# Patient Record
Sex: FEMALE | Race: WHITE | NOT HISPANIC OR LATINO | Employment: OTHER | URBAN - METROPOLITAN AREA
[De-identification: names, ages, dates, MRNs, and addresses within clinical notes are randomized per-mention and may not be internally consistent; named-entity substitution may affect disease eponyms.]

---

## 2017-01-01 ASSESSMENT — ENCOUNTER SYMPTOMS
STIFFNESS: 1
LOSS OF CONSCIOUSNESS: 0
DYSPNEA ON EXERTION: 1
DIZZINESS: 0
HEMOPTYSIS: 0
TREMORS: 0
POLYPHAGIA: 0
FEVER: 0
POSTURAL DYSPNEA: 0
INCREASED ENERGY: 1
POLYDIPSIA: 0
SNORES LOUDLY: 0
ALTERED TEMPERATURE REGULATION: 0
SPUTUM PRODUCTION: 1
DISTURBANCES IN COORDINATION: 0
WEIGHT LOSS: 0
MUSCLE CRAMPS: 0
TINGLING: 0
NUMBNESS: 0
CHILLS: 0
WHEEZING: 1
COUGH: 1
BACK PAIN: 1
JOINT SWELLING: 0
MUSCLE WEAKNESS: 1
ARTHRALGIAS: 1
SPEECH CHANGE: 0
DECREASED APPETITE: 1
SEIZURES: 0
HALLUCINATIONS: 0
COUGH DISTURBING SLEEP: 1
SHORTNESS OF BREATH: 1
PARALYSIS: 0
WEAKNESS: 0
MEMORY LOSS: 0
RESPIRATORY PAIN: 0
HEADACHES: 1
NIGHT SWEATS: 0
FATIGUE: 1
MYALGIAS: 1
WEIGHT GAIN: 0
NECK PAIN: 1

## 2017-01-03 DIAGNOSIS — M25.512 LEFT SHOULDER PAIN, UNSPECIFIED CHRONICITY: Primary | ICD-10-CM

## 2017-01-09 ENCOUNTER — OFFICE VISIT (OUTPATIENT)
Dept: ORTHOPEDICS | Facility: CLINIC | Age: 67
End: 2017-01-09

## 2017-01-09 VITALS — HEIGHT: 65 IN | BODY MASS INDEX: 21.02 KG/M2 | WEIGHT: 126.2 LBS

## 2017-01-09 DIAGNOSIS — M75.122 COMPLETE ROTATOR CUFF TEAR OF LEFT SHOULDER: ICD-10-CM

## 2017-01-09 DIAGNOSIS — M25.512 LEFT SHOULDER PAIN, UNSPECIFIED CHRONICITY: ICD-10-CM

## 2017-01-09 DIAGNOSIS — M25.512 LEFT SHOULDER PAIN, UNSPECIFIED CHRONICITY: Primary | ICD-10-CM

## 2017-01-09 LAB
BASOPHILS # BLD AUTO: 0.1 10E9/L (ref 0–0.2)
BASOPHILS NFR BLD AUTO: 0.6 %
CRP SERPL-MCNC: 3.7 MG/L (ref 0–8)
DIFFERENTIAL METHOD BLD: ABNORMAL
EOSINOPHIL # BLD AUTO: 0.1 10E9/L (ref 0–0.7)
EOSINOPHIL NFR BLD AUTO: 0.9 %
ERYTHROCYTE [DISTWIDTH] IN BLOOD BY AUTOMATED COUNT: 12.9 % (ref 10–15)
ERYTHROCYTE [SEDIMENTATION RATE] IN BLOOD BY WESTERGREN METHOD: 15 MM/H (ref 0–30)
HCT VFR BLD AUTO: 40.5 % (ref 35–47)
HGB BLD-MCNC: 13.8 G/DL (ref 11.7–15.7)
IMM GRANULOCYTES # BLD: 0 10E9/L (ref 0–0.4)
IMM GRANULOCYTES NFR BLD: 0.3 %
LYMPHOCYTES # BLD AUTO: 3.6 10E9/L (ref 0.8–5.3)
LYMPHOCYTES NFR BLD AUTO: 28.8 %
MCH RBC QN AUTO: 30.9 PG (ref 26.5–33)
MCHC RBC AUTO-ENTMCNC: 34.1 G/DL (ref 31.5–36.5)
MCV RBC AUTO: 91 FL (ref 78–100)
MONOCYTES # BLD AUTO: 0.7 10E9/L (ref 0–1.3)
MONOCYTES NFR BLD AUTO: 5.5 %
NEUTROPHILS # BLD AUTO: 8 10E9/L (ref 1.6–8.3)
NEUTROPHILS NFR BLD AUTO: 63.9 %
NRBC # BLD AUTO: 0 10*3/UL
NRBC BLD AUTO-RTO: 0 /100
PLATELET # BLD AUTO: 456 10E9/L (ref 150–450)
RBC # BLD AUTO: 4.47 10E12/L (ref 3.8–5.2)
WBC # BLD AUTO: 12.4 10E9/L (ref 4–11)

## 2017-01-09 NOTE — NURSING NOTE
Teaching Flowsheet   Relevant Diagnosis: Shoulder pain  Teaching Topic: Pre-op for left arthroscopic biopsies for culture - surgery coordinator will call patient to schedule     Person(s) involved in teaching:   Patient     Motivation Level:  Asks Questions: Yes  Eager to Learn: Yes  Cooperative: Yes  Receptive (willing/able to accept information): Yes  Any cultural factors/Gnosticism beliefs that may influence understanding or compliance? No     Patient demonstrates understanding of the following:  Reason for the appointment, diagnosis and treatment plan: Yes  Knowledge of proper use of medications and conditions for which they are ordered (with special attention to potential side effects or drug interactions): Yes  Which situations necessitate calling provider and whom to contact: Yes    Needs to work on smoking cessation.  Hold daily Amoxicillin x 2 weeks before surgery  Will contact PMD concerning pain medication management after surgery     Proper use and care of sling prn (medical equip, care aids, etc.): Yes  Nutritional needs and diet plan: Yes  Pain management techniques: Yes  Wound Care: Yes  How and/when to access community resources: Yes     Instructional Materials Used/Given: Pre-op packet, surgical soap     Time spent with patient: 12.
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
## 2017-01-11 ENCOUNTER — TELEPHONE (OUTPATIENT)
Dept: FAMILY MEDICINE | Facility: CLINIC | Age: 67
End: 2017-01-11

## 2017-01-11 NOTE — TELEPHONE ENCOUNTER
Reason for Call:  Other - Requesting Pre - Op Physical    Detailed comments: Patient called and stated she is having a shoulder surgery on 1/23/17 and she has to have pre op before then. She would like to know if there is any way Dr. Burger could fit her in. She said she is available Mondays all day and Wednesdays and Fridays before 2 pm.    Phone Number Patient can be reached at: Home number on file [phone] (home)    Best Time: Anytime    Can we leave a detailed message on this number? YES    Call taken on 1/11/2017 at 3:08 PM by Cass Andrade

## 2017-01-18 ENCOUNTER — OFFICE VISIT (OUTPATIENT)
Dept: FAMILY MEDICINE | Facility: CLINIC | Age: 67
End: 2017-01-18
Payer: COMMERCIAL

## 2017-01-18 VITALS
HEART RATE: 90 BPM | OXYGEN SATURATION: 98 % | WEIGHT: 126 LBS | BODY MASS INDEX: 20.99 KG/M2 | HEIGHT: 65 IN | SYSTOLIC BLOOD PRESSURE: 132 MMHG | TEMPERATURE: 98.5 F | DIASTOLIC BLOOD PRESSURE: 83 MMHG

## 2017-01-18 DIAGNOSIS — G89.29 CHRONIC LEFT SHOULDER PAIN: ICD-10-CM

## 2017-01-18 DIAGNOSIS — F17.200 TOBACCO USE DISORDER: ICD-10-CM

## 2017-01-18 DIAGNOSIS — M75.122 COMPLETE ROTATOR CUFF TEAR OF LEFT SHOULDER: ICD-10-CM

## 2017-01-18 DIAGNOSIS — Z01.818 PREOP GENERAL PHYSICAL EXAM: Primary | ICD-10-CM

## 2017-01-18 DIAGNOSIS — M25.512 CHRONIC LEFT SHOULDER PAIN: ICD-10-CM

## 2017-01-18 PROCEDURE — 99214 OFFICE O/P EST MOD 30 MIN: CPT | Performed by: FAMILY MEDICINE
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
## 2017-01-18 NOTE — MR AVS SNAPSHOT
After Visit Summary   1/18/2017    Beatriz Francis    MRN: [id number]           Patient Information     Date Of Birth          1950        Visit Information        Provider Department      1/18/2017 10:40 AM Azalia Burger MD [facility]        Today's Diagnoses     Preop general physical exam    -  1     Chronic left shoulder pain         Complete rotator cuff tear of left shoulder         Tobacco use disorder           Care Instructions      Before Your Surgery      Call your surgeon if there is any change in your health. This includes signs of a cold or flu (such as a sore throat, runny nose, cough, rash or fever).    Do not smoke, drink alcohol or take over the counter medicine (unless your surgeon or primary care doctor tells you to) for the 24 hours before and after surgery.    If you take prescribed drugs: Follow your doctor s orders about which medicines to take and which to stop until after surgery.    Eating and drinking prior to surgery: follow the instructions from your surgeon    Take a shower or bath the night before surgery. Use the soap your surgeon gave you to gently clean your skin. If you do not have soap from your surgeon, use your regular soap. Do not shave or scrub the surgery site.  Wear clean pajamas and have clean sheets on your bed.   Before Your Surgery    Call your surgeon if there is any change in your health. This includes signs of a cold or flu (such as a sore throat, runny nose, cough, rash or fever).  Do not smoke, drink alcohol or take over the counter medicine (unless your surgeon or primary care doctor tells you to) for the 24 hours before and after surgery.  If you take prescribed drugs: Follow your doctor s orders about which medicines to take and which to stop until after surgery.  Eating and drinking prior to surgery: follow the instructions from your surgeon  Take a shower or bath the night before surgery. Use the soap your  surgeon gave you to gently clean your skin. If you do not have soap from your surgeon, use your regular soap. Do not shave or scrub the surgery site.  Wear clean pajamas and have clean sheets on your bed.         Follow-ups after your visit        Your next 10 appointments already scheduled     Jan 23, 2017   Procedure with Ankit Morton MD   [facility] ([facility])    [address] [place] [address]  5th [facility] [id number] [phone]           Located in the Clinics and Surgery Center at [address] [place] [address].   parking is very convenient and highly recommended.  is a $6 flat rate fee; no tips accepted.  Both  and self parkers should enter the main arrival plaza from [facility]; parking attendants will direct you based on your parking preference.            Jan 23, 2017  3:30 PM   Test/Procedure with  Generic    Services Department ([facility])    [address] [place] [id number]               Feb 01, 2017 10:20 AM   SHORT with Azalia Burger MD   [facility] ([facility])    [address] [place] [id number] [phone]           Your Arrival times is X, We need you to be here at this time to get checked in and have the assistant get you ready for the provider, which can take about 15 minutes. Your appointment time with your provider is at  X. Thank you.            Feb 13, 2017 12:50 PM   (Arrive by 12:35 PM)   RETURN SHOULDER with Ankit Morton MD   [facility] ([facility])    [address] [place] [address]  4th [facility] [id number] [phone]              Who to contact     If you have questions or need follow up information about today's clinic visit or your schedule please contact [facility]  "[place] directly at [phone].  Normal or non-critical lab and imaging results will be communicated to you by MyChart, letter or phone within 4 business days after the clinic has received the results. If you do not hear from us within 7 days, please contact the clinic through [employer] or phone. If you have a critical or abnormal lab result, we will notify you by phone as soon as possible.  Submit refill requests through "[employer] or call your pharmacy and they will forward the refill request to us. Please allow 3 business days for your refill to be completed.          Additional Information About Your Visit        [employer] Information     "[employer] gives you secure access to your electronic health record. If you see a primary care provider, you can also send messages to your care team and make appointments. If you have questions, please call your primary care clinic.  If you do not have a primary care provider, please call [phone] and they will assist you.        Care EveryWhere ID     This is your Care EveryWhere ID. This could be used by other organizations to access your [facility] medical records  [id number]        Your Vitals Were     Pulse Temperature Height BMI (Body Mass Index) Pulse Oximetry Breastfeeding?    90 98.5  F (36.9  C) (Oral) 5' 5\" (1.651 m) 20.97 kg/m2 98% No       Blood Pressure from Last 3 Encounters:   01/18/17 132/83   11/15/16 119/79   10/05/16 139/92    Weight from Last 3 Encounters:   01/18/17 126 lb (57.153 kg)   01/09/17 126 lb 3.2 oz (57.244 kg)   11/15/16 124 lb (56.246 kg)              Today, you had the following     No orders found for display       Primary Care Provider Office Phone # Fax #    Azalia Burger [phone]       [facility] [address] [facility] [address]        Thank you!     Thank you for choosing [facility]  for your care. Our goal is always to provide you with excellent care. Hearing back " from our patients is one way we can continue to improve our services. Please take a few minutes to complete the written survey that you may receive in the mail after your visit with us. Thank you!             Your Updated Medication List - Protect others around you: Learn how to safely use, store and throw away your medicines at www.disposemymeds.org.          This list is accurate as of: 1/18/17 11:46 AM.  Always use your most recent med list.                   Brand Name Dispense Instructions for use    * albuterol (2.5 MG/3ML) 0.083% neb solution     3 Box    Take 1 vial (2.5 mg) by nebulization every 6 hours as needed for shortness of breath / dyspnea or wheezing       * albuterol 108 (90 BASE) MCG/ACT Inhaler    VENTOLIN HFA    6 Inhaler    Inhale 2 puffs into the lungs every 6 hours Please dispense a 3 month supply.       amoxicillin 500 MG capsule    AMOXIL    90 capsule    Take 1 capsule (500 mg) by mouth daily       fluticasone 220 MCG/ACT Inhaler    FLOVENT HFA    3 Inhaler    Inhale 2 puffs into the lungs 2 times daily       HYDROcodone-acetaminophen 5-325 MG per tablet   Start taking on:  2/4/2017    NORCO    60 tablet    Take 1 tablet by mouth every 6 hours as needed for pain May fill on or after 2/4/17       ibuprofen 200 MG tablet   Generic drug:  ibuprofen      1 TABLET EVERY 4 TO 6 HOURS AS NEEDED       methocarbamol 500 MG tablet    ROBAXIN    90 tablet    Take 1-2 tablets (500-1,000 mg) by mouth 3 times daily as needed for muscle spasms       omeprazole 20 MG tablet     90 tablet    Take 1 tablet (20 mg) by mouth daily Take 30-60 minutes before a meal.       tiotropium 18 MCG capsule    SPIRIVA HANDIHALER    90 capsule    Inhale 1 capsule (18 mcg) into the lungs daily       zolpidem 5 MG tablet    AMBIEN    30 tablet    Take 1 tablet (5 mg) by mouth nightly as needed for sleep       * Notice:  This list has 2 medication(s) that are the same as other medications prescribed for you. Read the  directions carefully, and ask your doctor or other care provider to review them with you.

## 2017-01-18 NOTE — NURSING NOTE
"Chief Complaint   Patient presents with     Pre-Op Exam     DOS: 1/23/2017     Pre Visit Planning - Done       Initial /83 mmHg  Pulse 90  Temp(Src) 98.5  F (36.9  C) (Oral)  Ht 5' 5\" (1.651 m)  Wt 126 lb (57.153 kg)  BMI 20.97 kg/m2  SpO2 98%  Breastfeeding? No Estimated body mass index is 20.97 kg/(m^2) as calculated from the following:    Height as of this encounter: 5' 5\" (1.651 m).    Weight as of this encounter: 126 lb (57.153 kg).  BP completed using cuff size: regular  Shanti Harrington CMA ([facility])      "

## 2017-01-22 ENCOUNTER — ANESTHESIA EVENT (OUTPATIENT)
Dept: SURGERY | Facility: AMBULATORY SURGERY CENTER | Age: 67
End: 2017-01-22

## 2017-01-23 ENCOUNTER — ANESTHESIA (OUTPATIENT)
Dept: SURGERY | Facility: AMBULATORY SURGERY CENTER | Age: 67
End: 2017-01-23

## 2017-01-23 ENCOUNTER — HOSPITAL ENCOUNTER (OUTPATIENT)
Facility: AMBULATORY SURGERY CENTER | Age: 67
End: 2017-01-23
Attending: ORTHOPAEDIC SURGERY

## 2017-01-23 VITALS
RESPIRATION RATE: 14 BRPM | HEART RATE: 103 BPM | DIASTOLIC BLOOD PRESSURE: 76 MMHG | WEIGHT: 126 LBS | HEIGHT: 65 IN | BODY MASS INDEX: 20.99 KG/M2 | TEMPERATURE: 97.9 F | OXYGEN SATURATION: 97 % | SYSTOLIC BLOOD PRESSURE: 111 MMHG

## 2017-01-23 RX ORDER — FENTANYL CITRATE 50 UG/ML
25-50 INJECTION, SOLUTION INTRAMUSCULAR; INTRAVENOUS
Status: DISCONTINUED | OUTPATIENT
Start: 2017-01-23 | End: 2017-01-23 | Stop reason: HOSPADM

## 2017-01-23 RX ORDER — ACETAMINOPHEN 325 MG/1
975 TABLET ORAL ONCE
Status: COMPLETED | OUTPATIENT
Start: 2017-01-23 | End: 2017-01-23

## 2017-01-23 RX ORDER — OXYCODONE AND ACETAMINOPHEN 5; 325 MG/1; MG/1
1-2 TABLET ORAL EVERY 4 HOURS PRN
Qty: 30 TABLET | Refills: 0 | Status: SHIPPED | OUTPATIENT
Start: 2017-01-23 | End: 2017-02-13

## 2017-01-23 RX ORDER — BUPIVACAINE HYDROCHLORIDE AND EPINEPHRINE 5; 5 MG/ML; UG/ML
INJECTION, SOLUTION PERINEURAL PRN
Status: DISCONTINUED | OUTPATIENT
Start: 2017-01-23 | End: 2017-01-23

## 2017-01-23 RX ORDER — HYDROXYZINE PAMOATE 25 MG/1
25 CAPSULE ORAL 3 TIMES DAILY PRN
Qty: 30 CAPSULE | Refills: 0 | Status: SHIPPED | OUTPATIENT
Start: 2017-01-23 | End: 2017-05-10

## 2017-01-23 RX ORDER — NALOXONE HYDROCHLORIDE 0.4 MG/ML
.1-.4 INJECTION, SOLUTION INTRAMUSCULAR; INTRAVENOUS; SUBCUTANEOUS
Status: DISCONTINUED | OUTPATIENT
Start: 2017-01-23 | End: 2017-01-23 | Stop reason: HOSPADM

## 2017-01-23 RX ORDER — NALOXONE HYDROCHLORIDE 0.4 MG/ML
.1-.4 INJECTION, SOLUTION INTRAMUSCULAR; INTRAVENOUS; SUBCUTANEOUS
Status: DISCONTINUED | OUTPATIENT
Start: 2017-01-23 | End: 2017-01-24 | Stop reason: HOSPADM

## 2017-01-23 RX ORDER — SODIUM CHLORIDE, SODIUM LACTATE, POTASSIUM CHLORIDE, CALCIUM CHLORIDE 600; 310; 30; 20 MG/100ML; MG/100ML; MG/100ML; MG/100ML
INJECTION, SOLUTION INTRAVENOUS CONTINUOUS
Status: DISCONTINUED | OUTPATIENT
Start: 2017-01-23 | End: 2017-01-24 | Stop reason: HOSPADM

## 2017-01-23 RX ORDER — ONDANSETRON 4 MG/1
4 TABLET, ORALLY DISINTEGRATING ORAL EVERY 30 MIN PRN
Status: DISCONTINUED | OUTPATIENT
Start: 2017-01-23 | End: 2017-01-24 | Stop reason: HOSPADM

## 2017-01-23 RX ORDER — FENTANYL CITRATE 50 UG/ML
INJECTION, SOLUTION INTRAMUSCULAR; INTRAVENOUS PRN
Status: DISCONTINUED | OUTPATIENT
Start: 2017-01-23 | End: 2017-01-23

## 2017-01-23 RX ORDER — ONDANSETRON 2 MG/ML
4 INJECTION INTRAMUSCULAR; INTRAVENOUS EVERY 30 MIN PRN
Status: DISCONTINUED | OUTPATIENT
Start: 2017-01-23 | End: 2017-01-24 | Stop reason: HOSPADM

## 2017-01-23 RX ORDER — SODIUM CHLORIDE, SODIUM LACTATE, POTASSIUM CHLORIDE, CALCIUM CHLORIDE 600; 310; 30; 20 MG/100ML; MG/100ML; MG/100ML; MG/100ML
INJECTION, SOLUTION INTRAVENOUS CONTINUOUS
Status: DISCONTINUED | OUTPATIENT
Start: 2017-01-23 | End: 2017-01-23 | Stop reason: HOSPADM

## 2017-01-23 RX ORDER — OXYCODONE HYDROCHLORIDE 5 MG/1
5 TABLET ORAL
Status: DISCONTINUED | OUTPATIENT
Start: 2017-01-23 | End: 2017-01-24 | Stop reason: HOSPADM

## 2017-01-23 RX ORDER — FLUMAZENIL 0.1 MG/ML
0.2 INJECTION, SOLUTION INTRAVENOUS
Status: DISCONTINUED | OUTPATIENT
Start: 2017-01-23 | End: 2017-01-23 | Stop reason: HOSPADM

## 2017-01-23 RX ORDER — CEFAZOLIN SODIUM 1 G/3ML
1 INJECTION, POWDER, FOR SOLUTION INTRAMUSCULAR; INTRAVENOUS SEE ADMIN INSTRUCTIONS
Status: DISCONTINUED | OUTPATIENT
Start: 2017-01-23 | End: 2017-01-23 | Stop reason: HOSPADM

## 2017-01-23 RX ORDER — GABAPENTIN 300 MG/1
300 CAPSULE ORAL ONCE
Status: COMPLETED | OUTPATIENT
Start: 2017-01-23 | End: 2017-01-23

## 2017-01-23 RX ORDER — PROPOFOL 10 MG/ML
INJECTION, EMULSION INTRAVENOUS CONTINUOUS PRN
Status: DISCONTINUED | OUTPATIENT
Start: 2017-01-23 | End: 2017-01-23

## 2017-01-23 RX ADMIN — GABAPENTIN 300 MG: 300 CAPSULE ORAL at 14:49

## 2017-01-23 RX ADMIN — SODIUM CHLORIDE, SODIUM LACTATE, POTASSIUM CHLORIDE, CALCIUM CHLORIDE: 600; 310; 30; 20 INJECTION, SOLUTION INTRAVENOUS at 14:49

## 2017-01-23 RX ADMIN — FENTANYL CITRATE 50 MCG: 50 INJECTION, SOLUTION INTRAMUSCULAR; INTRAVENOUS at 15:43

## 2017-01-23 RX ADMIN — FENTANYL CITRATE 50 MCG: 50 INJECTION, SOLUTION INTRAMUSCULAR; INTRAVENOUS at 15:00

## 2017-01-23 RX ADMIN — PROPOFOL 100 MCG/KG/MIN: 10 INJECTION, EMULSION INTRAVENOUS at 15:27

## 2017-01-23 RX ADMIN — BUPIVACAINE HYDROCHLORIDE AND EPINEPHRINE 15 ML: 5; 5 INJECTION, SOLUTION PERINEURAL at 15:01

## 2017-01-23 RX ADMIN — FENTANYL CITRATE 50 MCG: 50 INJECTION, SOLUTION INTRAMUSCULAR; INTRAVENOUS at 15:31

## 2017-01-23 RX ADMIN — ACETAMINOPHEN 975 MG: 325 TABLET ORAL at 14:49

## 2017-01-23 ASSESSMENT — LIFESTYLE VARIABLES: TOBACCO_USE: 1

## 2017-01-23 ASSESSMENT — COPD QUESTIONNAIRES
COPD: 1
CAT_SEVERITY: MODERATE

## 2017-01-23 NOTE — ANESTHESIA PREPROCEDURE EVALUATION
Anesthesia Evaluation     . Pt has had prior anesthetic. Type: General    No history of anesthetic complications     ROS/MED HX    ENT/Pulmonary:     (+)tobacco use, moderate COPD, , . .    Neurologic:     (+)other neuro Insomnia    Cardiovascular:     (+) Dyslipidemia, ----. : . . . :. . Previous cardiac testing date:results:date: results:ECG reviewed date:6/30/2016 results:NSR= 79. No acute changes. date: results:          METS/Exercise Tolerance:     Hematologic:     (+) Other Hematologic Disorder-Lupus. Rx; Prednisone 20 mg/day      Musculoskeletal: Comment: Left Shoulder Rotator Cuff Tear, AC Arthrosis  (+) arthritis, , , -       GI/Hepatic:     (+) GERD Asymptomatic on medication,       Renal/Genitourinary:  - ROS Renal section negative       Endo:  - neg endo ROS       Psychiatric:     (+) psychiatric history depression      Infectious Disease:  - neg infectious disease ROS       Malignancy:      - no malignancy   Other: Comment: S/P Hysterectomy   (+) No chance of pregnancy C-spine cleared: N/A, H/O Chronic Pain,H/O chronic opiod use ,              Physical Exam      Airway   Mallampati: I  TM distance: >3 FB  Neck ROM: full    Dental   (+) caps    Cardiovascular   Rhythm and rate: regular and normal      Pulmonary (+) rhonchi                           Anesthesia Plan      History & Physical Review  History and physical reviewed and following examination; no interval change.    ASA Status:  3 .    NPO Status:  > 8 hours    Plan for General and Peripheral Nerve Block for post-op pain at surgeon's request (General with native airway) with Intravenous and Propofol induction. Maintenance will be TIVA.    PONV prophylaxis:  Ondansetron (or other 5HT-3)       Postoperative Care  Postoperative pain management:  Oral pain medications and Peripheral nerve block (Single Shot).      Consents  Anesthetic plan, risks, benefits and alternatives discussed with:  Patient or representative..                          .

## 2017-01-23 NOTE — OR NURSING
Left interscalene block with bupivacaine for post operative pain control.  Tolerated procedure well.  Versed 1 mg and Fentanyl 50 mcg given.

## 2017-01-23 NOTE — ANESTHESIA PROCEDURE NOTES
Peripheral Nerve Block Procedure Note    Staff:     Anesthesiologist:  FRANCESCO BUI  Location: Pre-op  Procedure Start/Stop TImes:      1/23/2017 2:50 PM     1/23/2017 3:00 PM    patient identified, IV checked, site marked, risks and benefits discussed, informed consent, monitors and equipment checked, pre-op evaluation, at physician/surgeon's request and post-op pain management      Correct Patient: Yes      Correct Position: Yes      Correct Site: Yes      Correct Procedure: Yes      Correct Laterality:  Yes    Site Marked:  Yes  Procedure details:     Procedure:  Interscalene    ASA:  2    Laterality:  Left    Position:  Supine    Sterile Prep: chloraprep, mask and sterile gloves      Local skin infiltration:  None    Needle:  Short bevel and insulated    Needle gauge:  21    Needle length (inches):  4    Ultrasound: Yes      Ultrasound used to identify targeted nerve, plexus, or vascular structure and placed a needle adjacent to it      Permanent Image entered into patiient's record      Abnormal pain on injection: No      Blood Aspirated: No      Paresthesias:  No    Bleeding at site: No      Bolus via:  Needle    Infusion Method:  Single Shot    Complications:  None  Assessment/Narrative:     Injection made incrementally with aspirations every (mL):  5     The risks, benefits, and alternatives of the procedure were explained to the patient including bleeding, infection, and nerve injury. The patient consents to proceed. All questions were answered.

## 2017-01-23 NOTE — ANESTHESIA CARE TRANSFER NOTE
Patient: Beatriz Francis    ARTHROSCOPY SHOULDER (Left Shoulder)  Additional InformationProcedure(s):  Left Arthroscopic Biopsies for Culture       (Choice Anesthesia)   - Wound Class: I-Clean    Diagnosis: Shoulder Pain  Diagnosis Additional Information: No value filed.    Anesthesia Type:   No value filed.     Note:  Airway :Room Air  Patient transferred to:Phase II  Comments: Arrive Phase II, Stable, Airway Intact  110/77, 92,16,97,98.3        Vitals: (Last set prior to Anesthesia Care Transfer)              Electronically Signed By: ALEJANDRO Crandall CRNA  January 23, 2017  4:03 PM

## 2017-01-23 NOTE — ANESTHESIA POSTPROCEDURE EVALUATION
Patient: Beatriz Fracnis    ARTHROSCOPY SHOULDER (Left Shoulder)  Additional InformationProcedure(s):  Left Arthroscopic Biopsies for Culture       (Choice Anesthesia)   - Wound Class: I-Clean    Diagnosis:Shoulder Pain  Diagnosis Additional Information: No value filed.    Anesthesia Type:  General, Peripheral Nerve Block    Note:  Anesthesia Post Evaluation    Patient location during evaluation: PACU  Patient participation: Able to fully participate in evaluation  Level of consciousness: awake and alert  Pain management: adequate  Airway patency: patent  Cardiovascular status: hemodynamically stable  Respiratory status: nonlabored ventilation, spontaneous ventilation and room air  Hydration status: euvolemic  PONV: none     Anesthetic complications: None          Last vitals:  Filed Vitals:    01/23/17 1500 01/23/17 1604 01/23/17 1609   BP: 125/84 110/77 111/76   Pulse:      Temp:  36.3  C (97.3  F) 36.6  C (97.9  F)   Resp: 14 16 14   SpO2: 99% 98% 97%       Electronically Signed By: Anup Friend MD  January 23, 2017  4:53 PM

## 2017-01-23 NOTE — IP AVS SNAPSHOT
[facility]    [address] [place] [id number]    Phone:  [phone]    Fax:  [phone]                                       After Visit Summary   1/23/2017    Beatriz Francis    MRN: [id number]           After Visit Summary Signature Page     I have received my discharge instructions, and my questions have been answered. I have discussed any challenges I see with this plan with the nurse or doctor.    ..........................................................................................................................................  Patient/Patient Representative Signature      ..........................................................................................................................................  Patient Representative Print Name and Relationship to Patient    ..................................................               ................................................  Date                                            Time    ..........................................................................................................................................  Reviewed by Signature/Title    ...................................................              ..............................................  Date                                                            Time

## 2017-01-23 NOTE — DISCHARGE INSTRUCTIONS
[facility]  Home Care Following Anesthesia  For 24 hours after surgery:  1. Get plenty of rest.  A responsible adult must stay with you for at least 24 hours after you leave the surgery center.  2. Do not drive or use heavy equipment.  If you have weakness or tingling, don't drive or use heavy equipment until this feeling goes away.   3. Do not drink alcohol.   4. Avoid strenuous or risky activities.  Ask for help when climbing stairs.  5. You may feel lightheaded.  IF so, sit for a few minutes before standing.  Have someone help you get up.   6. If you have nausea (feel sick to your stomach): Drink only clear liquids such as apple juice, ginger ale, broth or 7-Up.  Rest may also help.  Be sure to drink enough fluids.  Move to a regular diet as you feel able.   7. You may have a slight fever.  Call the doctor if your fever is over 100 F (37.7 C) (taken under the tongue) or lasts longer than 24 hours.  8. You may have a dry mouth, a sore throat, muscle aches or trouble sleeping. These should go away after 24 hours.  9. Do not make important or legal decisions.   If you are female and have had general anesthesia you may have received a medication that inhibits the effectiveness of oral contraceptives.  We suggest you use a backup method of contraception for the next 7 days if this applies to you.  Tips for taking pain medications  To get the best pain relief possible, remember these points:    Take pain medications as directed, before pain becomes severe.    Pain medication can upset your stomach: taking it with food may help.    Constipation is a common side effect of pain medication. Drink plenty of  fluids.    Eat foods high in fiber. Take a stool softener if recommended by your doctor or pharmacist.    Do not drink alcohol, drive or operate machinery while taking pain medications.    Ask about other ways to control pain, such as with heat, ice or relaxation.    Call a doctor  for any of the followin. Signs of infection (fever, growing tenderness at the surgery site, a large amount of drainage or bleeding, severe pain, foul-smelling drainage, redness, swelling).  2. It has been over 8 to 10 hours since surgery and you are still not able to urinate (pass water).  3. Headache for over 24 hours.      Your doctor is:  Dr. Catarino Morton, Orthopaedics: [phone]               Or dial [phone] and ask for the resident on call for:  Orthopaedics  For emergency care, call the:  [facility]:  [phone] (TTY for hearing impaired: [phone])                   You have been prescribed a narcotic pain reliever that contains Tylenol/acetaminophen.      If you feel your pain relief is insufficient, you may take Tylenol/acetaminophen in addition to your narcotic pain medication.       Be careful not to exceed 3,000 mg of Tylenol/acetaminophen in a 24 hour period.      If you are taking extra strength Tylenol/acetaminophen (500 mg), the maximum dose is 6 tablets in 24 hours.      If you are taking regular strength acetaminophen (325 mg), the maximum dose is 9 tablets in 24 hours.    Discharge Instructions: Arthroscopy of the Shoulder    Diet    You have no restrictions in your diet.    During the evening following surgery, drink plenty of fluids and eat a light supper.   Nausea    The anesthesia you received may produce some nausea.     If nauseated, stay in bed, keep your head down, and try drinking fluids such as 7-up, tea, or soup.  Discomfort    The amount of discomfort you feel is very unpredictable.    If you have pain that cannot be controlled with the prescription you may have been given, you should notify your doctor.     Some residual swelling and discomfort may occur for one or two weeks.    Applying an ice pack for 10 minutes at a time may help relieve pain and reduce swelling.     When applying the ice, be sure your shoulder dressing does not get wet. You  can place plastic over the shoulder prior to applying the ice pack.   Drainage    You may expect drainage from the incisions on your shoulder.    Change the outer dressing in 3 days.    You may change the Band-Aids if they get soiled or wet. If you have white steri-strips across the incisions, leave them in place. They will fall off on their own in 7-10 days.   Activity    You can move your shoulder as tolerated after surgery.     Wear sling if instructed by your doctor.    No active sports, rotating of the shoulder or heavy lifting are advised for one week after surgery.    You may not drive the day of surgery because of the effects of anesthesia.     You can go back to work or school provided no problems such as significant increase or pain/swelling arise.     You may shower the day after surgery. Do not rub the incisions and pat dry.  Call your doctor if:    You have a large amount of bleeding drainage or severe pain.

## 2017-01-23 NOTE — BRIEF OP NOTE
Pre-op Dx:    Shoulder Painful shoulder after surgery    Post-op Dx:   Same           Procedure: Shoulder Arthroscopic Biopsy for Culture    Surgeon:   Ankit Morton MD    Assistant: None    Anaesthesia:   ISB + Sedation    Findings:       Dictated    EBL:      < 25 cc    Specimens: Shoulder 1-5.    Complications:  No immediate.    Plan:                   ** Codman's 2x/day.    ** Discharge to home.    ** Follow up 21 days.    ** Sling may be discontinued when patient comfortable.    ** May shower at POD #4 after dressing removed.    ** NSAIDs are ok if needed for pain control.

## 2017-01-23 NOTE — IP AVS SNAPSHOT
MRN:[id number]                      After Visit Summary   1/23/2017    Beatriz Francis    MRN: [id number]           Thank you!     Thank you for choosing [facility] for your care. Our goal is always to provide you with excellent care. Hearing back from our patients is one way we can continue to improve our services. Please take a few minutes to complete the written survey that you may receive in the mail after you visit with us. Thank you!        Patient Information     Date Of Birth          1950        About your hospital stay     You were admitted on:  January 23, 2017 [facility]    You were discharged on:  January 23, 2017       Who to Call     For medical emergencies, please call 911.  For non-urgent questions about your medical care, please call your primary care provider or clinic, [phone]  For questions related to your surgery, please call your surgery clinic        Attending Provider     Provider    Ankit Morton MD       Primary Care Provider Office Phone # Fax #    Azalia Burger [phone]       [address] [address]        After Care Instructions     Discharge Instructions       Dr. Morton's Discharge Instructions: Shoulder Arthroscopy     Activities: You will be provided with a sling. Wear the sling for comfort only.  You may stop using the sling when you are comfortable doing so.Your surgeon will let you know if there are any additional recommendations. An ice pack may be used for pain relief, along with the prescription pain pills that were prescribed.    You will be taught Codman's (pendulum) exercises.  Please do these twice per day.    Do Not Take Anti-inflammatories/NSAIDs (Ibuprofen, Aleve, Motrin, etc.)    Wound Care: You may remove your dressings in 4 days and shower. You may wet the wounds in the shower, but do not take  baths. Redress the wounds with bandaids. Leave the steri strips (sticky tapes) in place.     Follow-up within 10-14 days. Call for an appointment. [phone] ([facility] or [facility]) or [phone] ([facility]).                  Your next 10 appointments already scheduled     Feb 13, 2017 12:50 PM   (Arrive by 12:35 PM)   RETURN SHOULDER with Ankit Morton MD   [facility] ([facility])    [address] [place] [address]  4th [facility] [address] [phone]              Further instructions from your care team       [facility]  Home Care Following Anesthesia  For 24 hours after surgery:  1. Get plenty of rest.  A responsible adult must stay with you for at least 24 hours after you leave the surgery center.  2. Do not drive or use heavy equipment.  If you have weakness or tingling, don't drive or use heavy equipment until this feeling goes away.   3. Do not drink alcohol.   4. Avoid strenuous or risky activities.  Ask for help when climbing stairs.  5. You may feel lightheaded.  IF so, sit for a few minutes before standing.  Have someone help you get up.   6. If you have nausea (feel sick to your stomach): Drink only clear liquids such as apple juice, ginger ale, broth or 7-Up.  Rest may also help.  Be sure to drink enough fluids.  Move to a regular diet as you feel able.   7. You may have a slight fever.  Call the doctor if your fever is over 100 F (37.7 C) (taken under the tongue) or lasts longer than 24 hours.  8. You may have a dry mouth, a sore throat, muscle aches or trouble sleeping. These should go away after 24 hours.  9. Do not make important or legal decisions.   If you are female and have had general anesthesia you may have received a medication that inhibits the effectiveness of oral contraceptives.  We suggest you use a backup method of contraception for the next 7 days if this applies to you.  Tips for  taking pain medications  To get the best pain relief possible, remember these points:    Take pain medications as directed, before pain becomes severe.    Pain medication can upset your stomach: taking it with food may help.    Constipation is a common side effect of pain medication. Drink plenty of  fluids.    Eat foods high in fiber. Take a stool softener if recommended by your doctor or pharmacist.    Do not drink alcohol, drive or operate machinery while taking pain medications.    Ask about other ways to control pain, such as with heat, ice or relaxation.    Call a doctor for any of the followin. Signs of infection (fever, growing tenderness at the surgery site, a large amount of drainage or bleeding, severe pain, foul-smelling drainage, redness, swelling).  2. It has been over 8 to 10 hours since surgery and you are still not able to urinate (pass water).  3. Headache for over 24 hours.      Your doctor is:  Dr. Catarino Morton, Orthopaedics: [phone]               Or dial [phone] and ask for the resident on call for:  Orthopaedics  For emergency care, call the:  [facility]:  [phone] (TTY for hearing impaired: [phone])                   You have been prescribed a narcotic pain reliever that contains Tylenol/acetaminophen.      If you feel your pain relief is insufficient, you may take Tylenol/acetaminophen in addition to your narcotic pain medication.       Be careful not to exceed 3,000 mg of Tylenol/acetaminophen in a 24 hour period.      If you are taking extra strength Tylenol/acetaminophen (500 mg), the maximum dose is 6 tablets in 24 hours.      If you are taking regular strength acetaminophen (325 mg), the maximum dose is 9 tablets in 24 hours.    Discharge Instructions: Arthroscopy of the Shoulder    Diet    You have no restrictions in your diet.    During the evening following surgery, drink plenty of fluids and eat a light supper.   Nausea    The anesthesia  "you received may produce some nausea.     If nauseated, stay in bed, keep your head down, and try drinking fluids such as 7-up, tea, or soup.  Discomfort    The amount of discomfort you feel is very unpredictable.    If you have pain that cannot be controlled with the prescription you may have been given, you should notify your doctor.     Some residual swelling and discomfort may occur for one or two weeks.    Applying an ice pack for 10 minutes at a time may help relieve pain and reduce swelling.     When applying the ice, be sure your shoulder dressing does not get wet. You can place plastic over the shoulder prior to applying the ice pack.   Drainage    You may expect drainage from the incisions on your shoulder.    Change the outer dressing in 3 days.    You may change the Band-Aids if they get soiled or wet. If you have white steri-strips across the incisions, leave them in place. They will fall off on their own in 7-10 days.   Activity    You can move your shoulder as tolerated after surgery.     Wear sling if instructed by your doctor.    No active sports, rotating of the shoulder or heavy lifting are advised for one week after surgery.    You may not drive the day of surgery because of the effects of anesthesia.     You can go back to work or school provided no problems such as significant increase or pain/swelling arise.     You may shower the day after surgery. Do not rub the incisions and pat dry.  Call your doctor if:    You have a large amount of bleeding drainage or severe pain.                       Pending Results     No orders found from 1/22/2017 to 1/24/2017.            Admission Information        Provider Department Dept Phone    1/23/2017 Ankit Morton MD  [facility] [phone]      Your Vitals Were     Blood Pressure Pulse Temperature    111/76 mmHg 103 97.9  F (36.6  C) (Temporal)    Respirations Height Weight    14 1.651 m (5' 5\") 57.153 kg (126 lb)    BMI (Body Mass Index) " Pulse Oximetry       20.97 kg/m2 97%       [employer] Information     [employer] gives you secure access to your electronic health record. If you see a primary care provider, you can also send messages to your care team and make appointments. If you have questions, please call your primary care clinic.  If you do not have a primary care provider, please call [phone] and they will assist you.      [employer] is an electronic gateway that provides easy, online access to your medical records. With [employer], you can request a clinic appointment, read your test results, renew a prescription or communicate with your care team.     To access your existing account, please contact your [facility] or call [phone] for assistance.        Care EveryWhere ID     This is your Care EveryWhere ID. This could be used by other organizations to access your [facility] medical records  [id number]           Review of your medicines      START taking        Dose / Directions    hydrOXYzine 25 MG capsule   Commonly known as:  VISTARIL        Dose:  25 mg   Take 1 capsule (25 mg) by mouth 3 times daily as needed for itching   Quantity:  30 capsule   Refills:  0       oxyCODONE-acetaminophen 5-325 MG per tablet   Commonly known as:  PERCOCET        Dose:  1-2 tablet   Take 1-2 tablets by mouth every 4 hours as needed for pain (moderate to severe)   Quantity:  30 tablet   Refills:  0         CONTINUE these medicines which have NOT CHANGED        Dose / Directions    * albuterol (2.5 MG/3ML) 0.083% neb solution   Used for:  COPD (chronic obstructive pulmonary disease) (H)        Dose:  1 vial   Take 1 vial (2.5 mg) by nebulization every 6 hours as needed for shortness of breath / dyspnea or wheezing   Quantity:  3 Box   Refills:  6       * albuterol 108 (90 BASE) MCG/ACT Inhaler   Commonly known as:  VENTOLIN HFA   Used for:  COPD (chronic obstructive pulmonary disease) (H)        Dose:  2 puff   Inhale 2 puffs  into the lungs every 6 hours Please dispense a 3 month supply.   Quantity:  6 Inhaler   Refills:  1       ALEVE PO        Dose:  1 tablet   Take 1 tablet by mouth daily as needed for moderate pain   Refills:  0       amoxicillin 500 MG capsule   Commonly known as:  AMOXIL   Used for:  Acne, unspecified acne type        Dose:  500 mg   Take 1 capsule (500 mg) by mouth daily   Quantity:  90 capsule   Refills:  3       fluticasone 220 MCG/ACT Inhaler   Commonly known as:  FLOVENT HFA   Used for:  COPD (chronic obstructive pulmonary disease) (H)        Dose:  2 puff   Inhale 2 puffs into the lungs 2 times daily   Quantity:  3 Inhaler   Refills:  3       HYDROcodone-acetaminophen 5-325 MG per tablet   Commonly known as:  NORCO   Used for:  Chronic neck pain, Chronic pain syndrome        Dose:  1 tablet   Start taking on:  2/4/2017   Take 1 tablet by mouth every 6 hours as needed for pain May fill on or after 2/4/17   Quantity:  60 tablet   Refills:  0       methocarbamol 500 MG tablet   Commonly known as:  ROBAXIN   Used for:  Chronic pain syndrome, Bilateral shoulder pain        Dose:  500-1000 mg   Take 1-2 tablets (500-1,000 mg) by mouth 3 times daily as needed for muscle spasms   Quantity:  90 tablet   Refills:  1       omeprazole 20 MG tablet   Used for:  Gastroesophageal reflux disease without esophagitis        Dose:  20 mg   Take 1 tablet (20 mg) by mouth daily Take 30-60 minutes before a meal.   Quantity:  90 tablet   Refills:  3       tiotropium 18 MCG capsule   Commonly known as:  SPIRIVA HANDIHALER   Used for:  Pulmonary emphysema, unspecified emphysema type (H)        Dose:  18 mcg   Inhale 1 capsule (18 mcg) into the lungs daily   Quantity:  90 capsule   Refills:  3       zolpidem 5 MG tablet   Commonly known as:  AMBIEN   Used for:  Insomnia, unspecified type        Dose:  5 mg   Take 1 tablet (5 mg) by mouth nightly as needed for sleep   Quantity:  30 tablet   Refills:  5       * Notice:  This list has 2  medication(s) that are the same as other medications prescribed for you. Read the directions carefully, and ask your doctor or other care provider to review them with you.         Where to get your medicines      Some of these will need a paper prescription and others can be bought over the counter. Ask your nurse if you have questions.     Bring a paper prescription for each of these medications    - hydrOXYzine 25 MG capsule  - oxyCODONE-acetaminophen 5-325 MG per tablet             Protect others around you: Learn how to safely use, store and throw away your medicines at www.disposemymeds.org.             Medication List: This is a list of all your medications and when to take them. Check marks below indicate your daily home schedule. Keep this list as a reference.      Medications           Morning Afternoon Evening Bedtime As Needed    * albuterol (2.5 MG/3ML) 0.083% neb solution   Take 1 vial (2.5 mg) by nebulization every 6 hours as needed for shortness of breath / dyspnea or wheezing                                * albuterol 108 (90 BASE) MCG/ACT Inhaler   Commonly known as:  VENTOLIN HFA   Inhale 2 puffs into the lungs every 6 hours Please dispense a 3 month supply.                                ALEVE PO   Take 1 tablet by mouth daily as needed for moderate pain                                amoxicillin 500 MG capsule   Commonly known as:  AMOXIL   Take 1 capsule (500 mg) by mouth daily                                fluticasone 220 MCG/ACT Inhaler   Commonly known as:  FLOVENT HFA   Inhale 2 puffs into the lungs 2 times daily                                HYDROcodone-acetaminophen 5-325 MG per tablet   Commonly known as:  NORCO   Take 1 tablet by mouth every 6 hours as needed for pain May fill on or after 2/4/17   Start taking on:  2/4/2017                                hydrOXYzine 25 MG capsule   Commonly known as:  VISTARIL   Take 1 capsule (25 mg) by mouth 3 times daily as needed for itching                                 methocarbamol 500 MG tablet   Commonly known as:  ROBAXIN   Take 1-2 tablets (500-1,000 mg) by mouth 3 times daily as needed for muscle spasms                                omeprazole 20 MG tablet   Take 1 tablet (20 mg) by mouth daily Take 30-60 minutes before a meal.                                oxyCODONE-acetaminophen 5-325 MG per tablet   Commonly known as:  PERCOCET   Take 1-2 tablets by mouth every 4 hours as needed for pain (moderate to severe)                                tiotropium 18 MCG capsule   Commonly known as:  SPIRIVA HANDIHALER   Inhale 1 capsule (18 mcg) into the lungs daily                                zolpidem 5 MG tablet   Commonly known as:  AMBIEN   Take 1 tablet (5 mg) by mouth nightly as needed for sleep                                * Notice:  This list has 2 medication(s) that are the same as other medications prescribed for you. Read the directions carefully, and ask your doctor or other care provider to review them with you.              More Information        Pendulum Exercise for Use with Shoulder Repair Surgeries  Stretching exercises for your shoulder, such as the pendulum exercise, can improve flexibility, increase range of motion, and reduce pain. Your doctor or physical therapist has recommended the pendulum exercise to help speed your recovery. Remember to breathe normally when you exercise and try to use smooth, fluid movements.    Doing the Pendulum Exercise    Follow any special instructions you were given. If you feel pain, stop the exercise. If the pain continues after stopping, call your doctor or physical therapist.    Start pendulum exercises with your affected arm as soon as directed by your doctor:    Lean over with your good arm supported on a table or chair.    Relax the arm on the painful side, letting it hang straight down.    Slowly begin to swing the relaxed arm by moving your body. Move it in a [place], then reverse the direction.  Next, move the arm backward and forward. Finally, move it side to side.    Let gravity gently sway your arm. Do not actively lift or move it with your shoulder muscles.    Do the exercise 3 times a day, for 5 to 10 minutes each time or as directed by your doctor. Change the direction of your movement after 1 minute of motion.  Home Care    Wear your sling as directed.    Use pain medication as directed by your doctor.  Follow-Up  Make a follow-up appointment as directed by our staff.     When to Seek Medical Attention  Call 911 right away if you have:    Chest pain.    Shortness of breath.  Otherwise, call your doctor immediately if you have:    Fever of 100.4 F  (38.0 C) or higher, or shaking chills.    Increasing shoulder pain.    Pain that is not relieved by medication.    Pain or swelling in the arm on the side of your surgery.    Numbness, tingling, or blue-gray color of your arm or fingers on the side of your surgery.    Increased swelling or redness around the incision.    Drainage or oozing around the incision.     1617-8137 The "[employer]. [address] [place] [address], [place] [address]. All rights reserved. This information is not intended as a substitute for professional medical care. Always follow your healthcare professional's instructions.

## 2017-01-24 NOTE — OP NOTE
"DATE OF PROCEDURE:  01/23/2017      PREOPERATIVE DIAGNOSES:   1.  Left shoulder pain after prior rotator cuff repair.      POSTOPERATIVE DIAGNOSES:   1.  Left shoulder pain after prior rotator cuff repair.   2.  Left shoulder failed rotator cuff repair.      SURGICAL PROCEDURES:   1.  Left shoulder arthroscopic biopsy for cultures.      SURGEON:  Ankit Morton MD      ASSISTANT:  None.      SPECIMENS:  Shoulder.   1.  Shoulder #1 (posterior superior glenoid).   2.  Shoulder #2 (anterosuperior glenoid).   3.  Shoulder #3 (anteroinferior glenoid).   4.  Shoulder #4 (posterior inferior glenoid).   5.  Shoulder #5 (\"subacromial space\").      INDICATIONS:  Ms. Francis is a very pleasant 66-year-old woman with history of pain and disability in her shoulder.  She had previous surgery and concern for infection.  She had failed her repair.  After discussion of risks and benefits of surgical versus nonsurgical management, she elected to proceed with surgical remediation.      DESCRIPTION OF PROCEDURE:  The patient was positively identified in the preanesthesia care area, her surgical site was initialed by me and her consent was reviewed with her.  She was then taken to the operating theater, she was placed in a well-padded beachchair position, prepped and draped in the usual sterile fashion for left upper extremity surgery.      Prior to surgical initiation, a \"timeout\" was held in accordance with hospital policy.  Verbal verification that delivery of prophylactic intravenous antibiotics had been held secondary to the plan of obtaining cultures was performed.      After establishment of a posterior viewing portal, an internal portal was made under direct visualization.  This is in the distal most aspect of her anterior incision.  She had a full thickness rotator cuff tear.  The anterior subscapularis was not intact.  The sutures were medialized along with the end of the tendon.  It was chronic in appearance.  The " articular surface of the humerus and glenoid had no evidence of full thickness cartilage loss.  There was a superior cuff tear that was retracted at least half way to the glenoid rim.  The biceps was gone.      I took specimens and the above-mentioned cultures.  Each of these was taken 2 biopsies from each site.  One was sent for anaerobic and labeled please keep 2 weeks to rule out P. acnes.  The other was sent for aerobic in appropriate media.      All instruments were removed.  Closure was with interrupted 3-0 Monocryl sutures.  Steri-Strips and a sterile nonadherent dressing were applied.  A sling was placed.      POSTOPERATIVE PLAN:   1.  The patient will be discharged home today on oral analgesics.  She should have active and passive range of motion as tolerated and she will learn Codman's.   2.  At the 3-week suha, I will see her back in clinic.  Cultures will be final at 2 weeks and we can discuss options at that point.  If the cultures turn positive prior to that, we will have her seek Infectious Disease consultation prior to seeing me back in clinic.         MARCIO GOODMAN MD             D: 2017 16:08   T: 2017 08:08   MT: [email or web address]      Name:     YESENIA FERGUSON   MRN:      [id number]        Account:        [id number]   :      1950           Procedure Date: 2017      Document: [id number]       cc: Freddie Espino MD

## 2017-01-28 LAB
BACTERIA SPEC CULT: NO GROWTH
MICRO REPORT STATUS: NORMAL
SPECIMEN SOURCE: NORMAL

## 2017-02-06 LAB
BACTERIA SPEC CULT: NORMAL
Lab: NORMAL
MICRO REPORT STATUS: NORMAL
SPECIMEN SOURCE: NORMAL

## 2017-02-07 ENCOUNTER — MYC MEDICAL ADVICE (OUTPATIENT)
Dept: FAMILY MEDICINE | Facility: CLINIC | Age: 67
End: 2017-02-07

## 2017-02-07 DIAGNOSIS — J43.9 PULMONARY EMPHYSEMA, UNSPECIFIED EMPHYSEMA TYPE (H): Primary | ICD-10-CM

## 2017-02-07 RX ORDER — TIOTROPIUM BROMIDE 18 UG/1
18 CAPSULE ORAL; RESPIRATORY (INHALATION) DAILY
Qty: 90 CAPSULE | Refills: 1 | Status: SHIPPED | OUTPATIENT
Start: 2017-02-07 | End: 2017-05-10

## 2017-02-07 NOTE — TELEPHONE ENCOUNTER
Prescription approved per [facility] Refill Protocol.  Mailed script.  Tena Mcneil RN

## 2017-02-14 ENCOUNTER — ANESTHESIA EVENT (OUTPATIENT)
Dept: SURGERY | Facility: CLINIC | Age: 67
DRG: 483 | End: 2017-02-14
Payer: COMMERCIAL

## 2017-02-15 ENCOUNTER — ANESTHESIA (OUTPATIENT)
Dept: SURGERY | Facility: CLINIC | Age: 67
DRG: 483 | End: 2017-02-15
Payer: COMMERCIAL

## 2017-02-15 ENCOUNTER — APPOINTMENT (OUTPATIENT)
Dept: GENERAL RADIOLOGY | Facility: CLINIC | Age: 67
DRG: 483 | End: 2017-02-15
Attending: ORTHOPAEDIC SURGERY
Payer: COMMERCIAL

## 2017-02-15 ENCOUNTER — HOSPITAL ENCOUNTER (INPATIENT)
Facility: CLINIC | Age: 67
LOS: 1 days | Discharge: HOME | DRG: 483 | End: 2017-02-16
Attending: ORTHOPAEDIC SURGERY | Admitting: ORTHOPAEDIC SURGERY
Payer: COMMERCIAL

## 2017-02-15 DIAGNOSIS — Z96.612 H/O TOTAL SHOULDER REPLACEMENT, LEFT: Primary | ICD-10-CM

## 2017-02-15 PROBLEM — Z96.619 H/O TOTAL SHOULDER REPLACEMENT: Status: ACTIVE | Noted: 2017-02-15

## 2017-02-15 LAB
ABO + RH BLD: NORMAL
ABO + RH BLD: NORMAL
BLD GP AB SCN SERPL QL: NORMAL
BLOOD BANK CMNT PATIENT-IMP: NORMAL
SPECIMEN EXP DATE BLD: NORMAL

## 2017-02-15 PROCEDURE — 27110028 ZZH OR GENERAL SUPPLY NON-STERILE: Performed by: ORTHOPAEDIC SURGERY

## 2017-02-15 PROCEDURE — 36000062 ZZH SURGERY LEVEL 4 1ST 30 MIN - UMMC: Performed by: ORTHOPAEDIC SURGERY

## 2017-02-15 PROCEDURE — 40000940 XR SHOULDER LT PORT G/E 2 VW: Mod: LT

## 2017-02-15 PROCEDURE — 40000170 ZZH STATISTIC PRE-PROCEDURE ASSESSMENT II: Performed by: ORTHOPAEDIC SURGERY

## 2017-02-15 PROCEDURE — 0RRK00Z REPLACEMENT OF LEFT SHOULDER JOINT WITH REVERSE BALL AND SOCKET SYNTHETIC SUBSTITUTE, OPEN APPROACH: ICD-10-PCS | Performed by: ORTHOPAEDIC SURGERY

## 2017-02-15 PROCEDURE — 36415 COLL VENOUS BLD VENIPUNCTURE: CPT | Performed by: ORTHOPAEDIC SURGERY

## 2017-02-15 PROCEDURE — 25000125 ZZHC RX 250: Performed by: ANESTHESIOLOGY

## 2017-02-15 PROCEDURE — 86901 BLOOD TYPING SEROLOGIC RH(D): CPT | Performed by: ORTHOPAEDIC SURGERY

## 2017-02-15 PROCEDURE — 25000125 ZZHC RX 250: Performed by: ORTHOPAEDIC SURGERY

## 2017-02-15 PROCEDURE — 25000125 ZZHC RX 250: Performed by: STUDENT IN AN ORGANIZED HEALTH CARE EDUCATION/TRAINING PROGRAM

## 2017-02-15 PROCEDURE — 27210794 ZZH OR GENERAL SUPPLY STERILE: Performed by: ORTHOPAEDIC SURGERY

## 2017-02-15 PROCEDURE — 36000064 ZZH SURGERY LEVEL 4 EA 15 ADDTL MIN - UMMC: Performed by: ORTHOPAEDIC SURGERY

## 2017-02-15 PROCEDURE — 37000008 ZZH ANESTHESIA TECHNICAL FEE, 1ST 30 MIN: Performed by: ORTHOPAEDIC SURGERY

## 2017-02-15 PROCEDURE — 27810169 ZZH OR IMPLANT GENERAL: Performed by: ORTHOPAEDIC SURGERY

## 2017-02-15 PROCEDURE — 25000128 H RX IP 250 OP 636: Performed by: ORTHOPAEDIC SURGERY

## 2017-02-15 PROCEDURE — 99207 ZZC MOONLIGHTING INDICATOR: CPT | Performed by: INTERNAL MEDICINE

## 2017-02-15 PROCEDURE — 86850 RBC ANTIBODY SCREEN: CPT | Performed by: ORTHOPAEDIC SURGERY

## 2017-02-15 PROCEDURE — 71000014 ZZH RECOVERY PHASE 1 LEVEL 2 FIRST HR: Performed by: ORTHOPAEDIC SURGERY

## 2017-02-15 PROCEDURE — 27211024 ZZHC OR SUPPLY OTHER OPNP: Performed by: ORTHOPAEDIC SURGERY

## 2017-02-15 PROCEDURE — C1776 JOINT DEVICE (IMPLANTABLE): HCPCS | Performed by: ORTHOPAEDIC SURGERY

## 2017-02-15 PROCEDURE — 25000132 ZZH RX MED GY IP 250 OP 250 PS 637: Performed by: ORTHOPAEDIC SURGERY

## 2017-02-15 PROCEDURE — 12000001 ZZH R&B MED SURG/OB UMMC

## 2017-02-15 PROCEDURE — 86900 BLOOD TYPING SEROLOGIC ABO: CPT | Performed by: ORTHOPAEDIC SURGERY

## 2017-02-15 PROCEDURE — 25000566 ZZH SEVOFLURANE, EA 15 MIN: Performed by: ORTHOPAEDIC SURGERY

## 2017-02-15 PROCEDURE — 25000128 H RX IP 250 OP 636: Performed by: STUDENT IN AN ORGANIZED HEALTH CARE EDUCATION/TRAINING PROGRAM

## 2017-02-15 PROCEDURE — 25800025 ZZH RX 258: Performed by: STUDENT IN AN ORGANIZED HEALTH CARE EDUCATION/TRAINING PROGRAM

## 2017-02-15 PROCEDURE — 25000128 H RX IP 250 OP 636: Performed by: ANESTHESIOLOGY

## 2017-02-15 PROCEDURE — 25800025 ZZH RX 258: Performed by: ORTHOPAEDIC SURGERY

## 2017-02-15 PROCEDURE — 37000009 ZZH ANESTHESIA TECHNICAL FEE, EACH ADDTL 15 MIN: Performed by: ORTHOPAEDIC SURGERY

## 2017-02-15 PROCEDURE — 25000132 ZZH RX MED GY IP 250 OP 250 PS 637: Performed by: STUDENT IN AN ORGANIZED HEALTH CARE EDUCATION/TRAINING PROGRAM

## 2017-02-15 DEVICE — BONE CEMENT PALACOS W/GENTAMICIN 00-1113-140-01: Type: IMPLANTABLE DEVICE | Site: SHOULDER | Status: FUNCTIONAL

## 2017-02-15 DEVICE — IMPLANTABLE DEVICE: Type: IMPLANTABLE DEVICE | Site: SHOULDER | Status: FUNCTIONAL

## 2017-02-15 RX ORDER — LIDOCAINE 40 MG/G
CREAM TOPICAL
Status: DISCONTINUED | OUTPATIENT
Start: 2017-02-15 | End: 2017-02-16 | Stop reason: HOSPADM

## 2017-02-15 RX ORDER — DEXAMETHASONE SODIUM PHOSPHATE 4 MG/ML
INJECTION, SOLUTION INTRA-ARTICULAR; INTRALESIONAL; INTRAMUSCULAR; INTRAVENOUS; SOFT TISSUE PRN
Status: DISCONTINUED | OUTPATIENT
Start: 2017-02-15 | End: 2017-02-15

## 2017-02-15 RX ORDER — IPRATROPIUM BROMIDE AND ALBUTEROL SULFATE 2.5; .5 MG/3ML; MG/3ML
3 SOLUTION RESPIRATORY (INHALATION) ONCE
Status: COMPLETED | OUTPATIENT
Start: 2017-02-15 | End: 2017-02-15

## 2017-02-15 RX ORDER — BUPIVACAINE HYDROCHLORIDE AND EPINEPHRINE 5; 5 MG/ML; UG/ML
INJECTION, SOLUTION PERINEURAL PRN
Status: DISCONTINUED | OUTPATIENT
Start: 2017-02-15 | End: 2017-02-15

## 2017-02-15 RX ORDER — FLUMAZENIL 0.1 MG/ML
0.2 INJECTION, SOLUTION INTRAVENOUS
Status: DISCONTINUED | OUTPATIENT
Start: 2017-02-15 | End: 2017-02-15 | Stop reason: HOSPADM

## 2017-02-15 RX ORDER — HYDROMORPHONE HYDROCHLORIDE 2 MG/1
2-4 TABLET ORAL
Status: DISCONTINUED | OUTPATIENT
Start: 2017-02-15 | End: 2017-02-16 | Stop reason: HOSPADM

## 2017-02-15 RX ORDER — ACETAMINOPHEN 325 MG/1
650 TABLET ORAL EVERY 4 HOURS PRN
Status: DISCONTINUED | OUTPATIENT
Start: 2017-02-18 | End: 2017-02-16 | Stop reason: HOSPADM

## 2017-02-15 RX ORDER — FENTANYL CITRATE 50 UG/ML
25-50 INJECTION, SOLUTION INTRAMUSCULAR; INTRAVENOUS
Status: DISCONTINUED | OUTPATIENT
Start: 2017-02-15 | End: 2017-02-15 | Stop reason: HOSPADM

## 2017-02-15 RX ORDER — CEFAZOLIN SODIUM 2 G/100ML
2 INJECTION, SOLUTION INTRAVENOUS
Status: COMPLETED | OUTPATIENT
Start: 2017-02-15 | End: 2017-02-15

## 2017-02-15 RX ORDER — NALOXONE HYDROCHLORIDE 0.4 MG/ML
.1-.4 INJECTION, SOLUTION INTRAMUSCULAR; INTRAVENOUS; SUBCUTANEOUS
Status: DISCONTINUED | OUTPATIENT
Start: 2017-02-15 | End: 2017-02-15 | Stop reason: HOSPADM

## 2017-02-15 RX ORDER — ONDANSETRON 2 MG/ML
4 INJECTION INTRAMUSCULAR; INTRAVENOUS EVERY 6 HOURS PRN
Status: DISCONTINUED | OUTPATIENT
Start: 2017-02-15 | End: 2017-02-16 | Stop reason: HOSPADM

## 2017-02-15 RX ORDER — ONDANSETRON 4 MG/1
4 TABLET, ORALLY DISINTEGRATING ORAL EVERY 6 HOURS PRN
Status: DISCONTINUED | OUTPATIENT
Start: 2017-02-15 | End: 2017-02-16 | Stop reason: HOSPADM

## 2017-02-15 RX ORDER — SODIUM CHLORIDE, SODIUM LACTATE, POTASSIUM CHLORIDE, CALCIUM CHLORIDE 600; 310; 30; 20 MG/100ML; MG/100ML; MG/100ML; MG/100ML
INJECTION, SOLUTION INTRAVENOUS CONTINUOUS PRN
Status: DISCONTINUED | OUTPATIENT
Start: 2017-02-15 | End: 2017-02-15

## 2017-02-15 RX ORDER — AMOXICILLIN 500 MG/1
500 CAPSULE ORAL DAILY
Status: DISCONTINUED | OUTPATIENT
Start: 2017-02-16 | End: 2017-02-16 | Stop reason: HOSPADM

## 2017-02-15 RX ORDER — ALBUTEROL SULFATE 0.83 MG/ML
1 SOLUTION RESPIRATORY (INHALATION) EVERY 6 HOURS PRN
Status: DISCONTINUED | OUTPATIENT
Start: 2017-02-15 | End: 2017-02-16 | Stop reason: HOSPADM

## 2017-02-15 RX ORDER — NAPROXEN 250 MG/1
250 TABLET ORAL DAILY PRN
Status: DISCONTINUED | OUTPATIENT
Start: 2017-02-15 | End: 2017-02-16 | Stop reason: HOSPADM

## 2017-02-15 RX ORDER — ONDANSETRON 2 MG/ML
INJECTION INTRAMUSCULAR; INTRAVENOUS PRN
Status: DISCONTINUED | OUTPATIENT
Start: 2017-02-15 | End: 2017-02-15

## 2017-02-15 RX ORDER — METHOCARBAMOL 500 MG/1
500-1000 TABLET, FILM COATED ORAL 3 TIMES DAILY PRN
Status: DISCONTINUED | OUTPATIENT
Start: 2017-02-15 | End: 2017-02-16 | Stop reason: HOSPADM

## 2017-02-15 RX ORDER — PROCHLORPERAZINE MALEATE 5 MG
5-10 TABLET ORAL EVERY 6 HOURS PRN
Status: DISCONTINUED | OUTPATIENT
Start: 2017-02-15 | End: 2017-02-16 | Stop reason: HOSPADM

## 2017-02-15 RX ORDER — CEFAZOLIN SODIUM 1 G/3ML
1 INJECTION, POWDER, FOR SOLUTION INTRAMUSCULAR; INTRAVENOUS EVERY 8 HOURS
Status: COMPLETED | OUTPATIENT
Start: 2017-02-15 | End: 2017-02-16

## 2017-02-15 RX ORDER — GLYCOPYRROLATE 0.2 MG/ML
INJECTION, SOLUTION INTRAMUSCULAR; INTRAVENOUS PRN
Status: DISCONTINUED | OUTPATIENT
Start: 2017-02-15 | End: 2017-02-15

## 2017-02-15 RX ORDER — HYDROXYZINE PAMOATE 25 MG/1
25 CAPSULE ORAL 3 TIMES DAILY PRN
Status: DISCONTINUED | OUTPATIENT
Start: 2017-02-15 | End: 2017-02-16 | Stop reason: HOSPADM

## 2017-02-15 RX ORDER — ALBUTEROL SULFATE 90 UG/1
2 AEROSOL, METERED RESPIRATORY (INHALATION) EVERY 6 HOURS
Status: DISCONTINUED | OUTPATIENT
Start: 2017-02-15 | End: 2017-02-16 | Stop reason: HOSPADM

## 2017-02-15 RX ORDER — KETOROLAC TROMETHAMINE 30 MG/ML
INJECTION, SOLUTION INTRAMUSCULAR; INTRAVENOUS PRN
Status: DISCONTINUED | OUTPATIENT
Start: 2017-02-15 | End: 2017-02-15

## 2017-02-15 RX ORDER — EPHEDRINE SULFATE 50 MG/ML
INJECTION, SOLUTION INTRAMUSCULAR; INTRAVENOUS; SUBCUTANEOUS PRN
Status: DISCONTINUED | OUTPATIENT
Start: 2017-02-15 | End: 2017-02-15

## 2017-02-15 RX ORDER — GABAPENTIN 300 MG/1
300 CAPSULE ORAL ONCE
Status: COMPLETED | OUTPATIENT
Start: 2017-02-15 | End: 2017-02-15

## 2017-02-15 RX ORDER — ACETAMINOPHEN 325 MG/1
975 TABLET ORAL EVERY 8 HOURS
Status: DISCONTINUED | OUTPATIENT
Start: 2017-02-15 | End: 2017-02-16 | Stop reason: HOSPADM

## 2017-02-15 RX ORDER — METOCLOPRAMIDE 10 MG/1
10 TABLET ORAL EVERY 6 HOURS PRN
Status: DISCONTINUED | OUTPATIENT
Start: 2017-02-15 | End: 2017-02-16 | Stop reason: HOSPADM

## 2017-02-15 RX ORDER — CEFAZOLIN SODIUM 1 G/3ML
1 INJECTION, POWDER, FOR SOLUTION INTRAMUSCULAR; INTRAVENOUS SEE ADMIN INSTRUCTIONS
Status: DISCONTINUED | OUTPATIENT
Start: 2017-02-15 | End: 2017-02-15 | Stop reason: HOSPADM

## 2017-02-15 RX ORDER — LIDOCAINE HYDROCHLORIDE 20 MG/ML
INJECTION, SOLUTION INFILTRATION; PERINEURAL PRN
Status: DISCONTINUED | OUTPATIENT
Start: 2017-02-15 | End: 2017-02-15

## 2017-02-15 RX ORDER — ACETAMINOPHEN 325 MG/1
975 TABLET ORAL ONCE
Status: COMPLETED | OUTPATIENT
Start: 2017-02-15 | End: 2017-02-15

## 2017-02-15 RX ORDER — PROPOFOL 10 MG/ML
INJECTION, EMULSION INTRAVENOUS PRN
Status: DISCONTINUED | OUTPATIENT
Start: 2017-02-15 | End: 2017-02-15

## 2017-02-15 RX ORDER — NALOXONE HYDROCHLORIDE 0.4 MG/ML
.1-.4 INJECTION, SOLUTION INTRAMUSCULAR; INTRAVENOUS; SUBCUTANEOUS
Status: DISCONTINUED | OUTPATIENT
Start: 2017-02-15 | End: 2017-02-16 | Stop reason: HOSPADM

## 2017-02-15 RX ORDER — FENTANYL CITRATE 50 UG/ML
INJECTION, SOLUTION INTRAMUSCULAR; INTRAVENOUS PRN
Status: DISCONTINUED | OUTPATIENT
Start: 2017-02-15 | End: 2017-02-15

## 2017-02-15 RX ORDER — METOCLOPRAMIDE HYDROCHLORIDE 5 MG/ML
10 INJECTION INTRAMUSCULAR; INTRAVENOUS EVERY 6 HOURS PRN
Status: DISCONTINUED | OUTPATIENT
Start: 2017-02-15 | End: 2017-02-16 | Stop reason: HOSPADM

## 2017-02-15 RX ORDER — NICOTINE 21 MG/24HR
1 PATCH, TRANSDERMAL 24 HOURS TRANSDERMAL DAILY
Status: DISCONTINUED | OUTPATIENT
Start: 2017-02-15 | End: 2017-02-16 | Stop reason: HOSPADM

## 2017-02-15 RX ORDER — NEOSTIGMINE METHYLSULFATE 1 MG/ML
VIAL (ML) INJECTION PRN
Status: DISCONTINUED | OUTPATIENT
Start: 2017-02-15 | End: 2017-02-15

## 2017-02-15 RX ADMIN — PHENYLEPHRINE HYDROCHLORIDE 100 MCG: 10 INJECTION, SOLUTION INTRAMUSCULAR; INTRAVENOUS; SUBCUTANEOUS at 13:05

## 2017-02-15 RX ADMIN — KETOROLAC TROMETHAMINE 30 MG: 30 INJECTION, SOLUTION INTRAMUSCULAR at 14:33

## 2017-02-15 RX ADMIN — NEOSTIGMINE METHYLSULFATE 3 MG: 1 INJECTION INTRAMUSCULAR; INTRAVENOUS; SUBCUTANEOUS at 14:34

## 2017-02-15 RX ADMIN — PHENYLEPHRINE HYDROCHLORIDE 100 MCG: 10 INJECTION, SOLUTION INTRAMUSCULAR; INTRAVENOUS; SUBCUTANEOUS at 13:11

## 2017-02-15 RX ADMIN — GLYCOPYRROLATE 0.4 MG: 0.2 INJECTION, SOLUTION INTRAMUSCULAR; INTRAVENOUS at 14:34

## 2017-02-15 RX ADMIN — MIDAZOLAM HYDROCHLORIDE 1 MG: 1 INJECTION, SOLUTION INTRAMUSCULAR; INTRAVENOUS at 11:51

## 2017-02-15 RX ADMIN — GABAPENTIN 300 MG: 300 CAPSULE ORAL at 11:42

## 2017-02-15 RX ADMIN — PHENYLEPHRINE HYDROCHLORIDE 100 MCG: 10 INJECTION, SOLUTION INTRAMUSCULAR; INTRAVENOUS; SUBCUTANEOUS at 13:25

## 2017-02-15 RX ADMIN — LIDOCAINE HYDROCHLORIDE 60 MG: 20 INJECTION, SOLUTION INFILTRATION; PERINEURAL at 12:24

## 2017-02-15 RX ADMIN — FENTANYL CITRATE 25 MCG: 50 INJECTION, SOLUTION INTRAMUSCULAR; INTRAVENOUS at 14:30

## 2017-02-15 RX ADMIN — CEFAZOLIN 1 G: 1 INJECTION, POWDER, FOR SOLUTION INTRAMUSCULAR; INTRAVENOUS at 21:48

## 2017-02-15 RX ADMIN — IPRATROPIUM BROMIDE AND ALBUTEROL SULFATE 3 ML: .5; 3 SOLUTION RESPIRATORY (INHALATION) at 11:31

## 2017-02-15 RX ADMIN — ONDANSETRON 4 MG: 2 INJECTION INTRAMUSCULAR; INTRAVENOUS at 14:30

## 2017-02-15 RX ADMIN — DEXAMETHASONE SODIUM PHOSPHATE 8 MG: 4 INJECTION, SOLUTION INTRAMUSCULAR; INTRAVENOUS at 13:45

## 2017-02-15 RX ADMIN — PHENYLEPHRINE HYDROCHLORIDE 150 MCG: 10 INJECTION, SOLUTION INTRAMUSCULAR; INTRAVENOUS; SUBCUTANEOUS at 12:50

## 2017-02-15 RX ADMIN — SODIUM CHLORIDE, POTASSIUM CHLORIDE, SODIUM LACTATE AND CALCIUM CHLORIDE: 600; 310; 30; 20 INJECTION, SOLUTION INTRAVENOUS at 11:40

## 2017-02-15 RX ADMIN — MIDAZOLAM HYDROCHLORIDE 2 MG: 1 INJECTION, SOLUTION INTRAMUSCULAR; INTRAVENOUS at 12:21

## 2017-02-15 RX ADMIN — FENTANYL CITRATE 75 MCG: 50 INJECTION, SOLUTION INTRAMUSCULAR; INTRAVENOUS at 12:23

## 2017-02-15 RX ADMIN — Medication 30 MG: at 12:25

## 2017-02-15 RX ADMIN — PROPOFOL 150 MG: 10 INJECTION, EMULSION INTRAVENOUS at 12:24

## 2017-02-15 RX ADMIN — FENTANYL CITRATE 75 MCG: 50 INJECTION, SOLUTION INTRAMUSCULAR; INTRAVENOUS at 12:19

## 2017-02-15 RX ADMIN — FENTANYL CITRATE 50 MCG: 50 INJECTION, SOLUTION INTRAMUSCULAR; INTRAVENOUS at 11:50

## 2017-02-15 RX ADMIN — Medication 5 MG: at 12:46

## 2017-02-15 RX ADMIN — CEFAZOLIN SODIUM 2 G: 2 INJECTION, SOLUTION INTRAVENOUS at 12:45

## 2017-02-15 RX ADMIN — DEXTROSE AND SODIUM CHLORIDE: 5; 450 INJECTION, SOLUTION INTRAVENOUS at 18:14

## 2017-02-15 RX ADMIN — PHENYLEPHRINE HYDROCHLORIDE 100 MCG: 10 INJECTION, SOLUTION INTRAMUSCULAR; INTRAVENOUS; SUBCUTANEOUS at 13:35

## 2017-02-15 RX ADMIN — PROPOFOL 10 MG: 10 INJECTION, EMULSION INTRAVENOUS at 14:55

## 2017-02-15 RX ADMIN — CEFAZOLIN 1 G: 1 INJECTION, POWDER, FOR SOLUTION INTRAMUSCULAR; INTRAVENOUS at 14:33

## 2017-02-15 RX ADMIN — Medication 5 MG: at 13:40

## 2017-02-15 RX ADMIN — PHENYLEPHRINE HYDROCHLORIDE 150 MCG: 10 INJECTION, SOLUTION INTRAMUSCULAR; INTRAVENOUS; SUBCUTANEOUS at 12:43

## 2017-02-15 RX ADMIN — PHENYLEPHRINE HYDROCHLORIDE 100 MCG: 10 INJECTION, SOLUTION INTRAMUSCULAR; INTRAVENOUS; SUBCUTANEOUS at 13:55

## 2017-02-15 RX ADMIN — ACETAMINOPHEN 975 MG: 325 TABLET, FILM COATED ORAL at 11:41

## 2017-02-15 RX ADMIN — Medication 0.2 MCG/KG/MIN: at 13:05

## 2017-02-15 RX ADMIN — ACETAMINOPHEN 975 MG: 325 TABLET, FILM COATED ORAL at 20:04

## 2017-02-15 RX ADMIN — BUPIVACAINE HYDROCHLORIDE AND EPINEPHRINE BITARTRATE 20 ML: 5; .005 INJECTION, SOLUTION PERINEURAL at 11:52

## 2017-02-15 ASSESSMENT — COPD QUESTIONNAIRES
COPD: 1
CAT_SEVERITY: MODERATE

## 2017-02-15 ASSESSMENT — LIFESTYLE VARIABLES: TOBACCO_USE: 1

## 2017-02-15 NOTE — OR NURSING
"Remains awake and alert.  Tolerates PO well.  Denies pain or nausea.  CMS unchanged with left hand warm and pink with less then 3 second cap refill.  States \"numbness and tingling still present in left arm and hand.  Sling elevating arm.  Minimal drainage in drain tubing.  Meets discharge criteria.  "

## 2017-02-15 NOTE — ANESTHESIA POSTPROCEDURE EVALUATION
Patient: Beatriz Francis    Procedure(s):  Left Reverse Total Shoulder Arthroplasty   - Wound Class: I-Clean    Diagnosis:Rotator Cuff Arthropathy  Diagnosis Additional Information: No value filed.    Anesthesia Type:  General, ETT    Note:  Anesthesia Post Evaluation    Patient location during evaluation: PACU  Patient participation: Able to fully participate in evaluation  Level of consciousness: awake and alert  Pain management: adequate  Airway patency: patent  Cardiovascular status: acceptable  Respiratory status: acceptable  Hydration status: acceptable  PONV: none     Anesthetic complications: None          Last vitals:  Vitals:    02/15/17 1504 02/15/17 1515 02/15/17 1530   BP: 97/72 94/66 96/67   Resp: 16 14 18   Temp: 36.5  C (97.7  F)     SpO2: 100% 96% 94%         Electronically Signed By: Jaquan Neri MD, MD  February 15, 2017  3:47 PM
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
## 2017-02-15 NOTE — OR NURSING
"Taking over care.  Pt awake and sitting up and drinking water.  Denies pain in surgical shoulder.  Able to feel fingers and thumb in left hand and able to wiggle fingers.  Fingers warm and pink.  States left hand \"feels numb and is tingling\".  Breath sounds mostly clear heard anterior chest.  "

## 2017-02-15 NOTE — IP AVS SNAPSHOT
MRN:[id number]                      After Visit Summary   2/15/2017    Beatriz Francis    MRN: [id number]           Thank you!     Thank you for choosing [facility] for your care. Our goal is always to provide you with excellent care. Hearing back from our patients is one way we can continue to improve our services. Please take a few minutes to complete the written survey that you may receive in the mail after you visit with us. Thank you!        Patient Information     Date Of Birth          1950        About your hospital stay     You were admitted on:  February 15, 2017 You last received care in the:   8A    You were discharged on:  February 16, 2017        Reason for your hospital stay       You had your shoulder replaced.                  Who to Call     For medical emergencies, please call [phone].  For non-urgent questions about your medical care, please call your primary care provider or clinic, [phone]  For questions related to your surgery, please call your surgery clinic        Attending Provider     Provider Ankit Lundberg MD Orthopedics       Primary Care Provider Office Phone # Fax #    Azalia Shirlene Burger [phone]       [place] [id number]         When to contact your care team       Call your physician for fevers greater than 101.5, chills, increased pain, redness, swelling or discharge at the surgical site. During regular business hours call Dr Fernandez's office and request to speak with his nurse or the triage nurses. If you see him at [facility] call [phone]. If you see him at [facility] call [phone]. After hours or on weekends call the hospital  at [phone] and ask to speak with the resident on call.                  After Care Instructions     Discharge Instructions       Follow the therapy program, no active or passive shoulder  "movement for 6 weeks, or until instructed to do so by Dr Morton. Continue activating wrist/elbow as instructed.            Wound care and dressings       Instructions to care for your wound at home:You may remove your dressings on post-operative day 3If  your wound is dry, you may leave it open to the air. Starting on post-operative day 3  you may begin showering over your wound without the dressing on  but no scrubbing and no soaking your wound in a bath or pool.                  Follow-up Appointments     Follow Up and recommended labs and tests       Follow up with Dr Morton at [facility]/ [address] [place] [address]  in two weeks. Call the office at [phone]  to schedule an appointment if you have not already done so.                  Your next 10 appointments already scheduled     Feb 27, 2017 11:20 AM CST   (Arrive by 11:05 AM)   RETURN SHOULDER with Ankit Morton MD   [facility]    [address] [place] [address] [phone]            Mar 27, 2017 11:20 AM CDT   (Arrive by 11:05 AM)   RETURN SHOULDER with Ankit Morton MD   [facility]    [address] [place] [address] [phone]              Pending Results     No orders found for last 3 day(s).            Statement of Approval     Ordered          02/16/17 1029  I have reviewed and agree with all the recommendations and orders detailed in this document.  EFFECTIVE NOW     Approved and electronically signed by:  Lauren Young APRN CNP             Admission Information     Date & Time Provider Department Dept. Phone    2/15/2017 Aknit Morton MD  8A [phone]      Your Vitals Were     Blood Pressure Pulse Temperature Respirations Height Weight    109/58 94 97.1  F (36.2  C) 16 1.651 m (5' 5\") 55.6 kg (122 lb 9.2 oz)    Pulse Oximetry BMI (Body Mass Index)          "       97% 20.4 kg/m2          [employer] Information     [employer] gives you secure access to your electronic health record. If you see a primary care provider, you can also send messages to your care team and make appointments. If you have questions, please call your primary care clinic.  If you do not have a primary care provider, please call [phone] and they will assist you.        Care EveryWhere ID     This is your Care EveryWhere ID. This could be used by other organizations to access your [facility] medical records  [id number]           Review of your medicines      START taking        Dose / Directions    acetaminophen 325 MG tablet   Commonly known as:  TYLENOL   Used for:  H/O total shoulder replacement, left        Dose:  650 mg   Start taking on:  2/18/2017   Take 2 tablets (650 mg) by mouth every 4 hours as needed for other (surgical pain)   Quantity:  100 tablet   Refills:  0       HYDROmorphone 2 MG tablet   Commonly known as:  DILAUDID   Used for:  H/O total shoulder replacement, left        Dose:  2-4 mg   Take 1-2 tablets (2-4 mg) by mouth every 3 hours as needed for moderate to severe pain   Quantity:  80 tablet   Refills:  0       senna-docusate 8.6-50 MG per tablet   Commonly known as:  SENOKOT-S;PERICOLACE   Used for:  H/O total shoulder replacement, left        Dose:  2 tablet   Take 2 tablets by mouth 2 times daily   Quantity:  50 tablet   Refills:  0         CONTINUE these medicines which have NOT CHANGED        Dose / Directions    * albuterol (2.5 MG/3ML) 0.083% neb solution   Used for:  COPD (chronic obstructive pulmonary disease) (H)        Dose:  1 vial   Take 1 vial (2.5 mg) by nebulization every 6 hours as needed for shortness of breath / dyspnea or wheezing   Quantity:  3 Box   Refills:  6       * albuterol 108 (90 BASE) MCG/ACT Inhaler   Commonly known as:  VENTOLIN HFA   Used for:  COPD (chronic obstructive pulmonary disease) (H)        Dose:  2 puff   Inhale 2 puffs into the lungs  every 6 hours Please dispense a 3 month supply.   Quantity:  6 Inhaler   Refills:  1       amoxicillin 500 MG capsule   Commonly known as:  AMOXIL   Used for:  Acne, unspecified acne type        Dose:  500 mg   Take 1 capsule (500 mg) by mouth daily   Quantity:  90 capsule   Refills:  3       fluticasone 220 MCG/ACT Inhaler   Commonly known as:  FLOVENT HFA   Used for:  COPD (chronic obstructive pulmonary disease) (H)        Dose:  2 puff   Inhale 2 puffs into the lungs 2 times daily   Quantity:  3 Inhaler   Refills:  3       hydrOXYzine 25 MG capsule   Commonly known as:  VISTARIL        Dose:  25 mg   Take 1 capsule (25 mg) by mouth 3 times daily as needed for itching   Quantity:  30 capsule   Refills:  0       methocarbamol 500 MG tablet   Commonly known as:  ROBAXIN   Used for:  Chronic pain syndrome, Bilateral shoulder pain        Dose:  500-1000 mg   Take 1-2 tablets (500-1,000 mg) by mouth 3 times daily as needed for muscle spasms   Quantity:  90 tablet   Refills:  1       omeprazole 20 MG tablet   Used for:  Gastroesophageal reflux disease without esophagitis        Dose:  20 mg   Take 1 tablet (20 mg) by mouth daily Take 30-60 minutes before a meal.   Quantity:  90 tablet   Refills:  3       tiotropium 18 MCG capsule   Commonly known as:  SPIRIVA HANDIHALER   Used for:  Pulmonary emphysema, unspecified emphysema type (H)        Dose:  18 mcg   Inhale 1 capsule (18 mcg) into the lungs daily   Quantity:  90 capsule   Refills:  1       zolpidem 5 MG tablet   Commonly known as:  AMBIEN   Used for:  Insomnia, unspecified type        Dose:  5 mg   Take 1 tablet (5 mg) by mouth nightly as needed for sleep   Quantity:  30 tablet   Refills:  5       * Notice:  This list has 2 medication(s) that are the same as other medications prescribed for you. Read the directions carefully, and ask your doctor or other care provider to review them with you.      STOP taking     ALEVE PO           HYDROcodone-acetaminophen 5-325  MG per tablet   Commonly known as:  NORCO                Where to get your medicines      These medications were sent to [facility], [place] - [address]  [address] [facility], [facility] [address]     Phone:  [phone]     acetaminophen 325 MG tablet    senna-docusate 8.6-50 MG per tablet         Some of these will need a paper prescription and others can be bought over the counter. Ask your nurse if you have questions.     Bring a paper prescription for each of these medications     HYDROmorphone 2 MG tablet                Protect others around you: Learn how to safely use, store and throw away your medicines at www.disposemymeds.org.             Medication List: This is a list of all your medications and when to take them. Check marks below indicate your daily home schedule. Keep this list as a reference.      Medications           Morning Afternoon Evening Bedtime As Needed    acetaminophen 325 MG tablet   Commonly known as:  TYLENOL   Take 2 tablets (650 mg) by mouth every 4 hours as needed for other (surgical pain)   Start taking on:  2/18/2017   Last time this was given:  975 mg on 2/16/2017  5:21 AM                                * albuterol (2.5 MG/3ML) 0.083% neb solution   Take 1 vial (2.5 mg) by nebulization every 6 hours as needed for shortness of breath / dyspnea or wheezing                                * albuterol 108 (90 BASE) MCG/ACT Inhaler   Commonly known as:  VENTOLIN HFA   Inhale 2 puffs into the lungs every 6 hours Please dispense a 3 month supply.                                amoxicillin 500 MG capsule   Commonly known as:  AMOXIL   Take 1 capsule (500 mg) by mouth daily   Last time this was given:  500 mg on 2/16/2017  8:09 AM                                fluticasone 220 MCG/ACT Inhaler   Commonly known as:  FLOVENT HFA   Inhale 2 puffs into the lungs 2 times daily                                HYDROmorphone 2 MG tablet   Commonly known as:  DILAUDID    Take 1-2 tablets (2-4 mg) by mouth every 3 hours as needed for moderate to severe pain   Last time this was given:  2 mg on 2/16/2017  7:40 AM                                hydrOXYzine 25 MG capsule   Commonly known as:  VISTARIL   Take 1 capsule (25 mg) by mouth 3 times daily as needed for itching                                methocarbamol 500 MG tablet   Commonly known as:  ROBAXIN   Take 1-2 tablets (500-1,000 mg) by mouth 3 times daily as needed for muscle spasms                                omeprazole 20 MG tablet   Take 1 tablet (20 mg) by mouth daily Take 30-60 minutes before a meal.                                senna-docusate 8.6-50 MG per tablet   Commonly known as:  SENOKOT-S;PERICOLACE   Take 2 tablets by mouth 2 times daily                                tiotropium 18 MCG capsule   Commonly known as:  SPIRIVA HANDIHALER   Inhale 1 capsule (18 mcg) into the lungs daily                                zolpidem 5 MG tablet   Commonly known as:  AMBIEN   Take 1 tablet (5 mg) by mouth nightly as needed for sleep                                * Notice:  This list has 2 medication(s) that are the same as other medications prescribed for you. Read the directions carefully, and ask your doctor or other care provider to review them with you.

## 2017-02-15 NOTE — ANESTHESIA CARE TRANSFER NOTE
Patient: Beatriz Francis    Procedure(s):  Left Reverse Total Shoulder Arthroplasty   - Wound Class: I-Clean    Diagnosis: Rotator Cuff Arthropathy  Diagnosis Additional Information: No value filed.    Anesthesia Type:   General, ETT     Note:  Airway :Face Mask  Patient transferred to:PACU  Comments: Prior to extubation, patient breathing spontaneously and respiratory rate and tidal volume were appropriate. The patient was following commands. The patient was warm and demonstrated adequate strength. Patient was suctioned and extubated without complication.   Transported to PACU   VSS upon arrival   Care transferred to PACU RN      Vitals: (Last set prior to Anesthesia Care Transfer)    CRNA VITALS  2/15/2017 1432 - 2/15/2017 1512      2/15/2017             NIBP: 97/72    Pulse: 107    NIBP Mean: 80    Resp Rate (set): 14                Electronically Signed By: Barry Herring MD  February 15, 2017  3:12 PM

## 2017-02-15 NOTE — IP AVS SNAPSHOT
[facility]    [address]    [place] [address]    Phone:  [phone]                                       After Visit Summary   2/15/2017    Beatriz Francis    MRN: [id number]           After Visit Summary Signature Page     I have received my discharge instructions, and my questions have been answered. I have discussed any challenges I see with this plan with the nurse or doctor.    ..........................................................................................................................................  Patient/Patient Representative Signature      ..........................................................................................................................................  Patient Representative Print Name and Relationship to Patient    ..................................................               ................................................  Date                                            Time    ..........................................................................................................................................  Reviewed by Signature/Title    ...................................................              ..............................................  Date                                                            Time

## 2017-02-15 NOTE — ANESTHESIA PROCEDURE NOTES
Peripheral Nerve Block Procedure Note    Staff:     Anesthesiologist:  REFUGIO IBRAHIM    Resident/CRNA:  BARB ESCOBAR    Block performed by resident/CRNA in the presence of a teaching physician    Location: Pre-op  Procedure Start/Stop TImes:      2/15/2017 11:45 AM     2/15/2017 11:55 AM    patient identified, IV checked, site marked, risks and benefits discussed, informed consent, monitors and equipment checked, pre-op evaluation, at physician/surgeon's request and post-op pain management      Correct Patient: Yes      Correct Position: Yes      Correct Site: Yes      Correct Procedure: Yes      Correct Laterality:  Yes    Site Marked:  Yes  Procedure details:     Procedure:  Interscalene    ASA:  3    Laterality:  Left    Position:  Sitting    Sterile Prep: Betadine      Local skin infiltration:  None    Needle:  Insulated    Needle gauge:  21    Needle length (inches):  3.1    Ultrasound: Yes      Ultrasound used to identify targeted nerve, plexus, or vascular structure and placed a needle adjacent to it      Permanent Image entered into patiient's record

## 2017-02-15 NOTE — BRIEF OP NOTE
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    PT/OT per dictated operative report.           Comments: Dictated by Praveen

## 2017-02-16 ENCOUNTER — APPOINTMENT (OUTPATIENT)
Dept: OCCUPATIONAL THERAPY | Facility: CLINIC | Age: 67
DRG: 483 | End: 2017-02-16
Attending: ORTHOPAEDIC SURGERY
Payer: COMMERCIAL

## 2017-02-16 VITALS
BODY MASS INDEX: 20.42 KG/M2 | TEMPERATURE: 97.1 F | OXYGEN SATURATION: 97 % | SYSTOLIC BLOOD PRESSURE: 109 MMHG | HEART RATE: 94 BPM | RESPIRATION RATE: 16 BRPM | DIASTOLIC BLOOD PRESSURE: 58 MMHG | HEIGHT: 65 IN | WEIGHT: 122.58 LBS

## 2017-02-16 LAB — HGB BLD-MCNC: 9.6 G/DL (ref 11.7–15.7)

## 2017-02-16 PROCEDURE — 25000132 ZZH RX MED GY IP 250 OP 250 PS 637: Performed by: ORTHOPAEDIC SURGERY

## 2017-02-16 PROCEDURE — 40000133 ZZH STATISTIC OT WARD VISIT: Performed by: OCCUPATIONAL THERAPIST

## 2017-02-16 PROCEDURE — 36415 COLL VENOUS BLD VENIPUNCTURE: CPT | Performed by: ORTHOPAEDIC SURGERY

## 2017-02-16 PROCEDURE — 85018 HEMOGLOBIN: CPT | Performed by: ORTHOPAEDIC SURGERY

## 2017-02-16 PROCEDURE — 97165 OT EVAL LOW COMPLEX 30 MIN: CPT | Mod: GO | Performed by: OCCUPATIONAL THERAPIST

## 2017-02-16 PROCEDURE — 25000132 ZZH RX MED GY IP 250 OP 250 PS 637: Performed by: INTERNAL MEDICINE

## 2017-02-16 PROCEDURE — 97535 SELF CARE MNGMENT TRAINING: CPT | Mod: GO | Performed by: OCCUPATIONAL THERAPIST

## 2017-02-16 PROCEDURE — 97110 THERAPEUTIC EXERCISES: CPT | Mod: GO | Performed by: OCCUPATIONAL THERAPIST

## 2017-02-16 PROCEDURE — 25000128 H RX IP 250 OP 636: Performed by: ORTHOPAEDIC SURGERY

## 2017-02-16 RX ORDER — ACETAMINOPHEN 325 MG/1
650 TABLET ORAL EVERY 4 HOURS PRN
Qty: 100 TABLET | Refills: 0 | Status: SHIPPED | OUTPATIENT
Start: 2017-02-18

## 2017-02-16 RX ORDER — AMOXICILLIN 250 MG
2 CAPSULE ORAL 2 TIMES DAILY
Status: DISCONTINUED | OUTPATIENT
Start: 2017-02-16 | End: 2017-02-16 | Stop reason: HOSPADM

## 2017-02-16 RX ORDER — AMOXICILLIN 250 MG
2 CAPSULE ORAL 2 TIMES DAILY
Qty: 50 TABLET | Refills: 0 | Status: SHIPPED | OUTPATIENT
Start: 2017-02-16

## 2017-02-16 RX ORDER — HYDROMORPHONE HYDROCHLORIDE 2 MG/1
2-4 TABLET ORAL
Qty: 80 TABLET | Refills: 0 | Status: SHIPPED | OUTPATIENT
Start: 2017-02-16 | End: 2017-02-27

## 2017-02-16 RX ADMIN — HYDROMORPHONE HYDROCHLORIDE 2 MG: 2 TABLET ORAL at 07:40

## 2017-02-16 RX ADMIN — ACETAMINOPHEN 975 MG: 325 TABLET, FILM COATED ORAL at 05:21

## 2017-02-16 RX ADMIN — CEFAZOLIN 1 G: 1 INJECTION, POWDER, FOR SOLUTION INTRAMUSCULAR; INTRAVENOUS at 05:21

## 2017-02-16 RX ADMIN — AMOXICILLIN 500 MG: 500 CAPSULE ORAL at 08:09

## 2017-02-16 RX ADMIN — OMEPRAZOLE 20 MG: 20 CAPSULE, DELAYED RELEASE ORAL at 08:09

## 2017-02-16 RX ADMIN — HYDROMORPHONE HYDROCHLORIDE 2 MG: 2 TABLET ORAL at 11:02

## 2017-02-16 NOTE — CONSULTS
INTERNAL MEDICINE CONSULTATION     REQUESTING PHYSICIAN: Ankit Morton MD    REASON FOR CONSULTATION: For recommendations for medical comorbidities and post operative hemodynamics     Assessment  Beatriz Francis is a 66 year old female admitted on 2/15/2017 for Left reverse total shoulder arthroplasty     1) s/p Left reverse TSA post op D# 0  Hemodynamics: stable   - continue on  IV fluids, until adequate PO.      Analgesia: adequate   DVT prophylaxis, Abx and wound care as per primary team.   Intensive spirometry to prevent atelectasis   - patient wants to continue her amoxicillin daily (uses for skin)     2) COPD- stable, Ct home meds (Spiriva, Flovent, Albuterol prn)     3) Smoking - will order nicotine patch (smokes approx 1 pack/ day)       Thank you for letting us get involved in care of Cyn. Please page with any questions.      Anthony Salmeron MD, [place] (Pager- [phone])   Internal Medicine/ Hospitalist    CHIEF COMPLAINT: 66 year old year old female admitted for a chief complaint of Rotator Cuff Arthropathy  H/O total shoulder replacement    HISTORY OF PRESENT ILLNESS:   66 year old year old female  With h/o COPD, Depression, smoking, GERD  admitted on 2/15/2017 for Left reverse total shoulder arthroplasty (for further details for indication of surgery and operative note, please refer to Ankit Morton MD note) . Was in a accident and had shoulder injury and than rotator cuff repair and due to persistent pain had the Sx today. Had 150cc  EBL , fluids given were 750 cc LR. No documented hypotension/ hypoxemia intra/post operative.      Currently: Denies any chest pain, shortness of breath or palpitations. Denies any nausea, vomiting or bowel symptoms., Denies any dysuria or frequency of urination  No previous h/o blood clots. CAD     PAST MEDICAL HISTORY:   Past Medical History   Diagnosis Date     Abnormal platelets (H) 4/15/2015     Chronic neck pain      on chronic pain meds     COPD  (chronic obstructive pulmonary disease) (H)      Depression, major      Eczema      Herpes simplex, oral      Insomnia      Lupus (H)      lupus tumidus, 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     Pulmonary nodule      long standing, likely scarring     Past Surgical History   Procedure Laterality Date     Rotator cuff repair rt/lt  1994,1995     right     Rotator cuff repair rt/lt  3/5/04     left     Hysterectomy, pap no longer indicated  1990     Rotator cuff repair rt/lt  9/25/2009     Right     Colonoscopy  2002     Eye surgery  2004-[facility]     Biopsy       Arthroscopy shldr rotator cuff repair, subacromial decomp, dist clavicle resection, bicep tenodesis Left 7/8/2016     Procedure: ARTHROSCOPY SHOULDER ROTATOR CUFF REPAIR, SUBACROMIAL DECOMPRESSION, DISTAL CLAVICLE RESECTION, OPEN BICEP TENODESIS REPAIR;  Surgeon: Freddie Espino MD;  Location: [facility] OR     C shoulder surg proc unlisted  7/8/2016     Arthroscopy shoulder Left 1/23/2017     Procedure: ARTHROSCOPY SHOULDER;  Surgeon: Ankit Morton MD;  Location: [facility] OR       Family History   Problem Relation Age of Onset     CANCER Father      lung     HEART DISEASE Father      Alcohol/Drug Father      Other Cancer Father      CANCER Paternal Grandmother      lung     Hypertension Mother      CEREBROVASCULAR DISEASE Mother      ,, ,     CANCER Maternal Grandfather      CANCER Paternal Grandfather      DIABETES Brother      Hypertension Brother      Hyperlipidemia Brother      DIABETES Brother      GASTROINTESTINAL DISEASE Brother      [place]     Other Cancer Brother      DIABETES Brother      Rheumatoid Arthritis Brother        Social History     Social History     Marital status:      Spouse name: N/A     Number of children: N/A     Years of education: N/A     Social History Main Topics     Smoking status: Current Some Day Smoker     Packs/day: 1.00     Years: 45.00     Types: Cigarettes     Smokeless tobacco: Former User     Quit date: 9/9/2014      Comment:  Just under a pack. 52  yr. hx.     Alcohol use No      Comment: 3-4x's/year.     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 No     Social History Narrative    .    Helping take care of grandkids while her son is trying for full custody.       ALLERGIES:   Allergies   Allergen Reactions     Nkda [No Known Drug Allergies]        HOME MEDICATIONS:     No current facility-administered medications on file prior to encounter.   Current Outpatient Prescriptions on File Prior to Encounter:  tiotropium (SPIRIVA HANDIHALER) 18 MCG capsule Inhale 1 capsule (18 mcg) into the lungs daily   hydrOXYzine (VISTARIL) 25 MG capsule Take 1 capsule (25 mg) by mouth 3 times daily as needed for itching   HYDROcodone-acetaminophen (NORCO) 5-325 MG per tablet Take 1 tablet by mouth every 6 hours as needed for pain May fill on or after 2/4/17   zolpidem (AMBIEN) 5 MG tablet Take 1 tablet (5 mg) by mouth nightly as needed for sleep   amoxicillin (AMOXIL) 500 MG capsule Take 1 capsule (500 mg) by mouth daily   omeprazole 20 MG tablet Take 1 tablet (20 mg) by mouth daily Take 30-60 minutes before a meal.   albuterol (VENTOLIN HFA) 108 (90 BASE) MCG/ACT inhaler Inhale 2 puffs into the lungs every 6 hours Please dispense a 3 month supply.   fluticasone (FLOVENT HFA) 220 MCG/ACT inhaler Inhale 2 puffs into the lungs 2 times daily   albuterol (2.5 MG/3ML) 0.083% nebulizer solution Take 1 vial (2.5 mg) by nebulization every 6 hours as needed for shortness of breath / dyspnea or wheezing   Naproxen Sodium (ALEVE PO) Take 1 tablet by mouth daily as  "needed for moderate pain   methocarbamol (ROBAXIN) 500 MG tablet Take 1-2 tablets (500-1,000 mg) by mouth 3 times daily as needed for muscle spasms       REVIEW OF SYSTEMS: A comprehensive 10-point review of systems was done and was found negative unless mentioned in the HPI.     PHYSICAL EXAMINATION:   Vitals: /79 (BP Location: Left arm)  Temp 97.7  F (36.5  C) (Oral)  Resp 16  Ht 1.651 m (5' 5\")  Wt 55.6 kg (122 lb 9.2 oz)  LMP   SpO2 97%  BMI 20.4 kg/m2  BMI= Body mass index is 20.4 kg/(m^2).  GENERAL: Pleasant  HEENT: PERRLA. Sclerae  anicteric. Oral mucosa moist.    NECK: Supple. No thyromegaly.   CARDIOVASCULAR: Normal S1 and S2. No murmurs, rubs or gallops.   RESPIRATORY: Normal vesicular breath sounds. No wheezing or crackles.   ABDOMEN: Soft, nontender. No guarding, rigidity or rebound.   MUSCULOSKELETAL: Left shoulder dsg and sling- further as per ortho   EXTREMITIES: no edema  NEUROLOGIC: Cranial nerves II-XII are grossly intact. Alert and oriented x3.   SKIN: No petechia, purpura or rash.     LABORATORY DATA:   Results for YESENIA FERGUSON (MRN [id number]) as of 2/15/2017 18:34   Ref. Range 1/9/2017 12:08   CRP Inflammation Latest Ref Range: 0.0 - 8.0 mg/L 3.7   WBC Latest Ref Range: 4.0 - 11.0 10e9/L 12.4 (H)   Hemoglobin Latest Ref Range: 11.7 - 15.7 g/dL 13.8   Hematocrit Latest Ref Range: 35.0 - 47.0 % 40.5   Platelet Count Latest Ref Range: 150 - 450 10e9/L 456 (H)   RBC Count Latest Ref Range: 3.8 - 5.2 10e12/L 4.47   MCV Latest Ref Range: 78 - 100 fl 91   MCH Latest Ref Range: 26.5 - 33.0 pg 30.9   MCHC Latest Ref Range: 31.5 - 36.5 g/dL 34.1   RDW Latest Ref Range: 10.0 - 15.0 % 12.9   Diff Method Unknown Automated Method   % Neutrophils Latest Units: % 63.9   % Lymphocytes Latest Units: % 28.8   % Monocytes Latest Units: % 5.5   % Eosinophils Latest Units: % 0.9   % Basophils Latest Units: % 0.6   % Immature Granulocytes Latest Units: % 0.3   Nucleated RBCs Latest Ref Range: 0 /100 0 "   Absolute Neutrophil Latest Ref Range: 1.6 - 8.3 10e9/L 8.0   Absolute Lymphocytes Latest Ref Range: 0.8 - 5.3 10e9/L 3.6   Absolute Monocytes Latest Ref Range: 0.0 - 1.3 10e9/L 0.7   Absolute Eosinophils Latest Ref Range: 0.0 - 0.7 10e9/L 0.1   Absolute Basophils Latest Ref Range: 0.0 - 0.2 10e9/L 0.1   Abs Immature Granulocytes Latest Ref Range: 0 - 0.4 10e9/L 0.0   Absolute Nucleated RBC Unknown 0.0   Sed Rate Latest Ref Range: 0 - 30 mm/h 15   Results for YESENIA FERGUSON (MRN [id number]) as of 2/15/2017 18:34   Ref. Range 6/30/2016 17:55   Sodium Latest Ref Range: 133 - 144 mmol/L 135   Potassium Latest Ref Range: 3.4 - 5.3 mmol/L 5.0   Chloride Latest Ref Range: 94 - 109 mmol/L 101   Carbon Dioxide Latest Ref Range: 20 - 32 mmol/L 28   Urea Nitrogen Latest Ref Range: 7 - 30 mg/dL 9   Creatinine Latest Ref Range: 0.52 - 1.04 mg/dL 0.61   GFR Estimate Latest Ref Range: >60 mL/min/1.7m2 >90...   GFR Estimate If Black Latest Ref Range: >60 mL/min/1.7m2 >90...   Calcium Latest Ref Range: 8.5 - 10.1 mg/dL 9.3   Anion Gap Latest Ref Range: 3 - 14 mmol/L 6

## 2017-02-16 NOTE — PROGRESS NOTES
02/16/17 0849   Quick Adds   Type of Visit Initial Occupational Therapy Evaluation   Living Environment   Lives With spouse  (works part-time)   Living Arrangements house   Number of Stairs to Enter Home 5   Number of Stairs Within Home 0   Living Environment Comment Lives in a rambler style home, will not need to access basement   Self-Care   Dominant Hand right   Usual Activity Tolerance good   Current Activity Tolerance fair   Regular Exercise no   Equipment Currently Used at Home none   Activity/Exercise/Self-Care Comment Does  for grandkids 3 days per week   Functional Level Prior   Ambulation 0-->independent   Transferring 0-->independent   Toileting 0-->independent   Bathing 0-->independent   Dressing 0-->independent   Eating 0-->independent   Communication 0-->understands/communicates without difficulty   Swallowing 0-->swallows foods/liquids without difficulty   Cognition 0 - no cognition issues reported   Fall history within last six months no   Prior Functional Level Comment Patient drives, shops, cleans, cooks.   General Information   Onset of Illness/Injury or Date of Surgery - Date 02/15/17   Referring Physician Praveen   Patient/Family Goals Statement Hopes to go home today. Be able to reach with LUE, use it.   Additional Occupational Profile Info/Pertinent History of Current Problem s/p L reverse TSA. Has not been able to use arm much over the last year.   Precautions/Limitations other (see comments)   Weight-Bearing Status - LUE nonweight-bearing   General Observations PRECAUTIONS: sling full-time for 6 weeks, no AROM or PROM of L shoulder, elbow/wrist/hand AROM OK.   Cognitive Status Examination   Orientation orientation to person, place and time   Level of Consciousness alert   Able to Follow Commands WNL/WFL   Personal Safety (Cognitive) WNL/WFL   Visual Perception   Visual Perception Wears glasses   Sensory Examination   Sensory Comments Mild in hand/thumb post-op   Pain Assessment    Patient Currently in Pain Yes, see Vital Sign flowsheet   Range of Motion (ROM)   ROM Comment Limited L shoulder with post-op restrictions; block effecting L elbow flexion, otherwise WNL   Strength   Strength Comments LUE unable with NWB precautions, otherwise WNL   Muscle Tone Assessment   Muscle Tone Quick Adds No deficits were identified   Coordination   Upper Extremity Coordination No deficits were identified   Mobility   Bed Mobility Comments (I) supine<>EOB   Transfer Skill: Sit to Stand   Level of [place]: Sit/Stand independent   Transfer Skill: Toilet Transfer   Level of [place]: Toilet independent   Balance   Balance Comments (I) ambulation without AD   Upper Body Dressing   Level of [place]: Dress Upper Body moderate assist (50% patients effort)   Physical Assist/Nonphysical Assist: Dress Upper Body verbal cues   Lower Body Dressing   Level of [place]: Dress Lower Body stand-by assist   Physical Assist/Nonphysical Assist: Dress Lower Body verbal cues   Toileting   Level of [place]: Toilet independent   Grooming   Level of [place]: Grooming independent   Eating/Self Feeding   Level of [place]: Eating independent   Instrumental Activities of Daily Living (IADL)   Previous Responsibilities meal prep;housekeeping;laundry;shopping;driving;;yardwork;medication management   Activities of Daily Living Analysis   Impairments Contributing to Impaired Activities of Daily Living pain;post surgical precautions;ROM decreased;strength decreased   General Therapy Interventions   Planned Therapy Interventions ADL retraining;risk factor education;progressive activity/exercise;home program guidelines;transfer training;ROM   Clinical Impression   Criteria for Skilled Therapeutic Interventions Met yes, treatment indicated   OT Diagnosis Impaired ADL and L shoulder motion after L reverse TSA   Influenced by the following impairments shoulder precautions, use of sling   Assessment  "of Occupational Performance 1-3 Performance Deficits   Identified Performance Deficits dressing, bathing, IADL   Clinical Decision Making (Complexity) Low complexity   Therapy Frequency other (see comments)   Predicted Duration of Therapy Intervention (days/wks) 1x eval/treatment    Anticipated Discharge Disposition Home with Assist   Risks and Benefits of Treatment have been explained. Yes   Patient, Family & other staff in agreement with plan of care Yes   [facility] TM \"6 Clicks\"   2016, Trustees of [facility], under license to [employer].  All rights reserved.   6 Clicks Short Forms Daily Activity Inpatient Short Form   [facility]  \"6 Clicks\" Daily Activity Inpatient Short Form   1. Putting on and taking off regular lower body clothing? 3 - A Little   2. Bathing (including washing, rinsing, drying)? 3 - A Little   3. Toileting, which includes using toilet, bedpan or urinal? 4 - None   4. Putting on and taking off regular upper body clothing? 2 - A Lot   5. Taking care of personal grooming such as brushing teeth? 4 - None   6. Eating meals? 4 - None   Daily Activity Raw Score (Score out of 24.Lower scores equate to lower levels of function) 20   Total Evaluation Time   Total Evaluation Time (Minutes) 10     "

## 2017-02-16 NOTE — PLAN OF CARE
Problem: Goal Outcome Summary  Goal: Goal Outcome Summary  Outcome: No Change  Patient A/Ox4. VSS. Denies CP, SOB, dizziness/LH. LSCTA. +fl/BS. Voiding through patent casiano catheter. CMS is still quite a bit numb, pt said block is slowly wearing off. Dressing to shoulder CDI, HV in place. Tolerating regular diet without NV, pt will order breakfast this AM. IS encouraged. Activity level has been sleeping in bed, pt ready to get up after casiano pulled this AM. IV infused fluids between abx, pt is SL this morning. Pain rated as well managed throughout shift, did not request any PRN pain medications, am monitoring carefully for pain needs, pt has denied need for pain control at this time.  Pt would like to d/c today if possible. Patient has demonstrated ability to call appropriately. Patient is resting with call light within reach. Will continue to monitor.

## 2017-02-16 NOTE — PLAN OF CARE
Problem: Goal Outcome Summary  Goal: Goal Outcome Summary  Outcome: Improving  Patient arrived on 8A room 838 about 05:30 pm, A/Ox4. Pt orinted to room and call light, VSS. Denies CP, SOB, dizziness/LH. LSCTA. +fl/BS, CMS intact. Dressing to shoulder CDI, ice applied. Tolerating regular diet without NV. IS encouraged, IV infusing, arm sling on, pt denies pain or discomfort, Patient has demonstrated ability to call appropriately. Patient is resting with call light within reach. Will continue to monitor.

## 2017-02-16 NOTE — DISCHARGE SUMMARY
[facility] Orthopaedic Surgery Discharge Summary    Beatriz Francis MRN# [id number]   Age: 66 year old YOB: 1950       Date of Admission:  2/15/2017  Date of Discharge::  2/16/2017   Admitting Physician:  Ankit Morton MD  Discharge To:  Home   Primary Care Physician:      Azalia Burger          Admission Diagnoses:    Left shoulder pain after prior rotator cuff repairs (rotator cuff tear arthropathy).          Discharge Diagnosis:    same         Procedures Performed:   Left reverse total shoulder arthroplasty.          Consultations:   INTERNAL MEDICINE HOSPITALIST ADULT IP CONSULT - [facility] [place]  OCCUPATIONAL THERAPY ADULT IP CONSULT  PHYSICAL THERAPY ADULT IP CONSULT          Brief History:    Ms. Beatriz Francis is a very pleasant 66-year-old woman with history of pain and disability in her shoulder. She had a trial of nonsurgical management including activity modification. After discussion of risks and benefits of surgical versus nonsurgical management, she elected to proceed with surgical remediation.            Hospital Course:   Patient did well post operatively. She was admitted overnight for pain control and general observations. Pt did well. Transposition to oral pain meds quickly. Bowel and bladder function returned to normal with pt passing flatus on POD #0. Hemoglobin was stable, with no evidence of bleeding. Pt met with OT who taught home exercise program of wrist and elbow ROM with no active or passive ROM to the shoulder for 6 weeks. Pt was able to discharge in stable condition on POD #1.         Medications Prior to Admission:     Prescriptions Prior to Admission   Medication Sig Dispense Refill Last Dose     tiotropium (SPIRIVA HANDIHALER) 18 MCG capsule Inhale 1 capsule (18 mcg) into the lungs daily 90 capsule 1 2/15/2017 at 0700     hydrOXYzine (VISTARIL) 25 MG capsule Take 1 capsule (25 mg) by mouth 3 times daily as needed for itching 30 capsule 0 Past  Month at Unknown time     zolpidem (AMBIEN) 5 MG tablet Take 1 tablet (5 mg) by mouth nightly as needed for sleep 30 tablet 5 2/14/2017 at 2130     amoxicillin (AMOXIL) 500 MG capsule Take 1 capsule (500 mg) by mouth daily 90 capsule 3 2/15/2017 at 0830     omeprazole 20 MG tablet Take 1 tablet (20 mg) by mouth daily Take 30-60 minutes before a meal. 90 tablet 3 2/15/2017 at 0700     albuterol (VENTOLIN HFA) 108 (90 BASE) MCG/ACT inhaler Inhale 2 puffs into the lungs every 6 hours Please dispense a 3 month supply. 6 Inhaler 1 2/15/2017 at 0900     fluticasone (FLOVENT HFA) 220 MCG/ACT inhaler Inhale 2 puffs into the lungs 2 times daily 3 Inhaler 3 Past Month     albuterol (2.5 MG/3ML) 0.083% nebulizer solution Take 1 vial (2.5 mg) by nebulization every 6 hours as needed for shortness of breath / dyspnea or wheezing 3 Box 6 Past Month at Unknown time     [DISCONTINUED] Naproxen Sodium (ALEVE PO) Take 1 tablet by mouth daily as needed for moderate pain   1/19/17     [DISCONTINUED] HYDROcodone-acetaminophen (NORCO) 5-325 MG per tablet Take 1 tablet by mouth every 6 hours as needed for pain May fill on or after 2/4/17 60 tablet 0 2/15/2017 at 0830     methocarbamol (ROBAXIN) 500 MG tablet Take 1-2 tablets (500-1,000 mg) by mouth 3 times daily as needed for muscle spasms 90 tablet 1 More than a month at Unknown time            Discharge Medications:        Review of your medicines      START taking       Dose / Directions    acetaminophen 325 MG tablet   Commonly known as:  TYLENOL   Used for:  H/O total shoulder replacement, left        Dose:  650 mg   Start taking on:  2/18/2017   Take 2 tablets (650 mg) by mouth every 4 hours as needed for other (surgical pain)   Quantity:  100 tablet   Refills:  0       HYDROmorphone 2 MG tablet   Commonly known as:  DILAUDID   Used for:  H/O total shoulder replacement, left        Dose:  2-4 mg   Take 1-2 tablets (2-4 mg) by mouth every 3 hours as needed for moderate to severe pain    Quantity:  80 tablet   Refills:  0       senna-docusate 8.6-50 MG per tablet   Commonly known as:  SENOKOT-S;PERICOLACE   Used for:  H/O total shoulder replacement, left        Dose:  2 tablet   Take 2 tablets by mouth 2 times daily   Quantity:  50 tablet   Refills:  0         CONTINUE these medicines which have NOT CHANGED       Dose / Directions    * albuterol (2.5 MG/3ML) 0.083% neb solution   Used for:  COPD (chronic obstructive pulmonary disease) (H)        Dose:  1 vial   Take 1 vial (2.5 mg) by nebulization every 6 hours as needed for shortness of breath / dyspnea or wheezing   Quantity:  3 Box   Refills:  6       * albuterol 108 (90 BASE) MCG/ACT Inhaler   Commonly known as:  VENTOLIN HFA   Used for:  COPD (chronic obstructive pulmonary disease) (H)        Dose:  2 puff   Inhale 2 puffs into the lungs every 6 hours Please dispense a 3 month supply.   Quantity:  6 Inhaler   Refills:  1       amoxicillin 500 MG capsule   Commonly known as:  AMOXIL   Used for:  Acne, unspecified acne type        Dose:  500 mg   Take 1 capsule (500 mg) by mouth daily   Quantity:  90 capsule   Refills:  3       fluticasone 220 MCG/ACT Inhaler   Commonly known as:  FLOVENT HFA   Used for:  COPD (chronic obstructive pulmonary disease) (H)        Dose:  2 puff   Inhale 2 puffs into the lungs 2 times daily   Quantity:  3 Inhaler   Refills:  3       hydrOXYzine 25 MG capsule   Commonly known as:  VISTARIL        Dose:  25 mg   Take 1 capsule (25 mg) by mouth 3 times daily as needed for itching   Quantity:  30 capsule   Refills:  0       methocarbamol 500 MG tablet   Commonly known as:  ROBAXIN   Used for:  Chronic pain syndrome, Bilateral shoulder pain        Dose:  500-1000 mg   Take 1-2 tablets (500-1,000 mg) by mouth 3 times daily as needed for muscle spasms   Quantity:  90 tablet   Refills:  1       omeprazole 20 MG tablet   Used for:  Gastroesophageal reflux disease without esophagitis        Dose:  20 mg   Take 1 tablet (20  mg) by mouth daily Take 30-60 minutes before a meal.   Quantity:  90 tablet   Refills:  3       tiotropium 18 MCG capsule   Commonly known as:  SPIRIVA HANDIHALER   Used for:  Pulmonary emphysema, unspecified emphysema type (H)        Dose:  18 mcg   Inhale 1 capsule (18 mcg) into the lungs daily   Quantity:  90 capsule   Refills:  1       zolpidem 5 MG tablet   Commonly known as:  AMBIEN   Used for:  Insomnia, unspecified type        Dose:  5 mg   Take 1 tablet (5 mg) by mouth nightly as needed for sleep   Quantity:  30 tablet   Refills:  5       * Notice:  This list has 2 medication(s) that are the same as other medications prescribed for you. Read the directions carefully, and ask your doctor or other care provider to review them with you.      STOP taking          ALEVE PO           HYDROcodone-acetaminophen 5-325 MG per tablet   Commonly known as:  NORCO                Where to get your medicines      These medications were sent to [facility] [place], [place] - [address]  [address] [facility], [facility] [address]     Phone:  [phone]      acetaminophen 325 MG tablet     senna-docusate 8.6-50 MG per tablet         Some of these will need a paper prescription and others can be bought over the counter. Ask your nurse if you have questions.     Bring a paper prescription for each of these medications      HYDROmorphone 2 MG tablet                     Pending Results at Discharge:   None         Discharge Instructions:     Discharge Procedure Orders  Reason for your hospital stay   Order Comments: You had your shoulder replaced.     Follow Up and recommended labs and tests   Order Comments: Follow up with Dr Morton at [facility]/ [address] [place] [address]  in two weeks. Call the office at [phone]  to schedule an appointment if you have not already done so.     When to contact your care team   Order Comments: Call your physician for fevers greater than 101.5, chills, increased pain, redness, swelling  or discharge at the surgical site. During regular business hours call Dr Fernandez's office and request to speak with his nurse or the triage nurses. If you see him at [facility] call [phone]. If you see him at [facility] call [phone]. After hours or on weekends call the hospital  at [phone] and ask to speak with the resident on call.     Discharge Instructions   Order Comments: Follow the therapy program, no active or passive shoulder movement for 6 weeks, or until instructed to do so by Dr Morton. Continue activating wrist/elbow as instructed.     Wound care and dressings   Order Comments: Instructions to care for your wound at home:You may remove your dressings on post-operative day 3If  your wound is dry, you may leave it open to the air. Starting on post-operative day 3  you may begin showering over your wound without the dressing on  but no scrubbing and no soaking your wound in a bath or pool.       Future Appointments  Date Time Provider Department [place]   2/16/2017 1:30 PM Brigid Ribeiro, EL[facility]   2/27/2017 11:20 AM Ankit Morton MD [facility]   3/27/2017 11:20 AM Ankit Morton MD [facility]       ALEJANDRO Seo, [facility]  Department of Orthopedic Surgery  [facility]  [phone]

## 2017-02-16 NOTE — PLAN OF CARE
Problem: Goal Outcome Summary  Goal: Goal Outcome Summary  Outcome: Adequate for Discharge Date Met:  02/16/17  Pt discharged home via WC accompanied with transport about 11:40 am, discharge medication, dressing supplies, and discharge instruction given, all belongings send home with pt and pt understand discharge plan.

## 2017-02-16 NOTE — PLAN OF CARE
Problem: Goal Outcome Summary  Goal: Goal Outcome Summary  PT orders received for PT evaluation and treatment: s/p TSA.  Per OT pt does not have any skilled PT needs, PT orders completed.

## 2017-02-16 NOTE — PLAN OF CARE
Problem: Goal Outcome Summary  Goal: Goal Outcome Summary  Outcome: Improving  Patient A/Ox4. VSS. Denies CP, SOB, dizziness/LH. LSCTA. +fl/BS. Voiding with out difficulty. CMS intact. Dressing to shoulder changed this morning by NP and it is  CDI, ice applied. Tolerating regular diet without NV. IS encouraged. IV dcd pt is going home this aftrnoon, Pain rated comfortabely managable throughout shift, managed with dilaudid po PRN, Patient has demonstrated ability to call appropriately. Patient is resting with call light within reach. Will continue to monitor.

## 2017-02-16 NOTE — OP NOTE
DATE OF PROCEDURE:  2/15/2017      PREOPERATIVE DIAGNOSES:  Left shoulder pain after prior rotator cuff repairs (rotator cuff tear arthropathy).      POSTOPERATIVE DIAGNOSES:  Left shoulder pain after prior rotator cuff repairs (rotator cuff tear arthropathy).      SURGICAL PROCEDURE:  Left reverse total shoulder arthroplasty.      SURGEON:  Ankit Morton MD      ASSISTANT:  Freddie Sawyer MD.  He is the fellow that worked with Galileo Diaz today on Galileo's first case.  The assistance of assistant of Dr. Sawyer was necessitated by the orthopedic complexity of the case, the need to position the arm in space, pass and retrieve sutures.  Additionally, there was no suitably qualified orthopedic surgery .  No other level of surgical assistant would have provided adequate support for the patient's surgical well-being.      ANESTHESIA:  Interscalene blockade plus general endotracheal anesthesia.      ESTIMATED BLOOD LOSS:  150 mL.      IMPLANTS:   1.  Krystyna trabecular metal reverse matte finished size 11 mm proximally cemented humeral stem.   2.  Krystyna trabecular metal reverse 40+0 polyethylene.   3.  Krystyna trabecular metal reverse size 40 mm glenosphere.   4.  Krystyna trabecular metal reverse standard baseplate with appropriate screws and locking caps.   5.  Palacos cement preloaded with antibiotics (humeral side only).      INDICATIONS:  Ms. Beatriz Francis is a very pleasant 66-year-old woman with history of pain and disability in her shoulder.  She had a trial of nonsurgical management including activity modification.  After discussion of risks and benefits of surgical versus nonsurgical management, she elected to proceed with surgical remediation.      DESCRIPTION OF PROCEDURE:  The patient was positively identified in the preanesthesia care area, her surgical site was initialed by me, and her consent was reviewed with her.  She was then taken to the operating theater.  She was placed in a  "well-padded beachchair position, prepped and draped in the usual sterile fashion for left upper extremity surgery.      Prior to surgical initiation, a \"timeout\" was held in accordance with hospital policy.  Verbal verification of delivery of prophylactic intravenous antibiotics was performed.      After establishment of an anterior 10 cm deltopectoral incision, I was able to identify the cephalic vein and allow it to track medially.  Tributaries to the deltoid were ligated and coagulated.  I was able to mobilize the subdeltoid space.  There was dense adherent scarring there.  I was able to create a nice space for the brown retractor.  I was able to dissect down.  There was a filamentous anterior subscapularis, but no evidence of good tendon.  The superior tendon was gone.  The inferior was gone.        As I dissected down, I was able to dislocate the humerus and intussuscept it through the deltopectoral approach.  I was then able to ream.  There were numerous suture anchors in the proximal humerus.  I reamed until a size 11 mm reamer had acceptable cortical chatter and osteotomized the head in 30 degrees of retroversion, and then did metadiaphyseal followed by the metaphyseal reamings.  I did this in 30 degrees of retroversion.      I copiously irrigated, placed the trial implant, and turned my attention to the glenoid.      I did a circumferential release of the glenoid.  I identified the axillary nerve numerous times during the course of the procedure using the \"tug test.\"  I did this including once just prior to deltopectoral closure verifying that the axillary nerve remained in continuity throughout.      Consequently, I was able to do a circumferential labrectomy and remove all the cartilage off the glenoid.  I placed the implant in the center point.  I drilled, reamed, used the center boss and compressed the bone with a half and half reamer.  I copiously irrigated, placed the definitive implant in position, " and placed the guide screws with the superior followed by the inferior.  I tensioned them sequentially and then placed the locking screws using the anterior rotation device.  Again, I copiously irrigated and placed a 40 mm definitive glenosphere in position.  Provisional reduction with the above-mentioned implant trial showed 1 mm longitudinal traction, no evidence of instability with internal and external rotation, and no evidence of instability with internal rotation, adduction, and anterior superiorly directed force.      Consequently, I removed the humeral trial.  I placed three #2 FiberWires through the cut neck of the humerus and through the implant sutures, proximally cemented and put the definitive implant in position.  I left it there until the cement hardened outside the body.  I placed the definitive polyethylene, found similar motion profile, and reapproximated the subscapularis remnant using modified Suraj-Bong stitches.      The biceps was included in this as it had already been tenodesed surgically.      A drain was placed proximally to the axillary nerve and the axillary nerve verified to be in continuity.      Closure was with Ethibond proximally and distally to suha the interval in case of need for later revision followed by 0 Vicryl, 2-0 Vicryl and 3-0 running subcuticular Monocryl.  Steri-Strips and sterile nonadherent dressing were applied.  A sling was placed.      POSTOPERATIVE PLAN:   1.  The patient will be admitted to the Orthopedic Service for intravenous followed by oral pain medications.   2.  She will have no active or passive range of motion for the first 6 weeks.   3.  At 6 weeks, she will come out of her sling and begin activities of daily living.   4.  At 3 months, she will be evaluated clinically and radiographically (Hyndman 4 views) to determine whether or not she needs the [facility] reverse total shoulder arthroplasty protocol.         MARCIO GOODMAN MD              D: 02/15/2017 14:44   T: 02/15/2017 17:52   MT: lg      Name:     YESENIA FERGUSON   MRN:      [id number]        Account:        [id number]   :      1950           Procedure Date: 02/15/2017      Document: [id number]

## 2017-02-16 NOTE — PLAN OF CARE
Problem: Goal Outcome Summary  Goal: Goal Outcome Summary  Outcome: Therapy, progress toward functional goals as expected  OT: Eval completed and treatment initiated. Patient able to verbalize understanding of elbow/wrist/hand AROM and of shoulder precautions with use of sling. Completed bed mobility, toileting, transfers, ambulation, stairs (I)ly. SBA with LE dressing and Mod A with UE dressing with sling, but will have assist at home. Patient hoping to leave by noon today if possible. No further therapy needed at this time; will follow up with outpatient therapy after 6 weeks.    Comments:   Occupational Therapy Discharge Summary     Reason for therapy discharge:    Discharged to home.  All goals and outcomes met, no further needs identified.     Progress towards therapy goal(s). See goals on Care Plan in [facility] electronic health record for goal details.  Goals met     Therapy recommendation(s):    Continue home exercise program.

## 2017-02-16 NOTE — PROGRESS NOTES
"Orthopaedic Surgery Progress Note     Dx Left shoulder pain after prior rotator cuff repairs (rotator cuff tear arthropathy).       Tx: Left reverse total shoulder arthroplasty    E: No acute events overnight.   S: Pain well controlled,      O:   /58  Pulse 94  Temp 97.1  F (36.2  C)  Resp 16  Ht 1.651 m (5' 5\")  Wt 55.6 kg (122 lb 9.2 oz)  LMP   SpO2 97%  BMI 20.4 kg/m2  Denies N/V/D. Hoping to d/c later today.    Drain:  70/0    Exam:  Gen: NAD, A/O x 3  Resp: Comfortable, non-labored breathing  Abd soft  LUE:  -Wound dressed, c/d/i  -Sens: SILT m/r/u/ax  -Motor: 5/5 , io, epl, fpl  -Vasc: 2+ pulses, wwp, brisk cap refill      Recent Labs  Lab 02/16/17  0546   HGB 9.6*       Impression: 66 year old female s/p on L reverse total shoulder arthroplasty on 2- doing well    Plan:  - Activity: Up as tolerated, sling at all times.  - Antibiotics: complete  - Diet: general  - DVT prophylaxis: mechanical only   - Wound Care:  Aquacel   - Drain: will d/c later today.  - Pain management: PNB/orals  - Occupational Therapy: She will have no active or passive range of motion for the first 6 weeks.    - Dispo: 1-2 days pending progress with therapy/pain control  - Follow up: see below  Future Appointments  Date Time Provider Department Center   2/16/2017 8:30 AM [facility]   2/16/2017 1:30 PM Brigid Ribeiro, OT [facility] [place]   2/27/2017 11:20 AM Ankit Morton MD [facility]   3/27/2017 11:20 AM Ankit Morton MD [facility]        Lauren Young, APRN, CNP  Department of Orthopedic Surgery  [facility]  [phone]    For any questions regarding this patient please page me at the above number prior to contacting the ortho resident on call.          "

## 2017-02-18 ENCOUNTER — TELEPHONE (OUTPATIENT)
Dept: ORTHOPEDICS | Facility: CLINIC | Age: 67
End: 2017-02-18

## 2017-02-18 DIAGNOSIS — Z96.619 HISTORY OF SHOULDER REPLACEMENT, UNSPECIFIED LATERALITY: Primary | ICD-10-CM

## 2017-02-18 RX ORDER — OXYCODONE AND ACETAMINOPHEN 5; 325 MG/1; MG/1
1-2 TABLET ORAL EVERY 4 HOURS PRN
Qty: 80 TABLET | Refills: 0 | Status: SHIPPED | OUTPATIENT
Start: 2017-02-18 | End: 2017-03-17

## 2017-02-20 ENCOUNTER — TELEPHONE (OUTPATIENT)
Dept: FAMILY MEDICINE | Facility: CLINIC | Age: 67
End: 2017-02-20

## 2017-02-20 NOTE — TELEPHONE ENCOUNTER
This patient was discharged from [place] on 02/16/17.    Discharge Diagnosis: Total Shoulder Replacement, left    Follow-up instructions: Follow up w/ Dr Morton in two weeks    A follow-up visit has been scheduled w/ Dr Morton on 02/27/17.    Number of ED/ER visits in the last 12 months:  1     Please follow-up with patient.    Nancy Brown

## 2017-02-21 NOTE — TELEPHONE ENCOUNTER
Spoke with Lolly & we do not intervene with ortho surgeries. She is scheduled next week as recommended in f/u instructions.  Tena Mcneil RN

## 2017-02-27 ENCOUNTER — OFFICE VISIT (OUTPATIENT)
Dept: ORTHOPEDICS | Facility: CLINIC | Age: 67
End: 2017-02-27

## 2017-02-27 VITALS — HEIGHT: 65 IN | BODY MASS INDEX: 20.26 KG/M2 | WEIGHT: 121.6 LBS

## 2017-02-27 DIAGNOSIS — M75.122 COMPLETE ROTATOR CUFF TEAR OF LEFT SHOULDER: Primary | ICD-10-CM

## 2017-02-27 RX ORDER — OXYCODONE AND ACETAMINOPHEN 5; 325 MG/1; MG/1
1-2 TABLET ORAL EVERY 4 HOURS PRN
Qty: 60 TABLET | Refills: 0 | Status: SHIPPED | OUTPATIENT
Start: 2017-02-27 | End: 2017-03-17

## 2017-02-27 NOTE — LETTER
2/27/2017       RE: Beatriz Francis  [address] [place], [place] [address]     Dear Colleague,    Thank you for referring your patient, Beatriz Francis, to the [facility] at [facility]. Please see a copy of my visit note below.    S:  Doing well. Using percocet     O:  Incision clean, dry, and intact  Sets Deltoid  Wiggles fingers  Warm and well-perfused hand with palpable pulse    A/P:  Doing well  Advance per op report  I refilled percocet #60    Again, thank you for allowing me to participate in the care of your patient.      Sincerely,    Ankit Morton MD

## 2017-02-27 NOTE — PROGRESS NOTES
S:  Doing well. Using percocet     O:  Incision clean, dry, and intact  Sets Deltoid  Wiggles fingers  Warm and well-perfused hand with palpable pulse    A/P:  Doing well  Advance per op report  I refilled percocet #60

## 2017-02-27 NOTE — MR AVS SNAPSHOT
After Visit Summary   2/27/2017    Beatriz Francis    MRN: [id number]           Patient Information     Date Of Birth          1950        Visit Information        Provider Department      2/27/2017 11:20 AM Ankit Morton MD [facility]        Today's Diagnoses     Complete rotator cuff tear of left shoulder    -  1       Follow-ups after your visit        Your next 10 appointments already scheduled     Mar 27, 2017 11:20 AM CDT   (Arrive by 11:05 AM)   RETURN SHOULDER with Ankit Morton MD   [facility] ([facility])    [address] [place] [address] [id number] [phone]              Who to contact     Please call your clinic at [phone] to:    Ask questions about your health    Make or cancel appointments    Discuss your medicines    Learn about your test results    Speak to your doctor   If you have compliments or concerns about an experience at your clinic, or if you wish to file a complaint, please contact [facility] Patient Relations at [phone] or email us at [email or web address]         Additional Information About Your Visit        MyChart Information     [employer] gives you secure access to your electronic health record. If you see a primary care provider, you can also send messages to your care team and make appointments. If you have questions, please call your primary care clinic.  If you do not have a primary care provider, please call [phone] and they will assist you.      [employer] is an electronic gateway that provides easy, online access to your medical records. With [employer], you can request a clinic appointment, read your test results, renew a prescription or communicate with your care team.     To access your existing account, please contact your [facility] or call [phone] for assistance.       "  Care EveryWhere ID     This is your Care EveryWhere ID. This could be used by other organizations to access your [facility] medical records  [id number]        Your Vitals Were     Height BMI (Body Mass Index)                1.651 m (5' 5\") 20.24 kg/m2           Blood Pressure from Last 3 Encounters:   02/16/17 109/58   01/23/17 111/76   01/18/17 132/83    Weight from Last 3 Encounters:   02/27/17 55.2 kg (121 lb 9.6 oz)   02/15/17 55.6 kg (122 lb 9.2 oz)   01/23/17 57.2 kg (126 lb)              Today, you had the following     No orders found for display         Today's Medication Changes          These changes are accurate as of: 2/27/17 11:31 AM.  If you have any questions, ask your nurse or doctor.               These medicines have changed or have updated prescriptions.        Dose/Directions    * oxyCODONE-acetaminophen 5-325 MG per tablet   Commonly known as:  PERCOCET   This may have changed:  Another medication with the same name was added. Make sure you understand how and when to take each.   Used for:  History of shoulder replacement, unspecified laterality        Dose:  1-2 tablet   Take 1-2 tablets by mouth every 4 hours as needed for pain maximum 12 tablet(s) per day   Quantity:  80 tablet   Refills:  0       * oxyCODONE-acetaminophen 5-325 MG per tablet   Commonly known as:  PERCOCET   This may have changed:  You were already taking a medication with the same name, and this prescription was added. Make sure you understand how and when to take each.   Used for:  Complete rotator cuff tear of left shoulder        Dose:  1-2 tablet   Take 1-2 tablets by mouth every 4 hours as needed for pain (every four to six hours)   Quantity:  60 tablet   Refills:  0       * Notice:  This list has 2 medication(s) that are the same as other medications prescribed for you. Read the directions carefully, and ask your doctor or other care provider to review them with you.      Stop taking these medicines if you haven't " already. Please contact your care team if you have questions.     HYDROmorphone 2 MG tablet   Commonly known as:  DILAUDID                Where to get your medicines      Some of these will need a paper prescription and others can be bought over the counter.  Ask your nurse if you have questions.     Bring a paper prescription for each of these medications     oxyCODONE-acetaminophen 5-325 MG per tablet                Primary Care Provider Office Phone # Fax #    Azalia Burger [phone]       [address] [address]        Thank you!     Thank you for choosing [facility]  for your care. Our goal is always to provide you with excellent care. Hearing back from our patients is one way we can continue to improve our services. Please take a few minutes to complete the written survey that you may receive in the mail after your visit with us. Thank you!             Your Updated Medication List - Protect others around you: Learn how to safely use, store and throw away your medicines at www.disposemymeds.org.          This list is accurate as of: 2/27/17 11:31 AM.  Always use your most recent med list.                   Brand Name Dispense Instructions for use    acetaminophen 325 MG tablet    TYLENOL    100 tablet    Take 2 tablets (650 mg) by mouth every 4 hours as needed for other (surgical pain)       * albuterol (2.5 MG/3ML) 0.083% neb solution     3 Box    Take 1 vial (2.5 mg) by nebulization every 6 hours as needed for shortness of breath / dyspnea or wheezing       * albuterol 108 (90 BASE) MCG/ACT Inhaler    VENTOLIN HFA    6 Inhaler    Inhale 2 puffs into the lungs every 6 hours Please dispense a 3 month supply.       amoxicillin 500 MG capsule    AMOXIL    90 capsule    Take 1 capsule (500 mg) by mouth daily       fluticasone 220 MCG/ACT Inhaler    FLOVENT HFA    3 Inhaler    Inhale 2 puffs into the lungs 2 times daily        hydrOXYzine 25 MG capsule    VISTARIL    30 capsule    Take 1 capsule (25 mg) by mouth 3 times daily as needed for itching       methocarbamol 500 MG tablet    ROBAXIN    90 tablet    Take 1-2 tablets (500-1,000 mg) by mouth 3 times daily as needed for muscle spasms       omeprazole 20 MG tablet     90 tablet    Take 1 tablet (20 mg) by mouth daily Take 30-60 minutes before a meal.       * oxyCODONE-acetaminophen 5-325 MG per tablet    PERCOCET    80 tablet    Take 1-2 tablets by mouth every 4 hours as needed for pain maximum 12 tablet(s) per day       * oxyCODONE-acetaminophen 5-325 MG per tablet    PERCOCET    60 tablet    Take 1-2 tablets by mouth every 4 hours as needed for pain (every four to six hours)       senna-docusate 8.6-50 MG per tablet    SENOKOT-S;PERICOLACE    50 tablet    Take 2 tablets by mouth 2 times daily       tiotropium 18 MCG capsule    SPIRIVA HANDIHALER    90 capsule    Inhale 1 capsule (18 mcg) into the lungs daily       zolpidem 5 MG tablet    AMBIEN    30 tablet    Take 1 tablet (5 mg) by mouth nightly as needed for sleep       * Notice:  This list has 4 medication(s) that are the same as other medications prescribed for you. Read the directions carefully, and ask your doctor or other care provider to review them with you.
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
## 2017-03-09 DIAGNOSIS — G89.4 CHRONIC PAIN SYNDROME: ICD-10-CM

## 2017-03-09 DIAGNOSIS — G89.29 CHRONIC NECK PAIN: ICD-10-CM

## 2017-03-09 DIAGNOSIS — M54.2 CHRONIC NECK PAIN: ICD-10-CM

## 2017-03-09 RX ORDER — HYDROCODONE BITARTRATE AND ACETAMINOPHEN 5; 325 MG/1; MG/1
1 TABLET ORAL EVERY 6 HOURS PRN
Qty: 60 TABLET | Refills: 0 | Status: CANCELLED | OUTPATIENT
Start: 2017-03-09

## 2017-03-09 NOTE — TELEPHONE ENCOUNTER
Norco  Last Written Prescription Date: 02/06/2017  Last Fill Quantity: 60,  # refills: 0  Last Office Visit with G, P or [facility] prescribing provider: 01/18/2017    Patient is discontinuing Percocet and wants her norco filled

## 2017-03-09 NOTE — TELEPHONE ENCOUNTER
She was given percocet a few weeks ago from another provider.  Please find out the plan for her pain.      Elisabeth Massey D.O.

## 2017-03-09 NOTE — TELEPHONE ENCOUNTER
Called Teddy- she was switched to percocet for her rotator cuff tear but she will be out in about a week & then will be ready to switch back to her usual norco. Patient is okay waiting until PCP returns on Monday. Pended.  Tena Mcneil RN

## 2017-03-15 NOTE — TELEPHONE ENCOUNTER
ran by pharmacy and placed in PCP box. Routed back to PCP as and FYI to look for it.  Cass Serrano,Clinic Rn  [facility]

## 2017-03-17 RX ORDER — HYDROCODONE BITARTRATE AND ACETAMINOPHEN 5; 325 MG/1; MG/1
1 TABLET ORAL EVERY 6 HOURS PRN
Qty: 60 TABLET | Refills: 0 | Status: SHIPPED | OUTPATIENT
Start: 2017-03-17 | End: 2017-03-31

## 2017-03-17 NOTE — TELEPHONE ENCOUNTER
Called and spoke with patient. She is out of oxycodone in 2 days. Okayed with MD. Karthik walked to pharmacy.    Trever Morfin RN

## 2017-03-17 NOTE — TELEPHONE ENCOUNTER
Beatriz called pharmacy today to check on status of the norco refill.  Patient would like a phone call with an update.    Thanks!    Gale Escudero PharmD, [facility]  Pharmacist Manager   [facility]  [phone]

## 2017-03-17 NOTE — TELEPHONE ENCOUNTER
Patient called to check status of prescription.  Please call patient back at number [phone].    Thank you!  Karla RUDOLPH  Patient Representative  [facility]

## 2017-03-17 NOTE — TELEPHONE ENCOUNTER
Signed, placed in completed forms folder.  Please confirm oxycodone is finished and take off med rec  Azalia Burger MD

## 2017-03-27 ENCOUNTER — TELEPHONE (OUTPATIENT)
Dept: FAMILY MEDICINE | Facility: CLINIC | Age: 67
End: 2017-03-27

## 2017-03-27 ENCOUNTER — OFFICE VISIT (OUTPATIENT)
Dept: ORTHOPEDICS | Facility: CLINIC | Age: 67
End: 2017-03-27

## 2017-03-27 VITALS — WEIGHT: 120 LBS | HEIGHT: 65 IN | BODY MASS INDEX: 19.99 KG/M2

## 2017-03-27 DIAGNOSIS — Z96.612 S/P REVERSE TOTAL SHOULDER ARTHROPLASTY, LEFT: Primary | ICD-10-CM

## 2017-03-27 NOTE — TELEPHONE ENCOUNTER
Reason for Call:  Other - Call Back: Medication Recommendation    Detailed comments: Sapna with Dr. Morton's office calling. She stated Dr. Morton recommends an increase in pain medication for the next few weeks while she heals from shoulder surgery. Please call back if any questions.    Phone Number Patient can be reached at: Other phone number:  [phone]    Best Time: Anytime    Can we leave a detailed message on this number? YES    Call taken on 3/27/2017 at 1:10 PM by Cass Andrade

## 2017-03-27 NOTE — TELEPHONE ENCOUNTER
"Reviewed today's office notes but they are not complete yet.   Called EMILY Alejo inquiring what Dr. Morton is recommending. Med list states she is on nocro 5/325 every 6 hours PRN currently.   Sapna calls back & states \"Dr. Morton states 1-2 more vicodin a day for a few more weeks doesn't seem unreasonable.\"   Will huddle.    Tena Mcneil RN   "

## 2017-03-27 NOTE — MR AVS SNAPSHOT
After Visit Summary   3/27/2017    Beatriz Francis    MRN: [id number]           Patient Information     Date Of Birth          1950        Visit Information        Provider Department      3/27/2017 11:20 AM Ankit Morton MD [facility]        Today's Diagnoses     S/p reverse total shoulder arthroplasty, left    -  1       Follow-ups after your visit        Your next 10 appointments already scheduled     May 10, 2017 10:45 AM CDT   (Arrive by 10:30 AM)   RETURN SHOULDER with Casandra Martinez MD   [facility] ([facility])    [address] [place] [id number] [phone]              Who to contact     Please call your clinic at [phone] to:    Ask questions about your health    Make or cancel appointments    Discuss your medicines    Learn about your test results    Speak to your doctor   If you have compliments or concerns about an experience at your clinic, or if you wish to file a complaint, please contact [facility] Patient Relations at [phone] or email us at [email or web address]         Additional Information About Your Visit        MyChart Information     [employer]t gives you secure access to your electronic health record. If you see a primary care provider, you can also send messages to your care team and make appointments. If you have questions, please call your primary care clinic.  If you do not have a primary care provider, please call [phone] and they will assist you.      [employer] ([employer])t is an electronic gateway that provides easy, online access to your medical records. With [employer], you can request a clinic appointment, read your test results, renew a prescription or communicate with your care team.     To access your existing account, please contact your [facility] or call [phone] for assistance.       "  Care EveryWhere ID     This is your Care EveryWhere ID. This could be used by other organizations to access your [facility] medical records  [id number]        Your Vitals Were     Height BMI (Body Mass Index)                1.651 m (5' 5\") 19.97 kg/m2           Blood Pressure from Last 3 Encounters:   02/16/17 109/58   01/23/17 111/76   01/18/17 132/83    Weight from Last 3 Encounters:   03/27/17 54.4 kg (120 lb)   02/27/17 55.2 kg (121 lb 9.6 oz)   02/15/17 55.6 kg (122 lb 9.2 oz)              Today, you had the following     No orders found for display       Primary Care Provider Office Phone # Fax #    Azalia Burger [phone]       [address] [address]        Thank you!     Thank you for choosing [facility]  for your care. Our goal is always to provide you with excellent care. Hearing back from our patients is one way we can continue to improve our services. Please take a few minutes to complete the written survey that you may receive in the mail after your visit with us. Thank you!             Your Updated Medication List - Protect others around you: Learn how to safely use, store and throw away your medicines at www.disposemymeds.org.          This list is accurate as of: 3/27/17 11:59 PM.  Always use your most recent med list.                   Brand Name Dispense Instructions for use    acetaminophen 325 MG tablet    TYLENOL    100 tablet    Take 2 tablets (650 mg) by mouth every 4 hours as needed for other (surgical pain)       * albuterol (2.5 MG/3ML) 0.083% neb solution     3 Box    Take 1 vial (2.5 mg) by nebulization every 6 hours as needed for shortness of breath / dyspnea or wheezing       * albuterol 108 (90 BASE) MCG/ACT Inhaler    VENTOLIN HFA    6 Inhaler    Inhale 2 puffs into the lungs every 6 hours Please dispense a 3 month supply.       amoxicillin 500 MG capsule    AMOXIL    90 capsule    Take 1 " capsule (500 mg) by mouth daily       fluticasone 220 MCG/ACT Inhaler    FLOVENT HFA    3 Inhaler    Inhale 2 puffs into the lungs 2 times daily       hydrOXYzine 25 MG capsule    VISTARIL    30 capsule    Take 1 capsule (25 mg) by mouth 3 times daily as needed for itching       methocarbamol 500 MG tablet    ROBAXIN    90 tablet    Take 1-2 tablets (500-1,000 mg) by mouth 3 times daily as needed for muscle spasms       omeprazole 20 MG tablet     90 tablet    Take 1 tablet (20 mg) by mouth daily Take 30-60 minutes before a meal.       senna-docusate 8.6-50 MG per tablet    SENOKOT-S;PERICOLACE    50 tablet    Take 2 tablets by mouth 2 times daily       tiotropium 18 MCG capsule    SPIRIVA HANDIHALER    90 capsule    Inhale 1 capsule (18 mcg) into the lungs daily       zolpidem 5 MG tablet    AMBIEN    30 tablet    Take 1 tablet (5 mg) by mouth nightly as needed for sleep       * Notice:  This list has 2 medication(s) that are the same as other medications prescribed for you. Read the directions carefully, and ask your doctor or other care provider to review them with you.

## 2017-03-27 NOTE — LETTER
3/27/2017       RE: Beatriz Francis  [address] [place], [place] [id number]     Dear Colleague,    Thank you for referring your patient, Beatriz Francis, to the [facility] at [facility]. Please see a copy of my visit note below.    S:  Doing well.    O:  Incision clean, dry, and intact  Sets Deltoid  Wiggles fingers  Warm and well-perfused hand with palpable pulse    A/P:  Doing well  Advance per op report  F/U in 6 weeks without XR then with me at 6 mos post-op with XR.    Again, thank you for allowing me to participate in the care of your patient.      Sincerely,    Ankit Morton MD

## 2017-03-29 NOTE — TELEPHONE ENCOUNTER
Please contact RN to try and help clarify.  Patient may be asking for me to do script instead of her surgeon, but I prefer post op pain be managed by surgeon.  Based on refill request from 3/9/17 my understanding was patient was finishing her pain script for post-op pain and wanted to go back on the hydrocodone that I have prescribed for many years for chronic neck and back pain.  If she is still asking for help for post-op pain, then a script for post-op pain should be given by surgeon.  I appreciate the information, but I won't provide post op pain prescription.  Azalia Burger MD

## 2017-03-29 NOTE — TELEPHONE ENCOUNTER
Called and left message on Sapna's secure voicemail with Dr. Burger's message.     Leighann Abarca RN

## 2017-03-31 ENCOUNTER — TELEPHONE (OUTPATIENT)
Dept: ORTHOPEDICS | Facility: CLINIC | Age: 67
End: 2017-03-31

## 2017-03-31 DIAGNOSIS — G89.4 CHRONIC PAIN SYNDROME: ICD-10-CM

## 2017-03-31 DIAGNOSIS — Z96.612 S/P SHOULDER REPLACEMENT, LEFT: Primary | ICD-10-CM

## 2017-03-31 DIAGNOSIS — G89.29 CHRONIC NECK PAIN: ICD-10-CM

## 2017-03-31 DIAGNOSIS — M54.2 CHRONIC NECK PAIN: ICD-10-CM

## 2017-03-31 RX ORDER — HYDROCODONE BITARTRATE AND ACETAMINOPHEN 5; 325 MG/1; MG/1
1 TABLET ORAL EVERY 6 HOURS PRN
Qty: 60 TABLET | Refills: 0 | Status: SHIPPED | OUTPATIENT
Start: 2017-03-31 | End: 2017-03-31

## 2017-03-31 RX ORDER — HYDROCODONE BITARTRATE AND ACETAMINOPHEN 5; 325 MG/1; MG/1
1 TABLET ORAL EVERY 6 HOURS PRN
Qty: 60 TABLET | Refills: 0 | Status: SHIPPED | OUTPATIENT
Start: 2017-03-31 | End: 2017-04-13

## 2017-03-31 NOTE — TELEPHONE ENCOUNTER
Patient is S/P left reverse total shoulder arthroplasty 02/15/2017.  She is taking 4 Norco daily for pain.  Her PMD at [facility] requests Dr Morton refill pain medications until patient has decreased to 2 Norco / day which is her baseline.  Dr Morton is agreeable with this.  Prescription mailed to patient today.  She is aware Dr Morton will refill narcotics up to 3 months post-op or until she is back to her baseline for the chronic narcotic.

## 2017-04-13 DIAGNOSIS — Z96.612 S/P SHOULDER REPLACEMENT, LEFT: ICD-10-CM

## 2017-04-13 RX ORDER — HYDROCODONE BITARTRATE AND ACETAMINOPHEN 5; 325 MG/1; MG/1
1 TABLET ORAL EVERY 6 HOURS PRN
Qty: 60 TABLET | Refills: 0 | Status: SHIPPED | OUTPATIENT
Start: 2017-04-13 | End: 2017-04-28

## 2017-04-24 ENCOUNTER — RADIANT APPOINTMENT (OUTPATIENT)
Dept: MAMMOGRAPHY | Facility: CLINIC | Age: 67
End: 2017-04-24
Attending: FAMILY MEDICINE
Payer: COMMERCIAL

## 2017-04-24 DIAGNOSIS — Z12.31 VISIT FOR SCREENING MAMMOGRAM: ICD-10-CM

## 2017-04-24 PROCEDURE — G0202 SCR MAMMO BI INCL CAD: HCPCS | Mod: TC

## 2017-04-28 ENCOUNTER — TELEPHONE (OUTPATIENT)
Dept: ORTHOPEDICS | Facility: CLINIC | Age: 67
End: 2017-04-28

## 2017-04-28 DIAGNOSIS — Z96.612 S/P SHOULDER REPLACEMENT, LEFT: ICD-10-CM

## 2017-04-28 RX ORDER — HYDROCODONE BITARTRATE AND ACETAMINOPHEN 5; 325 MG/1; MG/1
1 TABLET ORAL EVERY 8 HOURS PRN
Qty: 60 TABLET | Refills: 0 | Status: SHIPPED | OUTPATIENT
Start: 2017-04-28 | End: 2017-06-15

## 2017-04-28 NOTE — TELEPHONE ENCOUNTER
Patient requests a refill of pain medication.  She is S/P left reverse total shoulder arthroplasty 02/15/2017.  She reports she has been taking 4 Norco / day but is agreeable with tapering down to 3 tablets daily.  She is aware future refill requests need to be authorized by her primary MD.  Today's prescription will be mailed to her home.

## 2017-05-09 ENCOUNTER — TELEPHONE (OUTPATIENT)
Dept: FAMILY MEDICINE | Facility: CLINIC | Age: 67
End: 2017-05-09

## 2017-05-09 NOTE — TELEPHONE ENCOUNTER
"She has generalized achyness every day, tired, not feeling well for about two months. She doesn't know why & states nothing has changed except for her allergies- she has been sneezing a lot & has a stuffy nose. She has been taking claritin & pseudoephedrine.  \"I feel terrible.\"   She denies fever, shortness of breath, tick or rash & stress changes.  Scheduled 5/9.  Tena Mcneil RN   "

## 2017-05-09 NOTE — TELEPHONE ENCOUNTER
Reason for call:  Patient reporting a symptom    Symptom or request: pain, not feeling well, achy all over, low energy    Duration (how long have symptoms been present): x a few months    Have you been treated for this before? Yes    Additional comments: patient wondering if she can be seen soon.    Phone Number patient can be reached at:  Home number on file [phone] (home)    Best Time:  any    Can we leave a detailed message on this number:  YES    Call taken on 5/9/2017 at 11:13 AM by Mervat Beauchamp

## 2017-05-10 ENCOUNTER — OFFICE VISIT (OUTPATIENT)
Dept: FAMILY MEDICINE | Facility: CLINIC | Age: 67
End: 2017-05-10
Payer: COMMERCIAL

## 2017-05-10 ENCOUNTER — OFFICE VISIT (OUTPATIENT)
Dept: ORTHOPEDICS | Facility: CLINIC | Age: 67
End: 2017-05-10

## 2017-05-10 ENCOUNTER — TELEPHONE (OUTPATIENT)
Dept: FAMILY MEDICINE | Facility: CLINIC | Age: 67
End: 2017-05-10

## 2017-05-10 VITALS
SYSTOLIC BLOOD PRESSURE: 112 MMHG | TEMPERATURE: 98.5 F | DIASTOLIC BLOOD PRESSURE: 68 MMHG | HEIGHT: 65 IN | WEIGHT: 122 LBS | BODY MASS INDEX: 20.33 KG/M2 | OXYGEN SATURATION: 98 % | HEART RATE: 86 BPM

## 2017-05-10 DIAGNOSIS — Z11.59 NEED FOR HEPATITIS C SCREENING TEST: ICD-10-CM

## 2017-05-10 DIAGNOSIS — R53.83 OTHER FATIGUE: ICD-10-CM

## 2017-05-10 DIAGNOSIS — F17.200 TOBACCO USE DISORDER: ICD-10-CM

## 2017-05-10 DIAGNOSIS — E87.1 HYPONATREMIA: ICD-10-CM

## 2017-05-10 DIAGNOSIS — G89.4 CHRONIC PAIN SYNDROME: ICD-10-CM

## 2017-05-10 DIAGNOSIS — E78.5 HYPERLIPIDEMIA LDL GOAL <130: ICD-10-CM

## 2017-05-10 DIAGNOSIS — J43.9 PULMONARY EMPHYSEMA, UNSPECIFIED EMPHYSEMA TYPE (H): Primary | ICD-10-CM

## 2017-05-10 DIAGNOSIS — Z96.612 S/P SHOULDER REPLACEMENT, LEFT: Primary | ICD-10-CM

## 2017-05-10 DIAGNOSIS — J30.2 SEASONAL ALLERGIC RHINITIS, UNSPECIFIED ALLERGIC RHINITIS TRIGGER: ICD-10-CM

## 2017-05-10 DIAGNOSIS — R63.0 POOR APPETITE: ICD-10-CM

## 2017-05-10 DIAGNOSIS — G47.00 INSOMNIA, UNSPECIFIED TYPE: ICD-10-CM

## 2017-05-10 DIAGNOSIS — Z96.612 S/P SHOULDER REPLACEMENT, LEFT: ICD-10-CM

## 2017-05-10 LAB
ALBUMIN SERPL-MCNC: 4.2 G/DL (ref 3.4–5)
ALP SERPL-CCNC: 108 U/L (ref 40–150)
ALT SERPL W P-5'-P-CCNC: 18 U/L (ref 0–50)
ANION GAP SERPL CALCULATED.3IONS-SCNC: 9 MMOL/L (ref 3–14)
AST SERPL W P-5'-P-CCNC: 12 U/L (ref 0–45)
BASOPHILS # BLD AUTO: 0 10E9/L (ref 0–0.2)
BASOPHILS NFR BLD AUTO: 0.4 %
BILIRUB SERPL-MCNC: 0.3 MG/DL (ref 0.2–1.3)
BUN SERPL-MCNC: 11 MG/DL (ref 7–30)
CALCIUM SERPL-MCNC: 9.2 MG/DL (ref 8.5–10.1)
CHLORIDE SERPL-SCNC: 98 MMOL/L (ref 94–109)
CHOLEST SERPL-MCNC: 192 MG/DL
CO2 SERPL-SCNC: 24 MMOL/L (ref 20–32)
CREAT SERPL-MCNC: 0.59 MG/DL (ref 0.52–1.04)
DIFFERENTIAL METHOD BLD: NORMAL
EOSINOPHIL # BLD AUTO: 0.2 10E9/L (ref 0–0.7)
EOSINOPHIL NFR BLD AUTO: 1.9 %
ERYTHROCYTE [DISTWIDTH] IN BLOOD BY AUTOMATED COUNT: 13.6 % (ref 10–15)
GFR SERPL CREATININE-BSD FRML MDRD: ABNORMAL ML/MIN/1.7M2
GLUCOSE SERPL-MCNC: 84 MG/DL (ref 70–99)
HCT VFR BLD AUTO: 39.9 % (ref 35–47)
HDLC SERPL-MCNC: 47 MG/DL
HGB BLD-MCNC: 13.6 G/DL (ref 11.7–15.7)
LDLC SERPL CALC-MCNC: 120 MG/DL
LYMPHOCYTES # BLD AUTO: 2.7 10E9/L (ref 0.8–5.3)
LYMPHOCYTES NFR BLD AUTO: 26 %
MCH RBC QN AUTO: 29.6 PG (ref 26.5–33)
MCHC RBC AUTO-ENTMCNC: 34.1 G/DL (ref 31.5–36.5)
MCV RBC AUTO: 87 FL (ref 78–100)
MONOCYTES # BLD AUTO: 0.9 10E9/L (ref 0–1.3)
MONOCYTES NFR BLD AUTO: 9.2 %
NEUTROPHILS # BLD AUTO: 6.4 10E9/L (ref 1.6–8.3)
NEUTROPHILS NFR BLD AUTO: 62.5 %
NONHDLC SERPL-MCNC: 145 MG/DL
PLATELET # BLD AUTO: 447 10E9/L (ref 150–450)
POTASSIUM SERPL-SCNC: 4.4 MMOL/L (ref 3.4–5.3)
PROT SERPL-MCNC: 7.6 G/DL (ref 6.8–8.8)
RBC # BLD AUTO: 4.59 10E12/L (ref 3.8–5.2)
SODIUM SERPL-SCNC: 131 MMOL/L (ref 133–144)
TRIGL SERPL-MCNC: 125 MG/DL
TSH SERPL DL<=0.005 MIU/L-ACNC: 0.84 MU/L (ref 0.4–4)
WBC # BLD AUTO: 10.2 10E9/L (ref 4–11)

## 2017-05-10 PROCEDURE — 80061 LIPID PANEL: CPT | Performed by: FAMILY MEDICINE

## 2017-05-10 PROCEDURE — 80050 GENERAL HEALTH PANEL: CPT | Performed by: FAMILY MEDICINE

## 2017-05-10 PROCEDURE — 36415 COLL VENOUS BLD VENIPUNCTURE: CPT | Performed by: FAMILY MEDICINE

## 2017-05-10 PROCEDURE — 99214 OFFICE O/P EST MOD 30 MIN: CPT | Performed by: FAMILY MEDICINE

## 2017-05-10 PROCEDURE — 86803 HEPATITIS C AB TEST: CPT | Performed by: FAMILY MEDICINE

## 2017-05-10 RX ORDER — FLUTICASONE PROPIONATE 50 MCG
1-2 SPRAY, SUSPENSION (ML) NASAL DAILY
Qty: 1 BOTTLE | Refills: 11 | Status: SHIPPED | OUTPATIENT
Start: 2017-05-10 | End: 2017-08-28

## 2017-05-10 RX ORDER — HYDROCODONE BITARTRATE AND ACETAMINOPHEN 5; 325 MG/1; MG/1
1 TABLET ORAL EVERY 8 HOURS PRN
Qty: 60 TABLET | Refills: 0 | Status: CANCELLED | OUTPATIENT
Start: 2017-05-10

## 2017-05-10 RX ORDER — FLUTICASONE PROPIONATE 220 UG/1
2 AEROSOL, METERED RESPIRATORY (INHALATION) 2 TIMES DAILY
Qty: 3 INHALER | Refills: 3 | Status: SHIPPED | OUTPATIENT
Start: 2017-05-10 | End: 2017-05-10

## 2017-05-10 RX ORDER — ALBUTEROL SULFATE 0.83 MG/ML
1 SOLUTION RESPIRATORY (INHALATION) EVERY 6 HOURS PRN
Qty: 3 BOX | Refills: 6 | Status: SHIPPED | OUTPATIENT
Start: 2017-05-10 | End: 2021-04-15

## 2017-05-10 RX ORDER — TIOTROPIUM BROMIDE 18 UG/1
18 CAPSULE ORAL; RESPIRATORY (INHALATION) DAILY
Qty: 90 CAPSULE | Refills: 1 | Status: SHIPPED | OUTPATIENT
Start: 2017-05-10 | End: 2017-12-11

## 2017-05-10 RX ORDER — ZOLPIDEM TARTRATE 5 MG/1
5 TABLET ORAL
Qty: 30 TABLET | Refills: 5 | Status: SHIPPED | OUTPATIENT
Start: 2017-05-10 | End: 2017-08-28

## 2017-05-10 RX ORDER — ALBUTEROL SULFATE 90 UG/1
2 AEROSOL, METERED RESPIRATORY (INHALATION) EVERY 6 HOURS
Qty: 6 INHALER | Refills: 1 | Status: SHIPPED | OUTPATIENT
Start: 2017-05-10 | End: 2017-11-07

## 2017-05-10 NOTE — PROGRESS NOTES
Chief concern: Left reverse total shoulder arthoplasty on 2/15/2017 with Dr. Morton    Interval history:  67 yo female s/p reverse total shoulder arthoplasty 3 months ago. Patient states she has been doing well since last visit, feels like she is improving every day. She does have some pain over her anterior shoulder, specialy with external rotation. However, states does not feel limited by her surgery. Denies fevers, chills, numbness, or tingling down the arm.    Objective:  General: Pleasant female in no acute distress. Communicates and articulates with appropriate affect  Respiratory: breathing room air non labored  LUE: incision is well healed, no erythema or fluctuance. TTP over anterior shoulder, accentuated with external and internal rotation. FE active, passive 120, Abduction active 90, passive 130, ER 30, IR to S1. SILT over ax, rum, med, uln nerve distributions. Extremity is wwp.    Imaging: none obtained    Assessment  1. S/p reverse total shoulder arthroplasty 3 months ago, doing well    Plan:  Patient is doing very well after her surgery. She has minimal limitations with her activities of daily living. Not interested in physical therapy. We went over some exercises, table slide, to increase her ROM. As for her anterior shoulder pain, this is not uncommon at this stage, we suggested tissue massages to free adhesions.  She can also use antiinflammatories for pain control. Finally, we recommend strengthening exercises for her anterior shoulder, e.g. punching the ceiling.  Next follow up with Dr. Morton at 6 months follow up.    Her questions were answered and she was happy with this plan.    Seen and discussed with Dr. Martinez.    Anil Montgomery MD  Orthopaedic Surgery, PGY-1    I have personally examined this patient and have reviewed the clinical presentation and progress note with the resident.  I agree with the treatment plan as outlined.  The plan was formulated with the resident on the day of the  resident's note.

## 2017-05-10 NOTE — TELEPHONE ENCOUNTER
Per refill authorization protocol - therapeutic substitution.     Medication: Flovent 220 is not covered by insurance.  . Reviewed pharmacy records, and dicussed therapeutic option with patient.     Sent new prescription for Qvar 80.      Routing to the prescriber as an  FYI to inform of change.      Updated medication list in EPIC.    Gale Escudero PharmD, [facility]  Pharmacist Manager   [facility]  [phone]

## 2017-05-10 NOTE — PROGRESS NOTES
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
## 2017-05-10 NOTE — MR AVS SNAPSHOT
After Visit Summary   5/10/2017    Beatriz Francis    MRN: [id number]           Patient Information     Date Of Birth          1950        Visit Information        Provider Department      5/10/2017 10:45 AM Casandra Martinez MD [facility]        Today's Diagnoses     S/P shoulder replacement, left    -  1       Follow-ups after your visit        Your next 10 appointments already scheduled     Jun 21, 2017  9:40 AM CDT   SHORT with Azalia Burger MD   [facility] ([facility])    [address] [place] [id number] [phone]           Your Arrival times is X, We need you to be here at this time to get checked in and have the assistant get you ready for the provider, which can take about 15 minutes. Your appointment time with your provider is at  X. Thank you.            Aug 14, 2017 11:20 AM CDT   (Arrive by 11:05 AM)   RETURN SHOULDER with Ankit Morton MD   [facility] ([facility])    [address] [place] [id number] [phone]              Who to contact     Please call your clinic at [phone] to:    Ask questions about your health    Make or cancel appointments    Discuss your medicines    Learn about your test results    Speak to your doctor   If you have compliments or concerns about an experience at your clinic, or if you wish to file a complaint, please contact [facility] Patient Relations at [phone] or email us at [email or web address]         Additional Information About Your Visit        MyChart Information     QuicklyChatt gives you secure access to your electronic health record. If you see a primary care provider, you can also send messages to your care team and make appointments. If you have questions, please call your primary care clinic.  If you do not have a primary care  provider, please call [phone] and they will assist you.      PA Semi is an electronic gateway that provides easy, online access to your medical records. With PA Semi, you can request a clinic appointment, read your test results, renew a prescription or communicate with your care team.     To access your existing account, please contact your [facility] or call [phone] for assistance.        Care EveryWhere ID     This is your Care EveryWhere ID. This could be used by other organizations to access your [place] medical records  [id number]         Blood Pressure from Last 3 Encounters:   05/10/17 112/68   02/16/17 109/58   01/23/17 111/76    Weight from Last 3 Encounters:   05/10/17 55.3 kg (122 lb)   03/27/17 54.4 kg (120 lb)   02/27/17 55.2 kg (121 lb 9.6 oz)              Today, you had the following     No orders found for display         Today's Medication Changes          These changes are accurate as of: 5/10/17 11:59 PM.  If you have any questions, ask your nurse or doctor.               Start taking these medicines.        Dose/Directions    beclomethasone 80 MCG/ACT Inhaler   Commonly known as:  QVAR   Used for:  Pulmonary emphysema, unspecified emphysema type (H)   Started by:  Azalia Burger MD        Dose:  4 puff   Inhale 4 puffs into the lungs 2 times daily   Quantity:  6 Inhaler   Refills:  3       fluticasone 50 MCG/ACT spray   Commonly known as:  FLONASE   Used for:  Seasonal allergic rhinitis, unspecified allergic rhinitis trigger   Started by:  Azalia Burger MD        Dose:  1-2 spray   Spray 1-2 sprays into both nostrils daily   Quantity:  1 Bottle   Refills:  11         Stop taking these medicines if you haven't already. Please contact your care team if you have questions.     fluticasone 220 MCG/ACT Inhaler   Commonly known as:  FLOVENT HFA   Stopped by:  Azalia Burger MD                Where to get your medicines      These  medications were sent to [facility] - [place], [place] - [address] [place] [address].  [address] [place] [address]., [facility] [address]     Phone:  [phone]     albuterol (2.5 MG/3ML) 0.083% neb solution    albuterol 108 (90 BASE) MCG/ACT Inhaler    beclomethasone 80 MCG/ACT Inhaler    fluticasone 50 MCG/ACT spray    tiotropium 18 MCG capsule         Some of these will need a paper prescription and others can be bought over the counter.  Ask your nurse if you have questions.     Bring a paper prescription for each of these medications     zolpidem 5 MG tablet                Primary Care Provider Office Phone # Fax #    Azalia Burger [phone]       [place] [id number]        Thank you!     Thank you for choosing [facility]  for your care. Our goal is always to provide you with excellent care. Hearing back from our patients is one way we can continue to improve our services. Please take a few minutes to complete the written survey that you may receive in the mail after your visit with us. Thank you!             Your Updated Medication List - Protect others around you: Learn how to safely use, store and throw away your medicines at www.disposemymeds.org.          This list is accurate as of: 5/10/17 11:59 PM.  Always use your most recent med list.                   Brand Name Dispense Instructions for use    acetaminophen 325 MG tablet    TYLENOL    100 tablet    Take 2 tablets (650 mg) by mouth every 4 hours as needed for other (surgical pain)       * albuterol 108 (90 BASE) MCG/ACT Inhaler    VENTOLIN HFA    6 Inhaler    Inhale 2 puffs into the lungs every 6 hours Please dispense a 3 month supply.       * albuterol (2.5 MG/3ML) 0.083% neb solution     3 Box    Take 1 vial (2.5 mg) by nebulization every 6 hours as needed for shortness of breath / dyspnea or wheezing       amoxicillin 500 MG capsule    AMOXIL     90 capsule    Take 1 capsule (500 mg) by mouth daily       beclomethasone 80 MCG/ACT Inhaler    QVAR    6 Inhaler    Inhale 4 puffs into the lungs 2 times daily       fluticasone 50 MCG/ACT spray    FLONASE    1 Bottle    Spray 1-2 sprays into both nostrils daily       HYDROcodone-acetaminophen 5-325 MG per tablet    NORCO    60 tablet    Take 1 tablet by mouth every 8 hours as needed for moderate to severe pain       methocarbamol 500 MG tablet    ROBAXIN    90 tablet    Take 1-2 tablets (500-1,000 mg) by mouth 3 times daily as needed for muscle spasms       omeprazole 20 MG tablet     90 tablet    Take 1 tablet (20 mg) by mouth daily Take 30-60 minutes before a meal.       senna-docusate 8.6-50 MG per tablet    SENOKOT-S;PERICOLACE    50 tablet    Take 2 tablets by mouth 2 times daily       tiotropium 18 MCG capsule    SPIRIVA HANDIHALER    90 capsule    Inhale 1 capsule (18 mcg) into the lungs daily       zolpidem 5 MG tablet    AMBIEN    30 tablet    Take 1 tablet (5 mg) by mouth nightly as needed for sleep       * Notice:  This list has 2 medication(s) that are the same as other medications prescribed for you. Read the directions carefully, and ask your doctor or other care provider to review them with you.

## 2017-05-10 NOTE — NURSING NOTE
"Chief Complaint   Patient presents with     Chronic Disease Management     COPD follow-up     Allergies       Initial /68  Pulse 86  Temp 98.5  F (36.9  C) (Oral)  Ht 5' 5\" (1.651 m)  Wt 122 lb (55.3 kg)  SpO2 98%  BMI 20.3 kg/m2 Estimated body mass index is 20.3 kg/(m^2) as calculated from the following:    Height as of this encounter: 5' 5\" (1.651 m).    Weight as of this encounter: 122 lb (55.3 kg).  Medication Reconciliation: complete   Shanti Harrington CMA (AAMA)      "

## 2017-05-10 NOTE — MR AVS SNAPSHOT
After Visit Summary   5/10/2017    Beatriz Francis    MRN: [id number]           Patient Information     Date Of Birth          1950        Visit Information        Provider Department      5/10/2017 8:00 AM Azalia Burger MD [facility]        Today's Diagnoses     Pulmonary emphysema, unspecified emphysema type (H)    -  1    S/P shoulder replacement, left        Insomnia, unspecified type        Other fatigue        Poor appetite        Seasonal allergic rhinitis, unspecified allergic rhinitis trigger        Hyperlipidemia LDL goal <130        Need for hepatitis C screening test        Tobacco use disorder          Care Instructions    Flonase: 2 puffs each nostril, once a day, every day    I prefer you use Claritin or Allegra (generic is fine) rather than Benadryl.    I recommend a multivitamin and Vitamin D 1000 IU daily until your appetite improves.    [facility]   Discharged by : Ghislaine DIXON MA  Paper scripts provided to patient : Rosetta     If you have any questions regarding your visit please contact your care team:     [facility] Clinic Hours Telephone Number   SOY Zhang Dr., Dr., Dr.   7am-7pm Monday - Thursday   7am-5pm Fridays  [phone]   (Appointment scheduling available 24/7)   RN Line   [phone] option 2       For a Price Quote for your services, please call our Consumer Price Line at [phone].     What options do I have for visits at the clinic other than the traditional office visit?     To expand how we care for you, many of our providers are utilizing electronic visits (e-visits) and telephone visits, when medically appropriate, for interactions with their patients rather than a visit in the clinic. We also offer nurse visits for many medical concerns. Just like any other service, we will bill your insurance company for this type of visit based  on time spent on the phone with your provider. Not all insurance companies cover these visits. Please check with your medical insurance if this type of visit is covered. You will be responsible for any charges that are not paid by your insurance.   E-visits via Featherlight: generally incur a $35.00 fee.     Telephone visits:   Time spent on the phone: *charged based on time that is spent on the phone in increments of 10 minutes. Estimated cost:   5-10 mins $30.00   11-20 mins. $59.00   21-30 mins. $85.00     Use Featherlight (secure email communication and access to your chart) to send your primary care provider a message or make an appointment. Ask someone on your Team how to sign up for Featherlight.     As always, Thank you for trusting us with your health care needs!      [facility]:    Scheduling Appointments  [facility]  Call: [phone]    [facility]  Call: [phone]    [facility]  Call: [phone]      WHERE TO GO FOR CARE?    Clinic    Make an appointment if you:       Are sick (cold, cough, flu, sore throat, earache or in pain).       Have a small injury (sprain, small cut, burn or broken bone).       Need a physical exam, Pap smear, vaccine or prescription refill.       Have questions about your health or medicines.    To reach us:      Call [phone] ([phone]). Open 24 hours every day. (For counseling services, call [phone].)    Log into Featherlight at [email or web address]. (Visit [email or web address] to create an account.) Hospital emergency room    An emergency is a serious or life- threatening problem that must be treated right away.    Call 990 or get to the hospital if you have:      Very bad or sudden:            - Chest pain or pressure         - Bleeding         - Head or belly pain         - Dizziness or trouble seeing, walking or                          Speaking      Problems  breathing      Blood in your vomit or you are coughing up blood      A major injury (knocked out, loss of a finger or limb, rape, broken bone protruding from skin)    A mental health crisis. (Or call the Mental Health Crisis line at 1-834.346.6702 or Suicide Prevention Hotline at 1-802.278.5473.)    Open 24 hours every day. You don't need an appointment.     Urgent care    Visit urgent care for sickness or small injuries when the clinic is closed. You don't need an appointment. To check hours or find an urgent care near you, visit [email or web address]. Online care    Get online care from [employer] for more than 70 common problems, like colds, allergies and infections. Open 24 hours every day at: [email or web address]   Need help deciding?    For advice about where to be seen, you may call your clinic and ask to speak with a nurse. We're here for you 24 hours every day.         If you are deaf or hard of hearing, please let us know. We provide many free services including sign language interpreters, oral interpreters, TTYs, telephone amplifiers, note takers and written materials.                       Follow-ups after your visit        Your next 10 appointments already scheduled     May 10, 2017 10:45 AM CDT   (Arrive by 10:30 AM)   RETURN SHOULDER with Casandra Martinez MD   [facility] ([facility])    [address] [place] [address] [phone]              Who to contact     If you have questions or need follow up information about today's clinic visit or your schedule please contact [facility] directly at [phone].  Normal or non-critical lab and imaging results will be communicated to you by MyChart, letter or phone within 4 business days after the clinic has received the results. If you do not hear from us within 7 days, please contact the clinic through MyChart or phone. If you have a critical or abnormal  "lab result, we will notify you by phone as soon as possible.  Submit refill requests through MobAppCreator or call your pharmacy and they will forward the refill request to us. Please allow 3 business days for your refill to be completed.          Additional Information About Your Visit        Covermate Productshart Information     MobAppCreator gives you secure access to your electronic health record. If you see a primary care provider, you can also send messages to your care team and make appointments. If you have questions, please call your primary care clinic.  If you do not have a primary care provider, please call [phone] and they will assist you.        Care EveryWhere ID     This is your Care EveryWhere ID. This could be used by other organizations to access your [place] medical records  [id number]        Your Vitals Were     Pulse Temperature Height Pulse Oximetry BMI (Body Mass Index)       86 98.5  F (36.9  C) (Oral) 5' 5\" (1.651 m) 98% 20.3 kg/m2        Blood Pressure from Last 3 Encounters:   05/10/17 112/68   02/16/17 109/58   01/23/17 111/76    Weight from Last 3 Encounters:   05/10/17 122 lb (55.3 kg)   03/27/17 120 lb (54.4 kg)   02/27/17 121 lb 9.6 oz (55.2 kg)              We Performed the Following     CBC with platelets differential     Comprehensive metabolic panel (BMP + Alb, Alk Phos, ALT, AST, Total. Bili, TP)     Hepatitis C Screen Reflex to HCV RNA Quant and Genotype     Lipid panel reflex to direct LDL     TOBACCO CESSATION ORDER FOR      TSH with free T4 reflex          Today's Medication Changes          These changes are accurate as of: 5/10/17  8:48 AM.  If you have any questions, ask your nurse or doctor.               Start taking these medicines.        Dose/Directions    fluticasone 50 MCG/ACT spray   Commonly known as:  FLONASE   Used for:  Seasonal allergic rhinitis, unspecified allergic rhinitis trigger   Started by:  Azalia Burger MD        Dose:  1-2 spray   Spray 1-2 sprays into both " nostrils daily   Quantity:  1 Bottle   Refills:  11            Where to get your medicines      These medications were sent to [facility] - [place], [place] - [address] [place] [address].  [address] [facility] [address]     Phone:  [phone]     albuterol (2.5 MG/3ML) 0.083% neb solution    albuterol 108 (90 BASE) MCG/ACT Inhaler    fluticasone 220 MCG/ACT Inhaler    fluticasone 50 MCG/ACT spray    tiotropium 18 MCG capsule         Some of these will need a paper prescription and others can be bought over the counter.  Ask your nurse if you have questions.     Bring a paper prescription for each of these medications     zolpidem 5 MG tablet                Primary Care Provider Office Phone # Fax #    Azalia Burger [phone]       [facility] [address] [place] [id number]        Thank you!     Thank you for choosing [facility]  for your care. Our goal is always to provide you with excellent care. Hearing back from our patients is one way we can continue to improve our services. Please take a few minutes to complete the written survey that you may receive in the mail after your visit with us. Thank you!             Your Updated Medication List - Protect others around you: Learn how to safely use, store and throw away your medicines at www.disposemymeds.org.          This list is accurate as of: 5/10/17  8:48 AM.  Always use your most recent med list.                   Brand Name Dispense Instructions for use    acetaminophen 325 MG tablet    TYLENOL    100 tablet    Take 2 tablets (650 mg) by mouth every 4 hours as needed for other (surgical pain)       * albuterol 108 (90 BASE) MCG/ACT Inhaler    VENTOLIN HFA    6 Inhaler    Inhale 2 puffs into the lungs every 6 hours Please dispense a 3 month supply.       * albuterol (2.5 MG/3ML) 0.083% neb solution     3 Box    Take 1 vial (2.5 mg) by nebulization every 6 hours  as needed for shortness of breath / dyspnea or wheezing       amoxicillin 500 MG capsule    AMOXIL    90 capsule    Take 1 capsule (500 mg) by mouth daily       fluticasone 220 MCG/ACT Inhaler    FLOVENT HFA    3 Inhaler    Inhale 2 puffs into the lungs 2 times daily       fluticasone 50 MCG/ACT spray    FLONASE    1 Bottle    Spray 1-2 sprays into both nostrils daily       HYDROcodone-acetaminophen 5-325 MG per tablet    NORCO    60 tablet    Take 1 tablet by mouth every 8 hours as needed for moderate to severe pain       methocarbamol 500 MG tablet    ROBAXIN    90 tablet    Take 1-2 tablets (500-1,000 mg) by mouth 3 times daily as needed for muscle spasms       omeprazole 20 MG tablet     90 tablet    Take 1 tablet (20 mg) by mouth daily Take 30-60 minutes before a meal.       senna-docusate 8.6-50 MG per tablet    SENOKOT-S;PERICOLACE    50 tablet    Take 2 tablets by mouth 2 times daily       tiotropium 18 MCG capsule    SPIRIVA HANDIHALER    90 capsule    Inhale 1 capsule (18 mcg) into the lungs daily       zolpidem 5 MG tablet    AMBIEN    30 tablet    Take 1 tablet (5 mg) by mouth nightly as needed for sleep       * Notice:  This list has 2 medication(s) that are the same as other medications prescribed for you. Read the directions carefully, and ask your doctor or other care provider to review them with you.

## 2017-05-10 NOTE — PATIENT INSTRUCTIONS
Flonase: 2 puffs each nostril, once a day, every day    I prefer you use Claritin or Allegra (generic is fine) rather than Benadryl.    I recommend a multivitamin and Vitamin D 1000 IU daily until your appetite improves.    [facility]   Discharged by : Ghislaine DIXON MA  Paper scripts provided to patient : Rosetta     If you have any questions regarding your visit please contact your care team:     [facility] Clinic Hours Telephone Number   SOY Zhang Dr., Dr., Dr.   7am-7pm Monday - Thursday   7am-5pm Fridays  [phone]   (Appointment scheduling available 24/7)   RN Line   [phone] option 2       For a Price Quote for your services, please call our [employer] Price Line at [phone].     What options do I have for visits at the clinic other than the traditional office visit?     To expand how we care for you, many of our providers are utilizing electronic visits (e-visits) and telephone visits, when medically appropriate, for interactions with their patients rather than a visit in the clinic. We also offer nurse visits for many medical concerns. Just like any other service, we will bill your insurance company for this type of visit based on time spent on the phone with your provider. Not all insurance companies cover these visits. Please check with your medical insurance if this type of visit is covered. You will be responsible for any charges that are not paid by your insurance.   E-visits via Education Everytime: generally incur a $35.00 fee.     Telephone visits:   Time spent on the phone: *charged based on time that is spent on the phone in increments of 10 minutes. Estimated cost:   5-10 mins $30.00   11-20 mins. $59.00   21-30 mins. $85.00     Use Education Everytime (secure email communication and access to your chart) to send your primary care provider a message or make an appointment. Ask someone on your Team how to sign up for  Visier.     As always, Thank you for trusting us with your health care needs!      [facility]:    Scheduling Appointments  [facility]  Call: [phone]    [facility]  Call: [phone]    [facility]  Call: [phone]      WHERE TO GO FOR CARE?    Clinic    Make an appointment if you:       Are sick (cold, cough, flu, sore throat, earache or in pain).       Have a small injury (sprain, small cut, burn or broken bone).       Need a physical exam, Pap smear, vaccine or prescription refill.       Have questions about your health or medicines.    To reach us:      Call [phone] ([phone]). Open 24 hours every day. (For counseling services, call [phone].)    Log into Visier at [employer]opher. (Visit [email or web address] to create an account.) Hospital emergency room    An emergency is a serious or life- threatening problem that must be treated right away.    Call 482 or get to the hospital if you have:      Very bad or sudden:            - Chest pain or pressure         - Bleeding         - Head or belly pain         - Dizziness or trouble seeing, walking or                          Speaking      Problems breathing      Blood in your vomit or you are coughing up blood      A major injury (knocked out, loss of a finger or limb, rape, broken bone protruding from skin)    A mental health crisis. (Or call the Mental Health Crisis line at 1-756.187.7770 or Suicide Prevention Hotline at 1-286.571.8209.)    Open 24 hours every day. You don't need an appointment.     Urgent care    Visit urgent care for sickness or small injuries when the clinic is closed. You don't need an appointment. To check hours or find an urgent care near you, visit [email or web address]. Online care    Get online care from [facility] for more than 70 common problems, like colds, allergies and infections. Open 24 hours every day  at: [email or web address]   Need help deciding?    For advice about where to be seen, you may call your clinic and ask to speak with a nurse. We're here for you 24 hours every day.         If you are deaf or hard of hearing, please let us know. We provide many free services including sign language interpreters, oral interpreters, TTYs, telephone amplifiers, note takers and written materials.
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
## 2017-05-10 NOTE — LETTER
5/10/2017       RE: Beatriz Francis  [facility] [address] [place], [place] [id number]     Dear Colleague,    Thank you for referring your patient, Beatriz Francis, to the [facility] at [facility]. Please see a copy of my visit note below.    Chief concern: Left reverse total shoulder arthoplasty on 2/15/2017 with Dr. Morton    Interval history:  67 yo female s/p reverse total shoulder arthoplasty 3 months ago. Patient states she has been doing well since last visit, feels like she is improving every day. She does have some pain over her anterior shoulder, specialy with external rotation. However, states does not feel limited by her surgery. Denies fevers, chills, numbness, or tingling down the arm.    Objective:  General: Pleasant female in no acute distress. Communicates and articulates with appropriate affect  Respiratory: breathing room air non labored  LUE: incision is well healed, no erythema or fluctuance. TTP over anterior shoulder, accentuated with external and internal rotation. FE active, passive 120, Abduction active 90, passive 130, ER 30, IR to S1. SILT over ax, rum, med, uln nerve distributions. Extremity is wwp.    Imaging: none obtained    Assessment  1. S/p reverse total shoulder arthroplasty 3 months ago, doing well    Plan:  Patient is doing very well after her surgery. She has minimal limitations with her activities of daily living. Not interested in physical therapy. We went over some exercises, table slide, to increase her ROM. As for her anterior shoulder pain, this is not uncommon at this stage, we suggested tissue massages to free adhesions.  She can also use antiinflammatories for pain control. Finally, we recommend strengthening exercises for her anterior shoulder, e.g. punching the ceiling.  Next follow up with Dr. Morton at 6 months follow up.    Her questions were answered and she was happy with this plan.    Seen and discussed with   Juan.    Anil Montgomery MD  Orthopaedic Surgery, PGY-1    I have personally examined this patient and have reviewed the clinical presentation and progress note with the resident.  I agree with the treatment plan as outlined.  The plan was formulated with the resident on the day of the resident's note.     Again, thank you for allowing me to participate in the care of your patient.      Sincerely,  Casandra Martinez MD

## 2017-05-11 LAB — HCV AB SERPL QL IA: NORMAL

## 2017-05-17 DIAGNOSIS — Z96.612 S/P SHOULDER REPLACEMENT, LEFT: ICD-10-CM

## 2017-05-17 DIAGNOSIS — M54.2 CHRONIC NECK PAIN: ICD-10-CM

## 2017-05-17 DIAGNOSIS — G89.4 CHRONIC PAIN SYNDROME: ICD-10-CM

## 2017-05-17 DIAGNOSIS — G89.29 CHRONIC NECK PAIN: ICD-10-CM

## 2017-05-17 NOTE — TELEPHONE ENCOUNTER
Hydrocodone/APAP 5/325  Last Written Prescription Date: 04/13/2017, for fill on 05/02/2017  Last Fill Quantity: 60  # refills:0  Last Office Visit with [facility], [facility] or [facility] prescribing provider: 05/10/2017                                     Next 5 appointments (look out 90 days)     Jun 21, 2017  9:40 AM CDT   SHORT with Azalia Burger MD   [facility]    [address] [place] [id number] [phone]                  This has previously been prescribed by Dr. Martinez, but she has requested this be redirected to Beatriz's PCP.      Based on our protocol day 18 or 20 days supply picked up 5/2/17, next fill date would be 5/19/17 at earliest.    Thank you!  Sophia Moralez, PharmD BCACP  [facility]  Phone [phone]  Fax [phone]

## 2017-05-19 RX ORDER — HYDROCODONE BITARTRATE AND ACETAMINOPHEN 5; 325 MG/1; MG/1
1 TABLET ORAL EVERY 8 HOURS PRN
Qty: 60 TABLET | Refills: 0 | Status: CANCELLED | OUTPATIENT
Start: 2017-05-19

## 2017-05-19 NOTE — TELEPHONE ENCOUNTER
Pt calling for update. Pt is asking for medication to be refill. Pt will be out soon.                    Thank You,  Karson Uriarte, [facility]  On behalf of [facility]

## 2017-05-22 ENCOUNTER — TELEPHONE (OUTPATIENT)
Dept: FAMILY MEDICINE | Facility: CLINIC | Age: 67
End: 2017-05-22

## 2017-05-22 RX ORDER — HYDROCODONE BITARTRATE AND ACETAMINOPHEN 5; 325 MG/1; MG/1
1 TABLET ORAL EVERY 6 HOURS PRN
Qty: 60 TABLET | Refills: 0 | Status: SHIPPED | OUTPATIENT
Start: 2017-05-22 | End: 2017-06-21

## 2017-05-22 NOTE — TELEPHONE ENCOUNTER
Reason for Call:  Medication or medication refill:    Do you use a [facility]?  Name of the pharmacy and phone number for the current request:  [facility] [place]    Name of the medication requested: HYDROcodone-acetaminophen (NORCO) 5-325 MG per tablet    Other request: N/a     Can we leave a detailed message on this number? YES    Phone number patient can be reached at: Home number on file [phone] (home)    Best Time: Anytime     Call taken on 5/22/2017 at 1:23 PM by Anne-Marie Grant

## 2017-05-22 NOTE — TELEPHONE ENCOUNTER
Brought hardcopy to pharmacy per patient request.    Gale Escudero PharmD, [facility]  Pharmacist Manager   [facility]  [phone]

## 2017-05-22 NOTE — TELEPHONE ENCOUNTER
This was ordered by surgeon Dr. Morton for surgery done on 2/15.   Route to Dr. Morton's office.  Tena Mcneil RN

## 2017-06-15 ENCOUNTER — TELEPHONE (OUTPATIENT)
Dept: FAMILY MEDICINE | Facility: CLINIC | Age: 67
End: 2017-06-15

## 2017-06-15 DIAGNOSIS — Z96.612 S/P SHOULDER REPLACEMENT, LEFT: ICD-10-CM

## 2017-06-15 RX ORDER — HYDROCODONE BITARTRATE AND ACETAMINOPHEN 5; 325 MG/1; MG/1
1 TABLET ORAL EVERY 8 HOURS PRN
Qty: 60 TABLET | Refills: 0 | Status: SHIPPED | OUTPATIENT
Start: 2017-06-15 | End: 2017-08-28 | Stop reason: DRUGHIGH

## 2017-06-15 NOTE — TELEPHONE ENCOUNTER
Refill in completed folder  Filled in Dr Burger's absence. Patient needs follow up with Dr Burger before further refills.    Umesh Price MD

## 2017-06-15 NOTE — TELEPHONE ENCOUNTER
norco  Last Written Prescription Date: 05/23/2017  Last Fill Quantity: 60,  # refills: 30  Last Office Visit with [facility], [facility] or [facility] prescribing provider: 05/10/2017                                Next 5 appointments (look out 90 days)     Jun 21, 2017  9:40 AM CDT   SHORT with Azalia Burger MD   [facility]    [address] [place] [id number] [phone]

## 2017-06-21 ENCOUNTER — OFFICE VISIT (OUTPATIENT)
Dept: FAMILY MEDICINE | Facility: CLINIC | Age: 67
End: 2017-06-21
Payer: COMMERCIAL

## 2017-06-21 VITALS — HEIGHT: 65 IN | DIASTOLIC BLOOD PRESSURE: 68 MMHG | SYSTOLIC BLOOD PRESSURE: 114 MMHG

## 2017-06-21 DIAGNOSIS — J30.2 SEASONAL ALLERGIC RHINITIS, UNSPECIFIED CHRONICITY, UNSPECIFIED TRIGGER: ICD-10-CM

## 2017-06-21 DIAGNOSIS — M54.2 CHRONIC NECK PAIN: ICD-10-CM

## 2017-06-21 DIAGNOSIS — E87.1 HYPONATREMIA: ICD-10-CM

## 2017-06-21 DIAGNOSIS — J43.9 PULMONARY EMPHYSEMA, UNSPECIFIED EMPHYSEMA TYPE (H): ICD-10-CM

## 2017-06-21 DIAGNOSIS — G89.29 CHRONIC NECK PAIN: ICD-10-CM

## 2017-06-21 DIAGNOSIS — G89.4 CHRONIC PAIN SYNDROME: Primary | ICD-10-CM

## 2017-06-21 LAB — SODIUM SERPL-SCNC: 135 MMOL/L (ref 133–144)

## 2017-06-21 PROCEDURE — 99214 OFFICE O/P EST MOD 30 MIN: CPT | Performed by: FAMILY MEDICINE

## 2017-06-21 PROCEDURE — 36415 COLL VENOUS BLD VENIPUNCTURE: CPT | Performed by: FAMILY MEDICINE

## 2017-06-21 PROCEDURE — 84295 ASSAY OF SERUM SODIUM: CPT | Performed by: FAMILY MEDICINE

## 2017-06-21 RX ORDER — DULOXETIN HYDROCHLORIDE 30 MG/1
30 CAPSULE, DELAYED RELEASE ORAL 2 TIMES DAILY
Qty: 30 CAPSULE | Refills: 1 | Status: SHIPPED | OUTPATIENT
Start: 2017-06-21 | End: 2017-08-28 | Stop reason: DRUGHIGH

## 2017-06-21 NOTE — PATIENT INSTRUCTIONS
[facility]   Discharged by : Shanti MAO CMA (AAMA)    Paper scripts provided to patient : none      If you have any questions regarding your visit please contact your care team:     [facility] Clinic Hours Telephone Number   [facility] [address] Dr., Dr.   7am-7pm Monday - Thursday   7am-5pm Fridays  [phone]   (Appointment scheduling available 24/7)   RN Line   [phone] option 2       For a Price Quote for your services, please call our [place] Price Line at [phone].     What options do I have for visits at the clinic other than the traditional office visit?     To expand how we care for you, many of our providers are utilizing electronic visits (e-visits) and telephone visits, when medically appropriate, for interactions with their patients rather than a visit in the clinic. We also offer nurse visits for many medical concerns. Just like any other service, we will bill your insurance company for this type of visit based on time spent on the phone with your provider. Not all insurance companies cover these visits. Please check with your medical insurance if this type of visit is covered. You will be responsible for any charges that are not paid by your insurance.   E-visits via Datapipe: generally incur a $35.00 fee.     Telephone visits:   Time spent on the phone: *charged based on time that is spent on the phone in increments of 10 minutes. Estimated cost:   5-10 mins $30.00   11-20 mins. $59.00   21-30 mins. $85.00     Use [email or web address] (secure email communication and access to your chart) to send your primary care provider a message or make an appointment. Ask someone on your Team how to sign up for Datapipe.     As always, Thank you for trusting us with your health care needs!      [facility] [facility]:    Scheduling Appointments  [facility]  Call: [phone]    [facility]  Call: [phone]    [facility]  Call: [phone]      WHERE TO GO FOR CARE?    Clinic    Make an appointment if you:       Are sick (cold, cough, flu, sore throat, earache or in pain).       Have a small injury (sprain, small cut, burn or broken bone).       Need a physical exam, Pap smear, vaccine or prescription refill.       Have questions about your health or medicines.    To reach us:      Call [phone] ([phone]). Open 24 hours every day. (For counseling services, call [phone].)    Log into ZYB at Zuki. (Visit Colibr[employer]­ to create an account.) Hospital emergency room    An emergency is a serious or life- threatening problem that must be treated right away.    Call 485 or get to the hospital if you have:      Very bad or sudden:            - Chest pain or pressure         - Bleeding         - Head or belly pain         - Dizziness or trouble seeing, walking or                          Speaking      Problems breathing      Blood in your vomit or you are coughing up blood      A major injury (knocked out, loss of a finger or limb, rape, broken bone protruding from skin)    A mental health crisis. (Or call the Mental Health Crisis line at 1-562.918.7561 or Suicide Prevention Hotline at 1-505.531.3334.)    Open 24 hours every day. You don't need an appointment.     Urgent care    Visit urgent care for sickness or small injuries when the clinic is closed. You don't need an appointment. To check hours or find an urgent care near you, visit [email or web address]. Online care    Get online care from [employer] for more than 70 common problems, like colds, allergies and infections. Open 24 hours every day at:   [email or web address]   Need help deciding?    For advice about where to be seen, you may call your clinic and ask to speak with a nurse. We're here for you 24 hours every day.         If you are deaf or hard of hearing,  please let us know. We provide many free services including sign language interpreters, oral interpreters, TTYs, telephone amplifiers, note takers and written materials.
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
## 2017-06-21 NOTE — LETTER
[facility]    06/21/17    Patient: Beatriz Francis  YOB: 1950  Medical Record Number: [id number]                                                                  Controlled Substance Agreement  I understand that my care provider has prescribed controlled substances (narcotics, tranquilizers, and/or stimulants) to help manage my condition(s).  I am taking this medicine to help me function or work.  I know that this is strong medicine.  It could have serious side effects and even cause a dependency on the drug.  If I stop these medicines suddenly, I could have severe withdrawal symptoms.    The risks, benefits, and side effects of these medication(s) were explained to me.  I agree that:  1. I will take part in other treatments as advised by my provider.  This may be psychiatry or counseling, physical therapy, behavioral therapy, group treatment, or a referral to a pain clinic.  I will reduce or stop my medicine when my provider tells me to do so.   2. I will take my medicines as prescribed.  I will not change the dose or schedule unless my provider tells me to.  There will be no refills if I  run out early.   I may be contacted at any time without warning and asked to complete a drug test or pill count.   3. I will keep all my appointments at the clinic.  If I miss appointments or fail to follow instructions, my provider may stop my medicine.  4. I will not ask other providers to prescribe controlled substances. And I will not accept controlled substances from other people. If I need another prescribed controlled substance for a new reason, I will notify my provider within one business day.  5. If I enroll in the [place] Medical Marijuana program, I will tell my provider.  I will also sign an agreement to share my medical records with my provider.  6. I will use one pharmacy to fill all of my controlled substance prescriptions.  If my prescription is mailed to my pharmacy, it may  take 5 to 7 days for my medicine to be ready.  7. I understand that my provider, clinic care team, and pharmacy can track controlled substance prescriptions from other providers through a central database (prescription monitoring program).  8. I will bring in my list of medications (or my medicine bottles) each time I come to the clinic.  REV- 04/2016                                                                                                                                            Page 1 of 2      [facility]    06/21/17    Patient: Beatriz Francis  YOB: 1950  Medical Record Number: [id number]    9. Refills of controlled substances will be made only during office hours.  It is up to me to make sure that I do not run out of my medicines on weekends or holidays.    10. I am responsible for my prescriptions.  If the medicine is lost or stolen, it will not be replaced.   I also agree not to share these medicines with anyone.  11. I agree to not use ANY illegal or recreational drugs.  This includes marijuana, cocaine, bath salts or other drugs.  I agree not to use alcohol unless my provider says I may.  I agree to give urine samples whenever asked.  If I fail to give a urine sample, the provider may stop my medicine.     12. I will tell my nurse or provider right away if I become pregnant or have a new medical problem treated outside of [facility].  13. I understand that this medicine can affect my thinking and judgment.  It may be unsafe for me to drive, use machinery and do dangerous tasks.  I will not do any of these things until I know how the medicine affects me.  If my dose changes, I will wait to see how it affects me.  I will contact my provider if I have concerns about medicine side effects.  I understand that if I do not follow any of the conditions above, my prescriptions or treatment may be stopped.    I agree that my provider, clinic care team, and pharmacy may  work with any city, state or federal law enforcement agency that investigates the misuse, sale, or other diversion of my controlled medicine. I will allow my provider to discuss my care with or share a copy of this agreement with any other treating provider, pharmacy or emergency room where I receive care.  I agree to give up (waive) any right of privacy or confidentiality with respect to these authorizations.   I have read this agreement and have asked questions about anything I did not understand.   ___________________________________    ___________________________  Patient Signature                                                           Date and Time  ___________________________________     ____________________________  Witness                                                                            Date and Time  ___________________________________  Azalia Burger MD  REV-  04/2016                                                                                                                                                                 Page 2 of 2  Opioid Pain Medicines (also known as Narcotics)  What You Need to Know      What are opioids?   Opioids are pain medicines that must be prescribed by a doctor. Examples are:     morphine (MS Contin, Jennifer)    oxycodone (Oxycontin)    oxycodone and acetaminophen (Percocet)    hydrocodone and acetaminophen (Vicodin, Norco)     fentanyl patch (Duragesic)     hydromorphone (Dilaudid)     methadone     What do opioids do well?   Opioids are best for short-term pain after a surgery or injury. They also work well for cancer pain. Unlike other pain medicines, they do not cause liver or kidney failure or ulcers. They may help some people with long-lasting (chronic) pain.     What do opioids NOT do well?   Opioids never get rid of pain entirely, and they do not work well for most patients with chronic pain. Opioids do not reduce swelling, one of the causes of  pain. They also don t work well for nerve pain.     Side effects  Talk to your doctor before you start or decide to keep taking one of these medicines. Side effects include:    Lowers your breathing rate enough that it could cause death    Death due to taking more than the prescribed dose    Serious lifelong opioid use      Dependence is not the same as addiction. Addiction is when people keep using a substance that harms their body, their mind or their relations with others. If you have a history of drug or alcohol abuse, taking opioids can cause a relapse.  Over time, opioids don t work as well. Most people will need higher and higher doses. The higher the dose, the more serious the side effects. We don t know the long-term effects of opioids.   People who have used opioids for a long time have a lower quality of life, worse depression, higher levels of pain and more visits to doctors.  Overdose from prescription drugs is the second leading cause of death in the [place] The risk of overdose rises when opioids are taken with other drugs such as:    Medicines used for anxiety and panic attacks (such as lorazepam, alprazolam, clonazepam    Other sedatives    Alcohol    Illegal drugs such as heroin  Never share your opioids with others. Be sure to store opioids in a secure place, locked if possible.Young children can easily swallow them and overdose.     Are there other ways to manage pain?  Ways to help reduce pain:    Exercise every day.    Treat health problems that may be causing pain.    Treat mental health problems like depression and anxiety.     Worse depression symptoms; Less pleasure in things you usually enjoy    Feeling tired or sluggish    Slower thoughts or cloudy thinking    Being more sensitive to pain over time; Pain is harder to control.    Trouble sleeping or restless sleep    Changes in hormone levels (for example, less testosterone).     Changes in sex drive or ability to have sex    Long lasting  nausea and constipation    Trouble breathing while asleep; This is worse with lung problems like COPD or sleep apnea.    Unsafe driving    Getting sick more often    Itching    Feeling dizzy    Dry mouth    Sweating    Trouble emptying the bladder (peeing). This is worse if you have an enlarged prostate or get urinary tract infections (UTIs).    What else should I know about opioids?  When someone takes opioids for too long or too often, they become dependent. This means that if you stop or reduce the medicine too quickly, you will have withdrawal symptoms.          Practice good sleep habits.  Try to go to bed and get up at the same time every day.    Stop smoking.  Tobacco use can make pain worse.    Do things that you enjoy.    Find a way to work through pain without drugs.  Try deep breathing, meditation, visual imagery and aromatherapy.    Ask your doctor to help you create a plan to manage your pain.

## 2017-06-21 NOTE — NURSING NOTE
"Chief Complaint   Patient presents with     Chronic Disease Management       Initial /68 (BP Location: Right arm, Patient Position: Chair, Cuff Size: Adult Regular)  Pulse (P) 80  Temp (P) 98.4  F (36.9  C) (Oral)  Ht 5' 5\" (1.651 m)  Wt (P) 119 lb (54 kg)  SpO2 (P) 99%  BMI (P) 19.8 kg/m2 Estimated body mass index is 19.8 kg/(m^2) (pended) as calculated from the following:    Height as of this encounter: 5' 5\" (1.651 m).    Weight as of this encounter: (P) 119 lb (54 kg).  Medication Reconciliation: complete   Valarie Blank MA      "

## 2017-06-21 NOTE — MR AVS SNAPSHOT
After Visit Summary   6/21/2017    Beatriz Francis    MRN: [id number]           Patient Information     Date Of Birth          1950        Visit Information        Provider Department      6/21/2017 9:40 AM Azalia Burger MD [facility]        Today's Diagnoses     Chronic pain syndrome    -  1    Hyponatremia        Chronic neck pain        Pulmonary emphysema, unspecified emphysema type (H)        Seasonal allergic rhinitis, unspecified allergic rhinitis trigger          Care Instructions    [facility]   Discharged by : Shanti MAO CMA (AAMA)    Paper scripts provided to patient : none      If you have any questions regarding your visit please contact your care team:     [facility] Clinic Hours Telephone Number   [address]., Dr., Dr.   7am-7pm Monday - Thursday   7am-5pm Fridays  [phone]   (Appointment scheduling available 24/7)   RN Line   [phone] option 2       For a Price Quote for your services, please call our [employer] Price Line at [phone].     What options do I have for visits at the clinic other than the traditional office visit?     To expand how we care for you, many of our providers are utilizing electronic visits (e-visits) and telephone visits, when medically appropriate, for interactions with their patients rather than a visit in the clinic. We also offer nurse visits for many medical concerns. Just like any other service, we will bill your insurance company for this type of visit based on time spent on the phone with your provider. Not all insurance companies cover these visits. Please check with your medical insurance if this type of visit is covered. You will be responsible for any charges that are not paid by your insurance.   E-visits via FlatStack: generally incur a $35.00 fee.     Telephone visits:   Time spent on the phone: *charged  based on time that is spent on the phone in increments of 10 minutes. Estimated cost:   5-10 mins $30.00   11-20 mins. $59.00   21-30 mins. $85.00     Use Lagoa (secure email communication and access to your chart) to send your primary care provider a message or make an appointment. Ask someone on your Team how to sign up for Lagoa.     As always, Thank you for trusting us with your health care needs!      [facility]:    Scheduling Appointments  [facility]  Call: [phone]    ByproDanna barnett, [facility]  Call: [phone]    [facility]  Call: [phone]      WHERE TO GO FOR CARE?    Clinic    Make an appointment if you:       Are sick (cold, cough, flu, sore throat, earache or in pain).       Have a small injury (sprain, small cut, burn or broken bone).       Need a physical exam, Pap smear, vaccine or prescription refill.       Have questions about your health or medicines.    To reach us:      Call [phone] ([phone]). Open 24 hours every day. (For counseling services, call [phone].)    Log into Lagoa at [email or web address]. (Visit WEALTH at [email or web address] to create an account.) Hospital emergency room    An emergency is a serious or life- threatening problem that must be treated right away.    Call 660 or get to the hospital if you have:      Very bad or sudden:            - Chest pain or pressure         - Bleeding         - Head or belly pain         - Dizziness or trouble seeing, walking or                          Speaking      Problems breathing      Blood in your vomit or you are coughing up blood      A major injury (knocked out, loss of a finger or limb, rape, broken bone protruding from skin)    A mental health crisis. (Or call the Mental Health Crisis line at 1-613.302.9605 or Suicide Prevention Hotline at 1-216.521.4145.)    Open 24 hours every day. You don't need an appointment.     Urgent  care    Visit urgent care for sickness or small injuries when the clinic is closed. You don't need an appointment. To check hours or find an urgent care near you, visit [email or web address]. Online care    Get online care from [facility] for more than 70 common problems, like colds, allergies and infections. Open 24 hours every day at:   [email or web address]   Need help deciding?    For advice about where to be seen, you may call your clinic and ask to speak with a nurse. We're here for you 24 hours every day.         If you are deaf or hard of hearing, please let us know. We provide many free services including sign language interpreters, oral interpreters, TTYs, telephone amplifiers, note takers and written materials.                         Follow-ups after your visit        Your next 10 appointments already scheduled     Aug 14, 2017 11:20 AM CDT   (Arrive by 11:05 AM)   RETURN SHOULDER with Ankit Morton MD   [facility] ([facility])    [address] [place] [id number] [phone]              Who to contact     If you have questions or need follow up information about today's clinic visit or your schedule please contact [facility] directly at [phone].  Normal or non-critical lab and imaging results will be communicated to you by AvePointhart, letter or phone within 4 business days after the clinic has received the results. If you do not hear from us within 7 days, please contact the clinic through AvePointhart or phone. If you have a critical or abnormal lab result, we will notify you by phone as soon as possible.  Submit refill requests through ENDOTRONIX or call your pharmacy and they will forward the refill request to us. Please allow 3 business days for your refill to be completed.          Additional Information About Your Visit        ENDOTRONIX Information     ENDOTRONIX gives you secure access to your electronic health record. If  "you see a primary care provider, you can also send messages to your care team and make appointments. If you have questions, please call your primary care clinic.  If you do not have a primary care provider, please call [phone] and they will assist you.        Care EveryWhere ID     This is your Care EveryWhere ID. This could be used by other organizations to access your [place] medical records  [id number]        Your Vitals Were     Height                   5' 5\" (1.651 m)            Blood Pressure from Last 3 Encounters:   06/21/17 114/68   05/10/17 112/68   02/16/17 109/58    Weight from Last 3 Encounters:   06/21/17 (P) 119 lb (54 kg)   05/10/17 122 lb (55.3 kg)   03/27/17 120 lb (54.4 kg)              We Performed the Following     Sodium          Today's Medication Changes          These changes are accurate as of: 6/21/17 10:39 AM.  If you have any questions, ask your nurse or doctor.               Start taking these medicines.        Dose/Directions    DULoxetine 30 MG EC capsule   Commonly known as:  CYMBALTA   Used for:  Chronic neck pain, Chronic pain syndrome   Started by:  Azalia Burger MD        Dose:  30 mg   Take 1 capsule (30 mg) by mouth 2 times daily   Quantity:  30 capsule   Refills:  1            Where to get your medicines      These medications were sent to [facility] [place] [address]     Phone:  [phone]     DULoxetine 30 MG EC capsule                Primary Care Provider Office Phone # Fax #    Azalia Burger [phone]       [place] [id number]        Equal Access to Services     KENDRICK CHAVEZ : Aravind Crook, wajuanitoda lujj, qaybta kaalmada bubba soler. So [facility] [phone].    ATENCIÓN: Si habla español, tiene a moon disposición servicios gratuitos de " asistencia lingüística. Rhea al [phone].    We comply with applicable federal civil rights laws and [place] laws. We do not discriminate on the basis of race, color, national origin, age, disability sex, sexual orientation or gender identity.            Thank you!     Thank you for choosing [facility]  for your care. Our goal is always to provide you with excellent care. Hearing back from our patients is one way we can continue to improve our services. Please take a few minutes to complete the written survey that you may receive in the mail after your visit with us. Thank you!             Your Updated Medication List - Protect others around you: Learn how to safely use, store and throw away your medicines at www.disposemymeds.org.          This list is accurate as of: 6/21/17 10:39 AM.  Always use your most recent med list.                   Brand Name Dispense Instructions for use Diagnosis    acetaminophen 325 MG tablet    TYLENOL    100 tablet    Take 2 tablets (650 mg) by mouth every 4 hours as needed for other (surgical pain)    H/O total shoulder replacement, left       * albuterol 108 (90 BASE) MCG/ACT Inhaler    VENTOLIN HFA    6 Inhaler    Inhale 2 puffs into the lungs every 6 hours Please dispense a 3 month supply.    Pulmonary emphysema, unspecified emphysema type (H)       * albuterol (2.5 MG/3ML) 0.083% neb solution     3 Box    Take 1 vial (2.5 mg) by nebulization every 6 hours as needed for shortness of breath / dyspnea or wheezing    Pulmonary emphysema, unspecified emphysema type (H)       amoxicillin 500 MG capsule    AMOXIL    90 capsule    Take 1 capsule (500 mg) by mouth daily    Acne, unspecified acne type       beclomethasone 80 MCG/ACT Inhaler    QVAR    6 Inhaler    Inhale 4 puffs into the lungs 2 times daily    Pulmonary emphysema, unspecified emphysema type (H)       DULoxetine 30 MG EC capsule    CYMBALTA    30 capsule    Take 1 capsule (30 mg) by mouth 2 times  daily    Chronic neck pain, Chronic pain syndrome       fluticasone 50 MCG/ACT spray    FLONASE    1 Bottle    Spray 1-2 sprays into both nostrils daily    Seasonal allergic rhinitis, unspecified allergic rhinitis trigger       * HYDROcodone-acetaminophen 5-325 MG per tablet    NORCO    60 tablet    Take 1 tablet by mouth every 6 hours as needed for pain May fill on or after 5/22/17    Chronic neck pain, Chronic pain syndrome       * HYDROcodone-acetaminophen 5-325 MG per tablet    NORCO    60 tablet    Take 1 tablet by mouth every 8 hours as needed for moderate to severe pain    S/P shoulder replacement, left       methocarbamol 500 MG tablet    ROBAXIN    90 tablet    Take 1-2 tablets (500-1,000 mg) by mouth 3 times daily as needed for muscle spasms    Chronic pain syndrome, Bilateral shoulder pain       omeprazole 20 MG tablet     90 tablet    Take 1 tablet (20 mg) by mouth daily Take 30-60 minutes before a meal.    Gastroesophageal reflux disease without esophagitis       senna-docusate 8.6-50 MG per tablet    SENOKOT-S;PERICOLACE    50 tablet    Take 2 tablets by mouth 2 times daily    H/O total shoulder replacement, left       tiotropium 18 MCG capsule    SPIRIVA HANDIHALER    90 capsule    Inhale 1 capsule (18 mcg) into the lungs daily    Pulmonary emphysema, unspecified emphysema type (H)       zolpidem 5 MG tablet    AMBIEN    30 tablet    Take 1 tablet (5 mg) by mouth nightly as needed for sleep    Insomnia, unspecified type       * Notice:  This list has 4 medication(s) that are the same as other medications prescribed for you. Read the directions carefully, and ask your doctor or other care provider to review them with you.

## 2017-06-26 ENCOUNTER — TELEPHONE (OUTPATIENT)
Dept: FAMILY MEDICINE | Facility: CLINIC | Age: 67
End: 2017-06-26

## 2017-06-26 RX ORDER — HYDROCODONE BITARTRATE AND ACETAMINOPHEN 5; 325 MG/1; MG/1
1 TABLET ORAL EVERY 6 HOURS PRN
Qty: 120 TABLET | Refills: 0 | Status: SHIPPED | OUTPATIENT
Start: 2017-06-26 | End: 2017-07-28

## 2017-07-28 ENCOUNTER — TELEPHONE (OUTPATIENT)
Dept: FAMILY MEDICINE | Facility: CLINIC | Age: 67
End: 2017-07-28

## 2017-07-28 DIAGNOSIS — G89.4 CHRONIC PAIN SYNDROME: ICD-10-CM

## 2017-07-28 DIAGNOSIS — M54.2 CHRONIC NECK PAIN: ICD-10-CM

## 2017-07-28 DIAGNOSIS — G89.29 CHRONIC NECK PAIN: ICD-10-CM

## 2017-07-28 NOTE — TELEPHONE ENCOUNTER
Norco 5-325mg        Last Written Prescription Date: 06/26/17  Last Fill Quantity: 120,  # refills: 0   Last Office Visit with [facility], [facility] or [facility] prescribing provider: 06/21/17                                         Next 5 appointments (look out 90 days)     Sep 08, 2017 10:40 AM CDT   SHORT with Azalia Burger MD   [facility]    [address] [place] [address] [phone]                  Jax Bryant Pharmacy Technician  [facility]

## 2017-07-31 RX ORDER — HYDROCODONE BITARTRATE AND ACETAMINOPHEN 5; 325 MG/1; MG/1
1 TABLET ORAL EVERY 6 HOURS PRN
Qty: 120 TABLET | Refills: 0 | Status: SHIPPED | OUTPATIENT
Start: 2017-07-31 | End: 2017-08-28

## 2017-07-31 NOTE — TELEPHONE ENCOUNTER
Recent follow up visit was a late cancel.  I will need to know if she tried the duloxetine and if it has done anything to improve pain.  Please do  also.  Azalia Burger MD

## 2017-07-31 NOTE — TELEPHONE ENCOUNTER
She has been taking Cymbalta for about the last 20 days & has had nausea about 15 of those days. She thought she had the stomach flu so that is why she cancelled her appt. She has a few days left of both medications. She is scheduled 9/8.   printed. Roger rayo.  Tena Mcneil RN

## 2017-08-01 NOTE — TELEPHONE ENCOUNTER
1.  Decrease the cymbalta from twice a day to once daily to see if nausea improves. If nausea persists, stop the cymbalta.    2.  Signed, placed in completed forms folder.  Azalia Burger MD

## 2017-08-03 ENCOUNTER — TELEPHONE (OUTPATIENT)
Dept: FAMILY MEDICINE | Facility: CLINIC | Age: 67
End: 2017-08-03

## 2017-08-03 NOTE — TELEPHONE ENCOUNTER
Reason for Call:  Other appointment    Detailed comments: Patient calling to see if she can get in any earlier to see Dr. Burger.  Currently patient has an appointment in early September.  She would like to get in August, if possible.  Please call patient back to discuss.     Phone Number Patient can be reached at: Cell number on file:    No relevant phone numbers on file.       Best Time: anytime    Can we leave a detailed message on this number? YES    Call taken on 8/3/2017 at 8:58 AM by Shona Alan

## 2017-08-08 DIAGNOSIS — L70.9 ACNE, UNSPECIFIED ACNE TYPE: ICD-10-CM

## 2017-08-08 NOTE — TELEPHONE ENCOUNTER
Medication Detail      Disp Refills Start End MANISHA   amoxicillin (AMOXIL) 500 MG capsule 90 capsule 3 7/17/2016  No   Sig: Take 1 capsule (500 mg) by mouth daily   Class: E-Prescribe   Route: Oral   Order: [id number]        Last Office Visit with [facility], [facility] or [facility] prescribing provider: 6/21/2017                                         Next 5 appointments (look out 90 days)     Aug 28, 2017 10:20 AM CDT   SHORT with Azalia Burger MD   [facility]    [address] [place] [id number] [phone]

## 2017-08-10 DIAGNOSIS — Z96.612 H/O TOTAL SHOULDER REPLACEMENT, LEFT: Primary | ICD-10-CM

## 2017-08-10 RX ORDER — AMOXICILLIN 500 MG/1
CAPSULE ORAL
Qty: 30 CAPSULE | Refills: 0 | Status: SHIPPED | OUTPATIENT
Start: 2017-08-10 | End: 2017-08-28

## 2017-08-15 NOTE — TELEPHONE ENCOUNTER
Patient states cutting back to once daily has helped & now she has it about 2x/week. She drinks water, relaxes & then it improves after an hour. She denies any foods attributing to her nausea. She wants to stick with her current plan & will speak with PCP at her 8/28 appt. [employer] dropped [facility] so she won't be able to see her PCP for 3 months until she can change her insurance after her birthday in [birth year].   Tena Mcneil RN

## 2017-08-21 ENCOUNTER — OFFICE VISIT (OUTPATIENT)
Dept: ORTHOPEDICS | Facility: CLINIC | Age: 67
End: 2017-08-21

## 2017-08-21 VITALS — HEIGHT: 65 IN | BODY MASS INDEX: 19.83 KG/M2 | WEIGHT: 119 LBS

## 2017-08-21 DIAGNOSIS — M25.512 PAIN IN JOINT OF LEFT SHOULDER: Primary | ICD-10-CM
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
## 2017-08-21 NOTE — MR AVS SNAPSHOT
After Visit Summary   8/21/2017    Beatriz Francis    MRN: [id number]           Patient Information     Date Of Birth          1950        Visit Information        Provider Department      8/21/2017 12:50 PM Ankit Morton MD [facility]        Today's Diagnoses     Pain in joint of left shoulder    -  1       Follow-ups after your visit        Your next 10 appointments already scheduled     Aug 28, 2017 10:20 AM CDT   SHORT with Azalia Burger MD   [facility]    [address] [place] [address] [phone]           Your Arrival times is X, We need you to be here at this time to get checked in and have the assistant get you ready for the provider, which can take about 15 minutes. Your appointment time with your provider is at  X. Thank you.              Who to contact     Please call your clinic at [phone] to:    Ask questions about your health    Make or cancel appointments    Discuss your medicines    Learn about your test results    Speak to your doctor   If you have compliments or concerns about an experience at your clinic, or if you wish to file a complaint, please contact [facility] Patient Relations at [phone] or email us at [email or web address]         Additional Information About Your Visit        MyChart Information     GordianTect gives you secure access to your electronic health record. If you see a primary care provider, you can also send messages to your care team and make appointments. If you have questions, please call your primary care clinic.  If you do not have a primary care provider, please call [phone] and they will assist you.      GroupZoom is an electronic gateway that provides easy, online access to your medical records. With GroupZoom, you can request a clinic appointment, read your test results, renew a prescription or  "communicate with your care team.     To access your existing account, please contact your [facility] or call [phone] for assistance.        Care EveryWhere ID     This is your Care EveryWhere ID. This could be used by other organizations to access your [place] medical records  [id number]        Your Vitals Were     Height BMI (Body Mass Index)                1.651 m (5' 5\") 19.8 kg/m2           Blood Pressure from Last 3 Encounters:   06/21/17 114/68   05/10/17 112/68   02/16/17 109/58    Weight from Last 3 Encounters:   08/21/17 54 kg (119 lb)   06/21/17 (P) 54 kg (119 lb)   05/10/17 55.3 kg (122 lb)              Today, you had the following     No orders found for display       Primary Care Provider Office Phone # Fax #    Azalia Burger [phone]       [address] [facility] [address]        Equal Access to Services     EZ[facility] : Hadii aad ku hadasho Soomaali, waaxda luqadaha, qaybta kaalmada adeegyada, waxay idiin haynidhin lauri kharachris coelho . So [facility] [phone].    ATENCIÓN: Si habla español, tiene a moon disposición servicios gratuitos de asistencia lingüística. Llame al [phone].    We comply with applicable federal civil rights laws and [place] laws. We do not discriminate on the basis of race, color, national origin, age, disability sex, sexual orientation or gender identity.            Thank you!     Thank you for choosing [facility]  for your care. Our goal is always to provide you with excellent care. Hearing back from our patients is one way we can continue to improve our services. Please take a few minutes to complete the written survey that you may receive in the mail after your visit with us. Thank you!             Your Updated Medication List - Protect others around you: Learn how to safely use, store and throw away your medicines at www.disposemymeds.org.          This list is accurate as of: " 8/21/17 11:59 PM.  Always use your most recent med list.                   Brand Name Dispense Instructions for use Diagnosis    acetaminophen 325 MG tablet    TYLENOL    100 tablet    Take 2 tablets (650 mg) by mouth every 4 hours as needed for other (surgical pain)    H/O total shoulder replacement, left       * albuterol 108 (90 BASE) MCG/ACT Inhaler    VENTOLIN HFA    6 Inhaler    Inhale 2 puffs into the lungs every 6 hours Please dispense a 3 month supply.    Pulmonary emphysema, unspecified emphysema type (H)       * albuterol (2.5 MG/3ML) 0.083% neb solution     3 Box    Take 1 vial (2.5 mg) by nebulization every 6 hours as needed for shortness of breath / dyspnea or wheezing    Pulmonary emphysema, unspecified emphysema type (H)       amoxicillin 500 MG capsule    AMOXIL    30 capsule    TAKE ONE CAPSULE BY MOUTH EVERY DAY    Acne, unspecified acne type       beclomethasone 80 MCG/ACT Inhaler    QVAR    6 Inhaler    Inhale 4 puffs into the lungs 2 times daily    Pulmonary emphysema, unspecified emphysema type (H)       DULoxetine 30 MG EC capsule    CYMBALTA    30 capsule    Take 1 capsule (30 mg) by mouth 2 times daily    Chronic neck pain, Chronic pain syndrome       fluticasone 50 MCG/ACT spray    FLONASE    1 Bottle    Spray 1-2 sprays into both nostrils daily    Seasonal allergic rhinitis, unspecified allergic rhinitis trigger       * HYDROcodone-acetaminophen 5-325 MG per tablet    NORCO    60 tablet    Take 1 tablet by mouth every 8 hours as needed for moderate to severe pain    S/P shoulder replacement, left       * HYDROcodone-acetaminophen 5-325 MG per tablet    NORCO    120 tablet    Take 1 tablet by mouth every 6 hours as needed for pain Max 4 tablets/day.  May fill on or after 7/31/17    Chronic neck pain, Chronic pain syndrome       methocarbamol 500 MG tablet    ROBAXIN    90 tablet    Take 1-2 tablets (500-1,000 mg) by mouth 3 times daily as needed for muscle spasms    Chronic pain syndrome,  Bilateral shoulder pain       omeprazole 20 MG tablet     90 tablet    Take 1 tablet (20 mg) by mouth daily Take 30-60 minutes before a meal.    Gastroesophageal reflux disease without esophagitis       senna-docusate 8.6-50 MG per tablet    SENOKOT-S;PERICOLACE    50 tablet    Take 2 tablets by mouth 2 times daily    H/O total shoulder replacement, left       tiotropium 18 MCG capsule    SPIRIVA HANDIHALER    90 capsule    Inhale 1 capsule (18 mcg) into the lungs daily    Pulmonary emphysema, unspecified emphysema type (H)       zolpidem 5 MG tablet    AMBIEN    30 tablet    Take 1 tablet (5 mg) by mouth nightly as needed for sleep    Insomnia, unspecified type       * Notice:  This list has 4 medication(s) that are the same as other medications prescribed for you. Read the directions carefully, and ask your doctor or other care provider to review them with you.

## 2017-08-21 NOTE — LETTER
8/21/2017       RE: Beatriz Francis  [address] [place], [place] [address]     Dear Colleague,    Thank you for referring your patient, Beatriz Francis, to the [facility] at [facility]. Please see a copy of my visit note below.    This is a postop followup.        DATE OF PROCEDURE:  02/15/2017, left reverse total shoulder arthroplasty.      INTERVAL HISTORY:  Beatriz is a 66-year-old right-hand dominant female who has done very well since her intervention as above.  She has minimal pain.  She does take pain medication but for unrelated pain generator.  She healed the incision well.  She has been working on increasing her strength and range of motion with progressive success.  She has not had any significant difficulty with sleeping related to her shoulder.  She states that she is far better off now than she was a year ago.      PHYSICAL EXAMINATION:  Examination of the left upper extremity reveals sensation intact median, ulnar, radial and axillary distributions.  She is able to fire the deltoid.  She can forward flex to 120  can abduct to 90 degrees.  Externally rotate to 30 degrees, internally rotate to her belt, all active.  Radial pulses palpable and is warm and well perfused.  There is a well-healed and minimal scar formation.        IMAGING:  Three views of the left shoulder were reviewed from today demonstrate a well-seated and positioned humeral stem and glenosphere.  The screws show no evidence of loosening or subsidence.  No implant breakage is located.  No other associated fractures or malady overtly recognized.       IMPRESSION:  66-year-old female 6 months status post left reverse shoulder arthroplasty.      PLAN:  We discussed her lifetime 8 pound lifting restriction.  She can otherwise continue working on range of motion and strengthening as tolerated if the following restrictions.  She should call the clinic office for dental prophylaxis  as needed.  Return to clinic at the 1 year anniversary of surgery.      Seen and examined by Dr. Morton who is in agreement with the plan as above.       I saw the patient with the resident.  I agree with the resident note and plan of care.      Ankit Morton MD

## 2017-08-21 NOTE — PROGRESS NOTES
This is a postop followup.        DATE OF PROCEDURE:  02/15/2017, left reverse total shoulder arthroplasty.      INTERVAL HISTORY:  Beatriz is a 66-year-old right-hand dominant female who has done very well since her intervention as above.  She has minimal pain.  She does take pain medication but for unrelated pain generator.  She healed the incision well.  She has been working on increasing her strength and range of motion with progressive success.  She has not had any significant difficulty with sleeping related to her shoulder.  She states that she is far better off now than she was a year ago.      PHYSICAL EXAMINATION:  Examination of the left upper extremity reveals sensation intact median, ulnar, radial and axillary distributions.  She is able to fire the deltoid.  She can forward flex to 120  can abduct to 90 degrees.  Externally rotate to 30 degrees, internally rotate to her belt, all active.  Radial pulses palpable and is warm and well perfused.  There is a well-healed and minimal scar formation.        IMAGING:  Three views of the left shoulder were reviewed from today demonstrate a well-seated and positioned humeral stem and glenosphere.  The screws show no evidence of loosening or subsidence.  No implant breakage is located.  No other associated fractures or malady overtly recognized.       IMPRESSION:  66-year-old female 6 months status post left reverse shoulder arthroplasty.      PLAN:  We discussed her lifetime 8 pound lifting restriction.  She can otherwise continue working on range of motion and strengthening as tolerated if the following restrictions.  She should call the clinic office for dental prophylaxis as needed.  Return to clinic at the 1 year anniversary of surgery.      Seen and examined by Dr. Morton who is in agreement with the plan as above.       I saw the patient with the resident.  I agree with the resident note and plan of care.      Ankit Morton MD

## 2017-08-28 ENCOUNTER — OFFICE VISIT (OUTPATIENT)
Dept: FAMILY MEDICINE | Facility: CLINIC | Age: 67
End: 2017-08-28
Payer: COMMERCIAL

## 2017-08-28 VITALS
HEART RATE: 84 BPM | BODY MASS INDEX: 20.35 KG/M2 | WEIGHT: 122.13 LBS | TEMPERATURE: 98.8 F | HEIGHT: 65 IN | DIASTOLIC BLOOD PRESSURE: 76 MMHG | SYSTOLIC BLOOD PRESSURE: 122 MMHG

## 2017-08-28 DIAGNOSIS — J43.9 PULMONARY EMPHYSEMA, UNSPECIFIED EMPHYSEMA TYPE (H): ICD-10-CM

## 2017-08-28 DIAGNOSIS — G47.00 INSOMNIA, UNSPECIFIED TYPE: ICD-10-CM

## 2017-08-28 DIAGNOSIS — L70.9 ACNE, UNSPECIFIED ACNE TYPE: ICD-10-CM

## 2017-08-28 DIAGNOSIS — M54.2 CHRONIC NECK PAIN: ICD-10-CM

## 2017-08-28 DIAGNOSIS — G89.4 CHRONIC PAIN SYNDROME: Primary | ICD-10-CM

## 2017-08-28 DIAGNOSIS — J30.2 SEASONAL ALLERGIC RHINITIS, UNSPECIFIED CHRONICITY, UNSPECIFIED TRIGGER: ICD-10-CM

## 2017-08-28 DIAGNOSIS — G89.29 CHRONIC NECK PAIN: ICD-10-CM

## 2017-08-28 PROCEDURE — 99000 SPECIMEN HANDLING OFFICE-LAB: CPT | Performed by: FAMILY MEDICINE

## 2017-08-28 PROCEDURE — 80307 DRUG TEST PRSMV CHEM ANLYZR: CPT | Mod: 90 | Performed by: FAMILY MEDICINE

## 2017-08-28 PROCEDURE — 99214 OFFICE O/P EST MOD 30 MIN: CPT | Performed by: FAMILY MEDICINE

## 2017-08-28 RX ORDER — PREDNISONE 20 MG/1
TABLET ORAL
Qty: 21 TABLET | Refills: 2 | Status: SHIPPED | OUTPATIENT
Start: 2017-08-28 | End: 2017-12-12

## 2017-08-28 RX ORDER — DOXEPIN 6 MG/1
6 TABLET, FILM COATED ORAL
Qty: 30 TABLET | Refills: 3 | Status: SHIPPED | OUTPATIENT
Start: 2017-08-28 | End: 2017-09-05

## 2017-08-28 RX ORDER — HYDROCODONE BITARTRATE AND ACETAMINOPHEN 5; 325 MG/1; MG/1
1 TABLET ORAL EVERY 6 HOURS PRN
Qty: 120 TABLET | Refills: 0 | Status: SHIPPED | OUTPATIENT
Start: 2017-08-30 | End: 2017-11-08

## 2017-08-28 RX ORDER — FLUTICASONE PROPIONATE 50 MCG
1-2 SPRAY, SUSPENSION (ML) NASAL DAILY
Qty: 3 BOTTLE | Refills: 3 | Status: SHIPPED | OUTPATIENT
Start: 2017-08-28 | End: 2019-03-04

## 2017-08-28 RX ORDER — DULOXETIN HYDROCHLORIDE 30 MG/1
30 CAPSULE, DELAYED RELEASE ORAL DAILY
Qty: 90 CAPSULE | Refills: 3 | Status: SHIPPED | OUTPATIENT
Start: 2017-08-28 | End: 2018-02-05

## 2017-08-28 RX ORDER — HYDROCODONE BITARTRATE AND ACETAMINOPHEN 5; 325 MG/1; MG/1
1 TABLET ORAL EVERY 6 HOURS PRN
Qty: 120 TABLET | Refills: 0 | Status: SHIPPED | OUTPATIENT
Start: 2017-09-29 | End: 2017-12-11

## 2017-08-28 RX ORDER — AMOXICILLIN 500 MG/1
500 CAPSULE ORAL DAILY
Qty: 30 CAPSULE | Refills: 0 | Status: CANCELLED | OUTPATIENT
Start: 2017-08-28

## 2017-08-28 RX ORDER — HYDROCODONE BITARTRATE AND ACETAMINOPHEN 5; 325 MG/1; MG/1
1 TABLET ORAL EVERY 6 HOURS PRN
Qty: 120 TABLET | Refills: 0 | Status: SHIPPED | OUTPATIENT
Start: 2017-10-29 | End: 2017-12-11

## 2017-08-28 RX ORDER — ADAPALENE 45 G/G
GEL TOPICAL AT BEDTIME
Qty: 45 G | Refills: 11 | Status: SHIPPED | OUTPATIENT
Start: 2017-08-28 | End: 2023-01-01

## 2017-08-28 RX ORDER — DOXYCYCLINE 100 MG/1
100 CAPSULE ORAL 2 TIMES DAILY
Qty: 14 CAPSULE | Refills: 0 | Status: SHIPPED | OUTPATIENT
Start: 2017-08-28 | End: 2017-12-11

## 2017-08-28 RX ORDER — ZOLPIDEM TARTRATE 5 MG/1
5 TABLET ORAL
Qty: 30 TABLET | Refills: 5 | Status: SHIPPED | OUTPATIENT
Start: 2017-08-28 | End: 2017-12-26

## 2017-08-28 ASSESSMENT — PAIN SCALES - GENERAL: PAINLEVEL: MODERATE PAIN (4)

## 2017-08-28 NOTE — MR AVS SNAPSHOT
After Visit Summary   8/28/2017    Beatriz Francis    MRN: [id number]           Patient Information     Date Of Birth          1950        Visit Information        Provider Department      8/28/2017 10:20 AM Azalia Burger MD [facility]        Today's Diagnoses     Pulmonary emphysema, unspecified emphysema type (H)    -  1    Chronic pain syndrome        Acne, unspecified acne type        Chronic neck pain        Insomnia, unspecified type        Seasonal allergic rhinitis, unspecified chronicity, unspecified trigger          Care Instructions    1. COPD- flares. Okay to use prednisone and the anti biotic doxycycline  2. For skin - Stop amoxicillin. Try tropical adapalene   3. For sleep- Try doxepin. Is till recommend Ambien 5 MG max as needed. It Is my goal to get you off Ambien     [facility]   Discharged by : Valarie Blank MA    Paper scripts provided to patient : yes     If you have any questions regarding your visit please contact your care team:     [facility] Clinic Hours Telephone Number   Dr. Eva Mesa   7am-7pm Monday - Thursday   7am-5pm Fridays  [phone]   (Appointment scheduling available 24/7)   RN Line   [phone] option 2       For a Price Quote for your services, please call our Consumer Price Line at [phone].     What options do I have for visits at the clinic other than the traditional office visit?     To expand how we care for you, many of our providers are utilizing electronic visits (e-visits) and telephone visits, when medically appropriate, for interactions with their patients rather than a visit in the clinic. We also offer nurse visits for many medical concerns. Just like any other service, we will bill your insurance company for this type of visit based on time spent on the phone with your provider. Not all insurance companies cover these  visits. Please check with your medical insurance if this type of visit is covered. You will be responsible for any charges that are not paid by your insurance.   E-visits via Coin: generally incur a $35.00 fee.     Telephone visits:   Time spent on the phone: *charged based on time that is spent on the phone in increments of 10 minutes. Estimated cost:   5-10 mins $30.00   11-20 mins. $59.00   21-30 mins. $85.00     Use Coin (secure email communication and access to your chart) to send your primary care provider a message or make an appointment. Ask someone on your Team how to sign up for Coin.     As always, Thank you for trusting us with your health care needs!      [facility]:    Scheduling Appointments  [facility]  Call: [phone]    [facility]  Call: [phone]    [facility]  Call: [phone]      WHERE TO GO FOR CARE?    Clinic    Make an appointment if you:       Are sick (cold, cough, flu, sore throat, earache or in pain).       Have a small injury (sprain, small cut, burn or broken bone).       Need a physical exam, Pap smear, vaccine or prescription refill.       Have questions about your health or medicines.    To reach us:      Call [phone] ([phone]). Open 24 hours every day. (For counseling services, call [phone].)    Log into Coin at [email or web address]. (Visit [email or web address] to create an account.) Hospital emergency room    An emergency is a serious or life- threatening problem that must be treated right away.    Call [phone] or get to the hospital if you have:      Very bad or sudden:            - Chest pain or pressure         - Bleeding         - Head or belly pain         - Dizziness or trouble seeing, walking or                          Speaking      Problems breathing      Blood in your vomit or you are coughing up blood      A major injury (knocked out, loss  of a finger or limb, rape, broken bone protruding from skin)    A mental health crisis. (Or call the Mental Health Crisis line at 1-819.979.4437 or Suicide Prevention Hotline at 1-656.573.6464.)    Open 24 hours every day. You don't need an appointment.     Urgent care    Visit urgent care for sickness or small injuries when the clinic is closed. You don't need an appointment. To check hours or find an urgent care near you, visit [email or web address]. Online care    Get online care from [facility] for more than 70 common problems, like colds, allergies and infections. Open 24 hours every day at:   [email or web address]   Need help deciding?    For advice about where to be seen, you may call your clinic and ask to speak with a nurse. We're here for you 24 hours every day.         If you are deaf or hard of hearing, please let us know. We provide many free services including sign language interpreters, oral interpreters, TTYs, telephone amplifiers, note takers and written materials.                         Follow-ups after your visit        Who to contact     If you have questions or need follow up information about today's clinic visit or your schedule please contact [facility] directly at [phone].  Normal or non-critical lab and imaging results will be communicated to you by NaHerehart, letter or phone within 4 business days after the clinic has received the results. If you do not hear from us within 7 days, please contact the clinic through NaHerehart or phone. If you have a critical or abnormal lab result, we will notify you by phone as soon as possible.  Submit refill requests through Sabre or call your pharmacy and they will forward the refill request to us. Please allow 3 business days for your refill to be completed.          Additional Information About Your Visit        Sabre Information     Sabre gives you secure access to your electronic health record. If you see a primary care provider, you can  "also send messages to your care team and make appointments. If you have questions, please call your primary care clinic.  If you do not have a primary care provider, please call [phone] and they will assist you.        Care EveryWhere ID     This is your Care EveryWhere ID. This could be used by other organizations to access your [facility] medical records  [id number]        Your Vitals Were     Pulse Temperature Height BMI (Body Mass Index)          84 98.8  F (37.1  C) (Oral) 5' 5\" (1.651 m) 20.32 kg/m2         Blood Pressure from Last 3 Encounters:   08/28/17 122/76   06/21/17 114/68   05/10/17 112/68    Weight from Last 3 Encounters:   08/28/17 122 lb 2 oz (55.4 kg)   08/21/17 119 lb (54 kg)   06/21/17 (P) 119 lb (54 kg)              We Performed the Following     Drug  Screen Comprehensive , Urine with Reported Meds (MedTox) (Pain Care Package)          Today's Medication Changes          These changes are accurate as of: 8/28/17 11:05 AM.  If you have any questions, ask your nurse or doctor.               Start taking these medicines.        Dose/Directions    adapalene 0.1 % gel   Commonly known as:  DIFFERIN   Used for:  Acne, unspecified acne type   Started by:  Azalia Burger MD        Apply topically At Bedtime   Quantity:  45 g   Refills:  11       doxepin 6 MG tablet   Commonly known as:  SILENOR   Used for:  Insomnia, unspecified type   Started by:  Azalia Burger MD        Dose:  6 mg   Take 1 tablet (6 mg) by mouth nightly as needed for sleep   Quantity:  30 tablet   Refills:  3       doxycycline 100 MG capsule   Commonly known as:  VIBRAMYCIN   Used for:  Pulmonary emphysema, unspecified emphysema type (H)   Started by:  Azalia Burger MD        Dose:  100 mg   Take 1 capsule (100 mg) by mouth 2 times daily   Quantity:  14 capsule   Refills:  0       predniSONE 20 MG tablet   Commonly known as:  DELTASONE   Used for:  Pulmonary emphysema, unspecified emphysema type (H) "   Started by:  Azalia Burger MD        Take 3 tabs (60 mg) orally daily for 2 days, 2 tabs (40 mg) orally daily for 5 days, 1 tab (20 mg) orally daily for 3 days, then 1/2 tab (10 mg) orally for 3 days   Quantity:  21 tablet   Refills:  2         These medicines have changed or have updated prescriptions.        Dose/Directions    DULoxetine 30 MG EC capsule   Commonly known as:  CYMBALTA   This may have changed:  when to take this   Used for:  Chronic neck pain, Chronic pain syndrome   Changed by:  Azalia Burger MD        Dose:  30 mg   Take 1 capsule (30 mg) by mouth daily   Quantity:  90 capsule   Refills:  3       * HYDROcodone-acetaminophen 5-325 MG per tablet   Commonly known as:  NORCO   This may have changed:  additional instructions   Used for:  Chronic neck pain, Chronic pain syndrome   Changed by:  Azalia Burger MD        Dose:  1 tablet   Start taking on:  8/30/2017   Take 1 tablet by mouth every 6 hours as needed for pain Max 4 tablets/day.  May fill on or after 8/30/17   Quantity:  120 tablet   Refills:  0       * HYDROcodone-acetaminophen 5-325 MG per tablet   Commonly known as:  NORCO   This may have changed:    - when to take this  - reasons to take this  - additional instructions   Used for:  Chronic neck pain, Chronic pain syndrome   Changed by:  Azalia Burger MD        Dose:  1 tablet   Start taking on:  9/29/2017   Take 1 tablet by mouth every 6 hours as needed for pain Max 4 tablets/day.  May fill on or after 9/29/17   Quantity:  120 tablet   Refills:  0       * HYDROcodone-acetaminophen 5-325 MG per tablet   Commonly known as:  NORCO   This may have changed:  You were already taking a medication with the same name, and this prescription was added. Make sure you understand how and when to take each.   Used for:  Chronic neck pain, Chronic pain syndrome   Changed by:  Azalia Burger MD        Dose:  1 tablet   Start taking on:  10/29/2017   Take 1 tablet by  mouth every 6 hours as needed for pain Max 4 tablets/day.  May fill on or after 10/29/17   Quantity:  120 tablet   Refills:  0       * Notice:  This list has 3 medication(s) that are the same as other medications prescribed for you. Read the directions carefully, and ask your doctor or other care provider to review them with you.         Where to get your medicines      Some of these will need a paper prescription and others can be bought over the counter.  Ask your nurse if you have questions.     Bring a paper prescription for each of these medications     adapalene 0.1 % gel    doxepin 6 MG tablet    doxycycline 100 MG capsule    DULoxetine 30 MG EC capsule    fluticasone 50 MCG/ACT spray    HYDROcodone-acetaminophen 5-325 MG per tablet    HYDROcodone-acetaminophen 5-325 MG per tablet    HYDROcodone-acetaminophen 5-325 MG per tablet    predniSONE 20 MG tablet    zolpidem 5 MG tablet                Primary Care Provider Office Phone # Fax #    Azalia Burger [phone] [address] [facility] [address]        Equal Access to Services     EZIO CHAVEZ : Hadii aad ku hadasho Soomaali, waaxda luqadaha, qaybta kaalmada adeegyada, waxay idiin haynidhin lauri coelho . So [facility] [phone].    ATENCIÓN: Si habla español, tiene a moon disposición servicios gratuitos de asistencia lingüística. [facility] [phone].    We comply with applicable federal civil rights laws and [place] laws. We do not discriminate on the basis of race, color, national origin, age, disability sex, sexual orientation or gender identity.            Thank you!     Thank you for choosing [facility]  for your care. Our goal is always to provide you with excellent care. Hearing back from our patients is one way we can continue to improve our services. Please take a few minutes to complete the written survey that you may receive in the mail after your visit with us. Thank you!              Your Updated Medication List - Protect others around you: Learn how to safely use, store and throw away your medicines at www.disposemymeds.org.          This list is accurate as of: 8/28/17 11:05 AM.  Always use your most recent med list.                   Brand Name Dispense Instructions for use Diagnosis    acetaminophen 325 MG tablet    TYLENOL    100 tablet    Take 2 tablets (650 mg) by mouth every 4 hours as needed for other (surgical pain)    H/O total shoulder replacement, left       adapalene 0.1 % gel    DIFFERIN    45 g    Apply topically At Bedtime    Acne, unspecified acne type       * albuterol 108 (90 BASE) MCG/ACT Inhaler    VENTOLIN HFA    6 Inhaler    Inhale 2 puffs into the lungs every 6 hours Please dispense a 3 month supply.    Pulmonary emphysema, unspecified emphysema type (H)       * albuterol (2.5 MG/3ML) 0.083% neb solution     3 Box    Take 1 vial (2.5 mg) by nebulization every 6 hours as needed for shortness of breath / dyspnea or wheezing    Pulmonary emphysema, unspecified emphysema type (H)       beclomethasone 80 MCG/ACT Inhaler    QVAR    6 Inhaler    Inhale 4 puffs into the lungs 2 times daily    Pulmonary emphysema, unspecified emphysema type (H)       doxepin 6 MG tablet    SILENOR    30 tablet    Take 1 tablet (6 mg) by mouth nightly as needed for sleep    Insomnia, unspecified type       doxycycline 100 MG capsule    VIBRAMYCIN    14 capsule    Take 1 capsule (100 mg) by mouth 2 times daily    Pulmonary emphysema, unspecified emphysema type (H)       DULoxetine 30 MG EC capsule    CYMBALTA    90 capsule    Take 1 capsule (30 mg) by mouth daily    Chronic neck pain, Chronic pain syndrome       fluticasone 50 MCG/ACT spray    FLONASE    3 Bottle    Spray 1-2 sprays into both nostrils daily    Seasonal allergic rhinitis, unspecified chronicity, unspecified trigger       * HYDROcodone-acetaminophen 5-325 MG per tablet   Start taking on:  8/30/2017    NORCO    120 tablet    Take 1  tablet by mouth every 6 hours as needed for pain Max 4 tablets/day.  May fill on or after 8/30/17    Chronic neck pain, Chronic pain syndrome       * HYDROcodone-acetaminophen 5-325 MG per tablet   Start taking on:  9/29/2017    NORCO    120 tablet    Take 1 tablet by mouth every 6 hours as needed for pain Max 4 tablets/day.  May fill on or after 9/29/17    Chronic neck pain, Chronic pain syndrome       * HYDROcodone-acetaminophen 5-325 MG per tablet   Start taking on:  10/29/2017    NORCO    120 tablet    Take 1 tablet by mouth every 6 hours as needed for pain Max 4 tablets/day.  May fill on or after 10/29/17    Chronic neck pain, Chronic pain syndrome       methocarbamol 500 MG tablet    ROBAXIN    90 tablet    Take 1-2 tablets (500-1,000 mg) by mouth 3 times daily as needed for muscle spasms    Chronic pain syndrome, Bilateral shoulder pain       omeprazole 20 MG tablet     90 tablet    Take 1 tablet (20 mg) by mouth daily Take 30-60 minutes before a meal.    Gastroesophageal reflux disease without esophagitis       predniSONE 20 MG tablet    DELTASONE    21 tablet    Take 3 tabs (60 mg) orally daily for 2 days, 2 tabs (40 mg) orally daily for 5 days, 1 tab (20 mg) orally daily for 3 days, then 1/2 tab (10 mg) orally for 3 days    Pulmonary emphysema, unspecified emphysema type (H)       senna-docusate 8.6-50 MG per tablet    SENOKOT-S;PERICOLACE    50 tablet    Take 2 tablets by mouth 2 times daily    H/O total shoulder replacement, left       tiotropium 18 MCG capsule    SPIRIVA HANDIHALER    90 capsule    Inhale 1 capsule (18 mcg) into the lungs daily    Pulmonary emphysema, unspecified emphysema type (H)       zolpidem 5 MG tablet    AMBIEN    30 tablet    Take 1 tablet (5 mg) by mouth nightly as needed for sleep    Insomnia, unspecified type       * Notice:  This list has 5 medication(s) that are the same as other medications prescribed for you. Read the directions carefully, and ask your doctor or other  care provider to review them with you.
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
## 2017-08-28 NOTE — NURSING NOTE
Handed patient Rx's at discharge. She will file all at home until needed.   Made patient a follow-up appointment for December.     Valarie Blank MA

## 2017-08-28 NOTE — PATIENT INSTRUCTIONS
1. COPD- flares. Okay to use prednisone and the anti biotic doxycycline  2. For skin - Stop amoxicillin. Try tropical adapalene   3. For sleep- Try doxepin. Is till recommend Ambien 5 MG max as needed. It Is my goal to get you off Ambien     [place] [facility]   Discharged by : Valarie Blank MA    Paper scripts provided to patient : yes     If you have any questions regarding your visit please contact your care team:     [facility] Clinic Hours Telephone Number   Dr. Eva Mesa   7am-7pm Monday - Thursday   7am-5pm Fridays  [phone]   (Appointment scheduling available 24/7)   RN Line   [phone] option 2       For a Price Quote for your services, please call our Consumer Price Line at [phone].     What options do I have for visits at the clinic other than the traditional office visit?     To expand how we care for you, many of our providers are utilizing electronic visits (e-visits) and telephone visits, when medically appropriate, for interactions with their patients rather than a visit in the clinic. We also offer nurse visits for many medical concerns. Just like any other service, we will bill your insurance company for this type of visit based on time spent on the phone with your provider. Not all insurance companies cover these visits. Please check with your medical insurance if this type of visit is covered. You will be responsible for any charges that are not paid by your insurance.   E-visits via [employer]: generally incur a $35.00 fee.     Telephone visits:   Time spent on the phone: *charged based on time that is spent on the phone in increments of 10 minutes. Estimated cost:   5-10 mins $30.00   11-20 mins. $59.00   21-30 mins. $85.00     Use [employer] (secure email communication and access to your chart) to send your primary care provider a message or make an appointment. Ask someone on your Team how to sign up for  [facility].     As always, Thank you for trusting us with your health care needs!      [facility]:    Scheduling Appointments  [facility]  Call: [phone]    [facility]  Call: [phone]    [facility]  Call: [phone]      WHERE TO GO FOR CARE?    Clinic    Make an appointment if you:       Are sick (cold, cough, flu, sore throat, earache or in pain).       Have a small injury (sprain, small cut, burn or broken bone).       Need a physical exam, Pap smear, vaccine or prescription refill.       Have questions about your health or medicines.    To reach us:      Call [phone] ([phone]). Open 24 hours every day. (For counseling services, call [phone].)    Log into [facility] at [employer]. (Visit [email or web address] to create an account.) Hospital emergency room    An emergency is a serious or life- threatening problem that must be treated right away.    Call [phone] or get to the hospital if you have:      Very bad or sudden:            - Chest pain or pressure         - Bleeding         - Head or belly pain         - Dizziness or trouble seeing, walking or                          Speaking      Problems breathing      Blood in your vomit or you are coughing up blood      A major injury (knocked out, loss of a finger or limb, rape, broken bone protruding from skin)    A mental health crisis. (Or call the Mental Health Crisis line at 1-842.193.6620 or Suicide Prevention Hotline at 1-583.689.5203.)    Open 24 hours every day. You don't need an appointment.     Urgent care    Visit urgent care for sickness or small injuries when the clinic is closed. You don't need an appointment. To check hours or find an urgent care near you, visit [email or web address]. Online care    Get online care from [facility] for more than 70 common problems, like colds, allergies and infections. Open 24 hours every day at:    [email or web address]   Need help deciding?    For advice about where to be seen, you may call your clinic and ask to speak with a nurse. We're here for you 24 hours every day.         If you are deaf or hard of hearing, please let us know. We provide many free services including sign language interpreters, oral interpreters, TTYs, telephone amplifiers, note takers and written materials.

## 2017-08-30 ENCOUNTER — TELEPHONE (OUTPATIENT)
Dept: FAMILY MEDICINE | Facility: CLINIC | Age: 67
End: 2017-08-30

## 2017-08-30 ENCOUNTER — MYC MEDICAL ADVICE (OUTPATIENT)
Dept: FAMILY MEDICINE | Facility: CLINIC | Age: 67
End: 2017-08-30

## 2017-08-30 NOTE — TELEPHONE ENCOUNTER
Per pharmacy, the patient's insurance requires a prior authorization for one or more of the patient's medications.  Please initiate prior authorization or call/fax pharmacy to change patient's medication.    Medication: adapalene (DIFFERIN) 0.1 % gel  Strength: 0.1% gel  Sig: Apply topically At Bedtime    [facility]acy: [id number]  Phone: [phone]  Fax: [phone]    Prescription Insurance: unk?  Phone: unk?  ID: [id number]    Additional Comments: ePA Form placed in Dr. Burger's MA Folder

## 2017-08-30 NOTE — TELEPHONE ENCOUNTER
PA submitted through [employer]. Will wait for response or if there are additional questions to be answered.   KEY: [id number]    Your demographic data has been sent to [facility] successfully. They will respond shortly with your clinical questions and you will be notified by email when available.  You can also check for an update later by opening this request from your dashboard.  Please do not fax or call [facility] to resubmit this request. If you need assistance, please chat with [employer] or call us at [phone].    Ghislaine Jimenez MA

## 2017-08-30 NOTE — TELEPHONE ENCOUNTER
Per pharmacy, the patient's insurance requires a prior authorization for one or more of the patient's medications.  Please initiate prior authorization or call/fax pharmacy to change patient's medication.    Medication: doxepin (SILENOR) 6 MG tablet  Strength: 6 mg  Sig: Take 1 tablet by mouth nightly as needed for sleep    [facility]acy: [facility]  Phone: [phone]  Fax: [phone]    Prescription Insurance: unk?  Phone: [phone]  ID: [id number]    Additional Comments: The patients plan does not cover the prescribed drug without a prior authorization. Please contact the plan to initiate a PA or call/fax the pharmacy to change medication.

## 2017-08-30 NOTE — TELEPHONE ENCOUNTER
Please send patient a my chart message to let her know insurance won't cover the gel and she can purchase the same thing over the counter.  Azalia Burger MD

## 2017-08-31 NOTE — TELEPHONE ENCOUNTER
Checked CoverMyMeds - more questions needed to be answered.   Your information has been submitted to [employer]. To check for an updated outcome later, reopen this PA request from your dashboard.  If [facility] has not responded to your request within 24 hours, contact [facility] at [phone]. If you think there may be a problem with your PA request, use our live chat feature at the bottom right  Please follow-up later.  Shanti Harrington CMA ([facility])

## 2017-09-04 ENCOUNTER — MYC MEDICAL ADVICE (OUTPATIENT)
Dept: FAMILY MEDICINE | Facility: CLINIC | Age: 67
End: 2017-09-04

## 2017-09-04 DIAGNOSIS — G47.00 INSOMNIA, UNSPECIFIED TYPE: ICD-10-CM

## 2017-09-05 ENCOUNTER — TELEPHONE (OUTPATIENT)
Dept: FAMILY MEDICINE | Facility: CLINIC | Age: 67
End: 2017-09-05

## 2017-09-05 LAB — PAIN DRUG SCR UR W RPTD MEDS: NORMAL

## 2017-09-05 RX ORDER — DOXEPIN 6 MG/1
6 TABLET, FILM COATED ORAL
Qty: 30 TABLET | Refills: 3 | Status: SHIPPED | OUTPATIENT
Start: 2017-09-05 | End: 2017-09-11

## 2017-09-05 NOTE — TELEPHONE ENCOUNTER
Per pharmacy, the patient's insurance requires a prior authorization for one or more of the patient's medications.  Please initiate prior authorization or call/fax pharmacy to change patient's medication.    Medication: silenor  Strength: 6 mg  Sig: take one tablet by mouth nightly as needed for sleep    [facility]acy: [facility]  Phone: [phone]  Fax: [phone]    Prescription Insurance: [facility]  Phone: [phone]  ID: [id number]    Additional Comments: Plan does not cover this medication.

## 2017-09-06 ENCOUNTER — MYC MEDICAL ADVICE (OUTPATIENT)
Dept: FAMILY MEDICINE | Facility: CLINIC | Age: 67
End: 2017-09-06

## 2017-09-06 NOTE — TELEPHONE ENCOUNTER
Let patient know the new sleep medication doxepin was not covered by her insurance.  Okay to take off med list unless she is going to see how much it is out of pocket.  Azalia Burger MD

## 2017-11-07 ENCOUNTER — MYC MEDICAL ADVICE (OUTPATIENT)
Dept: FAMILY MEDICINE | Facility: CLINIC | Age: 67
End: 2017-11-07

## 2017-11-07 DIAGNOSIS — M54.2 CHRONIC NECK PAIN: ICD-10-CM

## 2017-11-07 DIAGNOSIS — G89.4 CHRONIC PAIN SYNDROME: ICD-10-CM

## 2017-11-07 DIAGNOSIS — J43.9 PULMONARY EMPHYSEMA, UNSPECIFIED EMPHYSEMA TYPE (H): ICD-10-CM

## 2017-11-07 DIAGNOSIS — B00.1 RECURRENT COLD SORES: ICD-10-CM

## 2017-11-07 DIAGNOSIS — G89.29 CHRONIC NECK PAIN: ICD-10-CM

## 2017-11-07 RX ORDER — HYDROCODONE BITARTRATE AND ACETAMINOPHEN 5; 325 MG/1; MG/1
1 TABLET ORAL EVERY 6 HOURS PRN
Qty: 120 TABLET | Refills: 0 | Status: CANCELLED | OUTPATIENT
Start: 2017-11-07

## 2017-11-07 RX ORDER — ALBUTEROL SULFATE 90 UG/1
2 AEROSOL, METERED RESPIRATORY (INHALATION) EVERY 6 HOURS
Qty: 6 INHALER | Refills: 0 | Status: SHIPPED | OUTPATIENT
Start: 2017-11-07 | End: 2017-11-07

## 2017-11-07 RX ORDER — ALBUTEROL SULFATE 90 UG/1
2 AEROSOL, METERED RESPIRATORY (INHALATION) EVERY 6 HOURS
Qty: 6 INHALER | Refills: 0 | Status: SHIPPED | OUTPATIENT
Start: 2017-11-07 | End: 2018-02-05

## 2017-11-07 NOTE — TELEPHONE ENCOUNTER
Route for norco to get her to appt- last script was 10/29 & patient is scheduled 12/11 & acylovir, pended. Sent MyChart.   Tena Mcneil RN

## 2017-11-08 RX ORDER — ACYCLOVIR 50 MG/G
OINTMENT TOPICAL
Qty: 15 G | Refills: 3 | Status: SHIPPED | OUTPATIENT
Start: 2017-11-08 | End: 2018-09-10

## 2017-11-08 RX ORDER — HYDROCODONE BITARTRATE AND ACETAMINOPHEN 5; 325 MG/1; MG/1
1 TABLET ORAL EVERY 6 HOURS PRN
Qty: 120 TABLET | Refills: 0 | Status: SHIPPED | OUTPATIENT
Start: 2017-11-28 | End: 2018-02-05

## 2017-11-09 NOTE — TELEPHONE ENCOUNTER
Called patient and she wants me to put script for Norco at  to .  Patient stated she will  tomorrow.    Walked script to  and logged into book.    Nancy Brown

## 2017-12-11 ENCOUNTER — OFFICE VISIT (OUTPATIENT)
Dept: FAMILY MEDICINE | Facility: CLINIC | Age: 67
End: 2017-12-11
Payer: COMMERCIAL

## 2017-12-11 VITALS
WEIGHT: 122 LBS | SYSTOLIC BLOOD PRESSURE: 118 MMHG | DIASTOLIC BLOOD PRESSURE: 68 MMHG | BODY MASS INDEX: 20.33 KG/M2 | TEMPERATURE: 98.2 F | HEIGHT: 65 IN | HEART RATE: 72 BPM

## 2017-12-11 DIAGNOSIS — G89.29 CHRONIC NECK PAIN: ICD-10-CM

## 2017-12-11 DIAGNOSIS — G47.00 INSOMNIA, UNSPECIFIED TYPE: ICD-10-CM

## 2017-12-11 DIAGNOSIS — M54.2 CHRONIC NECK PAIN: ICD-10-CM

## 2017-12-11 DIAGNOSIS — J43.9 PULMONARY EMPHYSEMA, UNSPECIFIED EMPHYSEMA TYPE (H): Primary | ICD-10-CM

## 2017-12-11 DIAGNOSIS — Z72.0 TOBACCO ABUSE: ICD-10-CM

## 2017-12-11 DIAGNOSIS — K21.9 GASTROESOPHAGEAL REFLUX DISEASE WITHOUT ESOPHAGITIS: ICD-10-CM

## 2017-12-11 DIAGNOSIS — Z23 NEED FOR PROPHYLACTIC VACCINATION AND INOCULATION AGAINST INFLUENZA: ICD-10-CM

## 2017-12-11 DIAGNOSIS — R63.0 POOR APPETITE: ICD-10-CM

## 2017-12-11 DIAGNOSIS — G89.4 CHRONIC PAIN SYNDROME: ICD-10-CM

## 2017-12-11 PROCEDURE — 90686 IIV4 VACC NO PRSV 0.5 ML IM: CPT | Performed by: FAMILY MEDICINE

## 2017-12-11 PROCEDURE — G0008 ADMIN INFLUENZA VIRUS VAC: HCPCS | Performed by: FAMILY MEDICINE

## 2017-12-11 PROCEDURE — 99214 OFFICE O/P EST MOD 30 MIN: CPT | Mod: 25 | Performed by: FAMILY MEDICINE

## 2017-12-11 RX ORDER — DOXYCYCLINE 100 MG/1
100 CAPSULE ORAL 2 TIMES DAILY
Qty: 14 CAPSULE | Refills: 0 | Status: SHIPPED | OUTPATIENT
Start: 2017-12-11 | End: 2018-09-10

## 2017-12-11 RX ORDER — NICOTINE POLACRILEX 4 MG/1
20 GUM, CHEWING ORAL DAILY
Qty: 90 TABLET | Refills: 3 | Status: SHIPPED | OUTPATIENT
Start: 2017-12-11 | End: 2018-12-05

## 2017-12-11 RX ORDER — FLUTICASONE PROPIONATE 220 UG/1
2 AEROSOL, METERED RESPIRATORY (INHALATION) 2 TIMES DAILY
Qty: 3 INHALER | Refills: 3 | Status: SHIPPED | OUTPATIENT
Start: 2017-12-11 | End: 2018-02-05

## 2017-12-11 RX ORDER — PREDNISONE 20 MG/1
TABLET ORAL
Qty: 21 TABLET | Refills: 2 | Status: CANCELLED | OUTPATIENT
Start: 2017-12-11

## 2017-12-11 RX ORDER — HYDROCODONE BITARTRATE AND ACETAMINOPHEN 5; 325 MG/1; MG/1
1 TABLET ORAL EVERY 6 HOURS PRN
Qty: 120 TABLET | Refills: 0 | Status: SHIPPED | OUTPATIENT
Start: 2018-01-27 | End: 2018-02-05

## 2017-12-11 RX ORDER — TIOTROPIUM BROMIDE 18 UG/1
18 CAPSULE ORAL; RESPIRATORY (INHALATION) DAILY
Qty: 90 CAPSULE | Refills: 3 | Status: SHIPPED | OUTPATIENT
Start: 2017-12-11 | End: 2018-02-05

## 2017-12-11 RX ORDER — HYDROCODONE BITARTRATE AND ACETAMINOPHEN 5; 325 MG/1; MG/1
1 TABLET ORAL EVERY 6 HOURS PRN
Qty: 120 TABLET | Refills: 0 | Status: SHIPPED | OUTPATIENT
Start: 2018-02-26 | End: 2018-06-21

## 2017-12-11 RX ORDER — HYDROCODONE BITARTRATE AND ACETAMINOPHEN 5; 325 MG/1; MG/1
1 TABLET ORAL EVERY 6 HOURS PRN
Qty: 120 TABLET | Refills: 0 | Status: SHIPPED | OUTPATIENT
Start: 2017-12-28 | End: 2018-02-05

## 2017-12-11 NOTE — MR AVS SNAPSHOT
After Visit Summary   12/11/2017    Beatriz Francis    MRN: [id number]           Patient Information     Date Of Birth          1950        Visit Information        Provider Department      12/11/2017 10:20 AM Azalia Burger MD [facility]        Today's Diagnoses     Pulmonary emphysema, unspecified emphysema type (H)    -  1    Chronic neck pain        Chronic pain syndrome        Gastroesophageal reflux disease without esophagitis        Tobacco abuse        Insomnia, unspecified type        Poor appetite        Need for prophylactic vaccination and inoculation against influenza          Care Instructions    -- We will see if Insurance now covers Flovent for COPD    -- If you continue to have poor appetite, nausea, weight loss or any new pain, we will consider further imaging or blood work.     -- Continue Omeprazole for now. In the future we may switch to Ranitidine which has less long term complications.     [facility]   Discharged by : Shanti MAO CMA ([facility])    Paper scripts provided to patient : Norco 5-325 mg (3)     If you have any questions regarding your visit please contact your care team:     Team Gold                Clinic Hours Telephone Number     Dr. Eva Oliva 7am-7pm  Monday - Thursday   7am-5pm  Fridays  [phone]   (Appointment scheduling available 24/7)     RN Line  [phone] option 2     Urgent Care - [place] - 11am-9pm Monday-Friday Saturday-Sunday- 9am-5pm     [place] -   5pm-9pm Monday-Friday Saturday-Sunday- 9am-5pm    [phone] - [place]    [phone] - [place]       For a Price Quote for your services, please call our Consumer Price Line at [phone].     What options do I have for visits at the clinic other than the traditional office visit?     To expand how we care for you, many  of our providers are utilizing electronic visits (e-visits) and telephone visits, when medically appropriate, for interactions with their patients rather than a visit in the clinic. We also offer nurse visits for many medical concerns. Just like any other service, we will bill your insurance company for this type of visit based on time spent on the phone with your provider. Not all insurance companies cover these visits. Please check with your medical insurance if this type of visit is covered. You will be responsible for any charges that are not paid by your insurance.   E-visits via 1calendarhart: generally incur a $35.00 fee.     Telephone visits:   Time spent on the phone: *charged based on time that is spent on the phone in increments of 10 minutes. Estimated cost:   5-10 mins $30.00   11-20 mins. $59.00   21-30 mins. $85.00     Use PEVESA (secure email communication and access to your chart) to send your primary care provider a message or make an appointment. Ask someone on your Team how to sign up for PEVESA.     As always, Thank you for trusting us with your health care needs!      [facility]:    Scheduling Appointments  [facility]  Call: [phone]    [facility]  Call: [phone]    [facility]  Call: [phone]    For Gastroenterology referrals   [facility] and [facility], 4th Floor   [address] [place] [id number]   Appointments: [phone]    WHERE TO GO FOR CARE?  Clinic    Make an appointment if you:       Are sick (cold, cough, flu, sore throat, earache or in pain).       Have a small injury (sprain, small cut, burn or broken bone).       Need a physical exam, Pap smear, vaccine or prescription refill.       Have questions about your health or medicines.    To reach us:      Call [phone] ([phone]). Open 24 hours every day. (For counseling services,  call [phone].)    Log into [employer] at [email or web address]. (Visit [email or web address] to create an account.) Hospital emergency room    An emergency is a serious or life- threatening problem that must be treated right away.    Call 911 or get to the hospital if you have:      Very bad or sudden:            - Chest pain or pressure         - Bleeding         - Head or belly pain         - Dizziness or trouble seeing, walking or                          Speaking      Problems breathing      Blood in your vomit or you are coughing up blood      A major injury (knocked out, loss of a finger or limb, rape, broken bone protruding from skin)    A mental health crisis. (Or call the Mental Health Crisis line at 1-809.224.1415 or Suicide Prevention Hotline at 1-486.273.2459.)    Open 24 hours every day. You don't need an appointment.     Urgent care    Visit urgent care for sickness or small injuries when the clinic is closed. You don't need an appointment. To check hours or find an urgent care near you, visit [email or web address]. Online care    Get online care from [employer] for more than 70 common problems, like colds, allergies and infections. Open 24 hours every day at:   [email or web address]   Need help deciding?    For advice about where to be seen, you may call your clinic and ask to speak with a nurse. We're here for you 24 hours every day.         If you are deaf or hard of hearing, please let us know. We provide many free services including sign language interpreters, oral interpreters, TTYs, telephone amplifiers, note takers and written materials.                   Follow-ups after your visit        Who to contact     If you have questions or need follow up information about today's clinic visit or your schedule please contact [facility] directly at [phone].  Normal or non-critical lab and imaging results will be communicated to you by MyChart, letter or phone within 4 business days after the  "clinic has received the results. If you do not hear from us within 7 days, please contact the clinic through [employer] or phone. If you have a critical or abnormal lab result, we will notify you by phone as soon as possible.  Submit refill requests through [employer] or call your pharmacy and they will forward the refill request to us. Please allow 3 business days for your refill to be completed.          Additional Information About Your Visit        [employer] Information     [employer] gives you secure access to your electronic health record. If you see a primary care provider, you can also send messages to your care team and make appointments. If you have questions, please call your primary care clinic.  If you do not have a primary care provider, please call [phone] and they will assist you.        Care EveryWhere ID     This is your Care EveryWhere ID. This could be used by other organizations to access your [place] medical records  [id number]        Your Vitals Were     Pulse Temperature Height BMI (Body Mass Index)          72 98.2  F (36.8  C) (Oral) 5' 5\" (1.651 m) 20.3 kg/m2         Blood Pressure from Last 3 Encounters:   12/11/17 118/68   08/28/17 122/76   06/21/17 114/68    Weight from Last 3 Encounters:   12/11/17 122 lb (55.3 kg)   08/28/17 122 lb 2 oz (55.4 kg)   08/21/17 119 lb (54 kg)              We Performed the Following     FLU VAC, SPLIT VIRUS IM > 3 YO (QUADRIVALENT) [98639]     Vaccine Administration, Initial [65936]          Today's Medication Changes          These changes are accurate as of: 12/11/17 11:10 AM.  If you have any questions, ask your nurse or doctor.               Start taking these medicines.        Dose/Directions    fluticasone 220 MCG/ACT Inhaler   Commonly known as:  FLOVENT HFA   Used for:  Pulmonary emphysema, unspecified emphysema type (H)   Started by:  Azalia Burger MD        Dose:  2 puff   Inhale 2 puffs into the lungs 2 times daily   Quantity:  3 Inhaler "   Refills:  3         These medicines have changed or have updated prescriptions.        Dose/Directions    * HYDROcodone-acetaminophen 5-325 MG per tablet   Commonly known as:  NORCO   This may have changed:    - Another medication with the same name was added. Make sure you understand how and when to take each.  - Another medication with the same name was changed. Make sure you understand how and when to take each.   Used for:  Chronic neck pain, Chronic pain syndrome   Changed by:  Azalia Burger MD        Dose:  1 tablet   Take 1 tablet by mouth every 6 hours as needed for pain Max 4 tablets/day.  May fill on or after 11/28/17   Quantity:  120 tablet   Refills:  0       * HYDROcodone-acetaminophen 5-325 MG per tablet   Commonly known as:  NORCO   This may have changed:  You were already taking a medication with the same name, and this prescription was added. Make sure you understand how and when to take each.   Used for:  Chronic neck pain, Chronic pain syndrome   Changed by:  Azalia Burger MD        Dose:  1 tablet   Start taking on:  12/28/2017   Take 1 tablet by mouth every 6 hours as needed for pain Max 4 tablets/day.  May fill on or after 12/28/17   Quantity:  120 tablet   Refills:  0       * HYDROcodone-acetaminophen 5-325 MG per tablet   Commonly known as:  NORCO   This may have changed:  additional instructions   Used for:  Chronic neck pain, Chronic pain syndrome   Changed by:  Azalia Burger MD        Dose:  1 tablet   Start taking on:  1/27/2018   Take 1 tablet by mouth every 6 hours as needed for pain Max 4 tablets/day.  May fill on or after 1/27/18   Quantity:  120 tablet   Refills:  0       * HYDROcodone-acetaminophen 5-325 MG per tablet   Commonly known as:  NORCO   This may have changed:  additional instructions   Used for:  Chronic neck pain, Chronic pain syndrome   Changed by:  Azalia Burger MD        Dose:  1 tablet   Start taking on:  2/26/2018   Take 1 tablet by  mouth every 6 hours as needed for pain Max 4 tablets/day.  May fill on or after 2/26/18   Quantity:  120 tablet   Refills:  0       * Notice:  This list has 4 medication(s) that are the same as other medications prescribed for you. Read the directions carefully, and ask your doctor or other care provider to review them with you.         Where to get your medicines      These medications were sent to [facility] - [place], [place] - [address] [place] [address].  [address] [place] [address]., [place] [id number]     Phone:  [phone]     doxycycline 100 MG capsule    fluticasone 220 MCG/ACT Inhaler    omeprazole 20 MG tablet    tiotropium 18 MCG capsule         Some of these will need a paper prescription and others can be bought over the counter.  Ask your nurse if you have questions.     Bring a paper prescription for each of these medications     HYDROcodone-acetaminophen 5-325 MG per tablet    HYDROcodone-acetaminophen 5-325 MG per tablet    HYDROcodone-acetaminophen 5-325 MG per tablet                Primary Care Provider Office Phone # Fax #    Azalia Burger [phone]       [address] [place], [place] [id number]        Equal Access to Services     EZIO CHAVEZ : Hadii aad ku hadasho Sopaulette, waaxda luqadaha, qaybta kaalmada adelili, bubba coelho . So [facility] [phone].    ATENCIÓN: Si habla español, tiene a moon disposición servicios gratuitos de asistencia lingüística. [facility] [phone].    We comply with applicable federal civil rights laws and [place] laws. We do not discriminate on the basis of race, color, national origin, age, disability, sex, sexual orientation, or gender identity.            Thank you!     Thank you for choosing [facility]  for your care. Our goal is always to provide you with excellent care. Hearing back from our patients is one way we can continue to improve our services. Please take a few  minutes to complete the written survey that you may receive in the mail after your visit with us. Thank you!             Your Updated Medication List - Protect others around you: Learn how to safely use, store and throw away your medicines at www.disposemymeds.org.          This list is accurate as of: 12/11/17 11:10 AM.  Always use your most recent med list.                   Brand Name Dispense Instructions for use Diagnosis    acetaminophen 325 MG tablet    TYLENOL    100 tablet    Take 2 tablets (650 mg) by mouth every 4 hours as needed for other (surgical pain)    H/O total shoulder replacement, left       acyclovir 5 % ointment    ZOVIRAX    15 g    Apply topically 6 times daily    Recurrent cold sores       adapalene 0.1 % gel    DIFFERIN    45 g    Apply topically At Bedtime    Acne, unspecified acne type       * albuterol (2.5 MG/3ML) 0.083% neb solution     3 Box    Take 1 vial (2.5 mg) by nebulization every 6 hours as needed for shortness of breath / dyspnea or wheezing    Pulmonary emphysema, unspecified emphysema type (H)       * albuterol 108 (90 BASE) MCG/ACT Inhaler    VENTOLIN HFA    6 Inhaler    Inhale 2 puffs into the lungs every 6 hours Please dispense a 3 month supply.    Pulmonary emphysema, unspecified emphysema type (H)       beclomethasone 80 MCG/ACT Inhaler    QVAR    6 Inhaler    Inhale 4 puffs into the lungs 2 times daily    Pulmonary emphysema, unspecified emphysema type (H)       doxycycline 100 MG capsule    VIBRAMYCIN    14 capsule    Take 1 capsule (100 mg) by mouth 2 times daily    Pulmonary emphysema, unspecified emphysema type (H)       DULoxetine 30 MG EC capsule    CYMBALTA    90 capsule    Take 1 capsule (30 mg) by mouth daily    Chronic neck pain, Chronic pain syndrome       fluticasone 220 MCG/ACT Inhaler    FLOVENT HFA    3 Inhaler    Inhale 2 puffs into the lungs 2 times daily    Pulmonary emphysema, unspecified emphysema type (H)       fluticasone 50 MCG/ACT spray     FLONASE    3 Bottle    Spray 1-2 sprays into both nostrils daily    Seasonal allergic rhinitis, unspecified chronicity, unspecified trigger       * HYDROcodone-acetaminophen 5-325 MG per tablet    NORCO    120 tablet    Take 1 tablet by mouth every 6 hours as needed for pain Max 4 tablets/day.  May fill on or after 11/28/17    Chronic neck pain, Chronic pain syndrome       * HYDROcodone-acetaminophen 5-325 MG per tablet   Start taking on:  12/28/2017    NORCO    120 tablet    Take 1 tablet by mouth every 6 hours as needed for pain Max 4 tablets/day.  May fill on or after 12/28/17    Chronic neck pain, Chronic pain syndrome       * HYDROcodone-acetaminophen 5-325 MG per tablet   Start taking on:  1/27/2018    NORCO    120 tablet    Take 1 tablet by mouth every 6 hours as needed for pain Max 4 tablets/day.  May fill on or after 1/27/18    Chronic neck pain, Chronic pain syndrome       * HYDROcodone-acetaminophen 5-325 MG per tablet   Start taking on:  2/26/2018    NORCO    120 tablet    Take 1 tablet by mouth every 6 hours as needed for pain Max 4 tablets/day.  May fill on or after 2/26/18    Chronic neck pain, Chronic pain syndrome       methocarbamol 500 MG tablet    ROBAXIN    90 tablet    Take 1-2 tablets (500-1,000 mg) by mouth 3 times daily as needed for muscle spasms    Chronic pain syndrome, Bilateral shoulder pain       omeprazole 20 MG tablet     90 tablet    Take 1 tablet (20 mg) by mouth daily Take 30-60 minutes before a meal.    Gastroesophageal reflux disease without esophagitis       predniSONE 20 MG tablet    DELTASONE    21 tablet    Take 3 tabs (60 mg) orally daily for 2 days, 2 tabs (40 mg) orally daily for 5 days, 1 tab (20 mg) orally daily for 3 days, then 1/2 tab (10 mg) orally for 3 days    Pulmonary emphysema, unspecified emphysema type (H)       senna-docusate 8.6-50 MG per tablet    SENOKOT-S;PERICOLACE    50 tablet    Take 2 tablets by mouth 2 times daily    H/O total shoulder replacement,  left       tiotropium 18 MCG capsule    SPIRIVA HANDIHALER    90 capsule    Inhale 1 capsule (18 mcg) into the lungs daily    Pulmonary emphysema, unspecified emphysema type (H)       zolpidem 5 MG tablet    AMBIEN    30 tablet    Take 1 tablet (5 mg) by mouth nightly as needed for sleep    Insomnia, unspecified type       * Notice:  This list has 6 medication(s) that are the same as other medications prescribed for you. Read the directions carefully, and ask your doctor or other care provider to review them with you.
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
## 2017-12-11 NOTE — NURSING NOTE
"Chief Complaint   Patient presents with     Pain     Refill Request     pended     Flu Shot     done       Initial /68  Pulse 72  Temp 98.2  F (36.8  C) (Oral)  Ht 5' 5\" (1.651 m)  Wt 122 lb (55.3 kg)  BMI 20.3 kg/m2 Estimated body mass index is 20.3 kg/(m^2) as calculated from the following:    Height as of this encounter: 5' 5\" (1.651 m).    Weight as of this encounter: 122 lb (55.3 kg).  Medication Reconciliation: complete   Valarie Blank MA      "

## 2017-12-11 NOTE — PATIENT INSTRUCTIONS
-- We will see if Insurance now covers Flovent for COPD    -- If you continue to have poor appetite, nausea, weight loss or any new pain, we will consider further imaging or blood work.     -- Continue Omeprazole for now. In the future we may switch to Ranitidine which has less long term complications.     [facility]   Discharged by : Shanti MAO CMA ([facility])    Paper scripts provided to patient : Norco 5-325 mg (3)     If you have any questions regarding your visit please contact your care team:     Team Gold                Clinic Hours Telephone Number     Dr. Eva Oliav 7am-7pm  Monday - Thursday   7am-5pm  Fridays  [phone]   (Appointment scheduling available 24/7)     RN Line  [phone] option 2     Urgent Care - [place] and [place] - 11am-9pm Monday-Friday Saturday-Sunday- 9am-5pm     [place] -   5pm-9pm Monday-Friday Saturday-Sunday- 9am-5pm    [phone] - [place]    [phone] - [place]       For a Price Quote for your services, please call our Consumer Price Line at [phone].     What options do I have for visits at the clinic other than the traditional office visit?     To expand how we care for you, many of our providers are utilizing electronic visits (e-visits) and telephone visits, when medically appropriate, for interactions with their patients rather than a visit in the clinic. We also offer nurse visits for many medical concerns. Just like any other service, we will bill your insurance company for this type of visit based on time spent on the phone with your provider. Not all insurance companies cover these visits. Please check with your medical insurance if this type of visit is covered. You will be responsible for any charges that are not paid by your insurance.   E-visits via [employer]: generally incur a $35.00 fee.     Telephone visits:   Time  spent on the phone: *charged based on time that is spent on the phone in increments of 10 minutes. Estimated cost:   5-10 mins $30.00   11-20 mins. $59.00   21-30 mins. $85.00     Use [employer] (secure email communication and access to your chart) to send your primary care provider a message or make an appointment. Ask someone on your Team how to sign up for [employer].     As always, Thank you for trusting us with your health care needs!      [facility]:    Scheduling Appointments  [facility]  Call: [phone]    Danna Murphy, [facility]  Call: [phone]    [facility]  Call: [phone]    For Gastroenterology referrals   [facility]   Clinics and Surgery Center, 4th Floor   [address] [place] [address]   Appointments: [phone]    WHERE TO GO FOR CARE?  Clinic    Make an appointment if you:       Are sick (cold, cough, flu, sore throat, earache or in pain).       Have a small injury (sprain, small cut, burn or broken bone).       Need a physical exam, Pap smear, vaccine or prescription refill.       Have questions about your health or medicines.    To reach us:      Call [phone] ([phone]). Open 24 hours every day. (For counseling services, call [phone].)    Log into [employer] at [email or web address]. (Visit [email or web address] to create an account.) Hospital emergency room    An emergency is a serious or life- threatening problem that must be treated right away.    Call 053 or get to the hospital if you have:      Very bad or sudden:            - Chest pain or pressure         - Bleeding         - Head or belly pain         - Dizziness or trouble seeing, walking or                          Speaking      Problems breathing      Blood in your vomit or you are coughing up blood      A major injury (knocked out, loss of a finger or limb, rape, broken bone protruding from skin)    A mental  health crisis. (Or call the Mental Health Crisis line at 1-336.546.3906 or Suicide Prevention Hotline at 1-324.280.3703.)    Open 24 hours every day. You don't need an appointment.     Urgent care    Visit urgent care for sickness or small injuries when the clinic is closed. You don't need an appointment. To check hours or find an urgent care near you, visit [email or web address]. Online care    Get online care from [facility] for more than 70 common problems, like colds, allergies and infections. Open 24 hours every day at:   [email or web address]   Need help deciding?    For advice about where to be seen, you may call your clinic and ask to speak with a nurse. We're here for you 24 hours every day.         If you are deaf or hard of hearing, please let us know. We provide many free services including sign language interpreters, oral interpreters, TTYs, telephone amplifiers, note takers and written materials.

## 2017-12-12 ENCOUNTER — TELEPHONE (OUTPATIENT)
Dept: FAMILY MEDICINE | Facility: CLINIC | Age: 67
End: 2017-12-12

## 2017-12-12 DIAGNOSIS — J43.9 PULMONARY EMPHYSEMA, UNSPECIFIED EMPHYSEMA TYPE (H): ICD-10-CM

## 2017-12-12 RX ORDER — PREDNISONE 20 MG/1
TABLET ORAL
Qty: 21 TABLET | Refills: 1 | Status: SHIPPED | OUTPATIENT
Start: 2017-12-12 | End: 2019-03-04

## 2017-12-12 NOTE — TELEPHONE ENCOUNTER
Beatriz was inquiring about if you planned to prescribe prednisone, since you generally always use when sick.  She does not need right now, but would like to replace the supply she keeps on hand, and used about 3 weeks ago.    Sophia Moralez, PharmD [facility]  Phone [phone]  Fax [phone]

## 2017-12-12 NOTE — TELEPHONE ENCOUNTER
Called patient- her [employer] [facility]. She only used it once since the end of August & would like the remaining refills sent to our pharmacy. Faxed.  Tena Mcneil RN

## 2017-12-26 DIAGNOSIS — G47.00 INSOMNIA, UNSPECIFIED TYPE: ICD-10-CM

## 2017-12-26 NOTE — TELEPHONE ENCOUNTER
Zolpidem 5mg  Last Written Prescription Date: 05/10/2017  Last Fill Quantity:30,  # refills: 5  Last Office Visit with [facility], [facility] or [facility] prescribing provider:12/11/2017                                        Next 5 appointments (look out 90 days)     Feb 05, 2018  2:00 PM CST   SHORT with Azalia Burger MD   [facility]    [address] [place] [id number] [phone]                  Sophia Kirt, PharmD BCACP  [facility] [place]  Phone [phone]  Fax [phone]

## 2017-12-27 ENCOUNTER — MYC MEDICAL ADVICE (OUTPATIENT)
Dept: FAMILY MEDICINE | Facility: CLINIC | Age: 67
End: 2017-12-27

## 2017-12-27 RX ORDER — ZOLPIDEM TARTRATE 5 MG/1
5 TABLET ORAL
Qty: 30 TABLET | Refills: 0 | Status: SHIPPED | OUTPATIENT
Start: 2018-01-26 | End: 2018-02-05

## 2017-12-27 NOTE — TELEPHONE ENCOUNTER
There should be one month left on this RX as it was sent in August with 5 refills.  This should therefore not need a refill until end of January.  Rx forward dated, but will need to be signed by someone on site

## 2017-12-28 ENCOUNTER — TELEPHONE (OUTPATIENT)
Dept: FAMILY MEDICINE | Facility: CLINIC | Age: 67
End: 2017-12-28

## 2017-12-28 ENCOUNTER — MYC MEDICAL ADVICE (OUTPATIENT)
Dept: FAMILY MEDICINE | Facility: CLINIC | Age: 67
End: 2017-12-28

## 2017-12-28 NOTE — TELEPHONE ENCOUNTER
See TC from pharmacy    Sophia Moralez, PharmD BCACP  [facility]  Phone [phone]  Fax [phone]

## 2017-12-28 NOTE — TELEPHONE ENCOUNTER
Patient was given a Rx for Ambien to be filled 1/26/18. Patient takes 2- 5 mg so is out early.    Routed to provider to advise.  Tita Tesfaye RN  [facility]

## 2017-12-28 NOTE — TELEPHONE ENCOUNTER
Patient states unclear why written prescription generated 12/27/2017 was post dated to 1/26/2018.    Per discussion with Dr. Mesa and review of , last fill was 11/25/2017 @ [facility].  Verbal OK given to fill Zolpidem today by Dr. Mesa.    After discussion with Beatriz, she has been filling at [facility] due to insurance, and had not make  pharmacy aware.  Filled hard copy from [facility] today, and called [facility] after Beatriz requested we transfer the prescription.  They had 2 fills still valid on file there, prescription transferred and profiled for future use, therefore voiding it at [facility].    Sophia Moralez, PharmD BCACP  [facility]  Phone [phone]  Fax [phone]

## 2018-02-05 ENCOUNTER — OFFICE VISIT (OUTPATIENT)
Dept: FAMILY MEDICINE | Facility: CLINIC | Age: 68
End: 2018-02-05
Payer: COMMERCIAL

## 2018-02-05 VITALS
WEIGHT: 120.4 LBS | HEART RATE: 95 BPM | SYSTOLIC BLOOD PRESSURE: 116 MMHG | BODY MASS INDEX: 20.06 KG/M2 | HEIGHT: 65 IN | OXYGEN SATURATION: 96 % | DIASTOLIC BLOOD PRESSURE: 68 MMHG | TEMPERATURE: 98.3 F

## 2018-02-05 DIAGNOSIS — J43.9 PULMONARY EMPHYSEMA, UNSPECIFIED EMPHYSEMA TYPE (H): ICD-10-CM

## 2018-02-05 DIAGNOSIS — G89.4 CHRONIC PAIN SYNDROME: Primary | ICD-10-CM

## 2018-02-05 DIAGNOSIS — G89.29 CHRONIC NECK PAIN: ICD-10-CM

## 2018-02-05 DIAGNOSIS — M54.2 CHRONIC NECK PAIN: ICD-10-CM

## 2018-02-05 DIAGNOSIS — G47.00 INSOMNIA, UNSPECIFIED TYPE: ICD-10-CM

## 2018-02-05 PROCEDURE — 99214 OFFICE O/P EST MOD 30 MIN: CPT | Performed by: FAMILY MEDICINE

## 2018-02-05 RX ORDER — TIOTROPIUM BROMIDE 18 UG/1
18 CAPSULE ORAL; RESPIRATORY (INHALATION) DAILY
Qty: 90 CAPSULE | Refills: 3 | Status: SHIPPED | OUTPATIENT
Start: 2018-02-05 | End: 2018-04-09

## 2018-02-05 RX ORDER — ALBUTEROL SULFATE 90 UG/1
2 AEROSOL, METERED RESPIRATORY (INHALATION) EVERY 6 HOURS
Qty: 6 INHALER | Refills: 3 | Status: SHIPPED | OUTPATIENT
Start: 2018-02-05 | End: 2019-03-04

## 2018-02-05 RX ORDER — FLUTICASONE PROPIONATE 220 UG/1
2 AEROSOL, METERED RESPIRATORY (INHALATION) 2 TIMES DAILY
Qty: 3 INHALER | Refills: 3 | Status: SHIPPED | OUTPATIENT
Start: 2018-02-05 | End: 2022-07-25

## 2018-02-05 ASSESSMENT — ANXIETY QUESTIONNAIRES
7. FEELING AFRAID AS IF SOMETHING AWFUL MIGHT HAPPEN: NOT AT ALL
2. NOT BEING ABLE TO STOP OR CONTROL WORRYING: NOT AT ALL
IF YOU CHECKED OFF ANY PROBLEMS ON THIS QUESTIONNAIRE, HOW DIFFICULT HAVE THESE PROBLEMS MADE IT FOR YOU TO DO YOUR WORK, TAKE CARE OF THINGS AT HOME, OR GET ALONG WITH OTHER PEOPLE: NOT DIFFICULT AT ALL
5. BEING SO RESTLESS THAT IT IS HARD TO SIT STILL: NOT AT ALL
1. FEELING NERVOUS, ANXIOUS, OR ON EDGE: NOT AT ALL
6. BECOMING EASILY ANNOYED OR IRRITABLE: SEVERAL DAYS
GAD7 TOTAL SCORE: 1
3. WORRYING TOO MUCH ABOUT DIFFERENT THINGS: NOT AT ALL

## 2018-02-05 ASSESSMENT — PATIENT HEALTH QUESTIONNAIRE - PHQ9: 5. POOR APPETITE OR OVEREATING: NOT AT ALL
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
## 2018-02-05 NOTE — MR AVS SNAPSHOT
After Visit Summary   2/5/2018    Beatriz Francis    MRN: [id number]           Patient Information     Date Of Birth          1950        Visit Information        Provider Department      2/5/2018 2:00 PM Azalia Burger MD [facility]        Today's Diagnoses     Chronic pain syndrome    -  1    Insomnia, unspecified type        Pulmonary emphysema, unspecified emphysema type (H)        Chronic neck pain          Care Instructions    [facility]   Discharged by : Valarie Blank MA    Paper scripts provided to patient : yes     If you have any questions regarding your visit please contact your care team:     Team Gold                Clinic Hours Telephone Number     Dr. Eva Oliva 7am-7pm  Monday - Thursday   7am-5pm  Fridays  [phone]   (Appointment scheduling available 24/7)     RN Line  [phone] option 2     Urgent Care - [place] and [place] - 11am-9pm Monday-Friday Saturday-Sunday- 9am-5pm     [place] -   5pm-9pm Monday-Friday Saturday-Sunday- 9am-5pm    [phone] - [place]    [phone] - [place]       For a Price Quote for your services, please call our Consumer Price Line at [phone].     What options do I have for visits at the clinic other than the traditional office visit?     To expand how we care for you, many of our providers are utilizing electronic visits (e-visits) and telephone visits, when medically appropriate, for interactions with their patients rather than a visit in the clinic. We also offer nurse visits for many medical concerns. Just like any other service, we will bill your insurance company for this type of visit based on time spent on the phone with your provider. Not all insurance companies cover these visits. Please check with your medical insurance if this type of visit is covered. You  will be responsible for any charges that are not paid by your insurance.   E-visits via EndoventionharAffinity Solutions: generally incur a $35.00 fee.     Telephone visits:   Time spent on the phone: *charged based on time that is spent on the phone in increments of 10 minutes. Estimated cost:   5-10 mins $30.00   11-20 mins. $59.00   21-30 mins. $85.00     Use Endoventionhart (secure email communication and access to your chart) to send your primary care provider a message or make an appointment. Ask someone on your Team how to sign up for Singulext.     As always, Thank you for trusting us with your health care needs!      [facility]:    Scheduling Appointments  [place] [facility]  Call: [phone]    [place]  Call: [phone]    [facility]  Call: [phone]    For Gastroenterology referrals   [facility]enterology   Clinics and Surgery Center, 4th Floor   [address] [place] [address]   Appointments: [phone]    WHERE TO GO FOR CARE?  Clinic    Make an appointment if you:       Are sick (cold, cough, flu, sore throat, earache or in pain).       Have a small injury (sprain, small cut, burn or broken bone).       Need a physical exam, Pap smear, vaccine or prescription refill.       Have questions about your health or medicines.    To reach us:      Call [phone] ([phone]). Open 24 hours every day. (For counseling services, call [phone].)    Log into [employer] at [email or web address]. (Visit [email or web address] to create an account.) Hospital emergency room    An emergency is a serious or life- threatening problem that must be treated right away.    Call 101 or get to the hospital if you have:      Very bad or sudden:            - Chest pain or pressure         - Bleeding         - Head or belly pain         - Dizziness or trouble seeing, walking or                          Speaking      Problems  breathing      Blood in your vomit or you are coughing up blood      A major injury (knocked out, loss of a finger or limb, rape, broken bone protruding from skin)    A mental health crisis. (Or call the Mental Health Crisis line at 1-526.409.8327 or Suicide Prevention Hotline at 1-920.515.9727.)    Open 24 hours every day. You don't need an appointment.     Urgent care    Visit urgent care for sickness or small injuries when the clinic is closed. You don't need an appointment. To check hours or find an urgent care near you, visit [email or web address]. Online care    Get online care from [email or web address] for more than 70 common problems, like colds, allergies and infections. Open 24 hours every day at:   [email or web address]   Need help deciding?    For advice about where to be seen, you may call your clinic and ask to speak with a nurse. We're here for you 24 hours every day.         If you are deaf or hard of hearing, please let us know. We provide many free services including sign language interpreters, oral interpreters, TTYs, telephone amplifiers, note takers and written materials.                   Follow-ups after your visit        Who to contact     If you have questions or need follow up information about today's clinic visit or your schedule please contact [facility] directly at [phone].  Normal or non-critical lab and imaging results will be communicated to you by Pulmocidehart, letter or phone within 4 business days after the clinic has received the results. If you do not hear from us within 7 days, please contact the clinic through Pulmocidehart or phone. If you have a critical or abnormal lab result, we will notify you by phone as soon as possible.  Submit refill requests through [email or web address] or call your pharmacy and they will forward the refill request to us. Please allow 3 business days for your refill to be completed.          Additional Information About Your Visit        [email or web address] Information     [email or web address] gives  "you secure access to your electronic health record. If you see a primary care provider, you can also send messages to your care team and make appointments. If you have questions, please call your primary care clinic.  If you do not have a primary care provider, please call [phone] and they will assist you.        Care EveryWhere ID     This is your Care EveryWhere ID. This could be used by other organizations to access your [facility] medical records  [id number]        Your Vitals Were     Pulse Temperature Height Pulse Oximetry BMI (Body Mass Index)       95 98.3  F (36.8  C) (Oral) 5' 5\" (1.651 m) 96% 20.04 kg/m2        Blood Pressure from Last 3 Encounters:   02/05/18 116/68   12/11/17 118/68   08/28/17 122/76    Weight from Last 3 Encounters:   02/05/18 120 lb 6.4 oz (54.6 kg)   12/11/17 122 lb (55.3 kg)   08/28/17 122 lb 2 oz (55.4 kg)              Today, you had the following     No orders found for display         Where to get your medicines      Some of these will need a paper prescription and others can be bought over the counter.  Ask your nurse if you have questions.     Bring a paper prescription for each of these medications     albuterol 108 (90 BASE) MCG/ACT Inhaler    fluticasone 220 MCG/ACT Inhaler    tiotropium 18 MCG capsule          Primary Care Provider Office Phone # Fax #    Azalia Burger [phone]       [employer] [address]        Equal Access to Services     [facility] KATHY : Hadii aad ku hadasho Soomaali, waaxda luqadaha, qaybta kaalmada adeegyada, bubba avitia. So [facility] [phone].    ATENCIÓN: Si habla kassandra, tiene a moon disposición servicios gratuitos de asistencia lingüística. Llame al [phone].    We comply with applicable federal civil rights laws and [place] laws. We do not discriminate on the basis of race, color, national origin, age, disability, sex, sexual orientation, or gender identity.       "      Thank you!     Thank you for choosing [facility]  for your care. Our goal is always to provide you with excellent care. Hearing back from our patients is one way we can continue to improve our services. Please take a few minutes to complete the written survey that you may receive in the mail after your visit with us. Thank you!             Your Updated Medication List - Protect others around you: Learn how to safely use, store and throw away your medicines at www.disposemymeds.org.          This list is accurate as of 2/5/18  2:46 PM.  Always use your most recent med list.                   Brand Name Dispense Instructions for use Diagnosis    acetaminophen 325 MG tablet    TYLENOL    100 tablet    Take 2 tablets (650 mg) by mouth every 4 hours as needed for other (surgical pain)    H/O total shoulder replacement, left       acyclovir 5 % ointment    ZOVIRAX    15 g    Apply topically 6 times daily    Recurrent cold sores       adapalene 0.1 % gel    DIFFERIN    45 g    Apply topically At Bedtime    Acne, unspecified acne type       * albuterol (2.5 MG/3ML) 0.083% neb solution     3 Box    Take 1 vial (2.5 mg) by nebulization every 6 hours as needed for shortness of breath / dyspnea or wheezing    Pulmonary emphysema, unspecified emphysema type (H)       * albuterol 108 (90 BASE) MCG/ACT Inhaler    VENTOLIN HFA    6 Inhaler    Inhale 2 puffs into the lungs every 6 hours Please dispense a 3 month supply.    Pulmonary emphysema, unspecified emphysema type (H)       doxycycline 100 MG capsule    VIBRAMYCIN    14 capsule    Take 1 capsule (100 mg) by mouth 2 times daily    Pulmonary emphysema, unspecified emphysema type (H)       fluticasone 220 MCG/ACT Inhaler    FLOVENT HFA    3 Inhaler    Inhale 2 puffs into the lungs 2 times daily    Pulmonary emphysema, unspecified emphysema type (H)       fluticasone 50 MCG/ACT spray    FLONASE    3 Bottle    Spray 1-2 sprays into both nostrils daily     Seasonal allergic rhinitis, unspecified chronicity, unspecified trigger       * HYDROcodone-acetaminophen 5-325 MG per tablet    NORCO    120 tablet    Take 1 tablet by mouth every 6 hours as needed for pain Max 4 tablets/day.  May fill on or after 11/28/17    Chronic neck pain, Chronic pain syndrome       * HYDROcodone-acetaminophen 5-325 MG per tablet    NORCO    120 tablet    Take 1 tablet by mouth every 6 hours as needed for pain Max 4 tablets/day.  May fill on or after 12/28/17    Chronic neck pain, Chronic pain syndrome       * HYDROcodone-acetaminophen 5-325 MG per tablet    NORCO    120 tablet    Take 1 tablet by mouth every 6 hours as needed for pain Max 4 tablets/day.  May fill on or after 1/27/18    Chronic neck pain, Chronic pain syndrome       * HYDROcodone-acetaminophen 5-325 MG per tablet   Start taking on:  2/26/2018    NORCO    120 tablet    Take 1 tablet by mouth every 6 hours as needed for pain Max 4 tablets/day.  May fill on or after 2/26/18    Chronic neck pain, Chronic pain syndrome       methocarbamol 500 MG tablet    ROBAXIN    90 tablet    Take 1-2 tablets (500-1,000 mg) by mouth 3 times daily as needed for muscle spasms    Chronic pain syndrome, Bilateral shoulder pain       omeprazole 20 MG tablet     90 tablet    Take 1 tablet (20 mg) by mouth daily Take 30-60 minutes before a meal.    Gastroesophageal reflux disease without esophagitis       predniSONE 20 MG tablet    DELTASONE    21 tablet    Take 3 tabs (60 mg) orally daily for 2 days, 2 tabs (40 mg) orally daily for 5 days, 1 tab (20 mg) orally daily for 3 days, then 1/2 tab (10 mg) orally for 3 days    Pulmonary emphysema, unspecified emphysema type (H)       senna-docusate 8.6-50 MG per tablet    SENOKOT-S;PERICOLACE    50 tablet    Take 2 tablets by mouth 2 times daily    H/O total shoulder replacement, left       tiotropium 18 MCG capsule    SPIRIVA HANDIHALER    90 capsule    Inhale 1 capsule (18 mcg) into the lungs daily     Pulmonary emphysema, unspecified emphysema type (H)       zolpidem 5 MG tablet    AMBIEN    30 tablet    Take 1 tablet (5 mg) by mouth nightly as needed for sleep To be filled on or after 1/26/18    Insomnia, unspecified type       * Notice:  This list has 6 medication(s) that are the same as other medications prescribed for you. Read the directions carefully, and ask your doctor or other care provider to review them with you.

## 2018-02-05 NOTE — NURSING NOTE
"Chief Complaint   Patient presents with     Chronic Disease Management       Initial /68 (BP Location: Right arm, Patient Position: Sitting, Cuff Size: Adult Regular)  Pulse 95  Temp 98.3  F (36.8  C) (Oral)  Ht 5' 5\" (1.651 m)  Wt 120 lb 6.4 oz (54.6 kg)  SpO2 96%  BMI 20.04 kg/m2 Estimated body mass index is 20.04 kg/(m^2) as calculated from the following:    Height as of this encounter: 5' 5\" (1.651 m).    Weight as of this encounter: 120 lb 6.4 oz (54.6 kg).  Medication Reconciliation: complete    "

## 2018-02-05 NOTE — PATIENT INSTRUCTIONS
[facility]   Discharged by : Valarie Blank MA    Paper scripts provided to patient : yes     If you have any questions regarding your visit please contact your care team:     Team Gold                Clinic Hours Telephone Number     Dr. Eva Oliva 7am-7pm  Monday - Thursday   7am-5pm  Fridays  [phone]   (Appointment scheduling available 24/7)     RN Line  [phone] option 2     Urgent Care - [place] - 11am-9pm Monday-Friday Saturday-Sunday- 9am-5pm     [place] -   5pm-9pm Monday-Friday Saturday-Sunday- 9am-5pm    [phone] - [place]    [phone] - [place]       For a Price Quote for your services, please call our Consumer Price Line at [phone].     What options do I have for visits at the clinic other than the traditional office visit?     To expand how we care for you, many of our providers are utilizing electronic visits (e-visits) and telephone visits, when medically appropriate, for interactions with their patients rather than a visit in the clinic. We also offer nurse visits for many medical concerns. Just like any other service, we will bill your insurance company for this type of visit based on time spent on the phone with your provider. Not all insurance companies cover these visits. Please check with your medical insurance if this type of visit is covered. You will be responsible for any charges that are not paid by your insurance.   E-visits via FreshBooks: generally incur a $35.00 fee.     Telephone visits:   Time spent on the phone: *charged based on time that is spent on the phone in increments of 10 minutes. Estimated cost:   5-10 mins $30.00   11-20 mins. $59.00   21-30 mins. $85.00     Use FreshBooks (secure email communication and access to your chart) to send your primary care provider a message or make an appointment. Ask someone on  your Team how to sign up for [employer].     As always, Thank you for trusting us with your health care needs!      [facility]:    Scheduling Appointments  [place]  Call: [phone]    [place]  Call: [phone]    [facility]  Call: [phone]    For Gastroenterology referrals   [facility] Gastroenterology   Clinics and Surgery [place], 4th Floor   [address] [place] [address]   Appointments: [phone]    WHERE TO GO FOR CARE?  Clinic    Make an appointment if you:       Are sick (cold, cough, flu, sore throat, earache or in pain).       Have a small injury (sprain, small cut, burn or broken bone).       Need a physical exam, Pap smear, vaccine or prescription refill.       Have questions about your health or medicines.    To reach us:      Call [phone] ([phone]). Open 24 hours every day. (For counseling services, call [phone].)    Log into [employer] at [email or web address]. (Visit [email or web address] to create an account.) Hospital emergency room    An emergency is a serious or life- threatening problem that must be treated right away.    Call 489 or get to the hospital if you have:      Very bad or sudden:            - Chest pain or pressure         - Bleeding         - Head or belly pain         - Dizziness or trouble seeing, walking or                          Speaking      Problems breathing      Blood in your vomit or you are coughing up blood      A major injury (knocked out, loss of a finger or limb, rape, broken bone protruding from skin)    A mental health crisis. (Or call the Mental Health Crisis line at 1-659.794.8929 or Suicide Prevention Hotline at 1-469.772.4681.)    Open 24 hours every day. You don't need an appointment.     Urgent care    Visit urgent care for sickness or small injuries when the clinic is closed. You don't need an appointment. To check hours or find  an urgent care near you, visit [email or web address]. Online care    Get online care from OnCare for more than 70 common problems, like colds, allergies and infections. Open 24 hours every day at:   [email or web address]   Need help deciding?    For advice about where to be seen, you may call your clinic and ask to speak with a nurse. We're here for you 24 hours every day.         If you are deaf or hard of hearing, please let us know. We provide many free services including sign language interpreters, oral interpreters, TTYs, telephone amplifiers, note takers and written materials.

## 2018-02-06 RX ORDER — HYDROCODONE BITARTRATE AND ACETAMINOPHEN 5; 325 MG/1; MG/1
1 TABLET ORAL EVERY 6 HOURS PRN
Qty: 120 TABLET | Refills: 0 | Status: SHIPPED | OUTPATIENT
Start: 2018-04-27 | End: 2018-06-21

## 2018-02-06 RX ORDER — ZOLPIDEM TARTRATE 5 MG/1
5 TABLET ORAL
Qty: 30 TABLET | Refills: 3 | Status: SHIPPED | OUTPATIENT
Start: 2018-02-25 | End: 2018-06-21

## 2018-02-06 RX ORDER — HYDROCODONE BITARTRATE AND ACETAMINOPHEN 5; 325 MG/1; MG/1
1 TABLET ORAL EVERY 6 HOURS PRN
Qty: 120 TABLET | Refills: 0 | Status: SHIPPED | OUTPATIENT
Start: 2018-05-27 | End: 2018-06-21

## 2018-02-06 RX ORDER — HYDROCODONE BITARTRATE AND ACETAMINOPHEN 5; 325 MG/1; MG/1
1 TABLET ORAL EVERY 6 HOURS PRN
Qty: 120 TABLET | Refills: 0 | Status: SHIPPED | OUTPATIENT
Start: 2018-03-28 | End: 2018-06-21

## 2018-02-06 ASSESSMENT — ANXIETY QUESTIONNAIRES: GAD7 TOTAL SCORE: 1

## 2018-02-06 ASSESSMENT — PATIENT HEALTH QUESTIONNAIRE - PHQ9: SUM OF ALL RESPONSES TO PHQ QUESTIONS 1-9: 5

## 2018-02-07 ENCOUNTER — TELEPHONE (OUTPATIENT)
Dept: FAMILY MEDICINE | Facility: CLINIC | Age: 68
End: 2018-02-07

## 2018-02-07 NOTE — TELEPHONE ENCOUNTER
Walked scripts (ambien, hydrocodone 3 fill date 03/28/18, 4/27/18 and 5/27/18) to our pharmacy next door.    Nancy Brown

## 2018-02-28 ENCOUNTER — PRE VISIT (OUTPATIENT)
Dept: ORTHOPEDICS | Facility: CLINIC | Age: 68
End: 2018-02-28

## 2018-02-28 DIAGNOSIS — Z96.612 HISTORY OF LEFT SHOULDER REPLACEMENT: Primary | ICD-10-CM

## 2018-02-28 NOTE — TELEPHONE ENCOUNTER
Patient is s/p left reverse TSA on 2/15/2017.    Patient was last seen on 8/21/2017.    Patient is coming to clinic for annual surgical follow-up.      Per standing orders, left shoulder radiographs (Praveen views) have been ordered and scheduled.     Patient visit type and questionnaires have been reviewed and are correct for this appointment? Yes    Tanya Luna LPN

## 2018-04-09 ENCOUNTER — OFFICE VISIT (OUTPATIENT)
Dept: ORTHOPEDICS | Facility: CLINIC | Age: 68
End: 2018-04-09
Payer: COMMERCIAL

## 2018-04-09 ENCOUNTER — RADIANT APPOINTMENT (OUTPATIENT)
Dept: GENERAL RADIOLOGY | Facility: CLINIC | Age: 68
End: 2018-04-09
Attending: ORTHOPAEDIC SURGERY
Payer: COMMERCIAL

## 2018-04-09 ENCOUNTER — TELEPHONE (OUTPATIENT)
Dept: ORTHOPEDICS | Facility: CLINIC | Age: 68
End: 2018-04-09

## 2018-04-09 VITALS — HEIGHT: 65 IN | BODY MASS INDEX: 20.34 KG/M2 | WEIGHT: 122.1 LBS

## 2018-04-09 DIAGNOSIS — Z96.612 HISTORY OF LEFT SHOULDER REPLACEMENT: ICD-10-CM

## 2018-04-09 DIAGNOSIS — Z96.612 STATUS POST REPLACEMENT OF LEFT SHOULDER JOINT: Primary | ICD-10-CM

## 2018-04-09 RX ORDER — AMOXICILLIN 500 MG/1
500 TABLET, FILM COATED ORAL 3 TIMES DAILY
Qty: 20 TABLET | Refills: 0 | Status: SHIPPED | OUTPATIENT
Start: 2018-04-09 | End: 2019-09-05

## 2018-04-09 NOTE — TELEPHONE ENCOUNTER
Patient's pharmacy called to clarify amoxicillin prescription.  This was discussed with FARHAD Aguiar who stated that per the dictation the amoxicillin was prescribed for prophylaxis prior to dental and invasive procedures.  Pharmacy was provided with clarification.    Tanya Luna LPN

## 2018-04-09 NOTE — MR AVS SNAPSHOT
"              After Visit Summary   4/9/2018    Beatriz Francis    MRN: [id number]           Patient Information     Date Of Birth          1950        Visit Information        Provider Department      4/9/2018 2:30 PM Ankit Morton MD [facility]        Today's Diagnoses     Status post replacement of left shoulder joint    -  1       Follow-ups after your visit        Who to contact     Please call your clinic at [phone] to:    Ask questions about your health    Make or cancel appointments    Discuss your medicines    Learn about your test results    Speak to your doctor            Additional Information About Your Visit        MyChart Information     [employer] gives you secure access to your electronic health record. If you see a primary care provider, you can also send messages to your care team and make appointments. If you have questions, please call your primary care clinic.  If you do not have a primary care provider, please call [phone] and they will assist you.      [employer] is an electronic gateway that provides easy, online access to your medical records. With [employer], you can request a clinic appointment, read your test results, renew a prescription or communicate with your care team.     To access your existing account, please contact your [facility] or call [phone] for assistance.        Care EveryWhere ID     This is your Care EveryWhere ID. This could be used by other organizations to access your [place] medical records  [id number]        Your Vitals Were     Height BMI (Body Mass Index)                1.651 m (5' 5\") 20.32 kg/m2           Blood Pressure from Last 3 Encounters:   02/05/18 116/68   12/11/17 118/68   08/28/17 122/76    Weight from Last 3 Encounters:   04/09/18 55.4 kg (122 lb 1.6 oz)   02/05/18 54.6 kg (120 lb 6.4 oz)   12/11/17 55.3 kg (122 lb)              Today, you had the following     No orders " found for display         Today's Medication Changes          These changes are accurate as of 4/9/18 11:59 PM.  If you have any questions, ask your nurse or doctor.               Start taking these medicines.        Dose/Directions    amoxicillin 500 MG tablet   Commonly known as:  AMOXIL   Used for:  Status post replacement of left shoulder joint   Started by:  Ankit Morton MD        Dose:  500 mg   Take 1 tablet (500 mg) by mouth 3 times daily   Quantity:  20 tablet   Refills:  0            Where to get your medicines      These medications were sent to [facility] - [place], [place] - [address]  [address] [facility] [address]     Phone:  [phone]     amoxicillin 500 MG tablet                Primary Care Provider Office Phone # Fax #    Azalia Burger [phone]       [address] [place] [address]        Equal Access to Services     [facility]: Hadii tk waterman hadasho Sopaulette, waaxda luqadaha, qaybta kaalmada adeegyada, bubba coelho . So [facility] [phone].    ATENCIÓN: Si habla español, tiene a moon disposición servicios gratuitos de asistencia lingüística. Llame al [phone].    We comply with applicable federal civil rights laws and [place] laws. We do not discriminate on the basis of race, color, national origin, age, disability, sex, sexual orientation, or gender identity.            Thank you!     Thank you for choosing [facility]  for your care. Our goal is always to provide you with excellent care. Hearing back from our patients is one way we can continue to improve our services. Please take a few minutes to complete the written survey that you may receive in the mail after your visit with us. Thank you!             Your Updated Medication List - Protect others around you: Learn how to safely use, store and throw away your medicines at www.disposemymeds.org.           This list is accurate as of 4/9/18 11:59 PM.  Always use your most recent med list.                   Brand Name Dispense Instructions for use Diagnosis    acetaminophen 325 MG tablet    TYLENOL    100 tablet    Take 2 tablets (650 mg) by mouth every 4 hours as needed for other (surgical pain)    H/O total shoulder replacement, left       acyclovir 5 % ointment    ZOVIRAX    15 g    Apply topically 6 times daily    Recurrent cold sores       adapalene 0.1 % gel    DIFFERIN    45 g    Apply topically At Bedtime    Acne, unspecified acne type       * albuterol (2.5 MG/3ML) 0.083% neb solution     3 Box    Take 1 vial (2.5 mg) by nebulization every 6 hours as needed for shortness of breath / dyspnea or wheezing    Pulmonary emphysema, unspecified emphysema type (H)       * albuterol 108 (90 BASE) MCG/ACT Inhaler    VENTOLIN HFA    6 Inhaler    Inhale 2 puffs into the lungs every 6 hours Please dispense a 3 month supply.    Pulmonary emphysema, unspecified emphysema type (H)       amoxicillin 500 MG tablet    AMOXIL    20 tablet    Take 1 tablet (500 mg) by mouth 3 times daily    Status post replacement of left shoulder joint       doxycycline 100 MG capsule    VIBRAMYCIN    14 capsule    Take 1 capsule (100 mg) by mouth 2 times daily    Pulmonary emphysema, unspecified emphysema type (H)       fluticasone 220 MCG/ACT Inhaler    FLOVENT HFA    3 Inhaler    Inhale 2 puffs into the lungs 2 times daily    Pulmonary emphysema, unspecified emphysema type (H)       fluticasone 50 MCG/ACT spray    FLONASE    3 Bottle    Spray 1-2 sprays into both nostrils daily    Seasonal allergic rhinitis, unspecified chronicity, unspecified trigger       * HYDROcodone-acetaminophen 5-325 MG per tablet    NORCO    120 tablet    Take 1 tablet by mouth every 6 hours as needed for pain Max 4 tablets/day.  May fill on or after 2/26/18    Chronic neck pain, Chronic pain syndrome       * HYDROcodone-acetaminophen 5-325 MG per tablet    NORCO     120 tablet    Take 1 tablet by mouth every 6 hours as needed for pain Max 4 tablets/day.  May fill on or after 3/28/18    Chronic neck pain, Chronic pain syndrome       * HYDROcodone-acetaminophen 5-325 MG per tablet   Start taking on:  4/27/2018    NORCO    120 tablet    Take 1 tablet by mouth every 6 hours as needed for pain Max 4 tablets/day.  May fill on or after 4/27/18    Chronic neck pain, Chronic pain syndrome       * HYDROcodone-acetaminophen 5-325 MG per tablet   Start taking on:  5/27/2018    NORCO    120 tablet    Take 1 tablet by mouth every 6 hours as needed for pain Max 4 tablets/day.  May fill on or after 5/27/18    Chronic neck pain, Chronic pain syndrome       omeprazole 20 MG tablet     90 tablet    Take 1 tablet (20 mg) by mouth daily Take 30-60 minutes before a meal.    Gastroesophageal reflux disease without esophagitis       predniSONE 20 MG tablet    DELTASONE    21 tablet    Take 3 tabs (60 mg) orally daily for 2 days, 2 tabs (40 mg) orally daily for 5 days, 1 tab (20 mg) orally daily for 3 days, then 1/2 tab (10 mg) orally for 3 days    Pulmonary emphysema, unspecified emphysema type (H)       senna-docusate 8.6-50 MG per tablet    SENOKOT-S;PERICOLACE    50 tablet    Take 2 tablets by mouth 2 times daily    H/O total shoulder replacement, left       zolpidem 5 MG tablet    AMBIEN    30 tablet    Take 1 tablet (5 mg) by mouth nightly as needed for sleep To be filled on or after 2/25/18    Insomnia, unspecified type       * Notice:  This list has 6 medication(s) that are the same as other medications prescribed for you. Read the directions carefully, and ask your doctor or other care provider to review them with you.

## 2018-04-09 NOTE — LETTER
4/9/2018       RE: Beatriz Francis  [facility] [address] [place] [address]     Dear Colleague,    Thank you for referring your patient, Beatriz Francis, to the [facility] at [facility]. Please see a copy of my visit note below.    CHIEF COMPLAINT:  Left shoulder status post reverse total shoulder arthroplasty, date of surgery 02/15/2017.      HISTORY OF PRESENT ILLNESS:  Ms. Francis returns today for followup.  She has done very well.  She is now taking care of her mother and her grandchildren.  She notes that overall, she is happy with her shoulder.  Her pain is dramatically improved from where it was preoperatively, but her shoulder range of motion is still not back to what she was hoping it would be.      RADIOGRAPHS:  AP and lateral of the glenohumeral joint, AP and lateral of the scapula, indication shoulder surgery, comparison views in the EMR, show no evidence of atypical implant alignment, radiolucency, or hardware failure.  A reverse total shoulder arthroplasty is in good position.      PHYSICAL EXAMINATION:  On exam today, she is a well-developed woman in no apparent distress.  She has a well-healed surgical incision.  She has sensation intact in the axillary nerve distribution and a palpable radial pulse.  She has 140 degrees of active forward elevation and 140 degrees of lateral elevation, 80 degrees of external rotation at the side and internal rotation of the back to L-3.  She has 6 pounds of forward elevation strength and 10 pounds of external rotation strength as measured by a hand held dynamometer and an independent examiner.  This compares favorably to 6 and 15 respectively on the right side.      ASSESSMENT:  Left shoulder status post above procedure, doing well.      PLAN:  I had a nice talk with Ms. Francis today.  We talked at length about dental prophylaxis and also about the need for continued care with no lifting greater than 8  pounds.  She is going to return to a home exercise program.  She did not do physical therapy afterwards with a skilled physical therapist.      I would like to see her back at the next anniversary of her surgery with repeat radiographs or sooner should any additional problems arise.     Sincerely,    Ankit Morton MD    cc: Beatriz Francis
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
## 2018-06-02 ENCOUNTER — HEALTH MAINTENANCE LETTER (OUTPATIENT)
Age: 68
End: 2018-06-02

## 2018-06-21 ENCOUNTER — OFFICE VISIT (OUTPATIENT)
Dept: FAMILY MEDICINE | Facility: CLINIC | Age: 68
End: 2018-06-21
Payer: COMMERCIAL

## 2018-06-21 VITALS
TEMPERATURE: 98.7 F | SYSTOLIC BLOOD PRESSURE: 109 MMHG | WEIGHT: 115.2 LBS | BODY MASS INDEX: 19.17 KG/M2 | HEART RATE: 96 BPM | OXYGEN SATURATION: 96 % | DIASTOLIC BLOOD PRESSURE: 70 MMHG

## 2018-06-21 DIAGNOSIS — G89.29 CHRONIC NECK PAIN: ICD-10-CM

## 2018-06-21 DIAGNOSIS — G89.4 CHRONIC PAIN SYNDROME: ICD-10-CM

## 2018-06-21 DIAGNOSIS — G47.00 INSOMNIA, UNSPECIFIED TYPE: ICD-10-CM

## 2018-06-21 DIAGNOSIS — M54.2 CHRONIC NECK PAIN: ICD-10-CM

## 2018-06-21 PROCEDURE — 99214 OFFICE O/P EST MOD 30 MIN: CPT | Performed by: FAMILY MEDICINE

## 2018-06-21 RX ORDER — HYDROCODONE BITARTRATE AND ACETAMINOPHEN 5; 325 MG/1; MG/1
1 TABLET ORAL EVERY 6 HOURS PRN
Qty: 120 TABLET | Refills: 0 | Status: SHIPPED | OUTPATIENT
Start: 2018-08-16 | End: 2018-09-10

## 2018-06-21 RX ORDER — HYDROCODONE BITARTRATE AND ACETAMINOPHEN 5; 325 MG/1; MG/1
1 TABLET ORAL EVERY 6 HOURS PRN
Qty: 120 TABLET | Refills: 0 | Status: SHIPPED | OUTPATIENT
Start: 2018-06-21 | End: 2018-06-21

## 2018-06-21 RX ORDER — ZOLPIDEM TARTRATE 5 MG/1
5 TABLET ORAL
Qty: 30 TABLET | Refills: 2 | Status: SHIPPED | OUTPATIENT
Start: 2018-06-21 | End: 2018-09-10

## 2018-06-21 RX ORDER — HYDROCODONE BITARTRATE AND ACETAMINOPHEN 5; 325 MG/1; MG/1
1 TABLET ORAL EVERY 6 HOURS PRN
Qty: 120 TABLET | Refills: 0 | Status: SHIPPED | OUTPATIENT
Start: 2018-07-19 | End: 2018-06-21

## 2018-06-21 ASSESSMENT — PAIN SCALES - GENERAL: PAINLEVEL: MODERATE PAIN (4)

## 2018-06-21 NOTE — PATIENT INSTRUCTIONS
[facility]   Discharged by : karol  Paper scripts provided to patient : hydrocodone and ambien     If you have any questions regarding your visit please contact your care team:     Team Gold                Clinic Hours Telephone Number     Dr. Eva Mesa  Eloise Olivares, CNP 7am-7pm  Monday - Thursday   7am-5pm  Fridays  [phone]   (Appointment scheduling available 24/7)     RN Line  [phone] option 2     [facility] [place] [facility] 11am-9pm Monday-Friday Saturday-Sunday- 9am-5pm     [place] -   5pm-9pm Monday-Friday Saturday-Sunday- 9am-5pm    [phone] - [facility]    [phone] - [place]       For a Price Quote for your services, please call our [employer] Price Line at [phone].     What options do I have for visits at the clinic other than the traditional office visit?     To expand how we care for you, many of our providers are utilizing electronic visits (e-visits) and telephone visits, when medically appropriate, for interactions with their patients rather than a visit in the clinic. We also offer nurse visits for many medical concerns. Just like any other service, we will bill your insurance company for this type of visit based on time spent on the phone with your provider. Not all insurance companies cover these visits. Please check with your medical insurance if this type of visit is covered. You will be responsible for any charges that are not paid by your insurance.   E-visits via [employer]: generally incur a $35.00 fee.     Telephone visits:  Time spent on the phone: *charged based on time that is spent on the phone in increments of 10 minutes. Estimated cost:   5-10 mins $30.00   11-20 mins. $59.00   21-30 mins. $85.00       Use [employer] (secure email communication and access to your chart) to send your primary care provider a message or make an appointment. Ask someone on your  Team how to sign up for [employer].     As always, Thank you for trusting us with your health care needs!      [facility]:    Scheduling Appointments  [place] [place]  Call: [phone]    [facility]  Call: [phone]    [facility]  Call: [phone]    For Gastroenterology referrals   [facility]   Cl[facility] and [facility] Center, 4th Floor   [address] [place] [address]   Appointments: [phone]    WHERE TO GO FOR CARE?  Clinic    Make an appointment if you:       Are sick (cold, cough, flu, sore throat, earache or in pain).       Have a small injury (sprain, small cut, burn or broken bone).       Need a physical exam, Pap smear, vaccine or prescription refill.       Have questions about your health or medicines.    To reach us:      Call [phone] ([phone]). Open 24 hours every day. (For counseling services, call [phone].)    Log into [employer] at [email or web address]. (Visit [email or web address] to create an account.) Hospital emergency room    An emergency is a serious or life- threatening problem that must be treated right away.    Call 087 or get to the hospital if you have:      Very bad or sudden:            - Chest pain or pressure         - Bleeding         - Head or belly pain         - Dizziness or trouble seeing, walking or                          Speaking      Problems breathing      Blood in your vomit or you are coughing up blood      A major injury (knocked out, loss of a finger or limb, rape, broken bone protruding from skin)    A mental health crisis. (Or call the Mental Health Crisis line at 1-542.320.7264 or Suicide Prevention Hotline at 1-635.110.3129.)    Open 24 hours every day. You don't need an appointment.     Urgent care    Visit urgent care for sickness or small injuries when the clinic is closed. You don't need an appointment. To check hours or find an  urgent care near you, visit [email or web address]. Online care    Get online care from [facility] for more than 70 common problems, like colds, allergies and infections. Open 24 hours every day at:   [email or web address]   Need help deciding?    For advice about where to be seen, you may call your clinic and ask to speak with a nurse. We're here for you 24 hours every day.         If you are deaf or hard of hearing, please let us know. We provide many free services including sign language interpreters, oral interpreters, TTYs, telephone amplifiers, note takers and written materials.

## 2018-06-21 NOTE — MR AVS SNAPSHOT
After Visit Summary   6/21/2018    Beatriz Francis    MRN: [id number]           Patient Information     Date Of Birth          1950        Visit Information        Provider Department      6/21/2018 2:00 PM Freddie Mesa MD [facility]        Today's Diagnoses     Insomnia, unspecified type        Chronic neck pain        Chronic pain syndrome          Care Instructions    [facility]   Discharged by : karol  Paper scripts provided to patient : hydrocodone and ambien     If you have any questions regarding your visit please contact your care team:     Team Gold                Clinic Hours Telephone Number     Dr. Eva Olivares, CNP 7am-7pm  Monday - Thursday   7am-5pm  Fridays  [phone]   (Appointment scheduling available 24/7)     RN Line  [phone] option 2     Urgent Care - [place] and [facility] - 11am-9pm Monday-Friday Saturday-Sunday- 9am-5pm     [place] -   5pm-9pm Monday-Friday Saturday-Sunday- 9am-5pm    [phone] - [place]    [phone] - [place]       For a Price Quote for your services, please call our Consumer Price Line at [phone].     What options do I have for visits at the clinic other than the traditional office visit?     To expand how we care for you, many of our providers are utilizing electronic visits (e-visits) and telephone visits, when medically appropriate, for interactions with their patients rather than a visit in the clinic. We also offer nurse visits for many medical concerns. Just like any other service, we will bill your insurance company for this type of visit based on time spent on the phone with your provider. Not all insurance companies cover these visits. Please check with your medical insurance if this type of visit is covered. You will be responsible for any charges that are not paid by your  insurance.   E-visits via Thinknumhart: generally incur a $35.00 fee.     Telephone visits:  Time spent on the phone: *charged based on time that is spent on the phone in increments of 10 minutes. Estimated cost:   5-10 mins $30.00   11-20 mins. $59.00   21-30 mins. $85.00       Use [employer] (secure email communication and access to your chart) to send your primary care provider a message or make an appointment. Ask someone on your Team how to sign up for [employer].     As always, Thank you for trusting us with your health care needs!      [facility]:    Scheduling Appointments  [facility]  Call: [phone]    [facility]  Call: [phone]    [facility]  Call: [phone]    For Gastroenterology referrals   [facility]   Clinics and Surgery Center, 4th Floor   [address] [place] [address]   Appointments: [phone]    WHERE TO GO FOR CARE?  Clinic    Make an appointment if you:       Are sick (cold, cough, flu, sore throat, earache or in pain).       Have a small injury (sprain, small cut, burn or broken bone).       Need a physical exam, Pap smear, vaccine or prescription refill.       Have questions about your health or medicines.    To reach us:      Call [phone] ([phone]). Open 24 hours every day. (For counseling services, call [phone].)    Log into Atempo at [email or web address]. (Visit [email or web address] to create an account.) Hospital emergency room    An emergency is a serious or life- threatening problem that must be treated right away.    Call 433 or get to the hospital if you have:      Very bad or sudden:            - Chest pain or pressure         - Bleeding         - Head or belly pain         - Dizziness or trouble seeing, walking or                          Speaking      Problems breathing      Blood in your vomit or you are coughing up blood      A major  injury (knocked out, loss of a finger or limb, rape, broken bone protruding from skin)    A mental health crisis. (Or call the Mental Health Crisis line at 1-994.249.2297 or Suicide Prevention Hotline at 1-902.110.4194.)    Open 24 hours every day. You don't need an appointment.     Urgent care    Visit urgent care for sickness or small injuries when the clinic is closed. You don't need an appointment. To check hours or find an urgent care near you, visit [email or web address]. Online care    Get online care from [facility] for more than 70 common problems, like colds, allergies and infections. Open 24 hours every day at:   [email or web address]   Need help deciding?    For advice about where to be seen, you may call your clinic and ask to speak with a nurse. We're here for you 24 hours every day.         If you are deaf or hard of hearing, please let us know. We provide many free services including sign language interpreters, oral interpreters, TTYs, telephone amplifiers, note takers and written materials.                   Follow-ups after your visit        Follow-up notes from your care team     Return in about 3 months (around 9/21/2018) for Routine Visit.      Who to contact     If you have questions or need follow up information about today's clinic visit or your schedule please contact [facility] directly at [phone].  Normal or non-critical lab and imaging results will be communicated to you by MyChart, letter or phone within 4 business days after the clinic has received the results. If you do not hear from us within 7 days, please contact the clinic through [employer] or phone. If you have a critical or abnormal lab result, we will notify you by phone as soon as possible.  Submit refill requests through [employer] or call your pharmacy and they will forward the refill request to us. Please allow 3 business days for your refill to be completed.          Additional Information About Your Visit        [employer]  Information     [employer] gives you secure access to your electronic health record. If you see a primary care provider, you can also send messages to your care team and make appointments. If you have questions, please call your primary care clinic.  If you do not have a primary care provider, please call [phone] and they will assist you.        Care EveryWhere ID     This is your Care EveryWhere ID. This could be used by other organizations to access your [place] medical records  [id number]        Your Vitals Were     Pulse Temperature Pulse Oximetry Breastfeeding? BMI (Body Mass Index)       96 98.7  F (37.1  C) (Oral) 96% No 19.17 kg/m2        Blood Pressure from Last 3 Encounters:   06/21/18 109/70   02/05/18 116/68   12/11/17 118/68    Weight from Last 3 Encounters:   06/21/18 115 lb 3.2 oz (52.3 kg)   04/09/18 122 lb 1.6 oz (55.4 kg)   02/05/18 120 lb 6.4 oz (54.6 kg)              Today, you had the following     No orders found for display         Today's Medication Changes          These changes are accurate as of 6/21/18  2:55 PM.  If you have any questions, ask your nurse or doctor.               These medicines have changed or have updated prescriptions.        Dose/Directions    HYDROcodone-acetaminophen 5-325 MG per tablet   Commonly known as:  NORCO   This may have changed:    - additional instructions  - These instructions start on 8/16/2018. If you are unsure what to do until then, ask your doctor or other care provider.  - Another medication with the same name was removed. Continue taking this medication, and follow the directions you see here.   Used for:  Chronic neck pain, Chronic pain syndrome   Changed by:  Freddie Mesa MD        Dose:  1 tablet   Start taking on:  8/16/2018   Take 1 tablet by mouth every 6 hours as needed for pain Max 4 tablets/day.   Quantity:  120 tablet   Refills:  0       zolpidem 5 MG tablet   Commonly known as:  AMBIEN   This may have changed:   additional instructions   Used for:  Insomnia, unspecified type   Changed by:  Freddie Mesa MD        Dose:  5 mg   Take 1 tablet (5 mg) by mouth nightly as needed for sleep   Quantity:  30 tablet   Refills:  2            Where to get your medicines      Some of these will need a paper prescription and others can be bought over the counter.  Ask your nurse if you have questions.     Bring a paper prescription for each of these medications     HYDROcodone-acetaminophen 5-325 MG per tablet    zolpidem 5 MG tablet               Information about OPIOIDS     PRESCRIPTION OPIOIDS: WHAT YOU NEED TO KNOW   We gave you an opioid (narcotic) pain medicine. It is important to manage your pain, but opioids are not always the best choice. You should first try all the other options your care team gave you. Take this medicine for as short a time (and as few doses) as possible.     These medicines have risks:    DO NOT drive when on new or higher doses of pain medicine. These medicines can affect your alertness and reaction times, and you could be arrested for driving under the influence (DUI). If you need to use opioids long-term, talk to your care team about driving.    DO NOT operate heave machinery    DO NOT do any other dangerous activities while taking these medicines.     DO NOT drink any alcohol while taking these medicines.      If the opioid prescribed includes acetaminophen, DO NOT take with any other medicines that contain acetaminophen. Read all labels carefully. Look for the word  acetaminophen  or  Tylenol.  Ask your pharmacist if you have questions or are unsure.    You can get addicted to pain medicines, especially if you have a history of addiction (chemical, alcohol or substance dependence). Talk to your care team about ways to reduce this risk.    Store your pills in a secure place, locked if possible. We will not replace any lost or stolen medicine. If you don t finish your medicine, please throw  away (dispose) as directed by your pharmacist. The [place] Pollution Control Agency has more information about safe disposal: https://www.pca.[facility]mn.us/living-green/managing-unwanted-medications.     All opioids tend to cause constipation. Drink plenty of water and eat foods that have a lot of fiber, such as fruits, vegetables, prune juice, apple juice and high-fiber cereal. Take a laxative (Miralax, milk of magnesia, Colace, Senna) if you don t move your bowels at least every other day.          Primary Care Provider Office Phone # Fax #    Freddie Mesa [phone]       [address] [facility] [address]        Equal Access to Services     EZIO CHAVEZ : Aravind duarteo Ambreen, waaxda luqadaha, qaybta kaalmada ryder, bubba coelho . So [facility] [phone].    ATENCIÓN: Si habla español, tiene a moon disposición servicios gratuitos de asistencia lingüística. Llame al [phone].    We comply with applicable federal civil rights laws and [place] laws. We do not discriminate on the basis of race, color, national origin, age, disability, sex, sexual orientation, or gender identity.            Thank you!     Thank you for choosing [facility]  for your care. Our goal is always to provide you with excellent care. Hearing back from our patients is one way we can continue to improve our services. Please take a few minutes to complete the written survey that you may receive in the mail after your visit with us. Thank you!             Your Updated Medication List - Protect others around you: Learn how to safely use, store and throw away your medicines at www.disposemymeds.org.          This list is accurate as of 6/21/18  2:55 PM.  Always use your most recent med list.                   Brand Name Dispense Instructions for use Diagnosis    acetaminophen 325 MG tablet    TYLENOL    100 tablet    Take 2 tablets (650 mg) by  mouth every 4 hours as needed for other (surgical pain)    H/O total shoulder replacement, left       acyclovir 5 % ointment    ZOVIRAX    15 g    Apply topically 6 times daily    Recurrent cold sores       adapalene 0.1 % gel    DIFFERIN    45 g    Apply topically At Bedtime    Acne, unspecified acne type       * albuterol (2.5 MG/3ML) 0.083% neb solution     3 Box    Take 1 vial (2.5 mg) by nebulization every 6 hours as needed for shortness of breath / dyspnea or wheezing    Pulmonary emphysema, unspecified emphysema type (H)       * albuterol 108 (90 Base) MCG/ACT Inhaler    VENTOLIN HFA    6 Inhaler    Inhale 2 puffs into the lungs every 6 hours Please dispense a 3 month supply.    Pulmonary emphysema, unspecified emphysema type (H)       amoxicillin 500 MG tablet    AMOXIL    20 tablet    Take 1 tablet (500 mg) by mouth 3 times daily    Status post replacement of left shoulder joint       doxycycline 100 MG capsule    VIBRAMYCIN    14 capsule    Take 1 capsule (100 mg) by mouth 2 times daily    Pulmonary emphysema, unspecified emphysema type (H)       fluticasone 220 MCG/ACT Inhaler    FLOVENT HFA    3 Inhaler    Inhale 2 puffs into the lungs 2 times daily    Pulmonary emphysema, unspecified emphysema type (H)       fluticasone 50 MCG/ACT spray    FLONASE    3 Bottle    Spray 1-2 sprays into both nostrils daily    Seasonal allergic rhinitis, unspecified chronicity, unspecified trigger       HYDROcodone-acetaminophen 5-325 MG per tablet   Start taking on:  8/16/2018    NORCO    120 tablet    Take 1 tablet by mouth every 6 hours as needed for pain Max 4 tablets/day.    Chronic neck pain, Chronic pain syndrome       omeprazole 20 MG tablet     90 tablet    Take 1 tablet (20 mg) by mouth daily Take 30-60 minutes before a meal.    Gastroesophageal reflux disease without esophagitis       predniSONE 20 MG tablet    DELTASONE    21 tablet    Take 3 tabs (60 mg) orally daily for 2 days, 2 tabs (40 mg) orally daily for  5 days, 1 tab (20 mg) orally daily for 3 days, then 1/2 tab (10 mg) orally for 3 days    Pulmonary emphysema, unspecified emphysema type (H)       senna-docusate 8.6-50 MG per tablet    SENOKOT-S;PERICOLACE    50 tablet    Take 2 tablets by mouth 2 times daily    H/O total shoulder replacement, left       zolpidem 5 MG tablet    AMBIEN    30 tablet    Take 1 tablet (5 mg) by mouth nightly as needed for sleep    Insomnia, unspecified type       * Notice:  This list has 2 medication(s) that are the same as other medications prescribed for you. Read the directions carefully, and ask your doctor or other care provider to review them with you.
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
## 2018-08-29 DIAGNOSIS — G47.00 INSOMNIA, UNSPECIFIED TYPE: ICD-10-CM

## 2018-08-29 DIAGNOSIS — G89.4 CHRONIC PAIN SYNDROME: ICD-10-CM

## 2018-08-29 DIAGNOSIS — G89.29 CHRONIC NECK PAIN: ICD-10-CM

## 2018-08-29 DIAGNOSIS — M54.2 CHRONIC NECK PAIN: ICD-10-CM

## 2018-08-29 RX ORDER — ZOLPIDEM TARTRATE 5 MG/1
5 TABLET ORAL
Qty: 30 TABLET | Refills: 2 | Status: CANCELLED | OUTPATIENT
Start: 2018-08-29

## 2018-08-29 RX ORDER — HYDROCODONE BITARTRATE AND ACETAMINOPHEN 5; 325 MG/1; MG/1
1 TABLET ORAL EVERY 6 HOURS PRN
Qty: 120 TABLET | Refills: 0 | Status: CANCELLED | OUTPATIENT
Start: 2018-08-29

## 2018-08-29 NOTE — TELEPHONE ENCOUNTER
HYDROcodone-acetaminophen (NORCO) 5-325 MG per tablet      Last Written Prescription Date:  8/16/2018  Last Fill Quantity: 120 tablet,   # refills: 0  Last Office Visit: 6/21/2018 RAUL Garcia    Future Office visit:       Routing refill request to provider for review/approval because:  Drug not on the [facility], [facility] or [facility] refill protocol or controlled substance        zolpidem (AMBIEN) 5 MG tablet      Last Written Prescription Date:  6/21/2018  Last Fill Quantity: 30 tablet,   # refills: 2  Last Office Visit: 6/21/2018 chuy/ RAUL Mesa    Future Office visit:       Routing refill request to provider for review/approval because:  Drug not on the [facility], [facility] or [facility] refill protocol or controlled substance

## 2018-08-29 NOTE — TELEPHONE ENCOUNTER
Per last OV note, patient was to follow up in September. I called and scheduled appointment, ans she said that she just filled these scripts so she doesn't need them refilled before her appt. Refused.    Trever Morfin RN

## 2018-09-10 ENCOUNTER — OFFICE VISIT (OUTPATIENT)
Dept: FAMILY MEDICINE | Facility: CLINIC | Age: 68
End: 2018-09-10
Payer: COMMERCIAL

## 2018-09-10 VITALS
SYSTOLIC BLOOD PRESSURE: 118 MMHG | HEIGHT: 65 IN | BODY MASS INDEX: 19.69 KG/M2 | TEMPERATURE: 98.2 F | HEART RATE: 92 BPM | WEIGHT: 118.2 LBS | DIASTOLIC BLOOD PRESSURE: 72 MMHG

## 2018-09-10 DIAGNOSIS — M54.2 CHRONIC NECK PAIN: Primary | ICD-10-CM

## 2018-09-10 DIAGNOSIS — Z23 NEED FOR PROPHYLACTIC VACCINATION AGAINST STREPTOCOCCUS PNEUMONIAE (PNEUMOCOCCUS): ICD-10-CM

## 2018-09-10 DIAGNOSIS — F17.200 TOBACCO USE DISORDER: ICD-10-CM

## 2018-09-10 DIAGNOSIS — Z23 NEED FOR PROPHYLACTIC VACCINATION AND INOCULATION AGAINST INFLUENZA: ICD-10-CM

## 2018-09-10 DIAGNOSIS — G89.4 CHRONIC PAIN SYNDROME: ICD-10-CM

## 2018-09-10 DIAGNOSIS — G89.29 CHRONIC NECK PAIN: Primary | ICD-10-CM

## 2018-09-10 DIAGNOSIS — Z78.0 ASYMPTOMATIC POSTMENOPAUSAL STATUS: ICD-10-CM

## 2018-09-10 DIAGNOSIS — G47.00 INSOMNIA, UNSPECIFIED TYPE: ICD-10-CM

## 2018-09-10 PROCEDURE — G0008 ADMIN INFLUENZA VIRUS VAC: HCPCS | Performed by: FAMILY MEDICINE

## 2018-09-10 PROCEDURE — 90662 IIV NO PRSV INCREASED AG IM: CPT | Performed by: FAMILY MEDICINE

## 2018-09-10 PROCEDURE — 90670 PCV13 VACCINE IM: CPT | Performed by: FAMILY MEDICINE

## 2018-09-10 PROCEDURE — 80307 DRUG TEST PRSMV CHEM ANLYZR: CPT | Mod: 90 | Performed by: FAMILY MEDICINE

## 2018-09-10 PROCEDURE — 99214 OFFICE O/P EST MOD 30 MIN: CPT | Mod: 25 | Performed by: FAMILY MEDICINE

## 2018-09-10 PROCEDURE — 99000 SPECIMEN HANDLING OFFICE-LAB: CPT | Performed by: FAMILY MEDICINE

## 2018-09-10 PROCEDURE — G0009 ADMIN PNEUMOCOCCAL VACCINE: HCPCS | Performed by: FAMILY MEDICINE

## 2018-09-10 RX ORDER — TIOTROPIUM BROMIDE 18 UG/1
1 CAPSULE ORAL; RESPIRATORY (INHALATION) DAILY
Refills: 2 | COMMUNITY
Start: 2018-08-29 | End: 2018-12-05

## 2018-09-10 RX ORDER — HYDROCODONE BITARTRATE AND ACETAMINOPHEN 5; 325 MG/1; MG/1
1 TABLET ORAL EVERY 6 HOURS PRN
Qty: 120 TABLET | Refills: 0 | Status: SHIPPED | OUTPATIENT
Start: 2018-10-10 | End: 2018-09-10

## 2018-09-10 RX ORDER — HYDROCODONE BITARTRATE AND ACETAMINOPHEN 5; 325 MG/1; MG/1
1 TABLET ORAL EVERY 6 HOURS PRN
Qty: 120 TABLET | Refills: 0 | Status: SHIPPED | OUTPATIENT
Start: 2018-09-10 | End: 2018-09-10

## 2018-09-10 RX ORDER — ZOLPIDEM TARTRATE 5 MG/1
5 TABLET ORAL
Qty: 30 TABLET | Refills: 2 | Status: SHIPPED | OUTPATIENT
Start: 2018-09-10 | End: 2018-12-05

## 2018-09-10 RX ORDER — HYDROCODONE BITARTRATE AND ACETAMINOPHEN 5; 325 MG/1; MG/1
1 TABLET ORAL EVERY 6 HOURS PRN
Qty: 120 TABLET | Refills: 0 | Status: SHIPPED | OUTPATIENT
Start: 2018-11-09 | End: 2018-12-05

## 2018-09-10 ASSESSMENT — PAIN SCALES - GENERAL: PAINLEVEL: MILD PAIN (3)

## 2018-09-10 NOTE — PROGRESS NOTES
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
## 2018-09-10 NOTE — MR AVS SNAPSHOT
After Visit Summary   9/10/2018    Beatriz Francis    MRN: [id number]           Patient Information     Date Of Birth          1950        Visit Information        Provider Department      9/10/2018 10:20 AM Freddie Mesa MD [facility]        Today's Diagnoses     Chronic neck pain    -  1    Chronic pain syndrome        Asymptomatic postmenopausal status        Tobacco use disorder        Need for prophylactic vaccination against Streptococcus pneumoniae (pneumococcus)        Insomnia, unspecified type        Need for prophylactic vaccination and inoculation against influenza           Follow-ups after your visit        Follow-up notes from your care team     Return in about 3 months (around 12/10/2018) for Physical Exam.      Future tests that were ordered for you today     Open Future Orders        Priority Expected Expires Ordered    DEXA HIP/PELVIS/SPINE - Future Routine  9/10/2019 9/10/2018            Who to contact     If you have questions or need follow up information about today's clinic visit or your schedule please contact [facility] directly at [phone].  Normal or non-critical lab and imaging results will be communicated to you by [employer], letter or phone within 4 business days after the clinic has received the results. If you do not hear from us within 7 days, please contact the clinic through Voicendot or phone. If you have a critical or abnormal lab result, we will notify you by phone as soon as possible.  Submit refill requests through [employer] or call your pharmacy and they will forward the refill request to us. Please allow 3 business days for your refill to be completed.          Additional Information About Your Visit        MyChart Information     [employer] gives you secure access to your electronic health record. If you see a primary care provider, you can also send messages to your care team and make appointments. If you  "have questions, please call your primary care clinic.  If you do not have a primary care provider, please call [phone] and they will assist you.        Care EveryWhere ID     This is your Care EveryWhere ID. This could be used by other organizations to access your [facility] medical records  [id number]        Your Vitals Were     Pulse Temperature Height BMI (Body Mass Index)          92 98.2  F (36.8  C) (Oral) 5' 5\" (1.651 m) 19.67 kg/m2         Blood Pressure from Last 3 Encounters:   09/10/18 118/72   06/21/18 109/70   02/05/18 116/68    Weight from Last 3 Encounters:   09/10/18 118 lb 3.2 oz (53.6 kg)   06/21/18 115 lb 3.2 oz (52.3 kg)   04/09/18 122 lb 1.6 oz (55.4 kg)              We Performed the Following     ADMIN INFLUENZA (For MEDICARE Patients ONLY) []     ADMIN PNEUMOVAX VACCINE (For MEDICARE Patients ONLY) []     Drug  Screen Comprehensive, Urine w/o Reported Meds (Pain Care Package)     FLU VACCINE, INCREASED ANTIGEN, PRESV FREE, AGE 65+ [42580]     Pneumococcal vaccine 13 valent PCV13 IM (Prevnar) [86506]     TOBACCO CESSATION ORDER FOR           Today's Medication Changes          These changes are accurate as of 9/10/18 11:12 AM.  If you have any questions, ask your nurse or doctor.               Start taking these medicines.        Dose/Directions    HYDROcodone-acetaminophen 5-325 MG per tablet   Commonly known as:  NORCO   Used for:  Chronic neck pain, Chronic pain syndrome   Started by:  Freddie Mesa MD        Dose:  1 tablet   Start taking on:  11/9/2018   Take 1 tablet by mouth every 6 hours as needed for pain Max 4 tablets/day.   Quantity:  120 tablet   Refills:  0       varenicline 0.5 MG X 11 & 1 MG X 42 tablet   Commonly known as:  CHANTIX STARTING MONTH DORI   Used for:  Tobacco use disorder   Started by:  Freddie Mesa MD        Take 0.5 mg tab daily for 3 days, then 0.5 mg tab twice daily for 4 days, then 1 mg twice daily.   Quantity:  53 " tablet   Refills:  0            Where to get your medicines      These medications [place] [facility] [place] - [address]  [address] [facility] [address]     Phone:  [phone]     varenicline 0.5 MG X 11 & 1 MG X 42 tablet         Some of these will need a paper prescription and others can be bought over the counter.  Ask your nurse if you have questions.     Bring a paper prescription for each of these medications     HYDROcodone-acetaminophen 5-325 MG per tablet    zolpidem 5 MG tablet               Information about OPIOIDS     PRESCRIPTION OPIOIDS: WHAT YOU NEED TO KNOW   We gave you an opioid (narcotic) pain medicine. It is important to manage your pain, but opioids are not always the best choice. You should first try all the other options your care team gave you. Take this medicine for as short a time (and as few doses) as possible.    Some activities can increase your pain, such as bandage changes or therapy sessions. It may help to take your pain medicine 30 to 60 minutes before these activities. Reduce your stress by getting enough sleep, working on hobbies you enjoy and practicing relaxation or meditation. Talk to your care team about ways to manage your pain beyond prescription opioids.    These medicines have risks:    DO NOT drive when on new or higher doses of pain medicine. These medicines can affect your alertness and reaction times, and you could be arrested for driving under the influence (DUI). If you need to use opioids long-term, talk to your care team about driving.    DO NOT operate heavy machinery    DO NOT do any other dangerous activities while taking these medicines.    DO NOT drink any alcohol while taking these medicines.     If the opioid prescribed includes acetaminophen, DO NOT take with any other medicines that contain acetaminophen. Read all labels carefully. Look for the word  acetaminophen  or  Tylenol.  Ask your  pharmacist if you have questions or are unsure.    You can get addicted to pain medicines, especially if you have a history of addiction (chemical, alcohol or substance dependence). Talk to your care team about ways to reduce this risk.    All opioids tend to cause constipation. Drink plenty of water and eat foods that have a lot of fiber, such as fruits, vegetables, prune juice, apple juice and high-fiber cereal. Take a laxative (Miralax, milk of magnesia, Colace, Senna) if you don t move your bowels at least every other day. Other side effects include upset stomach, sleepiness, dizziness, throwing up, tolerance (needing more of the medicine to have the same effect), physical dependence and slowed breathing.    Store your pills in a secure place, locked if possible. We will not replace any lost or stolen medicine. If you don t finish your medicine, please throw away (dispose) as directed by your pharmacist. The [place] Pollution [employer] has more information about safe disposal: [email or web address]         Primary Care Provider Office Phone # Fax #    Freddie Mesa [phone]       [address] [facility] [address]        Equal Access to Services     EZIO CHAVEZ : Aravind duarteo Sopaulette, waaxda luqadaha, qaybta kaalmada adeegyada, bubba avitia. So [facility] [phone].    ATENCIÓN: Si habla español, tiene a moon disposición servicios gratuitos de asistencia lingüística. Llame al [phone].    We comply with applicable federal civil rights laws and [place] laws. We do not discriminate on the basis of race, color, national origin, age, disability, sex, sexual orientation, or gender identity.            Thank you!     Thank you for choosing [facility]  for your care. Our goal is always to provide you with excellent care. Hearing back from our patients is one way  we can continue to improve our services. Please take a few minutes to complete the written survey that you may receive in the mail after your visit with us. Thank you!             Your Updated Medication List - Protect others around you: Learn how to safely use, store and throw away your medicines at www.disposemymeds.org.          This list is accurate as of 9/10/18 11:12 AM.  Always use your most recent med list.                   Brand Name Dispense Instructions for use Diagnosis    acetaminophen 325 MG tablet    TYLENOL    100 tablet    Take 2 tablets (650 mg) by mouth every 4 hours as needed for other (surgical pain)    H/O total shoulder replacement, left       adapalene 0.1 % gel    DIFFERIN    45 g    Apply topically At Bedtime    Acne, unspecified acne type       * albuterol (2.5 MG/3ML) 0.083% neb solution     3 Box    Take 1 vial (2.5 mg) by nebulization every 6 hours as needed for shortness of breath / dyspnea or wheezing    Pulmonary emphysema, unspecified emphysema type (H)       * albuterol 108 (90 Base) MCG/ACT inhaler    VENTOLIN HFA    6 Inhaler    Inhale 2 puffs into the lungs every 6 hours Please dispense a 3 month supply.    Pulmonary emphysema, unspecified emphysema type (H)       amoxicillin 500 MG tablet    AMOXIL    20 tablet    Take 1 tablet (500 mg) by mouth 3 times daily    Status post replacement of left shoulder joint       fluticasone 220 MCG/ACT Inhaler    FLOVENT HFA    3 Inhaler    Inhale 2 puffs into the lungs 2 times daily    Pulmonary emphysema, unspecified emphysema type (H)       fluticasone 50 MCG/ACT spray    FLONASE    3 Bottle    Spray 1-2 sprays into both nostrils daily    Seasonal allergic rhinitis, unspecified chronicity, unspecified trigger       HYDROcodone-acetaminophen 5-325 MG per tablet   Start taking on:  11/9/2018    NORCO    120 tablet    Take 1 tablet by mouth every 6 hours as needed for pain Max 4 tablets/day.    Chronic neck pain, Chronic pain syndrome        omeprazole 20 MG tablet     90 tablet    Take 1 tablet (20 mg) by mouth daily Take 30-60 minutes before a meal.    Gastroesophageal reflux disease without esophagitis       predniSONE 20 MG tablet    DELTASONE    21 tablet    Take 3 tabs (60 mg) orally daily for 2 days, 2 tabs (40 mg) orally daily for 5 days, 1 tab (20 mg) orally daily for 3 days, then 1/2 tab (10 mg) orally for 3 days    Pulmonary emphysema, unspecified emphysema type (H)       senna-docusate 8.6-50 MG per tablet    SENOKOT-S;PERICOLACE    50 tablet    Take 2 tablets by mouth 2 times daily    H/O total shoulder replacement, left       SPIRIVA HANDIHALER 18 MCG capsule   Generic drug:  tiotropium      Take 1 capsule by mouth daily        varenicline 0.5 MG X 11 & 1 MG X 42 tablet    CHANTIX STARTING MONTH DORI    53 tablet    Take 0.5 mg tab daily for 3 days, then 0.5 mg tab twice daily for 4 days, then 1 mg twice daily.    Tobacco use disorder       zolpidem 5 MG tablet    AMBIEN    30 tablet    Take 1 tablet (5 mg) by mouth nightly as needed for sleep    Insomnia, unspecified type       * Notice:  This list has 2 medication(s) that are the same as other medications prescribed for you. Read the directions carefully, and ask your doctor or other care provider to review them with you.

## 2018-09-12 LAB — COMPREHEN DRUG ANALYSIS UR: NORMAL

## 2018-09-26 ENCOUNTER — RADIANT APPOINTMENT (OUTPATIENT)
Dept: BONE DENSITY | Facility: CLINIC | Age: 68
End: 2018-09-26
Attending: FAMILY MEDICINE
Payer: COMMERCIAL

## 2018-09-26 DIAGNOSIS — Z78.0 ASYMPTOMATIC POSTMENOPAUSAL STATUS: ICD-10-CM

## 2018-09-26 PROCEDURE — 77085 DXA BONE DENSITY AXL VRT FX: CPT | Performed by: INTERNAL MEDICINE

## 2018-09-28 ENCOUNTER — MYC MEDICAL ADVICE (OUTPATIENT)
Dept: FAMILY MEDICINE | Facility: CLINIC | Age: 68
End: 2018-09-28

## 2018-10-01 ENCOUNTER — MYC MEDICAL ADVICE (OUTPATIENT)
Dept: FAMILY MEDICINE | Facility: CLINIC | Age: 68
End: 2018-10-01

## 2018-10-01 DIAGNOSIS — F17.200 SMOKER: Primary | ICD-10-CM

## 2018-10-01 RX ORDER — VARENICLINE TARTRATE 1 MG/1
1 TABLET, FILM COATED ORAL 2 TIMES DAILY
Qty: 56 TABLET | Refills: 2 | Status: SHIPPED | OUTPATIENT
Start: 2018-10-01 | End: 2018-12-05

## 2018-10-23 ASSESSMENT — ACTIVITIES OF DAILY LIVING (ADL)
I_NEED_ASSISTANCE_FOR_THE_FOLLOWING_DAILY_ACTIVITIES:: NO ASSISTANCE IS NEEDED
CURRENT_FUNCTION: NO ASSISTANCE NEEDED

## 2018-10-23 ASSESSMENT — ENCOUNTER SYMPTOMS
DIARRHEA: 0
FEVER: 0
EYE PAIN: 0
SORE THROAT: 0
MYALGIAS: 1
CONSTIPATION: 0
BREAST MASS: 0
HEADACHES: 1
SHORTNESS OF BREATH: 1
FREQUENCY: 0
DYSURIA: 0
ARTHRALGIAS: 1
NERVOUS/ANXIOUS: 0
ABDOMINAL PAIN: 0
HEARTBURN: 1
PALPITATIONS: 0
HEMATURIA: 0
PARESTHESIAS: 0
HEMATOCHEZIA: 0
COUGH: 1
NAUSEA: 0
DIZZINESS: 0
WEAKNESS: 0
JOINT SWELLING: 1
CHILLS: 0

## 2018-10-24 ENCOUNTER — OFFICE VISIT (OUTPATIENT)
Dept: FAMILY MEDICINE | Facility: CLINIC | Age: 68
End: 2018-10-24
Payer: COMMERCIAL

## 2018-10-24 VITALS
SYSTOLIC BLOOD PRESSURE: 125 MMHG | HEIGHT: 66 IN | BODY MASS INDEX: 18.96 KG/M2 | OXYGEN SATURATION: 98 % | WEIGHT: 118 LBS | DIASTOLIC BLOOD PRESSURE: 82 MMHG | HEART RATE: 100 BPM | TEMPERATURE: 98.3 F

## 2018-10-24 DIAGNOSIS — Z13.220 LIPID SCREENING: ICD-10-CM

## 2018-10-24 DIAGNOSIS — M25.512 ACUTE PAIN OF LEFT SHOULDER: ICD-10-CM

## 2018-10-24 DIAGNOSIS — J43.9 PULMONARY EMPHYSEMA, UNSPECIFIED EMPHYSEMA TYPE (H): ICD-10-CM

## 2018-10-24 DIAGNOSIS — Z23 NEED FOR PROPHYLACTIC VACCINATION WITH TETANUS-DIPHTHERIA (TD): ICD-10-CM

## 2018-10-24 DIAGNOSIS — D69.1 ABNORMAL PLATELETS (H): ICD-10-CM

## 2018-10-24 DIAGNOSIS — G89.4 CHRONIC PAIN SYNDROME: ICD-10-CM

## 2018-10-24 DIAGNOSIS — Z00.00 ROUTINE HISTORY AND PHYSICAL EXAMINATION OF ADULT: Primary | ICD-10-CM

## 2018-10-24 DIAGNOSIS — E78.5 HYPERLIPIDEMIA LDL GOAL <130: ICD-10-CM

## 2018-10-24 LAB
ERYTHROCYTE [DISTWIDTH] IN BLOOD BY AUTOMATED COUNT: 13.1 % (ref 10–15)
HCT VFR BLD AUTO: 39.4 % (ref 35–47)
HGB BLD-MCNC: 13.5 G/DL (ref 11.7–15.7)
MCH RBC QN AUTO: 30.5 PG (ref 26.5–33)
MCHC RBC AUTO-ENTMCNC: 34.3 G/DL (ref 31.5–36.5)
MCV RBC AUTO: 89 FL (ref 78–100)
PLATELET # BLD AUTO: 432 10E9/L (ref 150–450)
RBC # BLD AUTO: 4.43 10E12/L (ref 3.8–5.2)
WBC # BLD AUTO: 9.6 10E9/L (ref 4–11)

## 2018-10-24 PROCEDURE — 85027 COMPLETE CBC AUTOMATED: CPT | Performed by: NURSE PRACTITIONER

## 2018-10-24 PROCEDURE — 90714 TD VACC NO PRESV 7 YRS+ IM: CPT | Performed by: NURSE PRACTITIONER

## 2018-10-24 PROCEDURE — 80061 LIPID PANEL: CPT | Performed by: NURSE PRACTITIONER

## 2018-10-24 PROCEDURE — 36415 COLL VENOUS BLD VENIPUNCTURE: CPT | Performed by: NURSE PRACTITIONER

## 2018-10-24 PROCEDURE — G0145 SCR C/V CYTO,THINLAYER,RESCR: HCPCS | Performed by: NURSE PRACTITIONER

## 2018-10-24 PROCEDURE — 90471 IMMUNIZATION ADMIN: CPT | Performed by: NURSE PRACTITIONER

## 2018-10-24 PROCEDURE — 87624 HPV HI-RISK TYP POOLED RSLT: CPT | Performed by: NURSE PRACTITIONER

## 2018-10-24 PROCEDURE — 99214 OFFICE O/P EST MOD 30 MIN: CPT | Mod: 25 | Performed by: NURSE PRACTITIONER

## 2018-10-24 PROCEDURE — G0402 INITIAL PREVENTIVE EXAM: HCPCS | Performed by: NURSE PRACTITIONER

## 2018-10-24 ASSESSMENT — ENCOUNTER SYMPTOMS
DIARRHEA: 0
SORE THROAT: 0
NAUSEA: 0
HEARTBURN: 1
FREQUENCY: 0
DYSURIA: 0
WEAKNESS: 0
ABDOMINAL PAIN: 0
CONSTIPATION: 0
HEMATOCHEZIA: 0
DIZZINESS: 0
COUGH: 1
HEADACHES: 1
HEMATURIA: 0
MYALGIAS: 1
PARESTHESIAS: 0
FEVER: 0
PALPITATIONS: 0
EYE PAIN: 0
BREAST MASS: 0
CHILLS: 0
JOINT SWELLING: 1
NERVOUS/ANXIOUS: 0

## 2018-10-24 ASSESSMENT — ACTIVITIES OF DAILY LIVING (ADL): CURRENT_FUNCTION: NO ASSISTANCE NEEDED

## 2018-10-24 NOTE — PATIENT INSTRUCTIONS
Preventive Health Recommendations    Female Ages 65 +    Yearly exam:     See your health care provider every year in order to  o Review health changes.   o Discuss preventive care.    o Review your medicines if your doctor has prescribed any.      You no longer need a yearly Pap test unless you've had an abnormal Pap test in the past 10 years. If you have vaginal symptoms, such as bleeding or discharge, be sure to talk with your provider about a Pap test.      Every 1 to 2 years, have a mammogram.  If you are over 69, talk with your health care provider about whether or not you want to continue having screening mammograms.      Every 10 years, have a colonoscopy. Or, have a yearly FIT test (stool test). These exams will check for colon cancer.       Have a cholesterol test every 5 years, or more often if your doctor advises it.       Have a diabetes test (fasting glucose) every three years. If you are at risk for diabetes, you should have this test more often.       At age 65, have a bone density scan (DEXA) to check for osteoporosis (brittle bone disease).    Shots:    Get a flu shot each year.    Get a tetanus shot every 10 years.    Talk to your doctor about your pneumonia vaccines. There are now two you should receive - Pneumovax (PPSV 23) and Prevnar (PCV 13).    Talk to your pharmacist about the shingles vaccine.    Talk to your doctor about the hepatitis B vaccine.    Nutrition:     Eat at least 5 servings of fruits and vegetables each day.      Eat whole-grain bread, whole-wheat pasta and brown rice instead of white grains and rice.      Get adequate Calcium and Vitamin D.     Lifestyle    Exercise at least 150 minutes a week (30 minutes a day, 5 days a week). This will help you control your weight and prevent disease.      Limit alcohol to one drink per day.      No smoking.       Wear sunscreen to prevent skin cancer.       See your dentist twice a year for an exam and cleaning.      See your eye doctor  every 1 to 2 years to screen for conditions such as glaucoma, macular degeneration and cataracts.    [facility]   Discharged by : Ghislaine DIXON MA    If you have any questions regarding your visit please contact your care team:     Team Gold                Clinic Hours Telephone Number     Dr. Eva Olivares, CNP  Hallie Lucien, CNP 7am-7pm  Monday - Thursday   7am-5pm  Fridays  [phone]   (Appointment scheduling available 24/7)     RN Line  [phone] option 2     [facility] [place] [facility] 11am-9pm Monday-Friday Saturday-Sunday- 9am-5pm     [place] -   5pm-9pm Monday-Friday Saturday-Sunday- 9am-5pm    [phone] - [place]    [phone] - [place]       For a Price Quote for your services, please call our Consumer Price Line at [phone].     What options do I have for visits at the clinic other than the traditional office visit?     To expand how we care for you, many of our providers are utilizing electronic visits (e-visits) and telephone visits, when medically appropriate, for interactions with their patients rather than a visit in the clinic. We also offer nurse visits for many medical concerns. Just like any other service, we will bill your insurance company for this type of visit based on time spent on the phone with your provider. Not all insurance companies cover these visits. Please check with your medical insurance if this type of visit is covered. You will be responsible for any charges that are not paid by your insurance.   E-visits via Edgar: generally incur a $35.00 fee.     Telephone visits:  Time spent on the phone: *charged based on time that is spent on the phone in increments of 10 minutes. Estimated cost:   5-10 mins $30.00   11-20 mins. $59.00   21-30 mins. $85.00       Use Edgar (secure email communication and access to your chart) to send your primary care  provider a message or make an appointment. Ask someone on your Team how to sign up for [employer]wick[employer].     As always, Thank you for trusting us with your health care needs!      [facility]:    Scheduling Appointments  [place] [facility]  Call: [phone]    Danna Murphy [facility]  Call: [phone]    [facility]  Call: [phone]    For Gastroenterology referrals   [facility]   Clinics and Surgery [place], 4th Floor   [address] [place] [address]   Appointments: [phone]    WHERE TO GO FOR CARE?  Clinic    Make an appointment if you:       Are sick (cold, cough, flu, sore throat, earache or in pain).       Have a small injury (sprain, small cut, burn or broken bone).       Need a physical exam, Pap smear, vaccine or prescription refill.       Have questions about your health or medicines.    To reach us:      Call [phone] ([phone]). Open 24 hours every day. (For counseling services, call [phone].)    Log into Qwickly at [email or web address]. (Visit [email or web address] to create an account.) Hospital emergency room    An emergency is a serious or life- threatening problem that must be treated right away.    Call [phone] or get to the hospital if you have:      Very bad or sudden:            - Chest pain or pressure         - Bleeding         - Head or belly pain         - Dizziness or trouble seeing, walking or                          Speaking      Problems breathing      Blood in your vomit or you are coughing up blood      A major injury (knocked out, loss of a finger or limb, rape, broken bone protruding from skin)    A mental health crisis. (Or call the Mental Health Crisis line at 1-852.570.2575 or Suicide Prevention Hotline at 1-844.807.3007.)    Open 24 hours every day. You don't need an appointment.     Urgent care    Visit urgent care for sickness or small injuries when the clinic is  closed. You don't need an appointment. To check hours or find an urgent care near you, visit [email or web address]. Online care    Get online care from [facility] for more than 70 common problems, like colds, allergies and infections. Open 24 hours every day at:   [email or web address]   Need help deciding?    For advice about where to be seen, you may call your clinic and ask to speak with a nurse. We're here for you 24 hours every day.         If you are deaf or hard of hearing, please let us know. We provide many free services including sign language interpreters, oral interpreters, TTYs, telephone amplifiers, note takers and written materials.

## 2018-10-24 NOTE — PROGRESS NOTES
SUBJECTIVE:   Beatriz Francis is a 67 year old female who presents for Preventive Visit.  Are you in the first 12 months of your Medicare coverage?  Yes,  Visual Acuity:  Right Eye: 20/25   Left Eye: 20/32  Both Eyes: 20/25    Physical   Annual:     Getting at least 3 servings of Calcium per day:  Yes    Bi-annual eye exam:  NO    Dental care twice a year:  Yes    Sleep apnea or symptoms of sleep apnea:  Daytime drowsiness    Diet:  Regular (no restrictions)    Frequency of exercise:  None    Taking medications regularly:  Yes    Medication side effects:  None    Additional concerns today:  YES    Ability to successfully perform activities of daily living: no assistance needed    Home Safety:  Throw rugs in the hallway    Hearing Impairment: no hearing concerns      Fall risk:  Fallen 2 or more times in the past year?: No  Any fall with injury in the past year?: No  click delete button to remove this line now  COGNITIVE SCREEN  1) Repeat 3 items (Leader, Season, Table)    2) Clock draw: NORMAL  3) 3 item recall: Recalls 2 objects   Results: NORMAL clock, 1-2 items recalled: COGNITIVE IMPAIRMENT LESS LIKELY    Mini-CogTM Copyright S Marcello. Licensed by the author for use in [facility]; reprinted with permission (clau@[facility]). All rights reserved.        Reviewed and updated as needed this visit by clinical staff  Tobacco  Allergies  Meds  Med Hx  Surg Hx  Fam Hx  Soc Hx      She is on chantix and she is down to half ppd. Smoking for 50 + years.    Chronic pain syndrome  Uncontrolled. Years. Upper body, arms, back, legs. Generalized.   Has tried Cymbalta, gabapentin, nausea with both medications. Wanted to know about meloxicam and if she can start this. Her younger sister recently started on this for her OA and it's been a lifesaver.   She's maintained on long term opioid therapy. 20 mg MEDD.       COPD Follow-Up    Symptoms are currently: stable    Current fatigue or dyspnea with ambulation:  "none    Shortness of breath: stable    Increased or change in Cough/Sputum: No    Fever(s): No    Baseline ambulation without stopping to rest:  . Able to walk up few flights of stairs without stopping to rest.    Any ER/UC or hospital admissions since your last visit? No     History   Smoking Status     Current Every Day Smoker     Packs/day: 1.00     Years: 45.00     Types: Cigarettes   Smokeless Tobacco     Never Used     Comment: Just under a pack. 50  yr. hx.     No results found for: FEV1, [id number]        Reviewed and updated as needed this visit by Provider        Social History   Substance Use Topics     Smoking status: Current Every Day Smoker     Packs/day: 1.00     Years: 45.00     Types: Cigarettes     Smokeless tobacco: Never Used      Comment: Just under a pack. 52  yr. hx.     Alcohol use No      Comment: 3-4x's/year.       Alcohol Use 10/23/2018   If you drink alcohol do you typically have greater than 3 drinks per day OR greater than 7 drinks per week? Not Applicable   No flowsheet data found.      Just moved mother into assisted living. She lost a few lbs due to stress. She has regained a few lbs and consciously trying to make herself eat.     Joint Pain    Onset: couple of weeks     Description:   Location: right ankle, left thumb  Character: Sharp    Intensity: moderate    Progression of Symptoms: same    Accompanying Signs & Symptoms:  Other symptoms: swelling in left thumb, and \"locks\" in the mornings     History:   Previous similar pain: no       Precipitating factors:   Trauma or overuse: no     Alleviating factors:  Improved by: Meloxicam      Therapies Tried and outcome: meds from her sister - Meloxicam         Today's PHQ-2 Score:   PHQ-2 ( 1999 Pfizer) 10/23/2018   Q1: Little interest or pleasure in doing things 0   Q2: Feeling down, depressed or hopeless 0   PHQ-2 Score 0   Q1: Little interest or pleasure in doing things Not at all   Q2: Feeling down, depressed or hopeless Not at all "   PHQ-2 Score 0       Do you feel safe in your environment - Yes    Do you have a Health Care Directive?: Yes: Advance Directive has been received and scanned.    Current providers sharing in care for this patient include:   Patient Care Team:  Freddie Mesa MD as PCP - General (Family Practice)  Freddie Espino MD as MD (Orthopedics)  Ankit Morton MD as MD (Orthopedics)    The following health maintenance items are reviewed in Epic and correct as of today:  Health Maintenance   Topic Date Due     HIV SCREEN (SYSTEM ASSIGNED)  [birth year]/28/1970     COPD ACTION PLAN Q1 YR  09/23/2014     LUNG CANCER SCREENING ANNUAL  03/02/2016     PAP Q3 YR  03/27/2018     LIPID MONITORING Q1 YEAR  05/10/2018     CBC Q1 YR  05/10/2018     TETANUS IMMUNIZATION (SYSTEM ASSIGNED)  10/31/2018     HIRAL QUESTIONNAIRE 1 YEAR  02/05/2019     PHQ-9 Q1YR  02/05/2019     MAMMO SCREEN Q2 YR (SYSTEM ASSIGNED)  04/24/2019     TOBACCO CESSATION COUNSELING Q1 YR  09/10/2019     FALL RISK ASSESSMENT  09/10/2019     PNEUMOCOCCAL (2 of 2 - PPSV23) 09/10/2019     URINE DRUG SCREEN Q1 YR  09/10/2019     ADVANCE DIRECTIVE PLANNING Q5 YRS  02/19/2020     COLON CANCER SCREEN (SYSTEM ASSIGNED)  11/09/2025     SPIROMETRY ONETIME  Completed     DEXA SCAN SCREENING (SYSTEM ASSIGNED)  Completed     INFLUENZA VACCINE  Completed     HEPATITIS C SCREENING  Completed     Labs reviewed in EPIC  BP Readings from Last 3 Encounters:   10/24/18 125/82   09/10/18 118/72   06/21/18 109/70    Wt Readings from Last 3 Encounters:   10/24/18 118 lb (53.5 kg)   09/10/18 118 lb 3.2 oz (53.6 kg)   06/21/18 115 lb 3.2 oz (52.3 kg)                    Mammogram Screening: Patient over age 50, mutual decision to screen reflected in health maintenance.    Review of Systems   Constitutional: Negative for chills and fever.   HENT: Negative for congestion, ear pain, hearing loss and sore throat.    Eyes: Negative for pain and visual disturbance.   Respiratory:  "Positive for cough. Shortness of breath: baseline from COPD.    Cardiovascular: Negative for chest pain, palpitations and peripheral edema.   Gastrointestinal: Positive for heartburn. Negative for abdominal pain, constipation, diarrhea, hematochezia and nausea.   Breasts:  Negative for tenderness, breast mass and discharge.   Genitourinary: Negative for dysuria, frequency, genital sores, hematuria, pelvic pain, urgency, vaginal bleeding and vaginal discharge.   Musculoskeletal: Positive for arthralgias (OA), joint swelling and myalgias.   Skin: Negative for rash.   Neurological: Positive for headaches. Negative for dizziness, weakness and paresthesias.   Psychiatric/Behavioral: Negative for mood changes. The patient is not nervous/anxious.      Constitutional, HEENT, cardiovascular, pulmonary, GI, , musculoskeletal, neuro, skin, endocrine and psych systems are negative, except as otherwise noted.    OBJECTIVE:   /82  Pulse 100  Temp 98.3  F (36.8  C) (Oral)  Ht 5' 5.5\" (1.664 m)  Wt 118 lb (53.5 kg)  SpO2 98%  Breastfeeding? No  BMI 19.34 kg/m2 Estimated body mass index is 19.34 kg/(m^2) as calculated from the following:    Height as of this encounter: 5' 5.5\" (1.664 m).    Weight as of this encounter: 118 lb (53.5 kg).  Physical Exam  GENERAL: healthy, alert and no distress  EYES: Eyes grossly normal to inspection, PERRL and conjunctivae and sclerae normal  HENT: ear canals and TM's normal, nose and mouth without ulcers or lesions  NECK: no adenopathy, no asymmetry, masses, or scars and thyroid normal to palpation  RESP: lungs clear to auscultation - no rales, rhonchi or wheezes  BREAST: normal without masses, tenderness or nipple discharge and no palpable axillary masses or adenopathy  CV: regular rate and rhythm, normal S1 S2, no S3 or S4, no murmur, click or rub, no peripheral edema and peripheral pulses strong  ABDOMEN: soft, nontender, no hepatosplenomegaly, no masses and bowel sounds " "normal  MS: no gross musculoskeletal defects noted, no edema  SKIN: no suspicious lesions or rashes  NEURO: Normal strength and tone, mentation intact and speech normal  PSYCH: mentation appears normal, affect normal/bright    Diagnostic Test Results:  No results found for this or any previous visit (from the past 24 hour(s)).    ASSESSMENT / PLAN:     Discussed chronic pain management with long term opioid therapy not being optimal choice. Meloxicam not ideal choice either for age >65 years and potential for GI risk and or cardiac. I think she could find benefit in trying Savella given her pain seems to be widespread and that she has tried and failed duloxetine and gabapentin. She will think about this. For now she will continue on Norco.     ICD-10-CM    1. Routine history and physical examination of adult Z00.00    2. Pulmonary emphysema, unspecified emphysema type (H) J43.9 COPD ACTION PLAN   3. Chronic pain syndrome G89.4    4. Need for prophylactic vaccination with tetanus-diphtheria (Td) Z23 TD (ADULT, 7+) PRESERVE FREE   5. Lipid screening Z13.220 Lipid panel reflex to direct LDL Fasting   6. Hyperlipidemia LDL goal <130 E78.5    7. Abnormal platelets (H) D69.1 CBC with platelets   8. Acute pain of left shoulder M25.512        End of Life Planning:  Patient currently has an advanced directive: No.  I have verified the patient's ablity to prepare an advanced directive/make health care decisions.  Literature was provided to assist patient in preparing an advanced directive.    COUNSELING:  Reviewed preventive health counseling, as reflected in patient instructions       Regular exercise       Healthy diet/nutrition       pain, medications     BP Readings from Last 1 Encounters:   10/24/18 125/82     Estimated body mass index is 19.34 kg/(m^2) as calculated from the following:    Height as of this encounter: 5' 5.5\" (1.664 m).    Weight as of this encounter: 118 lb (53.5 kg).    BP Screening:   Last 3 BP " Readings:    BP Readings from Last 3 Encounters:   10/24/18 125/82   09/10/18 118/72   06/21/18 109/70       The following was recommended to the patient:  Re-screen BP within a year and recommended lifestyle modifications  Weight management plan noted, stable and monitoring     reports that she has been smoking Cigarettes.  She has a 45.00 pack-year smoking history. She has never used smokeless tobacco.  Tobacco Cessation Action Plan: Information offered: Patient not interested at this time    Appropriate preventive services were discussed with this patient, including applicable screening as appropriate for cardiovascular disease, diabetes, osteopenia/osteoporosis, and glaucoma.  As appropriate for age/gender, discussed screening for colorectal cancer, prostate cancer, breast cancer, and cervical cancer. Checklist reviewing preventive services available has been given to the patient.    Reviewed patients plan of care and provided an AVS. The Basic Care Plan (routine screening as documented in Health Maintenance) for Beatriz meets the Care Plan requirement. This Care Plan has been established and reviewed with the Patient.    Counseling Resources:  ATP IV Guidelines  Pooled Cohorts Equation Calculator  Breast Cancer Risk Calculator  FRAX Risk Assessment  ICSI Preventive Guidelines  Dietary Guidelines for Americans, 2010  [employer]'s MyPlate  ASA Prophylaxis  Lung CA Screening    [facility]JANDRO [facility]  Answers for HPI/ROS submitted by the patient on 10/23/2018   PHQ-2 Score: 0

## 2018-10-24 NOTE — NURSING NOTE
Prior to injection verified patient identity using patient's name and date of birth.  Due to injection administration, patient instructed to remain in clinic for 15 minutes  afterwards, and to report any adverse reaction to me immediately.    Screening Questionnaire for Adult Immunization    Are you sick today?   No   Do you have allergies to medications, food, a vaccine component or latex?   No   Have you ever had a serious reaction after receiving a vaccination?   No   Do you have a long-term health problem with heart disease, lung disease, asthma, kidney disease, metabolic disease (e.g. diabetes), anemia, or other blood disorder?   No   Do you have cancer, leukemia, HIV/AIDS, or any other immune system problem?   No   In the past 3 months, have you taken medications that affect  your immune system, such as prednisone, other steroids, or anticancer drugs; drugs for the treatment of rheumatoid arthritis, Crohn s disease, or psoriasis; or have you had radiation treatments?   No   Have you had a seizure, or a brain or other nervous system problem?   No   During the past year, have you received a transfusion of blood or blood     products, or been given immune (gamma) globulin or antiviral drug?   No   For women: Are you pregnant or is there a chance you could become        pregnant during the next month?   No   Have you received any vaccinations in the past 4 weeks?   No     Immunization questionnaire answers were all negative.        Per orders of Shira Mcgraw CNP, injection of Td given by Ghislaine Jimenez. Patient instructed to remain in clinic for 15 minutes afterwards, and to report any adverse reaction to me immediately.       Screening performed by Ghislaine Jimenez on 10/24/2018 at 9:52 AM.

## 2018-10-24 NOTE — MR AVS SNAPSHOT
After Visit Summary   10/24/2018    Beatriz Francis    MRN: [id number]           Patient Information     Date Of Birth          1950        Visit Information        Provider Department      10/24/2018 9:00 AM Hallie Mcgraw APRN [facility]        Today's Diagnoses     Lipid screening    -  1    Screening for malignant neoplasm of cervix        Screening for HIV (human immunodeficiency virus)        Need for prophylactic vaccination with tetanus-diphtheria (Td)        Routine history and physical examination of adult        Pulmonary emphysema, unspecified emphysema type (H)        Chronic pain syndrome        Hyperlipidemia LDL goal <130        Abnormal platelets (H)        Acute pain of left shoulder          Care Instructions      Preventive Health Recommendations    Female Ages 65 +    Yearly exam:     See your health care provider every year in order to  o Review health changes.   o Discuss preventive care.    o Review your medicines if your doctor has prescribed any.      You no longer need a yearly Pap test unless you've had an abnormal Pap test in the past 10 years. If you have vaginal symptoms, such as bleeding or discharge, be sure to talk with your provider about a Pap test.      Every 1 to 2 years, have a mammogram.  If you are over 69, talk with your health care provider about whether or not you want to continue having screening mammograms.      Every 10 years, have a colonoscopy. Or, have a yearly FIT test (stool test). These exams will check for colon cancer.       Have a cholesterol test every 5 years, or more often if your doctor advises it.       Have a diabetes test (fasting glucose) every three years. If you are at risk for diabetes, you should have this test more often.       At age 65, have a bone density scan (DEXA) to check for osteoporosis (brittle bone disease).    Shots:    Get a flu shot each year.    Get a tetanus shot every 10  years.    Talk to your doctor about your pneumonia vaccines. There are now two you should receive - Pneumovax (PPSV 23) and Prevnar (PCV 13).    Talk to your pharmacist about the shingles vaccine.    Talk to your doctor about the hepatitis B vaccine.    Nutrition:     Eat at least 5 servings of fruits and vegetables each day.      Eat whole-grain bread, whole-wheat pasta and brown rice instead of white grains and rice.      Get adequate Calcium and Vitamin D.     Lifestyle    Exercise at least 150 minutes a week (30 minutes a day, 5 days a week). This will help you control your weight and prevent disease.      Limit alcohol to one drink per day.      No smoking.       Wear sunscreen to prevent skin cancer.       See your dentist twice a year for an exam and cleaning.      See your eye doctor every 1 to 2 years to screen for conditions such as glaucoma, macular degeneration and cataracts.    [facility]   Discharged by : Ghislaine DIXON MA    If you have any questions regarding your visit please contact your care team:     Team Gold                Clinic Hours Telephone Number     Dr. Eva Olivares, CASSIE Mcgraw, CNP 7am-7pm  Monday - Thursday   7am-5pm  Fridays  [phone]   (Appointment scheduling available 24/7)     RN Line  [phone] option 2     [facility] - 11am-9pm Monday-Friday Saturday-Sunday- 9am-5pm     [place] -   5pm-9pm Monday-Friday Saturday-Sunday- 9am-5pm    [phone] - [facility]    [phone] - [place]       For a Price Quote for your services, please call our Consumer Price Line at [phone].     What options do I have for visits at the clinic other than the traditional office visit?     To expand how we care for you, many of our providers are utilizing electronic visits (e-visits) and telephone visits, when medically appropriate, for interactions with  their patients rather than a visit in the clinic. We also offer nurse visits for many medical concerns. Just like any other service, we will bill your insurance company for this type of visit based on time spent on the phone with your provider. Not all insurance companies cover these visits. Please check with your medical insurance if this type of visit is covered. You will be responsible for any charges that are not paid by your insurance.   E-visits via [employer]: generally incur a $35.00 fee.     Telephone visits:  Time spent on the phone: *charged based on time that is spent on the phone in increments of 10 minutes. Estimated cost:   5-10 mins $30.00   11-20 mins. $59.00   21-30 mins. $85.00       Use [employer] (secure email communication and access to your chart) to send your primary care provider a message or make an appointment. Ask someone on your Team how to sign up for [employer].     As always, Thank you for trusting us with your health care needs!      [facility]:    Scheduling Appointments  [place]  Call: [phone]    [facility]  Call: [phone]    [facility]  Call: [phone]    For Gastroenterology referrals   [facility]   Clinics and Surgery Center, 4th Floor   [address] [place] [address]   Appointments: [phone]    WHERE TO GO FOR CARE?  Clinic    Make an appointment if you:       Are sick (cold, cough, flu, sore throat, earache or in pain).       Have a small injury (sprain, small cut, burn or broken bone).       Need a physical exam, Pap smear, vaccine or prescription refill.       Have questions about your health or medicines.    To reach us:      Call [phone] ([phone]). Open 24 hours every day. (For counseling services, call [phone].)    Log into [employer] at [email or web address]. (Visit [email or web address] to create an account.) [facility]  emergency room    An emergency is a serious or life- threatening problem that must be treated right away.    Call 911 or get to the hospital if you have:      Very bad or sudden:            - Chest pain or pressure         - Bleeding         - Head or belly pain         - Dizziness or trouble seeing, walking or                          Speaking      Problems breathing      Blood in your vomit or you are coughing up blood      A major injury (knocked out, loss of a finger or limb, rape, broken bone protruding from skin)    A mental health crisis. (Or call the Mental Health Crisis line at 1-207.119.2012 or Suicide Prevention Hotline at 1-614.810.6538.)    Open 24 hours every day. You don't need an appointment.     Urgent care    Visit urgent care for sickness or small injuries when the clinic is closed. You don't need an appointment. To check hours or find an urgent care near you, visit [email or web address]. Online care    Get online care from [facility] for more than 70 common problems, like colds, allergies and infections. Open 24 hours every day at:   [email or web address]   Need help deciding?    For advice about where to be seen, you may call your clinic and ask to speak with a nurse. We're here for you 24 hours every day.         If you are deaf or hard of hearing, please let us know. We provide many free services including sign language interpreters, oral interpreters, TTYs, telephone amplifiers, note takers and written materials.                   Follow-ups after your visit        Who to contact     If you have questions or need follow up information about today's clinic visit or your schedule please contact [facility] directly at [phone].  Normal or non-critical lab and imaging results will be communicated to you by MyChart, letter or phone within 4 business days after the clinic has received the results. If you do not hear from us within 7 days, please contact the clinic through MyChart or phone.  "If you have a critical or abnormal lab result, we will notify you by phone as soon as possible.  Submit refill requests through [employer] or call your pharmacy and they will forward the refill request to us. Please allow 3 business days for your refill to be completed.          Additional Information About Your Visit        [employer] Information     [employer] gives you secure access to your electronic health record. If you see a primary care provider, you can also send messages to your care team and make appointments. If you have questions, please call your primary care clinic.  If you do not have a primary care provider, please call [phone] and they will assist you.        Care EveryWhere ID     This is your Care EveryWhere ID. This could be used by other organizations to access your [facility] medical records  [id number]        Your Vitals Were     Pulse Temperature Height Pulse Oximetry Breastfeeding? BMI (Body Mass Index)    100 98.3  F (36.8  C) (Oral) 5' 5.5\" (1.664 m) 98% No 19.34 kg/m2       Blood Pressure from Last 3 Encounters:   10/24/18 125/82   09/10/18 118/72   06/21/18 109/70    Weight from Last 3 Encounters:   10/24/18 118 lb (53.5 kg)   09/10/18 118 lb 3.2 oz (53.6 kg)   06/21/18 115 lb 3.2 oz (52.3 kg)              We Performed the Following     CBC with platelets     COPD ACTION PLAN     HPV High Risk Types DNA Cervical     Lipid panel reflex to direct LDL Fasting     Pap imaged thin layer screen with HPV - recommended age 30 - 65 years (select HPV order below)     TD (ADULT, 7+) PRESERVE FREE        Primary Care Provider Office Phone # Fax #    Freddie Mesa [phone]       [address] [facility] [address]        Equal Access to Services     EZIO CHAVEZ : Aravind Crook, argentina aquino, bubba hernandez. So [facility] [phone].    ATENCIÓN: Si habla español, tiene a moon disposición " servicios gratuitos de asistencia lingüística. Rhea restrepo [phone].    We comply with applicable federal civil rights laws and [place] laws. We do not discriminate on the basis of race, color, national origin, age, disability, sex, sexual orientation, or gender identity.            Thank you!     Thank you for choosing [facility]  for your care. Our goal is always to provide you with excellent care. Hearing back from our patients is one way we can continue to improve our services. Please take a few minutes to complete the written survey that you may receive in the mail after your visit with us. Thank you!             Your Updated Medication List - Protect others around you: Learn how to safely use, store and throw away your medicines at www.disposemymeds.org.          This list is accurate as of 10/24/18  9:45 AM.  Always use your most recent med list.                   Brand Name Dispense Instructions for use Diagnosis    acetaminophen 325 MG tablet    TYLENOL    100 tablet    Take 2 tablets (650 mg) by mouth every 4 hours as needed for other (surgical pain)    H/O total shoulder replacement, left       adapalene 0.1 % gel    DIFFERIN    45 g    Apply topically At Bedtime    Acne, unspecified acne type       * albuterol (2.5 MG/3ML) 0.083% neb solution     3 Box    Take 1 vial (2.5 mg) by nebulization every 6 hours as needed for shortness of breath / dyspnea or wheezing    Pulmonary emphysema, unspecified emphysema type (H)       * albuterol 108 (90 Base) MCG/ACT inhaler    VENTOLIN HFA    6 Inhaler    Inhale 2 puffs into the lungs every 6 hours Please dispense a 3 month supply.    Pulmonary emphysema, unspecified emphysema type (H)       amoxicillin 500 MG tablet    AMOXIL    20 tablet    Take 1 tablet (500 mg) by mouth 3 times daily    Status post replacement of left shoulder joint       fluticasone 220 MCG/ACT Inhaler    FLOVENT HFA    3 Inhaler    Inhale 2 puffs into the lungs 2 times  daily    Pulmonary emphysema, unspecified emphysema type (H)       fluticasone 50 MCG/ACT spray    FLONASE    3 Bottle    Spray 1-2 sprays into both nostrils daily    Seasonal allergic rhinitis, unspecified chronicity, unspecified trigger       HYDROcodone-acetaminophen 5-325 MG per tablet   Start taking on:  11/9/2018    NORCO    120 tablet    Take 1 tablet by mouth every 6 hours as needed for pain Max 4 tablets/day.    Chronic neck pain, Chronic pain syndrome       omeprazole 20 MG tablet     90 tablet    Take 1 tablet (20 mg) by mouth daily Take 30-60 minutes before a meal.    Gastroesophageal reflux disease without esophagitis       predniSONE 20 MG tablet    DELTASONE    21 tablet    Take 3 tabs (60 mg) orally daily for 2 days, 2 tabs (40 mg) orally daily for 5 days, 1 tab (20 mg) orally daily for 3 days, then 1/2 tab (10 mg) orally for 3 days    Pulmonary emphysema, unspecified emphysema type (H)       senna-docusate 8.6-50 MG per tablet    SENOKOT-S;PERICOLACE    50 tablet    Take 2 tablets by mouth 2 times daily    H/O total shoulder replacement, left       SPIRIVA HANDIHALER 18 MCG capsule   Generic drug:  tiotropium      Take 1 capsule by mouth daily        * varenicline 0.5 MG X 11 & 1 MG X 42 tablet    CHANTIX STARTING MONTH DORI    53 tablet    Take 0.5 mg tab daily for 3 days, then 0.5 mg tab twice daily for 4 days, then 1 mg twice daily.    Tobacco use disorder       * varenicline 1 MG tablet    CHANTIX    56 tablet    Take 1 tablet (1 mg) by mouth 2 times daily    Smoker       zolpidem 5 MG tablet    AMBIEN    30 tablet    Take 1 tablet (5 mg) by mouth nightly as needed for sleep    Insomnia, unspecified type       * Notice:  This list has 4 medication(s) that are the same as other medications prescribed for you. Read the directions carefully, and ask your doctor or other care provider to review them with you.

## 2018-10-25 LAB
CHOLEST SERPL-MCNC: 191 MG/DL
HDLC SERPL-MCNC: 51 MG/DL
LDLC SERPL CALC-MCNC: 107 MG/DL
NONHDLC SERPL-MCNC: 140 MG/DL
TRIGL SERPL-MCNC: 166 MG/DL

## 2018-10-26 ASSESSMENT — ENCOUNTER SYMPTOMS: ARTHRALGIAS: 1

## 2018-10-29 LAB
COPATH REPORT: NORMAL
PAP: NORMAL

## 2018-10-30 LAB
FINAL DIAGNOSIS: NORMAL
HPV HR 12 DNA CVX QL NAA+PROBE: NEGATIVE
HPV16 DNA SPEC QL NAA+PROBE: NEGATIVE
HPV18 DNA SPEC QL NAA+PROBE: NEGATIVE
SPECIMEN DESCRIPTION: NORMAL
SPECIMEN SOURCE CVX/VAG CYTO: NORMAL

## 2018-11-07 ENCOUNTER — MYC MEDICAL ADVICE (OUTPATIENT)
Dept: FAMILY MEDICINE | Facility: CLINIC | Age: 68
End: 2018-11-07

## 2018-11-07 DIAGNOSIS — Z72.0 TOBACCO ABUSE: Primary | ICD-10-CM

## 2018-11-09 NOTE — TELEPHONE ENCOUNTER
It's a lung CT scan for lung cancer screen completed x1 year. See Health maintenance.If she needs help scheduling this please assist.     Thank you

## 2018-11-13 ENCOUNTER — RADIANT APPOINTMENT (OUTPATIENT)
Dept: CT IMAGING | Facility: CLINIC | Age: 68
End: 2018-11-13
Attending: NURSE PRACTITIONER
Payer: COMMERCIAL

## 2018-11-13 DIAGNOSIS — Z72.0 TOBACCO ABUSE: ICD-10-CM

## 2018-11-13 PROCEDURE — G0297 LDCT FOR LUNG CA SCREEN: HCPCS | Mod: TC

## 2018-11-14 ENCOUNTER — TELEPHONE (OUTPATIENT)
Dept: FAMILY MEDICINE | Facility: CLINIC | Age: 68
End: 2018-11-14

## 2018-11-14 DIAGNOSIS — R91.8 ABNORMAL CT LUNG SCREENING: ICD-10-CM

## 2018-11-14 NOTE — TELEPHONE ENCOUNTER
Beatriz notified of CT result and recommendation.  CT order placed  Reviewed information about lung nodule.    Valentino Sullivan RN  [place] [facility] RN  Lung Nodule and ED Lab Result RN  Epic pool (ED late result f/u RN): [id number]  FV INCIDENTAL RADIOLOGY F/U NURSES: P [id number]  # [phone]

## 2018-11-14 NOTE — TELEPHONE ENCOUNTER
1jia[facility]ie/[employer] Radiology Lung Cancer Screening CT result notification:     LDCT/Lung Cancer Screening CT Exam date: 11/13/18  Radiologist Impression AND Recommendations:   ACR Assessment Category:  Lung-RADS Category 3. Probably benign  finding(s)- short term follow up suggested 6 month low dose CT   Pertinent Findings:  Lungs:  3 mm nodule on the right lower lobe image 240 series 6. More centrally, there are at least three occluded bronchi, this may be related to mucous plugging, an endobronchial lesion would be difficult to exclude in this setting. No acute infiltrates. Trace peripheral fibrosis and/or atelectasis.        Ordering Provider: Hallie Mcgraw APRN CNP  Beatriz Francis did not receive the remaining radiology results from her provider.   CT Chest order placed to be completed within 6 months (Yes/No):  Yes, order pending at this time  RN communicated the lung nodule finding to the patient (Yes/No):  No, left message requesting call back  RN ordered Lung nodule program referral (Yes/NA): no  The patient had the following questions: NA  Correct letter sent as per Lung nodule protocol (Yes/No):  yes      If scheduling LDCT only, RN will contact Image Scheduling Team  Hours available (all sites below):  Mon-Fri 7A to 8P; Sat 7A to 3P.  No schedulers available on Sunday.    [facility] ([place] and [place]): [phone]    [place] region ([place], [place]): [phone]    South region ([facility]): [phone]    Left voicemail message requesting a call back to [phone] between 10 a.m. and 6:30 p.m. to discuss radiology finding.    [employer] [employer] RN  Lung Nodule and ED Lab Result F/u RN  Ph# [phone]

## 2018-12-05 ENCOUNTER — OFFICE VISIT (OUTPATIENT)
Dept: FAMILY MEDICINE | Facility: CLINIC | Age: 68
End: 2018-12-05
Payer: COMMERCIAL

## 2018-12-05 VITALS
TEMPERATURE: 98.7 F | HEART RATE: 96 BPM | SYSTOLIC BLOOD PRESSURE: 108 MMHG | BODY MASS INDEX: 19.48 KG/M2 | DIASTOLIC BLOOD PRESSURE: 74 MMHG | HEIGHT: 66 IN | WEIGHT: 121.2 LBS

## 2018-12-05 DIAGNOSIS — G47.00 INSOMNIA, UNSPECIFIED TYPE: ICD-10-CM

## 2018-12-05 DIAGNOSIS — F17.200 SMOKER: ICD-10-CM

## 2018-12-05 DIAGNOSIS — G89.4 CHRONIC PAIN SYNDROME: ICD-10-CM

## 2018-12-05 DIAGNOSIS — M54.2 CHRONIC NECK PAIN: ICD-10-CM

## 2018-12-05 DIAGNOSIS — J43.9 PULMONARY EMPHYSEMA, UNSPECIFIED EMPHYSEMA TYPE (H): Primary | ICD-10-CM

## 2018-12-05 DIAGNOSIS — G89.29 CHRONIC NECK PAIN: ICD-10-CM

## 2018-12-05 DIAGNOSIS — K21.9 GASTROESOPHAGEAL REFLUX DISEASE WITHOUT ESOPHAGITIS: ICD-10-CM

## 2018-12-05 PROCEDURE — 99214 OFFICE O/P EST MOD 30 MIN: CPT | Performed by: FAMILY MEDICINE

## 2018-12-05 RX ORDER — VARENICLINE TARTRATE 1 MG/1
1 TABLET, FILM COATED ORAL DAILY
Qty: 30 TABLET | Refills: 2 | Status: SHIPPED | OUTPATIENT
Start: 2018-12-05 | End: 2019-03-04

## 2018-12-05 RX ORDER — HYDROCODONE BITARTRATE AND ACETAMINOPHEN 5; 325 MG/1; MG/1
1 TABLET ORAL EVERY 6 HOURS PRN
Qty: 120 TABLET | Refills: 0 | Status: SHIPPED | OUTPATIENT
Start: 2019-01-02 | End: 2018-12-05

## 2018-12-05 RX ORDER — HYDROCODONE BITARTRATE AND ACETAMINOPHEN 5; 325 MG/1; MG/1
1 TABLET ORAL EVERY 6 HOURS PRN
Qty: 120 TABLET | Refills: 0 | Status: SHIPPED | OUTPATIENT
Start: 2018-12-05 | End: 2018-12-05

## 2018-12-05 RX ORDER — NICOTINE POLACRILEX 4 MG/1
20 GUM, CHEWING ORAL DAILY
Qty: 90 TABLET | Refills: 3 | Status: SHIPPED | OUTPATIENT
Start: 2018-12-05 | End: 2020-10-12

## 2018-12-05 RX ORDER — MELOXICAM 15 MG/1
15 TABLET ORAL DAILY PRN
Qty: 90 TABLET | Refills: 1 | Status: SHIPPED | OUTPATIENT
Start: 2018-12-05 | End: 2019-09-05

## 2018-12-05 RX ORDER — HYDROCODONE BITARTRATE AND ACETAMINOPHEN 5; 325 MG/1; MG/1
1 TABLET ORAL EVERY 6 HOURS PRN
Qty: 120 TABLET | Refills: 0 | Status: SHIPPED | OUTPATIENT
Start: 2019-01-31 | End: 2019-03-04

## 2018-12-05 RX ORDER — ZOLPIDEM TARTRATE 5 MG/1
5 TABLET ORAL
Qty: 30 TABLET | Refills: 2 | Status: SHIPPED | OUTPATIENT
Start: 2018-12-05 | End: 2019-03-04

## 2018-12-05 RX ORDER — TIOTROPIUM BROMIDE 18 UG/1
1 CAPSULE ORAL; RESPIRATORY (INHALATION) DAILY
Qty: 90 CAPSULE | Refills: 3 | Status: SHIPPED | OUTPATIENT
Start: 2018-12-05 | End: 2019-03-04

## 2018-12-05 ASSESSMENT — PAIN SCALES - GENERAL: PAINLEVEL: MODERATE PAIN (5)
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
## 2018-12-05 NOTE — MR AVS SNAPSHOT
After Visit Summary   12/5/2018    Beatriz Francis    MRN: [id number]           Patient Information     Date Of Birth          1950        Visit Information        Provider Department      12/5/2018 11:40 AM Freddie Mesa MD [facility]        Today's Diagnoses     Pulmonary emphysema, unspecified emphysema type (H)    -  1    Chronic pain syndrome        Insomnia, unspecified type        Gastroesophageal reflux disease without esophagitis        Chronic neck pain        Smoker           Follow-ups after your visit        Follow-up notes from your care team     Return in about 3 months (around 3/5/2019) for Routine Visit.      Who to contact     If you have questions or need follow up information about today's clinic visit or your schedule please contact [facility] directly at [phone].  Normal or non-critical lab and imaging results will be communicated to you by MyChart, letter or phone within 4 business days after the clinic has received the results. If you do not hear from us within 7 days, please contact the clinic through MyChart or phone. If you have a critical or abnormal lab result, we will notify you by phone as soon as possible.  Submit refill requests through [employer] or call your pharmacy and they will forward the refill request to us. Please allow 3 business days for your refill to be completed.          Additional Information About Your Visit        MyChart Information     [employer] gives you secure access to your electronic health record. If you see a primary care provider, you can also send messages to your care team and make appointments. If you have questions, please call your primary care clinic.  If you do not have a primary care provider, please call [phone] and they will assist you.        Care EveryWhere ID     This is your Care EveryWhere ID. This could be used by other organizations to access your [facility]  "records  [id number]        Your Vitals Were     Pulse Temperature Height BMI (Body Mass Index)          96 98.7  F (37.1  C) (Oral) 5' 5.5\" (1.664 m) 19.86 kg/m2         Blood Pressure from Last 3 Encounters:   12/05/18 108/74   10/24/18 125/82   09/10/18 118/72    Weight from Last 3 Encounters:   12/05/18 121 lb 3.2 oz (55 kg)   10/24/18 118 lb (53.5 kg)   09/10/18 118 lb 3.2 oz (53.6 kg)              Today, you had the following     No orders found for display         Today's Medication Changes          These changes are accurate as of 12/5/18 12:43 PM.  If you have any questions, ask your nurse or doctor.               Start taking these medicines.        Dose/Directions    HYDROcodone-acetaminophen 5-325 MG tablet   Commonly known as:  NORCO   Used for:  Chronic neck pain, Chronic pain syndrome   Started by:  Freddie Mesa MD        Dose:  1 tablet   Start taking on:  1/31/2019   Take 1 tablet by mouth every 6 hours as needed for pain Max 4 tablets/day.   Quantity:  120 tablet   Refills:  0       meloxicam 15 MG tablet   Commonly known as:  MOBIC   Used for:  Chronic neck pain   Started by:  Freddie Mesa MD        Dose:  15 mg   Take 1 tablet (15 mg) by mouth daily as needed for moderate pain   Quantity:  90 tablet   Refills:  1         These medicines have changed or have updated prescriptions.        Dose/Directions    SPIRIVA HANDIHALER 18 MCG inhaled capsule   This may have changed:    - how much to take  - how to take this   Used for:  Pulmonary emphysema, unspecified emphysema type (H)   Generic drug:  tiotropium   Changed by:  Freddie Mesa MD        Dose:  1 capsule   Inhale 1 capsule (18 mcg) into the lungs daily   Quantity:  90 capsule   Refills:  3       varenicline 1 MG tablet   Commonly known as:  CHANTIX   This may have changed:    - when to take this  - Another medication with the same name was removed. Continue taking this medication, and follow the " directions you see here.   Used for:  Smoker   Changed by:  Freddie Mesa MD        Dose:  1 mg   Take 1 tablet (1 mg) by mouth daily   Quantity:  30 tablet   Refills:  2            Where to get your medicines      These medications were sent [place] [facility] [place] - [address]  [address] [facility] [address]     Phone:  [phone]     meloxicam 15 MG tablet    omeprazole 20 MG tablet    SPIRIVA HANDIHALER 18 MCG inhaled capsule    varenicline 1 MG tablet         Some of these will need a paper prescription and others can be bought over the counter.  Ask your nurse if you have questions.     Bring a paper prescription for each of these medications     HYDROcodone-acetaminophen 5-325 MG tablet    zolpidem 5 MG tablet               Information about OPIOIDS     PRESCRIPTION OPIOIDS: WHAT YOU NEED TO KNOW   We gave you an opioid (narcotic) pain medicine. It is important to manage your pain, but opioids are not always the best choice. You should first try all the other options your care team gave you. Take this medicine for as short a time (and as few doses) as possible.    Some activities can increase your pain, such as bandage changes or therapy sessions. It may help to take your pain medicine 30 to 60 minutes before these activities. Reduce your stress by getting enough sleep, working on hobbies you enjoy and practicing relaxation or meditation. Talk to your care team about ways to manage your pain beyond prescription opioids.    These medicines have risks:    DO NOT drive when on new or higher doses of pain medicine. These medicines can affect your alertness and reaction times, and you could be arrested for driving under the influence (DUI). If you need to use opioids long-term, talk to your care team about driving.    DO NOT operate heavy machinery    DO NOT do any other dangerous activities while taking these medicines.    DO NOT drink any alcohol  while taking these medicines.     If the opioid prescribed includes acetaminophen, DO NOT take with any other medicines that contain acetaminophen. Read all labels carefully. Look for the word  acetaminophen  or  Tylenol.  Ask your pharmacist if you have questions or are unsure.    You can get addicted to pain medicines, especially if you have a history of addiction (chemical, alcohol or substance dependence). Talk to your care team about ways to reduce this risk.    All opioids tend to cause constipation. Drink plenty of water and eat foods that have a lot of fiber, such as fruits, vegetables, prune juice, apple juice and high-fiber cereal. Take a laxative (Miralax, milk of magnesia, Colace, Senna) if you don t move your bowels at least every other day. Other side effects include upset stomach, sleepiness, dizziness, throwing up, tolerance (needing more of the medicine to have the same effect), physical dependence and slowed breathing.    Store your pills in a secure place, locked if possible. We will not replace any lost or stolen medicine. If you don t finish your medicine, please throw away (dispose) as directed by your pharmacist. The [place] Pollution Control Agency has more information about safe disposal: [email or web address]-medications         Primary Care Provider Office Phone # Fax #    Freddie Mesa [phone]       [address] [place] [address]        Equal Access to Services     EZIO CHAVEZ : Hadgino valle Sopaulette, waaxda luqadaha, qaybta kaalmada bubba soler. So [facility] [phone].    ATENCIÓN: Si habla español, tiene a moon disposición servicios gratuitos de asistencia lingüística. Rhea restrepo [phone].    We comply with applicable federal civil rights laws and [place] laws. We do not discriminate on the basis of race, color, national origin, age, disability, sex,  sexual orientation, or gender identity.            Thank you!     Thank you for choosing [facility]  for your care. Our goal is always to provide you with excellent care. Hearing back from our patients is one way we can continue to improve our services. Please take a few minutes to complete the written survey that you may receive in the mail after your visit with us. Thank you!             Your Updated Medication List - Protect others around you: Learn how to safely use, store and throw away your medicines at www.disposemymeds.org.          This list is accurate as of 12/5/18 12:43 PM.  Always use your most recent med list.                   Brand Name Dispense Instructions for use Diagnosis    acetaminophen 325 MG tablet    TYLENOL    100 tablet    Take 2 tablets (650 mg) by mouth every 4 hours as needed for other (surgical pain)    H/O total shoulder replacement, left       adapalene 0.1 % external gel    DIFFERIN    45 g    Apply topically At Bedtime    Acne, unspecified acne type       * albuterol (2.5 MG/3ML) 0.083% neb solution    PROVENTIL    3 Box    Take 1 vial (2.5 mg) by nebulization every 6 hours as needed for shortness of breath / dyspnea or wheezing    Pulmonary emphysema, unspecified emphysema type (H)       * albuterol 108 (90 Base) MCG/ACT inhaler    VENTOLIN HFA    6 Inhaler    Inhale 2 puffs into the lungs every 6 hours Please dispense a 3 month supply.    Pulmonary emphysema, unspecified emphysema type (H)       amoxicillin 500 MG tablet    AMOXIL    20 tablet    Take 1 tablet (500 mg) by mouth 3 times daily    Status post replacement of left shoulder joint       fluticasone 220 MCG/ACT inhaler    FLOVENT HFA    3 Inhaler    Inhale 2 puffs into the lungs 2 times daily    Pulmonary emphysema, unspecified emphysema type (H)       fluticasone 50 MCG/ACT nasal spray    FLONASE    3 Bottle    Spray 1-2 sprays into both nostrils daily    Seasonal allergic rhinitis, unspecified  chronicity, unspecified trigger       HYDROcodone-acetaminophen 5-325 MG tablet   Start taking on:  1/31/2019    NORCO    120 tablet    Take 1 tablet by mouth every 6 hours as needed for pain Max 4 tablets/day.    Chronic neck pain, Chronic pain syndrome       meloxicam 15 MG tablet    MOBIC    90 tablet    Take 1 tablet (15 mg) by mouth daily as needed for moderate pain    Chronic neck pain       omeprazole 20 MG tablet     90 tablet    Take 1 tablet (20 mg) by mouth daily Take 30-60 minutes before a meal.    Gastroesophageal reflux disease without esophagitis       predniSONE 20 MG tablet    DELTASONE    21 tablet    Take 3 tabs (60 mg) orally daily for 2 days, 2 tabs (40 mg) orally daily for 5 days, 1 tab (20 mg) orally daily for 3 days, then 1/2 tab (10 mg) orally for 3 days    Pulmonary emphysema, unspecified emphysema type (H)       senna-docusate 8.6-50 MG tablet    SENOKOT-S/PERICOLACE    50 tablet    Take 2 tablets by mouth 2 times daily    H/O total shoulder replacement, left       SPIRIVA HANDIHALER 18 MCG inhaled capsule   Generic drug:  tiotropium     90 capsule    Inhale 1 capsule (18 mcg) into the lungs daily    Pulmonary emphysema, unspecified emphysema type (H)       varenicline 1 MG tablet    CHANTIX    30 tablet    Take 1 tablet (1 mg) by mouth daily    Smoker       zolpidem 5 MG tablet    AMBIEN    30 tablet    Take 1 tablet (5 mg) by mouth nightly as needed for sleep    Insomnia, unspecified type       * Notice:  This list has 2 medication(s) that are the same as other medications prescribed for you. Read the directions carefully, and ask your doctor or other care provider to review them with you.

## 2018-12-13 ENCOUNTER — TELEPHONE (OUTPATIENT)
Dept: FAMILY MEDICINE | Facility: CLINIC | Age: 68
End: 2018-12-13

## 2018-12-13 DIAGNOSIS — G47.00 INSOMNIA, UNSPECIFIED TYPE: ICD-10-CM

## 2018-12-13 RX ORDER — ZOLPIDEM TARTRATE 5 MG/1
5 TABLET ORAL
Qty: 30 TABLET | Refills: 2 | Status: CANCELLED | OUTPATIENT
Start: 2018-12-13

## 2018-12-13 NOTE — TELEPHONE ENCOUNTER
zolpidem (AMBIEN) 5 MG tablet      Class: Local Print  Last Written Prescription Date:  12/5/2018  Last Fill Quantity: 30 tablet,   # refills: 2  Last Office Visit: 12/5/2018  RAUL Garcia    Future Office visit:       Routing refill request to provider for review/approval because:  Drug not on the [facility], [facility] or  Health refill protocol or controlled substance

## 2018-12-14 NOTE — TELEPHONE ENCOUNTER
Spoke with patient, who reports she had done the automatic refill and forgot that pharmacy had the hard copies.  She spoke with them yesterday and had this filled, needs nothing further at this time.    Marta Candelario RN

## 2018-12-14 NOTE — TELEPHONE ENCOUNTER
"Patient was seen 12/5/18 and last Rx 30 tabs with 2 refills was \"local print\" that day but no documentation if was handed to patient.    Routed to team to contact pharmacy during business hours and/or call patient to verify where this Rx went.    Appears this might require PA?    Jasmin Meza, RN  [facility]      "

## 2019-01-25 NOTE — PATIENT INSTRUCTIONS
Before Your Surgery      Call your surgeon if there is any change in your health. This includes signs of a cold or flu (such as a sore throat, runny nose, cough, rash or fever).    Do not smoke, drink alcohol or take over the counter medicine (unless your surgeon or primary care doctor tells you to) for the 24 hours before and after surgery.    If you take prescribed drugs: Follow your doctor s orders about which medicines to take and which to stop until after surgery.    Eating and drinking prior to surgery: follow the instructions from your surgeon    Take a shower or bath the night before surgery. Use the soap your surgeon gave you to gently clean your skin. If you do not have soap from your surgeon, use your regular soap. Do not shave or scrub the surgery site.  Wear clean pajamas and have clean sheets on your bed.   Before Your Surgery    Call your surgeon if there is any change in your health. This includes signs of a cold or flu (such as a sore throat, runny nose, cough, rash or fever).  Do not smoke, drink alcohol or take over the counter medicine (unless your surgeon or primary care doctor tells you to) for the 24 hours before and after surgery.  If you take prescribed drugs: Follow your doctor s orders about which medicines to take and which to stop until after surgery.  Eating and drinking prior to surgery: follow the instructions from your surgeon  Take a shower or bath the night before surgery. Use the soap your surgeon gave you to gently clean your skin. If you do not have soap from your surgeon, use your regular soap. Do not shave or scrub the surgery site.  Wear clean pajamas and have clean sheets on your bed.   
Patient is 18 years or older (and not pregnant)

## 2019-02-08 ENCOUNTER — TRANSFERRED RECORDS (OUTPATIENT)
Dept: HEALTH INFORMATION MANAGEMENT | Facility: CLINIC | Age: 69
End: 2019-02-08

## 2019-02-11 ENCOUNTER — OFFICE VISIT (OUTPATIENT)
Dept: FAMILY MEDICINE | Facility: CLINIC | Age: 69
End: 2019-02-11
Payer: COMMERCIAL

## 2019-02-11 VITALS
OXYGEN SATURATION: 94 % | DIASTOLIC BLOOD PRESSURE: 79 MMHG | WEIGHT: 120 LBS | SYSTOLIC BLOOD PRESSURE: 111 MMHG | TEMPERATURE: 97.1 F | HEART RATE: 96 BPM | BODY MASS INDEX: 19.99 KG/M2 | HEIGHT: 65 IN

## 2019-02-11 DIAGNOSIS — S22.21XD CLOSED FRACTURE OF MANUBRIUM WITH ROUTINE HEALING, SUBSEQUENT ENCOUNTER: Primary | ICD-10-CM

## 2019-02-11 PROCEDURE — 99214 OFFICE O/P EST MOD 30 MIN: CPT | Performed by: FAMILY MEDICINE

## 2019-02-11 RX ORDER — OXYCODONE HYDROCHLORIDE 5 MG/1
5 CAPSULE ORAL EVERY 4 HOURS PRN
COMMUNITY
End: 2019-03-04

## 2019-02-11 RX ORDER — OXYCODONE HYDROCHLORIDE 5 MG/1
5 TABLET ORAL EVERY 6 HOURS PRN
Qty: 42 TABLET | Refills: 0 | Status: SHIPPED | OUTPATIENT
Start: 2019-02-11 | End: 2019-03-04

## 2019-02-11 ASSESSMENT — PAIN SCALES - GENERAL: PAINLEVEL: SEVERE PAIN (6)

## 2019-02-11 ASSESSMENT — MIFFLIN-ST. JEOR: SCORE: 1085.2
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
## 2019-03-04 ENCOUNTER — OFFICE VISIT (OUTPATIENT)
Dept: FAMILY MEDICINE | Facility: CLINIC | Age: 69
End: 2019-03-04
Payer: COMMERCIAL

## 2019-03-04 VITALS
OXYGEN SATURATION: 97 % | WEIGHT: 126 LBS | DIASTOLIC BLOOD PRESSURE: 86 MMHG | HEART RATE: 83 BPM | BODY MASS INDEX: 20.99 KG/M2 | TEMPERATURE: 98 F | SYSTOLIC BLOOD PRESSURE: 129 MMHG | HEIGHT: 65 IN

## 2019-03-04 DIAGNOSIS — M54.2 CHRONIC NECK PAIN: ICD-10-CM

## 2019-03-04 DIAGNOSIS — J30.1 SEASONAL ALLERGIC RHINITIS DUE TO POLLEN: ICD-10-CM

## 2019-03-04 DIAGNOSIS — F17.200 SMOKER: ICD-10-CM

## 2019-03-04 DIAGNOSIS — G47.00 INSOMNIA, UNSPECIFIED TYPE: ICD-10-CM

## 2019-03-04 DIAGNOSIS — G89.4 CHRONIC PAIN SYNDROME: ICD-10-CM

## 2019-03-04 DIAGNOSIS — J43.9 PULMONARY EMPHYSEMA, UNSPECIFIED EMPHYSEMA TYPE (H): ICD-10-CM

## 2019-03-04 DIAGNOSIS — G89.29 CHRONIC NECK PAIN: ICD-10-CM

## 2019-03-04 PROCEDURE — 99214 OFFICE O/P EST MOD 30 MIN: CPT | Performed by: FAMILY MEDICINE

## 2019-03-04 RX ORDER — HYDROCODONE BITARTRATE AND ACETAMINOPHEN 5; 325 MG/1; MG/1
1 TABLET ORAL EVERY 6 HOURS PRN
Qty: 120 TABLET | Refills: 0 | Status: SHIPPED | OUTPATIENT
Start: 2019-04-29 | End: 2019-06-03

## 2019-03-04 RX ORDER — ZOLPIDEM TARTRATE 5 MG/1
5 TABLET ORAL
Qty: 30 TABLET | Refills: 2 | Status: SHIPPED | OUTPATIENT
Start: 2019-03-04 | End: 2019-06-03

## 2019-03-04 RX ORDER — HYDROCODONE BITARTRATE AND ACETAMINOPHEN 5; 325 MG/1; MG/1
1 TABLET ORAL EVERY 6 HOURS PRN
Qty: 120 TABLET | Refills: 0 | Status: SHIPPED | OUTPATIENT
Start: 2019-03-04 | End: 2019-03-04

## 2019-03-04 RX ORDER — VARENICLINE TARTRATE 1 MG/1
1 TABLET, FILM COATED ORAL DAILY
Qty: 30 TABLET | Refills: 2 | Status: SHIPPED | OUTPATIENT
Start: 2019-03-04 | End: 2019-06-03

## 2019-03-04 RX ORDER — FLUTICASONE PROPIONATE 50 MCG
1-2 SPRAY, SUSPENSION (ML) NASAL DAILY
Qty: 18.2 ML | Refills: 11 | Status: SHIPPED | OUTPATIENT
Start: 2019-03-04 | End: 2020-01-13

## 2019-03-04 RX ORDER — TIOTROPIUM BROMIDE 18 UG/1
1 CAPSULE ORAL; RESPIRATORY (INHALATION) DAILY
Qty: 90 CAPSULE | Refills: 11 | Status: SHIPPED | OUTPATIENT
Start: 2019-03-04 | End: 2020-03-31

## 2019-03-04 RX ORDER — HYDROCODONE BITARTRATE AND ACETAMINOPHEN 5; 325 MG/1; MG/1
1 TABLET ORAL EVERY 6 HOURS PRN
Qty: 120 TABLET | Refills: 0 | Status: SHIPPED | OUTPATIENT
Start: 2019-04-01 | End: 2019-03-04

## 2019-03-04 RX ORDER — ALBUTEROL SULFATE 90 UG/1
2 AEROSOL, METERED RESPIRATORY (INHALATION) EVERY 6 HOURS
Qty: 6 INHALER | Refills: 3 | Status: SHIPPED | OUTPATIENT
Start: 2019-03-04 | End: 2020-08-26

## 2019-03-04 ASSESSMENT — MIFFLIN-ST. JEOR: SCORE: 1112.41

## 2019-03-04 NOTE — LETTER
[facility]  03/04/19    Patient: Beatriz Francis  YOB: 1950  Medical Record Number: [id number]                                                                  Opioid / Opioid Plus Controlled Substance Agreement    I understand that my care provider has prescribed an opioid (narcotic) controlled substance to help manage my condition(s). I am taking this medicine to help me function or work. I know this is strong medicine, and that it can cause serious side effects. Opioid medicine can be sedating, addicting and may cause a dependency on the drug. They can affect my ability to drive or think, and cause depression. They need to be taken exactly as prescribed. Combining opioids with certain medicines or chemicals (such as cocaine, sedatives and tranquilizers, sleeping pills, meth) can be dangerous or even fatal. Also, if I stop opioids suddenly, I may have severe withdrawal symptoms. Last, I understand that opioids do not work for all types of pain nor for all patients. If not helpful, I may be asked to stop them.        The risks, benefits, and side effects of these medicine(s) were explained to me. I agree that:    1. I will take part in other treatments as advised by my care team. This may be psychiatry or counseling, physical therapy, behavioral therapy, group treatment or a referral to a pain clinic. I will reduce or stop my medicine when my care team tells me to do so.  2. I will take my medicines as prescribed. I will not change the dose or schedule unless my care team tells me to. There will be no refills if I  run out early.   I may be contactedwithout warning and asked to complete a urine drug test or pill count at any time.   3. I will keep all my appointments, and understand this is part of the monitoring of opioids. My care team may require an office visit for EVERY opioid/controlled substance refill. If I miss appointments or don t follow instructions, my care team  may stop my medicine.  4. I will not ask other providers to prescribe controlled substances, and I will not accept controlled substances from other people. If I need another prescribed controlled substance for a new reason, I will tell my care team within 1 business day.  5. I will use one pharmacy to fill all of my controlled substance prescriptions, and it is up to me to make sure that I do not run out of my medicines on weekends or holidays. If my care team is willing to refill my opioid prescription without a visit, I must request refills only during office hours, refills may take up to 3 days to process, and it may take up to 5 to 7 days for my medicine to be mailed and ready at my pharmacy. Prescriptions will not be mailed anywhere except my pharmacy.        [id number]  Rev 12/18         Registration to scan to EHR                             Page 1 of 2               Controlled Substance Agreement Opioid        [facility]  03/04/19  Patient: Beatriz Francis  YOB: 1950  Medical Record Number: [id number]                                                                  6. I am responsible for my prescriptions. If the medicine/prescription is lost or stolen, it will not be replaced. I also agree not to share controlled substance medicines with anyone.  7. I agree to not use ANY illegal or recreational drugs. This includes marijuana, cocaine, bath salts or other drugs. I agree not to use alcohol unless my care team says I may.          I agree to give urine samples whenever asked. If I don t give a urine sample, the care team may stop my medicine.    8. If I enroll in the [place] Medical Marijuana program, I will tell my care team. I will also sign an agreement to share my medical records with my care team.   9. I will bring in my list of medicines (or my medicine bottles) each time I come to the clinic.   10. I will tell my care team right away if I become pregnant or have a  new medical problem treated outside of my regular clinic.  11. I understand that this medicine can affect my thinking and judgment. It may be unsafe for me to drive, use machinery and do dangerous tasks. I will not do any of these things until I know how the medicine affects me. If my dose changes, I will wait to see how it affects me. I will contact my care team if I have concerns about medicine side effects.    I understand that if I do not follow any of the conditions above, my prescriptions or treatment may be stopped.      I agree that my provider, clinic care team, and pharmacy may work with any city, state or federal law enforcement agency that investigates the misuse, sale, or other diversion of my controlled medicine. I will allow my provider to discuss my care with or share a copy of this agreement with any other treating provider, pharmacy or emergency room where I receive care. I agree to give up (waive) any right of privacy or confidentiality with respect to these consents.     I have read this agreement and have asked questions about anything I did not understand.      ________________________________________________________________________  Patient signature - Date/Time -  Beatriz Francis                                      ________________________________________________________________________  Witness signature                                                            ________________________________________________________________________  Provider signature - Freddie Mesa MD      [id number]  Rev 12/18         Registration to scan to EHR                         Page 2 of 2                   Controlled Substance Agreement Opioid           Page 1 of 2  Opioid Pain Medicines (also known as Narcotics)  What You Need to Know    What are opioids?   Opioids are pain medicines that must be prescribed by a doctor.  They are also known as narcotics.    Examples are:     morphine (MS Contin,  Jennifer)    oxycodone (Oxycontin)    oxycodone and acetaminophen (Percocet)    hydrocodone and acetaminophen (Vicodin, Norco)     fentanyl patch (Duragesic)     hydromorphone (Dilaudid)     methadone     What do opioids do well?   Opioids are best for short-term pain after a surgery or injury. They also work well for cancer pain. Unlike other pain medicines, they do not cause liver or kidney failure or ulcers. They may help some people with long-lasting (chronic) pain.     What do opioids NOT do well?   Opioids never get rid of pain entirely, and they do not work well for most patients with chronic pain. Opioids do not reduce swelling, one of the causes of pain. They also don t work well for nerve pain.                           For informational purposes only.  Not to replace the advice of your care provider.  Copyright 201 [facility]. All right reserved. [employer] [id number] 02/18.      Page 2 of 2    Risks and side effects   Talk to your doctor before you start or decide to keep taking one of these medicines. Side effects include:    Lowering your breathing rate enough to cause death    Overdose, including death, especially if taking higher than prescribed doses    Long-term opioid use    Worse depression symptoms; less pleasure in things you usually enjoy    Feeling tired or sluggish    Slower thoughts or cloudy thinking    Being more sensitive to pain over time; pain is harder to control    Trouble sleeping or restless sleep    Changes in hormone levels (for example, less testosterone)    Changes in sex drive or ability to have sex    Constipation    Unsafe driving    Itching and sweating    Feeling dizzy    Nausea, vomiting and dry mouth    What else should I know about opioids?  When someone takes opioids for too long or too often, they become dependent. This means that if you stop or reduce the medicine too quickly, you will have withdrawal symptoms.    Dependence is not the same as addiction.  Addiction is when people keep using a substance that harms their body, their mind or their relations with others. If you have a history of drug or alcohol abuse, taking opioids can cause a relapse.    Over time, opioids don t work as well. Most people will need higher and higher doses. The higher the dose, the more serious the side effects. We don t know the long-term effects of opioids.      Prescribed opioids aren't the best way to manage chronic pain    Other ways to manage pain include:      Ibuprofen or acetaminophen.  You should always try this first.      Treat health problems that may be causing pain.      acupuncture or massage, deep breathing, meditation, visual imagery, aromatherapy.      Use heat or ice at the pain site      Physical therapy and exercise      Stop smoking      See a counselor or therapist                                                  People who have used opioids for a long time may have a lower quality of life, worse depression, higher levels of pain and more visits to doctors.    Never share your opioids with others. Be sure to store opioids in a secure place, locked if possible.Young children can easily swallow them and overdose.     You can overdose on opioids.  Signs of overdose include decrease or loss of consciousness, slowed breathing, trouble waking and blue lips.  If someone is worried about overdose, they should call 911.    If you are at risk for overdose, you may get naloxone (Narcan, a medicine that reverses the effects of opioids.  If you overdose, a friend or family member can give you Narcan while waiting for the ambulance.  They need to know the signs of overdose and how to give Narcan.    While you're taking opioids:    Don't use alcohol or street drugs. Taking them together can cause death.    Don't take any of these medicines unless your doctor says its okay.  Taking these with opioids can cause death.    Benzodiazepines (such as lorazepam         or  diazepam)    Muscle relaxers (such as cyclobenzaprine)    sleeping pills    other opioids    Safe disposal of opioids  Find your area drug take-back program, your pharmacy mail-back program, buy a special disposal bag (such as Deterra) from your pharmacy or flush them down the toilet.  Use the guidelines at:  www.fda.gov/drugs/resourcesforyou
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
## 2019-04-29 ENCOUNTER — MYC MEDICAL ADVICE (OUTPATIENT)
Dept: FAMILY MEDICINE | Facility: CLINIC | Age: 69
End: 2019-04-29

## 2019-05-13 ENCOUNTER — ANCILLARY PROCEDURE (OUTPATIENT)
Dept: CT IMAGING | Facility: CLINIC | Age: 69
End: 2019-05-13
Attending: NURSE PRACTITIONER
Payer: COMMERCIAL

## 2019-05-13 DIAGNOSIS — R91.8 ABNORMAL CT LUNG SCREENING: ICD-10-CM

## 2019-05-13 PROCEDURE — 71250 CT THORAX DX C-: CPT | Mod: TC | Performed by: INTERNAL MEDICINE

## 2019-06-03 ENCOUNTER — OFFICE VISIT (OUTPATIENT)
Dept: FAMILY MEDICINE | Facility: CLINIC | Age: 69
End: 2019-06-03
Payer: COMMERCIAL

## 2019-06-03 VITALS
SYSTOLIC BLOOD PRESSURE: 125 MMHG | BODY MASS INDEX: 20.8 KG/M2 | DIASTOLIC BLOOD PRESSURE: 82 MMHG | OXYGEN SATURATION: 95 % | TEMPERATURE: 97.7 F | WEIGHT: 125 LBS | HEART RATE: 101 BPM

## 2019-06-03 DIAGNOSIS — M54.2 CHRONIC NECK PAIN: ICD-10-CM

## 2019-06-03 DIAGNOSIS — F17.200 SMOKER: ICD-10-CM

## 2019-06-03 DIAGNOSIS — G47.00 INSOMNIA, UNSPECIFIED TYPE: ICD-10-CM

## 2019-06-03 DIAGNOSIS — R22.31 MASS OF FINGER OF RIGHT HAND: ICD-10-CM

## 2019-06-03 DIAGNOSIS — G89.29 CHRONIC NECK PAIN: ICD-10-CM

## 2019-06-03 DIAGNOSIS — Z96.612 H/O TOTAL SHOULDER REPLACEMENT, LEFT: ICD-10-CM

## 2019-06-03 DIAGNOSIS — F11.90 CHRONIC, CONTINUOUS USE OF OPIOIDS: Primary | ICD-10-CM

## 2019-06-03 DIAGNOSIS — Z12.31 ENCOUNTER FOR SCREENING MAMMOGRAM FOR BREAST CANCER: ICD-10-CM

## 2019-06-03 PROCEDURE — 99214 OFFICE O/P EST MOD 30 MIN: CPT | Performed by: FAMILY MEDICINE

## 2019-06-03 RX ORDER — ZOLPIDEM TARTRATE 5 MG/1
5 TABLET ORAL
Qty: 30 TABLET | Refills: 2 | Status: SHIPPED | OUTPATIENT
Start: 2019-06-03 | End: 2019-07-30

## 2019-06-03 RX ORDER — HYDROCODONE BITARTRATE AND ACETAMINOPHEN 5; 325 MG/1; MG/1
1 TABLET ORAL EVERY 6 HOURS PRN
Qty: 120 TABLET | Refills: 0 | Status: SHIPPED | OUTPATIENT
Start: 2019-07-29 | End: 2019-09-05

## 2019-06-03 RX ORDER — HYDROCODONE BITARTRATE AND ACETAMINOPHEN 5; 325 MG/1; MG/1
1 TABLET ORAL EVERY 6 HOURS PRN
Qty: 120 TABLET | Refills: 0 | Status: SHIPPED | OUTPATIENT
Start: 2019-06-03 | End: 2019-07-25

## 2019-06-03 RX ORDER — VARENICLINE TARTRATE 1 MG/1
1 TABLET, FILM COATED ORAL 2 TIMES DAILY
Qty: 60 TABLET | Refills: 2 | Status: SHIPPED | OUTPATIENT
Start: 2019-06-03 | End: 2019-09-05

## 2019-06-03 RX ORDER — HYDROCODONE BITARTRATE AND ACETAMINOPHEN 5; 325 MG/1; MG/1
1 TABLET ORAL EVERY 6 HOURS PRN
Qty: 120 TABLET | Refills: 0 | Status: SHIPPED | OUTPATIENT
Start: 2019-07-01 | End: 2019-09-05

## 2019-06-03 ASSESSMENT — PAIN SCALES - GENERAL: PAINLEVEL: MODERATE PAIN (5)
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
## 2019-07-26 DIAGNOSIS — G47.00 INSOMNIA, UNSPECIFIED TYPE: ICD-10-CM

## 2019-07-26 NOTE — TELEPHONE ENCOUNTER
Requested Prescriptions   Pending Prescriptions Disp Refills     zolpidem (AMBIEN) 5 MG tablet 30 tablet 2     Sig: Take 1 tablet (5 mg) by mouth nightly as needed for sleep       There is no refill protocol information for this order         Last Written Prescription Date:  6-3-19  Last Fill Quantity: 30,   # refills: 2  Last Office Visit: 6-3-19  Future Office visit:    Next 5 appointments (look out 90 days)    Aug 13, 2019  1:20 PM CDT  Samm Ward with Freddie Mesa MD  [facility] [address] [place] [id number] [phone]           Routing refill request to provider for review/approval because:  Drug not on the [facility], [facility] or [facility] refill protocol or controlled substance

## 2019-07-30 RX ORDER — ZOLPIDEM TARTRATE 5 MG/1
5 TABLET ORAL
Qty: 30 TABLET | Refills: 0 | Status: SHIPPED | OUTPATIENT
Start: 2019-07-30 | End: 2019-08-26

## 2019-08-08 ENCOUNTER — TELEPHONE (OUTPATIENT)
Dept: FAMILY MEDICINE | Facility: CLINIC | Age: 69
End: 2019-08-08

## 2019-08-08 DIAGNOSIS — Z12.31 ENCOUNTER FOR SCREENING MAMMOGRAM FOR BREAST CANCER: Primary | ICD-10-CM

## 2019-08-08 NOTE — TELEPHONE ENCOUNTER
Panel Management Review      Patient has the following on her problem list:    COPD  Diagnosis date:    Is this diagnosis new within the last year?   NO   Was spirometry completed?      Composite cancer screening  Chart review shows that this patient is due/due soon for the following Mammogram  Summary:    Patient is due/failing the following:   MAMMOGRAM    Action needed:   Schedule mammogram    Type of outreach:    Sent letter.    Questions for provider review:    None                                                                                                                                    Amanda Wilder MA       Chart routed to Care Team .

## 2019-08-08 NOTE — LETTER
August 8, 2019    Beatriz Francis  [address] [place] [id number]    Dear Beatriz    We care about your health and have reviewed your health plan. We have reviewed your medical conditions, medication list, and lab results and are making recommendations based on this review, to better manage your health.    You are in particular need of attention regarding:  - Scheduling a Breast Cancer Screening (Mammography) [phone]      Here is a list of Health Maintenance topics that are due now or due soon:  Health Maintenance Due   Topic Date Due     ZOSTER IMMUNIZATION (2 of 3) 03/13/2015     HIRAL ASSESSMENT  02/05/2019     PHQ-9  02/05/2019     MAMMO SCREENING  04/24/2019       Please call us at [phone] (or use [facility]) to address the above recommendations. If we do not hear from you in the next couple of weeks we will be reaching out to you again.    Thank you for trusting [facility] and we appreciate the opportunity to serve you.  We look forward to supporting your healthcare needs in the future.    Healthy Regards,    Your Care Team

## 2019-08-26 DIAGNOSIS — G89.29 CHRONIC NECK PAIN: ICD-10-CM

## 2019-08-26 DIAGNOSIS — G47.00 INSOMNIA, UNSPECIFIED TYPE: ICD-10-CM

## 2019-08-26 DIAGNOSIS — F11.90 CHRONIC, CONTINUOUS USE OF OPIOIDS: ICD-10-CM

## 2019-08-26 DIAGNOSIS — M54.2 CHRONIC NECK PAIN: ICD-10-CM

## 2019-08-26 RX ORDER — HYDROCODONE BITARTRATE AND ACETAMINOPHEN 5; 325 MG/1; MG/1
1 TABLET ORAL EVERY 6 HOURS PRN
Qty: 120 TABLET | Refills: 0 | Status: SHIPPED | OUTPATIENT
Start: 2019-08-26 | End: 2019-09-05

## 2019-08-26 RX ORDER — ZOLPIDEM TARTRATE 5 MG/1
5 TABLET ORAL
Qty: 30 TABLET | Refills: 0 | Status: SHIPPED | OUTPATIENT
Start: 2019-08-26 | End: 2019-09-23

## 2019-08-26 NOTE — TELEPHONE ENCOUNTER
It appears patient is due for a recheck within the next month.  Rx written for hydrocodone per previous directions.  No refills after this date without being seen.

## 2019-08-26 NOTE — TELEPHONE ENCOUNTER
Requested Prescriptions   Pending Prescriptions Disp Refills     zolpidem (AMBIEN) 5 MG tablet 30 tablet 0     Sig: Take 1 tablet (5 mg) by mouth nightly as needed for sleep       There is no refill protocol information for this order         Last Written Prescription Date:  7-30-19  Last Fill Quantity: 30,   # refills: 0  Last Office Visit: 6-3-19  Future Office visit:    Next 5 appointments (look out 90 days)    Sep 05, 2019 11:00 AM CDT  Samm Ward with Freddie Mesa MD  [facility] ([facility]) [address] [place] [id number] [phone]           Routing refill request to provider for review/approval because:  Drug not on the [facility], [facility] or [facility] refill protocol or controlled substance

## 2019-08-26 NOTE — TELEPHONE ENCOUNTER
Requested Prescriptions   Pending Prescriptions Disp Refills     HYDROcodone-acetaminophen (NORCO) 5-325 MG tablet 120 tablet 0     Sig: Take 1 tablet by mouth every 6 hours as needed for pain Max 4 tablets/day.       There is no refill protocol information for this order         Last Written Prescription Date:  7-29-19  Last Fill Quantity: 120,   # refills: 0  Last Office Visit: 6-3-19  Future Office visit:    Next 5 appointments (look out 90 days)    Sep 05, 2019 11:00 AM CDT  Samm Ward with Freddie Mesa MD  [facility] [address] [place] [id number] [phone]           Routing refill request to provider for review/approval because:  Drug not on the [facility], [facility] or [facility] refill protocol or controlled substance

## 2019-08-26 NOTE — TELEPHONE ENCOUNTER
I have re filled the patient's Ambien.  They have not change the dose, and she is due for a follow-up next month for her chronic pain and insomnia medications.

## 2019-09-05 ENCOUNTER — OFFICE VISIT (OUTPATIENT)
Dept: FAMILY MEDICINE | Facility: CLINIC | Age: 69
End: 2019-09-05
Payer: COMMERCIAL

## 2019-09-05 VITALS
HEART RATE: 85 BPM | OXYGEN SATURATION: 97 % | WEIGHT: 122 LBS | BODY MASS INDEX: 20.3 KG/M2 | TEMPERATURE: 97.9 F | DIASTOLIC BLOOD PRESSURE: 73 MMHG | SYSTOLIC BLOOD PRESSURE: 112 MMHG

## 2019-09-05 DIAGNOSIS — G89.29 OTHER CHRONIC PAIN: ICD-10-CM

## 2019-09-05 DIAGNOSIS — G89.29 CHRONIC NECK PAIN: ICD-10-CM

## 2019-09-05 DIAGNOSIS — M54.2 CHRONIC NECK PAIN: ICD-10-CM

## 2019-09-05 DIAGNOSIS — F11.90 CHRONIC, CONTINUOUS USE OF OPIOIDS: Primary | ICD-10-CM

## 2019-09-05 PROCEDURE — 99213 OFFICE O/P EST LOW 20 MIN: CPT | Performed by: FAMILY MEDICINE

## 2019-09-05 PROCEDURE — 99000 SPECIMEN HANDLING OFFICE-LAB: CPT | Performed by: FAMILY MEDICINE

## 2019-09-05 PROCEDURE — 80307 DRUG TEST PRSMV CHEM ANLYZR: CPT | Mod: 90 | Performed by: FAMILY MEDICINE

## 2019-09-05 RX ORDER — HYDROCODONE BITARTRATE AND ACETAMINOPHEN 5; 325 MG/1; MG/1
1 TABLET ORAL EVERY 6 HOURS PRN
Qty: 120 TABLET | Refills: 0 | Status: SHIPPED | OUTPATIENT
Start: 2019-09-05 | End: 2019-10-21

## 2019-09-05 RX ORDER — MELOXICAM 15 MG/1
15 TABLET ORAL DAILY PRN
Qty: 90 TABLET | Refills: 1 | Status: SHIPPED | OUTPATIENT
Start: 2019-09-05 | End: 2020-11-15

## 2019-09-05 ASSESSMENT — ANXIETY QUESTIONNAIRES
1. FEELING NERVOUS, ANXIOUS, OR ON EDGE: NOT AT ALL
2. NOT BEING ABLE TO STOP OR CONTROL WORRYING: NOT AT ALL
6. BECOMING EASILY ANNOYED OR IRRITABLE: SEVERAL DAYS
5. BEING SO RESTLESS THAT IT IS HARD TO SIT STILL: NOT AT ALL
3. WORRYING TOO MUCH ABOUT DIFFERENT THINGS: SEVERAL DAYS
7. FEELING AFRAID AS IF SOMETHING AWFUL MIGHT HAPPEN: NOT AT ALL
IF YOU CHECKED OFF ANY PROBLEMS ON THIS QUESTIONNAIRE, HOW DIFFICULT HAVE THESE PROBLEMS MADE IT FOR YOU TO DO YOUR WORK, TAKE CARE OF THINGS AT HOME, OR GET ALONG WITH OTHER PEOPLE: NOT DIFFICULT AT ALL
GAD7 TOTAL SCORE: 3

## 2019-09-05 ASSESSMENT — PATIENT HEALTH QUESTIONNAIRE - PHQ9
5. POOR APPETITE OR OVEREATING: SEVERAL DAYS
SUM OF ALL RESPONSES TO PHQ QUESTIONS 1-9: 5

## 2019-09-05 ASSESSMENT — PAIN SCALES - GENERAL: PAINLEVEL: MODERATE PAIN (4)
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
## 2019-09-06 ASSESSMENT — ANXIETY QUESTIONNAIRES: GAD7 TOTAL SCORE: 3

## 2019-09-09 LAB — COMPREHEN DRUG ANALYSIS UR: NORMAL

## 2019-09-12 NOTE — TELEPHONE ENCOUNTER
9/12/19- Spoke with pt, scheduled mammogram at [place] next Friday @11:00.    Routing to Dr. Mesa to sign order. She is on HM for every 2 years, previous one was done 4/24/17.    Amanda Wilder MA

## 2019-09-23 DIAGNOSIS — G47.00 INSOMNIA, UNSPECIFIED TYPE: ICD-10-CM

## 2019-09-23 RX ORDER — ZOLPIDEM TARTRATE 5 MG/1
5 TABLET ORAL
Qty: 30 TABLET | Refills: 0 | Status: SHIPPED | OUTPATIENT
Start: 2019-09-23 | End: 2019-10-21

## 2019-09-23 RX ORDER — ZOLPIDEM TARTRATE 5 MG/1
5 TABLET ORAL
Qty: 30 TABLET | Refills: 0 | Status: CANCELLED | OUTPATIENT
Start: 2019-09-23

## 2019-09-23 NOTE — TELEPHONE ENCOUNTER
Requested Prescriptions   Pending Prescriptions Disp Refills     zolpidem (AMBIEN) 5 MG tablet 30 tablet 0     Sig: Take 1 tablet (5 mg) by mouth nightly as needed for sleep       There is no refill protocol information for this order         Last Written Prescription Date:  8-26-19  Last Fill Quantity: 30,   # refills: 0  Last Office Visit: 9-5-19  Future Office visit:       Routing refill request to provider for review/approval because:  Drug not on the [facility], P or [facility] refill protocol or controlled substance

## 2019-09-29 ENCOUNTER — HEALTH MAINTENANCE LETTER (OUTPATIENT)
Age: 69
End: 2019-09-29

## 2019-10-21 DIAGNOSIS — F11.90 CHRONIC, CONTINUOUS USE OF OPIOIDS: ICD-10-CM

## 2019-10-21 DIAGNOSIS — G89.29 CHRONIC NECK PAIN: ICD-10-CM

## 2019-10-21 DIAGNOSIS — M54.2 CHRONIC NECK PAIN: ICD-10-CM

## 2019-10-21 DIAGNOSIS — G47.00 INSOMNIA, UNSPECIFIED TYPE: ICD-10-CM

## 2019-10-21 NOTE — TELEPHONE ENCOUNTER
Requested Prescriptions   Pending Prescriptions Disp Refills     HYDROcodone-acetaminophen (NORCO) 5-325 MG tablet 120 tablet 0     Sig: Take 1 tablet by mouth every 6 hours as needed for pain Max 4 tablets/day.         Last Written Prescription Date:  9-5-19  Last Fill Quantity: 20,   # refills: 0  Last Office Visit: 9-5-19  Future Office visit:       Routing refill request to provider for review/approval because:  Drug not on the [facility], P or  Health refill protocol or controlled substance      There is no refill protocol information for this order        zolpidem (AMBIEN) 5 MG tablet 30 tablet 0     Sig: Take 1 tablet (5 mg) by mouth nightly as needed for sleep         Last Written Prescription Date:  9-23-19  Last Fill Quantity: 30,   # refills: 0  Last Office Visit:   Future Office visit:       Routing refill request to provider for review/approval because:  Drug not on the [facility], [facility] or  Health refill protocol or controlled substance      There is no refill protocol information for this order

## 2019-10-22 RX ORDER — HYDROCODONE BITARTRATE AND ACETAMINOPHEN 5; 325 MG/1; MG/1
1 TABLET ORAL EVERY 6 HOURS PRN
Qty: 120 TABLET | Refills: 0 | Status: SHIPPED | OUTPATIENT
Start: 2019-10-22 | End: 2019-11-20

## 2019-10-22 RX ORDER — ZOLPIDEM TARTRATE 5 MG/1
5 TABLET ORAL
Qty: 30 TABLET | Refills: 0 | Status: SHIPPED | OUTPATIENT
Start: 2019-10-22 | End: 2019-11-20

## 2019-11-20 DIAGNOSIS — F11.90 CHRONIC, CONTINUOUS USE OF OPIOIDS: ICD-10-CM

## 2019-11-20 DIAGNOSIS — M54.2 CHRONIC NECK PAIN: ICD-10-CM

## 2019-11-20 DIAGNOSIS — G47.00 INSOMNIA, UNSPECIFIED TYPE: ICD-10-CM

## 2019-11-20 DIAGNOSIS — G89.29 CHRONIC NECK PAIN: ICD-10-CM

## 2019-11-20 RX ORDER — HYDROCODONE BITARTRATE AND ACETAMINOPHEN 5; 325 MG/1; MG/1
1 TABLET ORAL EVERY 6 HOURS PRN
Qty: 120 TABLET | Refills: 0 | Status: SHIPPED | OUTPATIENT
Start: 2019-11-20 | End: 2019-12-19

## 2019-11-20 RX ORDER — ZOLPIDEM TARTRATE 5 MG/1
5 TABLET ORAL
Qty: 30 TABLET | Refills: 0 | Status: SHIPPED | OUTPATIENT
Start: 2019-11-20 | End: 2019-12-17

## 2019-11-20 NOTE — TELEPHONE ENCOUNTER
Requested Prescriptions   Pending Prescriptions Disp Refills     HYDROcodone-acetaminophen (NORCO) 5-325 MG tablet 120 tablet 0     Sig: Take 1 tablet by mouth every 6 hours as needed for pain Max 4 tablets/day.       There is no refill protocol information for this order         Last Written Prescription Date:  10/22/19  Last Fill Quantity: 120,   # refills: 0  Last Office Visit: 9/5/19  Future Office visit:       Routing refill request to provider for review/approval because:  Drug not on the [facility], P or [facility] refill protocol or controlled substance

## 2019-12-16 ENCOUNTER — TELEPHONE (OUTPATIENT)
Dept: FAMILY MEDICINE | Facility: CLINIC | Age: 69
End: 2019-12-16

## 2019-12-16 DIAGNOSIS — G47.00 INSOMNIA, UNSPECIFIED TYPE: ICD-10-CM

## 2019-12-16 NOTE — TELEPHONE ENCOUNTER
Requested Prescriptions   Pending Prescriptions Disp Refills     zolpidem (AMBIEN) 5 MG tablet 30 tablet 0     Sig: Take 1 tablet (5 mg) by mouth nightly as needed for sleep       There is no refill protocol information for this order         Last Written Prescription Date:  11-20-19  Last Fill Quantity: 30,   # refills: 0  Last Office Visit: 9-5-19  Future Office visit:    Next 5 appointments (look out 90 days)    Dec 30, 2019 11:40 AM CST  Samm Ward with Freddie Mesa MD  [facility] ([facility]) [address] [place] [id number] [phone]           Routing refill request to provider for review/approval because:  Drug not on the [facility], [facility] or [facility] refill protocol or controlled substance

## 2019-12-17 RX ORDER — ZOLPIDEM TARTRATE 5 MG/1
5 TABLET ORAL
Qty: 30 TABLET | Refills: 0 | Status: SHIPPED | OUTPATIENT
Start: 2019-12-17 | End: 2020-01-13

## 2019-12-17 NOTE — TELEPHONE ENCOUNTER
Routing refill request to provider for review/approval because:  Drug not on the [facility] refill protocol       Cassandra Crespo RN  [facility]

## 2019-12-19 DIAGNOSIS — M54.2 CHRONIC NECK PAIN: ICD-10-CM

## 2019-12-19 DIAGNOSIS — G89.29 CHRONIC NECK PAIN: ICD-10-CM

## 2019-12-19 DIAGNOSIS — F11.90 CHRONIC, CONTINUOUS USE OF OPIOIDS: ICD-10-CM

## 2019-12-19 RX ORDER — HYDROCODONE BITARTRATE AND ACETAMINOPHEN 5; 325 MG/1; MG/1
1 TABLET ORAL EVERY 6 HOURS PRN
Qty: 120 TABLET | Refills: 0 | Status: SHIPPED | OUTPATIENT
Start: 2019-12-19 | End: 2020-01-13

## 2019-12-19 NOTE — TELEPHONE ENCOUNTER
Routing refill request to provider for review/approval because:  Drug not on the [facility] refill protocol       Haroon Chanel  [facility]p Refill 4 minutes ago (10:08 AM)      Tried to send this on Monday, but system issues must have prevented it from getting to you. Please send ASAP as pt is due for it tomorrow.    Routing comment          Ade Cleary RN

## 2020-01-13 ENCOUNTER — OFFICE VISIT (OUTPATIENT)
Dept: FAMILY MEDICINE | Facility: CLINIC | Age: 70
End: 2020-01-13
Payer: COMMERCIAL

## 2020-01-13 VITALS
SYSTOLIC BLOOD PRESSURE: 123 MMHG | WEIGHT: 125 LBS | HEIGHT: 65 IN | DIASTOLIC BLOOD PRESSURE: 87 MMHG | OXYGEN SATURATION: 97 % | TEMPERATURE: 98.4 F | HEART RATE: 100 BPM | BODY MASS INDEX: 20.83 KG/M2

## 2020-01-13 DIAGNOSIS — G47.00 INSOMNIA, UNSPECIFIED TYPE: ICD-10-CM

## 2020-01-13 DIAGNOSIS — M54.2 CHRONIC NECK PAIN: ICD-10-CM

## 2020-01-13 DIAGNOSIS — F11.90 CHRONIC, CONTINUOUS USE OF OPIOIDS: ICD-10-CM

## 2020-01-13 DIAGNOSIS — Z23 NEED FOR PROPHYLACTIC VACCINATION AND INOCULATION AGAINST INFLUENZA: ICD-10-CM

## 2020-01-13 DIAGNOSIS — G89.29 CHRONIC NECK PAIN: ICD-10-CM

## 2020-01-13 DIAGNOSIS — J43.9 PULMONARY EMPHYSEMA, UNSPECIFIED EMPHYSEMA TYPE (H): ICD-10-CM

## 2020-01-13 DIAGNOSIS — J30.1 SEASONAL ALLERGIC RHINITIS DUE TO POLLEN: ICD-10-CM

## 2020-01-13 DIAGNOSIS — B00.1 RECURRENT COLD SORES: Primary | ICD-10-CM

## 2020-01-13 DIAGNOSIS — Z23 NEED FOR VACCINATION FOR STREP PNEUMONIAE: ICD-10-CM

## 2020-01-13 PROCEDURE — 90732 PPSV23 VACC 2 YRS+ SUBQ/IM: CPT | Performed by: FAMILY MEDICINE

## 2020-01-13 PROCEDURE — G0009 ADMIN PNEUMOCOCCAL VACCINE: HCPCS | Performed by: FAMILY MEDICINE

## 2020-01-13 PROCEDURE — 99214 OFFICE O/P EST MOD 30 MIN: CPT | Mod: 25 | Performed by: FAMILY MEDICINE

## 2020-01-13 PROCEDURE — 90662 IIV NO PRSV INCREASED AG IM: CPT | Performed by: FAMILY MEDICINE

## 2020-01-13 PROCEDURE — G0008 ADMIN INFLUENZA VIRUS VAC: HCPCS | Performed by: FAMILY MEDICINE

## 2020-01-13 RX ORDER — ZOLPIDEM TARTRATE 10 MG/1
5-10 TABLET ORAL
Qty: 30 TABLET | Refills: 0 | Status: SHIPPED | OUTPATIENT
Start: 2020-01-13 | End: 2020-02-12

## 2020-01-13 RX ORDER — FLUTICASONE PROPIONATE 50 MCG
1-2 SPRAY, SUSPENSION (ML) NASAL DAILY
Qty: 18.2 ML | Refills: 11 | Status: SHIPPED | OUTPATIENT
Start: 2020-01-13 | End: 2021-03-24

## 2020-01-13 RX ORDER — HYDROCODONE BITARTRATE AND ACETAMINOPHEN 5; 325 MG/1; MG/1
1 TABLET ORAL EVERY 6 HOURS PRN
Qty: 120 TABLET | Refills: 0 | Status: SHIPPED | OUTPATIENT
Start: 2020-01-13 | End: 2020-02-12

## 2020-01-13 RX ORDER — ACYCLOVIR 50 MG/G
OINTMENT TOPICAL
Qty: 15 G | Refills: 11 | Status: SHIPPED | OUTPATIENT
Start: 2020-01-13 | End: 2022-07-25

## 2020-01-13 ASSESSMENT — PAIN SCALES - GENERAL: PAINLEVEL: MODERATE PAIN (4)

## 2020-01-13 ASSESSMENT — MIFFLIN-ST. JEOR: SCORE: 1102.88

## 2020-01-13 NOTE — NURSING NOTE
Prior to immunization administration, verified patients identity using patient s name and date of birth. Please see Immunization Activity for additional information.     Screening Questionnaire for Adult Immunization    Are you sick today?   No   Do you have allergies to medications, food, a vaccine component or latex?   No   Have you ever had a serious reaction after receiving a vaccination?   No   Do you have a long-term health problem with heart, lung, kidney, or metabolic disease (e.g., diabetes), asthma, a blood disorder, no spleen, complement component deficiency, a cochlear implant, or a spinal fluid leak?  Are you on long-term aspirin therapy?   No   Do you have cancer, leukemia, HIV/AIDS, or any other immune system problem?   No   Do you have a parent, brother, or sister with an immune system problem?   No   In the past 3 months, have you taken medications that affect  your immune system, such as prednisone, other steroids, or anticancer drugs; drugs for the treatment of rheumatoid arthritis, Crohn s disease, or psoriasis; or have you had radiation treatments?   No   Have you had a seizure, or a brain or other nervous system problem?   No   During the past year, have you received a transfusion of blood or blood    products, or been given immune (gamma) globulin or antiviral drug?   No   For women: Are you pregnant or is there a chance you could become       pregnant during the next month?   No   Have you received any vaccinations in the past 4 weeks?   No     Immunization questionnaire answers were all negative.        Per orders of Dr. Mesa, injection of PCV23 and FLU given by Amanda Wilder. Patient instructed to remain in clinic for 15 minutes afterwards, and to report any adverse reaction to me immediately.    Vaccine information supplied.       Screening performed by Amanda Wilder on 1/13/2020 at 2:43 PM.
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
## 2020-01-14 ENCOUNTER — TELEPHONE (OUTPATIENT)
Dept: FAMILY MEDICINE | Facility: CLINIC | Age: 70
End: 2020-01-14

## 2020-01-14 NOTE — TELEPHONE ENCOUNTER
Prior Authorization Retail Medication Request    Medication/Dose: acyclovir  ICD code (if different than what is on RX):    Previously Tried and Failed:    Rationale:      Insurance Name:    Insurance ID:        Pharmacy Information (if different than what is on RX)  Name:    Phone:

## 2020-01-15 NOTE — TELEPHONE ENCOUNTER
Central Prior Authorization Team   Phone: [phone]    PA Initiation    Medication: acyclovir  Insurance Company: [employer] [place] - Phone [phone] Fax [phone]  Pharmacy Filling the Rx: [facility] [place], [place] - [address]  Filling Pharmacy Phone: [phone]  Filling Pharmacy Fax: [phone]  Start Date: 1/15/2020

## 2020-01-20 ENCOUNTER — TELEPHONE (OUTPATIENT)
Dept: FAMILY MEDICINE | Facility: CLINIC | Age: 70
End: 2020-01-20

## 2020-01-20 DIAGNOSIS — B00.1 RECURRENT COLD SORES: Primary | ICD-10-CM

## 2020-01-20 RX ORDER — ACYCLOVIR 400 MG/1
400 TABLET ORAL EVERY 8 HOURS
Qty: 15 TABLET | Refills: 11 | Status: SHIPPED | OUTPATIENT
Start: 2020-01-20 | End: 2021-08-09

## 2020-01-20 NOTE — TELEPHONE ENCOUNTER
Insurance requires her to try the Ayclovir tablets first before they will cover the ointment- - please send us a rx for the tablets. (cash price for the ointment $294.43)  Thanks!  Eva Ortiz CPhT  [facility]  [phone]

## 2020-01-20 NOTE — TELEPHONE ENCOUNTER
PRIOR AUTHORIZATION DENIED    Medication: acyclovir-DENIED    Denial Date: 1/16/2020    Denial Rational: PATIENT MUST TRY/FAIL FORMULARY ALTERNATIVES -  VALACYCLOVIR TAB      Appeal Information:  IF PATIENT IS UNABLE TO TRY/FAIL ALTERNATIVE(S) PLEASE SUPPLY PA TEAM WITH A LETTER OF MEDICAL NECESSITY WITH CLINICAL REASON.    PLEASE CALL NUMBER ON THE BACK OF INSURANCE CARD.

## 2020-01-20 NOTE — TELEPHONE ENCOUNTER
Relayed MD message to patient. Patient verbalizes understanding and states there are no further questions.      Tracie De Los Santos RN on 1/20/2020 at 3:43 PM

## 2020-01-20 NOTE — TELEPHONE ENCOUNTER
New script with refills sent to the pharmacy.    Please call patient to advise.    Freddie Mesa MD

## 2020-01-20 NOTE — TELEPHONE ENCOUNTER
Please call Beatriz and let her know the acyclovir ointment will not be covered by her insurance.  She can pay cash for it or try an oral medication.  Please call to find out what she prefers.  If she wants an oral medication, then I can send that to the pharmacy of her choice.    Thanks,  Freddie Mesa MD

## 2020-01-20 NOTE — TELEPHONE ENCOUNTER
Reason for Call:  Other returning call    Detailed comments: Patient calling back to OK the medication for Ayclovir tablets. Please advise.     Phone Number Patient can be reached at: Home number on file [phone] (home)    Best Time: Anytime    Can we leave a detailed message on this number? YES    Call taken on 1/20/2020 at 2:27 PM by Sapna Doran

## 2020-01-20 NOTE — TELEPHONE ENCOUNTER
1/20/20- Spoke with patient, she is going to see how much it will cost out of pocket for topical, if it is too much she will call back and request oral to our [place] pharmacy here.    Amanda Wilder MA

## 2020-01-21 ENCOUNTER — TELEPHONE (OUTPATIENT)
Dept: FAMILY MEDICINE | Facility: CLINIC | Age: 70
End: 2020-01-21

## 2020-01-21 NOTE — TELEPHONE ENCOUNTER
Panel Management Review      Patient has the following on her problem list: None      Composite cancer screening  Chart review shows that this patient is due/due soon for the following Mammogram  Summary:    Patient is due/failing the following:   MAMMOGRAM    Action needed:   Schedule mammogram.    Type of outreach:    Sent letter.    Questions for provider review:    None                                                                                                                                    Amanda Wilder MA       Chart routed to Care Team .

## 2020-01-21 NOTE — LETTER
January 21, 2020    Beatriz Francis  [address] [place] [id number]    Dear Beatriz    We care about your health and have reviewed your health plan. We have reviewed your medical conditions, medication list, and lab results and are making recommendations based on this review, to better manage your health.    You are in particular need of attention regarding:  - Scheduling a Breast Cancer Screening (Mammography) [phone]      Here is a list of Health Maintenance topics that are due now or due soon:  Health Maintenance Due   Topic Date Due     ZOSTER IMMUNIZATION (2 of 3) 03/13/2015     MAMMO SCREENING  04/24/2019     TOBACCO CESSATION COUNSELING  09/10/2019     MEDICARE ANNUAL WELLNESS VISIT  10/24/2019     LIPID  10/24/2019     FALL RISK ASSESSMENT  10/24/2019     CBC  10/24/2019     PHQ-2  01/01/2020     ADVANCE CARE PLANNING  02/19/2020       Please call us at [phone] (or use Art [employer]) to address the above recommendations. If we do not hear from you in the next couple of weeks we will be reaching out to you again.    Thank you for trusting [facility] and we appreciate the opportunity to serve you.  We look forward to supporting your healthcare needs in the future.    Healthy Regards,    Your Care Team

## 2020-01-29 DIAGNOSIS — K21.9 GASTROESOPHAGEAL REFLUX DISEASE WITHOUT ESOPHAGITIS: Primary | ICD-10-CM

## 2020-01-29 NOTE — TELEPHONE ENCOUNTER
"Requested Prescriptions   Pending Prescriptions Disp Refills     omeprazole (PRILOSEC) 20 MG DR capsule  0     Sig: Take 1 capsule (20 mg) by mouth daily         Last Written Prescription Date:  na  Last Fill Quantity: na,   # refills: na  Last Office Visit: na  Future Office visit:       Routing refill request to provider for review/approval because:  Medication is reported/historical      PPI Protocol Passed - 1/29/2020  8:50 AM        Passed - Not on Clopidogrel (unless Pantoprazole ordered)        Passed - No diagnosis of osteoporosis on record        Passed - Recent (12 mo) or future (30 days) visit within the authorizing provider's specialty     Patient has had an office visit with the authorizing provider or a provider within the authorizing providers department within the previous 12 mos or has a future within next 30 days. See \"Patient Info\" tab in inbasket, or \"Choose Columns\" in Meds & Orders section of the refill encounter.              Passed - Medication is active on med list        Passed - Patient is age 18 or older        Passed - No active pregnacy on record        Passed - No positive pregnancy test in past 12 months          "

## 2020-02-04 ASSESSMENT — ASTHMA QUESTIONNAIRES
ACT_TOTALSCORE: 13
QUESTION_4 LAST FOUR WEEKS HOW OFTEN HAVE YOU USED YOUR RESCUE INHALER OR NEBULIZER MEDICATION (SUCH AS ALBUTEROL): ONE OR TWO TIMES PER DAY
QUESTION_3 LAST FOUR WEEKS HOW OFTEN DID YOUR ASTHMA SYMPTOMS (WHEEZING, COUGHING, SHORTNESS OF BREATH, CHEST TIGHTNESS OR PAIN) WAKE YOU UP AT NIGHT OR EARLIER THAN USUAL IN THE MORNING: TWO OR THREE NIGHTS A WEEK
QUESTION_2 LAST FOUR WEEKS HOW OFTEN HAVE YOU HAD SHORTNESS OF BREATH: ONCE A DAY
QUESTION_5 LAST FOUR WEEKS HOW WOULD YOU RATE YOUR ASTHMA CONTROL: SOMEWHAT CONTROLLED
QUESTION_1 LAST FOUR WEEKS HOW MUCH OF THE TIME DID YOUR ASTHMA KEEP YOU FROM GETTING AS MUCH DONE AT WORK, SCHOOL OR AT HOME: A LITTLE OF THE TIME

## 2020-02-05 ASSESSMENT — ASTHMA QUESTIONNAIRES: ACT_TOTALSCORE: 13

## 2020-02-12 DIAGNOSIS — M54.2 CHRONIC NECK PAIN: ICD-10-CM

## 2020-02-12 DIAGNOSIS — F11.90 CHRONIC, CONTINUOUS USE OF OPIOIDS: ICD-10-CM

## 2020-02-12 DIAGNOSIS — G89.29 CHRONIC NECK PAIN: ICD-10-CM

## 2020-02-12 DIAGNOSIS — G47.00 INSOMNIA, UNSPECIFIED TYPE: ICD-10-CM

## 2020-02-12 RX ORDER — HYDROCODONE BITARTRATE AND ACETAMINOPHEN 5; 325 MG/1; MG/1
1 TABLET ORAL EVERY 6 HOURS PRN
Qty: 120 TABLET | Refills: 0 | Status: SHIPPED | OUTPATIENT
Start: 2020-02-12 | End: 2020-03-11

## 2020-02-12 RX ORDER — ZOLPIDEM TARTRATE 10 MG/1
5-10 TABLET ORAL
Qty: 30 TABLET | Refills: 5 | Status: SHIPPED | OUTPATIENT
Start: 2020-02-12 | End: 2020-04-06

## 2020-02-12 NOTE — TELEPHONE ENCOUNTER
Requested Prescriptions   Pending Prescriptions Disp Refills     HYDROcodone-acetaminophen (NORCO) 5-325 MG tablet 120 tablet 0     Sig: Take 1 tablet by mouth every 6 hours as needed for pain Max 4 tablets/day.       There is no refill protocol information for this order         Last Written Prescription Date:  1/13/2020  Last Fill Quantity: 120,   # refills: 0  Last Office Visit: 1/13/2020  Future Office visit:       Routing refill request to provider for review/approval because:  Drug not on the [facility], P or [facility] refill protocol or controlled substance

## 2020-02-12 NOTE — TELEPHONE ENCOUNTER
Requested Prescriptions   Pending Prescriptions Disp Refills     zolpidem (AMBIEN) 10 MG tablet 30 tablet 0     Sig: Take 0.5-1 tablets (5-10 mg) by mouth nightly as needed for sleep       There is no refill protocol information for this order         Last Written Prescription Date:  1/13/2020  Last Fill Quantity: 30,   # refills: 0  Last Office Visit: 1/13/2020  Future Office visit:       Routing refill request to provider for review/approval because:  Drug not on the [facility], P or [facility] refill protocol or controlled substance

## 2020-03-05 NOTE — TELEPHONE ENCOUNTER
Brief note.     Patient called from home. Stated that PO Dilaudid is causing her intolerable reflux. Strongly requesting switch to Percocet.     Discussed with Dr. Morton. Dr. Morton ok with switch.     Dr. Martinez to sign script. Patient will have her  drive to  the prescription at [address] [place] [address] Pharmacy.     Questions and concerns addressed.     Freddie Rivas MD 2/18/2017  Orthopaedic Surgery Resident, PGY-4  Pager: [phone]       Femoral Nerve Single Shot     Patient location during procedure: pre-op   Block not for primary anesthetic.  Reason for block: at surgeon's request and post-op pain management   Post-op Pain Location: Left Leg  Start time: 3/5/2020 2:20 AM  Timeout: 3/5/2020 2:16 AM   End time: 3/5/2020 2:20 AM    Staffing  Authorizing Provider: Miya Alvarez MD  Performing Provider: Tello Collins MD    Preanesthetic Checklist  Completed: patient identified, site marked, surgical consent, pre-op evaluation, timeout performed, IV checked, risks and benefits discussed and monitors and equipment checked  Peripheral Block  Patient position: supine  Prep: ChloraPrep and site prepped and draped  Patient monitoring: heart rate, cardiac monitor, continuous pulse ox, continuous capnometry and frequent blood pressure checks  Block type: femoral  Laterality: left  Injection technique: single shot  Needle  Needle type: Stimuplex   Needle gauge: 20 G  Needle length: 2 in  Needle localization: anatomical landmarks and ultrasound guidance  Catheter type: spring wound  Catheter size: 19 G  Test dose: lidocaine 1.5% with Epi 1-to-200,000 and negative   -ultrasound image captured on disc.  Assessment  Injection assessment: negative aspiration, negative parasthesia and local visualized surrounding nerve  Paresthesia pain: none  Heart rate change: no  Slow fractionated injection: yes  Additional Notes  VSS.  [facility] RN monitoring vitals throughout procedure.  Patient tolerated procedure well.

## 2020-03-11 DIAGNOSIS — F11.90 CHRONIC, CONTINUOUS USE OF OPIOIDS: ICD-10-CM

## 2020-03-11 DIAGNOSIS — M54.2 CHRONIC NECK PAIN: ICD-10-CM

## 2020-03-11 DIAGNOSIS — G89.29 CHRONIC NECK PAIN: ICD-10-CM

## 2020-03-11 RX ORDER — HYDROCODONE BITARTRATE AND ACETAMINOPHEN 5; 325 MG/1; MG/1
1 TABLET ORAL EVERY 6 HOURS PRN
Qty: 120 TABLET | Refills: 0 | Status: SHIPPED | OUTPATIENT
Start: 2020-03-11 | End: 2020-04-06

## 2020-03-11 NOTE — TELEPHONE ENCOUNTER
Requested Prescriptions   Pending Prescriptions Disp Refills     HYDROcodone-acetaminophen (NORCO) 5-325 MG tablet 120 tablet 0     Sig: Take 1 tablet by mouth every 6 hours as needed for pain Max 4 tablets/day.       There is no refill protocol information for this order         Last Written Prescription Date:  2/12/20  Last Fill Quantity: 120,   # refills: 0  Last Office Visit: 1/13/20  Future Office visit:       Routing refill request to provider for review/approval because:  Drug not on the [facility], P or [facility] refill protocol or controlled substance

## 2020-03-15 ENCOUNTER — HEALTH MAINTENANCE LETTER (OUTPATIENT)
Age: 70
End: 2020-03-15

## 2020-03-31 DIAGNOSIS — J43.9 PULMONARY EMPHYSEMA, UNSPECIFIED EMPHYSEMA TYPE (H): ICD-10-CM

## 2020-03-31 RX ORDER — TIOTROPIUM BROMIDE 18 UG/1
1 CAPSULE ORAL; RESPIRATORY (INHALATION) DAILY
Qty: 90 CAPSULE | Refills: 11 | Status: SHIPPED | OUTPATIENT
Start: 2020-03-31 | End: 2021-06-25

## 2020-04-06 ENCOUNTER — VIRTUAL VISIT (OUTPATIENT)
Dept: FAMILY MEDICINE | Facility: CLINIC | Age: 70
End: 2020-04-06
Payer: COMMERCIAL

## 2020-04-06 DIAGNOSIS — F11.90 CHRONIC, CONTINUOUS USE OF OPIOIDS: ICD-10-CM

## 2020-04-06 DIAGNOSIS — G47.00 INSOMNIA, UNSPECIFIED TYPE: ICD-10-CM

## 2020-04-06 DIAGNOSIS — M54.2 CHRONIC NECK PAIN: ICD-10-CM

## 2020-04-06 DIAGNOSIS — G89.29 CHRONIC NECK PAIN: ICD-10-CM

## 2020-04-06 PROCEDURE — 99214 OFFICE O/P EST MOD 30 MIN: CPT | Mod: TEL | Performed by: FAMILY MEDICINE

## 2020-04-06 RX ORDER — HYDROCODONE BITARTRATE AND ACETAMINOPHEN 5; 325 MG/1; MG/1
1 TABLET ORAL EVERY 6 HOURS PRN
Qty: 120 TABLET | Refills: 0 | Status: SHIPPED | OUTPATIENT
Start: 2020-04-06 | End: 2020-05-08

## 2020-04-06 RX ORDER — ZOLPIDEM TARTRATE 10 MG/1
10 TABLET ORAL
Qty: 30 TABLET | Refills: 5 | Status: SHIPPED | OUTPATIENT
Start: 2020-04-06 | End: 2020-09-22
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
## 2020-05-08 DIAGNOSIS — G89.29 CHRONIC NECK PAIN: ICD-10-CM

## 2020-05-08 DIAGNOSIS — F11.90 CHRONIC, CONTINUOUS USE OF OPIOIDS: ICD-10-CM

## 2020-05-08 DIAGNOSIS — M54.2 CHRONIC NECK PAIN: ICD-10-CM

## 2020-05-08 RX ORDER — HYDROCODONE BITARTRATE AND ACETAMINOPHEN 5; 325 MG/1; MG/1
1 TABLET ORAL EVERY 6 HOURS PRN
Qty: 120 TABLET | Refills: 0 | Status: SHIPPED | OUTPATIENT
Start: 2020-05-08 | End: 2020-06-03

## 2020-05-08 NOTE — TELEPHONE ENCOUNTER
Requested Prescriptions   Pending Prescriptions Disp Refills     HYDROcodone-acetaminophen (NORCO) 5-325 MG tablet 120 tablet 0     Sig: Take 1 tablet by mouth every 6 hours as needed for pain Max 4 tablets/day.       There is no refill protocol information for this order        Last refill 4/6/20 # 120  Last OV 4/6/20.    Routing refill request to provider for review/approval because:  Drug [place] [facility] [place]   Deandra LYNNEN,RN  [facility]

## 2020-06-03 DIAGNOSIS — G89.29 CHRONIC NECK PAIN: ICD-10-CM

## 2020-06-03 DIAGNOSIS — M54.2 CHRONIC NECK PAIN: ICD-10-CM

## 2020-06-03 DIAGNOSIS — F11.90 CHRONIC, CONTINUOUS USE OF OPIOIDS: ICD-10-CM

## 2020-06-03 RX ORDER — HYDROCODONE BITARTRATE AND ACETAMINOPHEN 5; 325 MG/1; MG/1
1 TABLET ORAL EVERY 6 HOURS PRN
Qty: 120 TABLET | Refills: 0 | Status: SHIPPED | OUTPATIENT
Start: 2020-06-03 | End: 2020-06-11

## 2020-06-03 NOTE — TELEPHONE ENCOUNTER
Routing refill request to provider for review/approval because:  Drug not on the [facility] refill protocol   Deandra KRISHNAN,RN  [facility] [place]

## 2020-06-11 ENCOUNTER — OFFICE VISIT (OUTPATIENT)
Dept: FAMILY MEDICINE | Facility: CLINIC | Age: 70
End: 2020-06-11
Payer: COMMERCIAL

## 2020-06-11 VITALS
HEART RATE: 96 BPM | WEIGHT: 127 LBS | DIASTOLIC BLOOD PRESSURE: 84 MMHG | TEMPERATURE: 98.1 F | OXYGEN SATURATION: 96 % | BODY MASS INDEX: 21.13 KG/M2 | SYSTOLIC BLOOD PRESSURE: 133 MMHG

## 2020-06-11 DIAGNOSIS — F11.90 CHRONIC, CONTINUOUS USE OF OPIOIDS: ICD-10-CM

## 2020-06-11 DIAGNOSIS — F33.1 MODERATE EPISODE OF RECURRENT MAJOR DEPRESSIVE DISORDER (H): Primary | ICD-10-CM

## 2020-06-11 DIAGNOSIS — G89.29 CHRONIC NECK PAIN: ICD-10-CM

## 2020-06-11 DIAGNOSIS — M54.2 CHRONIC NECK PAIN: ICD-10-CM

## 2020-06-11 PROCEDURE — 99214 OFFICE O/P EST MOD 30 MIN: CPT | Performed by: FAMILY MEDICINE

## 2020-06-11 RX ORDER — BUPROPION HYDROCHLORIDE 150 MG/1
150 TABLET ORAL EVERY MORNING
Qty: 30 TABLET | Refills: 1 | Status: SHIPPED | OUTPATIENT
Start: 2020-06-11 | End: 2020-06-23

## 2020-06-11 RX ORDER — HYDROCODONE BITARTRATE AND ACETAMINOPHEN 5; 325 MG/1; MG/1
1 TABLET ORAL EVERY 6 HOURS PRN
Qty: 120 TABLET | Refills: 0 | Status: SHIPPED | OUTPATIENT
Start: 2020-07-01 | End: 2020-07-31

## 2020-06-11 ASSESSMENT — ANXIETY QUESTIONNAIRES
5. BEING SO RESTLESS THAT IT IS HARD TO SIT STILL: SEVERAL DAYS
2. NOT BEING ABLE TO STOP OR CONTROL WORRYING: SEVERAL DAYS
IF YOU CHECKED OFF ANY PROBLEMS ON THIS QUESTIONNAIRE, HOW DIFFICULT HAVE THESE PROBLEMS MADE IT FOR YOU TO DO YOUR WORK, TAKE CARE OF THINGS AT HOME, OR GET ALONG WITH OTHER PEOPLE: SOMEWHAT DIFFICULT
GAD7 TOTAL SCORE: 8
7. FEELING AFRAID AS IF SOMETHING AWFUL MIGHT HAPPEN: NOT AT ALL
1. FEELING NERVOUS, ANXIOUS, OR ON EDGE: SEVERAL DAYS
7. FEELING AFRAID AS IF SOMETHING AWFUL MIGHT HAPPEN: NOT AT ALL
GAD7 TOTAL SCORE: 8
1. FEELING NERVOUS, ANXIOUS, OR ON EDGE: SEVERAL DAYS
2. NOT BEING ABLE TO STOP OR CONTROL WORRYING: SEVERAL DAYS
3. WORRYING TOO MUCH ABOUT DIFFERENT THINGS: SEVERAL DAYS
IF YOU CHECKED OFF ANY PROBLEMS ON THIS QUESTIONNAIRE, HOW DIFFICULT HAVE THESE PROBLEMS MADE IT FOR YOU TO DO YOUR WORK, TAKE CARE OF THINGS AT HOME, OR GET ALONG WITH OTHER PEOPLE: SOMEWHAT DIFFICULT
6. BECOMING EASILY ANNOYED OR IRRITABLE: MORE THAN HALF THE DAYS
3. WORRYING TOO MUCH ABOUT DIFFERENT THINGS: SEVERAL DAYS
5. BEING SO RESTLESS THAT IT IS HARD TO SIT STILL: SEVERAL DAYS
6. BECOMING EASILY ANNOYED OR IRRITABLE: MORE THAN HALF THE DAYS

## 2020-06-11 ASSESSMENT — PAIN SCALES - GENERAL: PAINLEVEL: SEVERE PAIN (6)

## 2020-06-11 ASSESSMENT — PATIENT HEALTH QUESTIONNAIRE - PHQ9
5. POOR APPETITE OR OVEREATING: MORE THAN HALF THE DAYS
SUM OF ALL RESPONSES TO PHQ QUESTIONS 1-9: 16
5. POOR APPETITE OR OVEREATING: MORE THAN HALF THE DAYS

## 2020-06-11 NOTE — LETTER
My Depression Action Plan  Name: Beatriz Francis   Date of Birth 1950  Date: 6/11/2020    My doctor: Freddie Mesa   My clinic: [address] [id number] [phone]          GREEN    ZONE   Good Control    What it looks like:     Things are going generally well. You have normal ups and downs. You may even feel depressed from time to time, but bad moods usually last less than a day.   What you need to do:  1. Continue to care for yourself (see self care plan)  2. Check your depression survival kit and update it as needed  3. Follow your physician s recommendations including any medication.  4. Do not stop taking medication unless you consult with your physician first.           YELLOW         ZONE Getting Worse    What it looks like:     Depression is starting to interfere with your life.     It may be hard to get out of bed; you may be starting to isolate yourself from others.    Symptoms of depression are starting to last most all day and this has happened for several days.     You may have suicidal thoughts but they are not constant.   What you need to do:     1. Call your care team. Your response to treatment will improve if you keep your care team informed of your progress. Yellow periods are signs an adjustment may need to be made.     2. Continue your self-care.  Just get dressed and ready for the day.  Don't give yourself time to talk yourself out of it.    3. Talk to someone in your support network.    4. Open up your Depression Self-Care Plan/Wellness Kit.           RED    ZONE Medical Alert - Get Help    What it looks like:     Depression is seriously interfering with your life.     You may experience these or other symptoms: You can t get out of bed most days, can t work or engage in other necessary activities, you have trouble taking care of basic hygiene, or basic responsibilities, thoughts of  suicide or death that will not go away, self-injurious behavior.     What you need to do:  1. Call your care team and request a same-day appointment. If they are not available (weekends or after hours) call your local crisis line, emergency room or 911.            Depression Self-Care Plan / Wellness Kit    Self-Care for Depression  Here s the deal. Your body and mind are really not as separate as most people think.  What you do and think affects how you feel and how you feel influences what you do and think. This means if you do things that people who feel good do, it will help you feel better.  Sometimes this is all it takes.  There is also a place for medication and therapy depending on how severe your depression is, so be sure to consult with your medical provider and/ or Behavioral Health Consultant if your symptoms are worsening or not improving.     In order to better manage my stress, I will:    Exercise  Get some form of exercise, every day. This will help reduce pain and release endorphins, the  feel good  chemicals in your brain. This is almost as good as taking antidepressants!  This is not the same as joining a gym and then never going! (they count on that by the way ) It can be as simple as just going for a walk or doing some gardening, anything that will get you moving.      Hygiene   Maintain good hygiene (get out of bed in the morning, make your bed, brush your teeth, take a shower, and get dressed like you were going to work, even if you are unemployed).  If your clothes don't fit try to get ones that do.    Diet  Strive to eat foods that are good for me, drink plenty of water, and avoid excessive sugar, caffeine, alcohol, and other mood-altering substances.  Some foods that are helpful in depression are: complex carbohydrates, B vitamins, flaxseed, fish or fish oil, fresh fruits and vegetables.    Psychotherapy  Agree to participate in Individual Therapy (if recommended).    Medication  If  prescribed medications, I agree to take them.  Missing doses can result in serious side effects.  I understand that drinking alcohol, or other illicit drug use, may cause potential side effects.  I will not stop my medication abruptly without first discussing it with my provider.    Staying Connected With Others  Stay in touch with my friends, family members, and my primary care provider/team.    Use your imagination  Be creative.  We all have a creative side; it doesn t matter if it s oil painting, sand castles, or mud pies! This will also kick up the endorphins.    Witness Beauty  (AKA stop and smell the roses) Take a look outside, even in mid-winter. Notice colors, textures. Watch the squirrels and birds.     Service to others  Be of service to others.  There is always someone else in need.  By helping others we can  get out of ourselves  and remember the really important things.  This also provides opportunities for practicing all the other parts of the program.    Humor  Laugh and be silly!  Adjust your TV habits for less news and crime-drama and more comedy.    Control your stress  Try breathing deep, massage therapy, biofeedback, and meditation. Find time to relax each day.     Crisis Text Line  http://www.crisistextline.org    The Crisis Text Line serves anyone, in any type of crisis, providing access to free, 24/7 support and information via the medium people already use and trust:    Here's how it works:  1.  Text 710-878 from anywhere in the [place], anytime, about any type of crisis.  2.  A live, trained Crisis Counselor receives the text and responds quickly.  3.  The volunteer Crisis Counselor will help you move from a 'hot moment to a cool moment'.    My support system    Clinic Contact:  Phone number:    Contact 1:  Phone number:    Contact 2:  Phone number:    Yazidi/:  Phone number:    Therapist:  Phone number:    Local crisis center:    Phone number:    Other community support:   Phone number:
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
## 2020-06-12 ASSESSMENT — ANXIETY QUESTIONNAIRES: GAD7 TOTAL SCORE: 8

## 2020-06-19 ENCOUNTER — MYC MEDICAL ADVICE (OUTPATIENT)
Dept: FAMILY MEDICINE | Facility: CLINIC | Age: 70
End: 2020-06-19

## 2020-06-19 NOTE — TELEPHONE ENCOUNTER
Routing to PCP to review and advise.    Appears bupropion is newest medication - 6/11/2020?    Please see My Chart message.                    Cassandra Crespo RN  [facility]

## 2020-06-23 ENCOUNTER — MYC MEDICAL ADVICE (OUTPATIENT)
Dept: FAMILY MEDICINE | Facility: CLINIC | Age: 70
End: 2020-06-23

## 2020-06-23 DIAGNOSIS — F33.1 MODERATE EPISODE OF RECURRENT MAJOR DEPRESSIVE DISORDER (H): ICD-10-CM

## 2020-06-23 RX ORDER — BUPROPION HYDROCHLORIDE 150 MG/1
300 TABLET ORAL EVERY MORNING
Qty: 60 TABLET | Refills: 1 | Status: SHIPPED | OUTPATIENT
Start: 2020-06-23 | End: 2020-08-26

## 2020-06-23 NOTE — TELEPHONE ENCOUNTER
Beatriz, 6/19/20     Go ahead and increase to 2 tablets in the morning and recheck with me by sending me a [employer] message sometime next week.  I will be out of clinic the week of June 29 so I want to touch base before that.     Freddie Mesa MD        See above and also new message per [employer] below.    Deandra KRISHNAN,RN  [facility]

## 2020-07-06 DIAGNOSIS — Z12.31 ENCOUNTER FOR SCREENING MAMMOGRAM FOR BREAST CANCER: ICD-10-CM

## 2020-07-06 PROCEDURE — 77067 SCR MAMMO BI INCL CAD: CPT | Mod: TC

## 2020-07-06 PROCEDURE — 77063 BREAST TOMOSYNTHESIS BI: CPT | Mod: TC

## 2020-07-29 DIAGNOSIS — F11.90 CHRONIC, CONTINUOUS USE OF OPIOIDS: ICD-10-CM

## 2020-07-29 DIAGNOSIS — M54.2 CHRONIC NECK PAIN: ICD-10-CM

## 2020-07-29 DIAGNOSIS — G89.29 CHRONIC NECK PAIN: ICD-10-CM

## 2020-07-31 RX ORDER — HYDROCODONE BITARTRATE AND ACETAMINOPHEN 5; 325 MG/1; MG/1
1 TABLET ORAL EVERY 6 HOURS PRN
Qty: 120 TABLET | Refills: 0 | Status: SHIPPED | OUTPATIENT
Start: 2020-07-31 | End: 2020-08-25

## 2020-08-25 DIAGNOSIS — F33.1 MODERATE EPISODE OF RECURRENT MAJOR DEPRESSIVE DISORDER (H): ICD-10-CM

## 2020-08-25 DIAGNOSIS — M54.2 CHRONIC NECK PAIN: ICD-10-CM

## 2020-08-25 DIAGNOSIS — J43.9 PULMONARY EMPHYSEMA, UNSPECIFIED EMPHYSEMA TYPE (H): ICD-10-CM

## 2020-08-25 DIAGNOSIS — F11.90 CHRONIC, CONTINUOUS USE OF OPIOIDS: ICD-10-CM

## 2020-08-25 DIAGNOSIS — G89.29 CHRONIC NECK PAIN: ICD-10-CM

## 2020-08-25 RX ORDER — HYDROCODONE BITARTRATE AND ACETAMINOPHEN 5; 325 MG/1; MG/1
1 TABLET ORAL EVERY 6 HOURS PRN
Qty: 120 TABLET | Refills: 0 | Status: SHIPPED | OUTPATIENT
Start: 2020-08-25 | End: 2020-09-22

## 2020-08-25 NOTE — TELEPHONE ENCOUNTER
Requested Prescriptions   Pending Prescriptions Disp Refills    HYDROcodone-acetaminophen (NORCO) 5-325 MG tablet 120 tablet 0     Sig: Take 1 tablet by mouth every 6 hours as needed for pain Max 4 tablets/day.       There is no refill protocol information for this order        Routing refill request to provider for review/approval because:  Drug not on the [facility] refill protocol   Deandra Neal RN,BSN  [facility]

## 2020-08-26 RX ORDER — BUPROPION HYDROCHLORIDE 150 MG/1
TABLET ORAL
Qty: 60 TABLET | Refills: 1 | Status: SHIPPED | OUTPATIENT
Start: 2020-08-26 | End: 2020-10-12

## 2020-08-26 RX ORDER — ALBUTEROL SULFATE 90 UG/1
AEROSOL, METERED RESPIRATORY (INHALATION)
Qty: 414 G | Refills: 0 | Status: SHIPPED | OUTPATIENT
Start: 2020-08-26 | End: 2020-11-15

## 2020-09-22 DIAGNOSIS — G47.00 INSOMNIA, UNSPECIFIED TYPE: ICD-10-CM

## 2020-09-22 DIAGNOSIS — G89.29 CHRONIC NECK PAIN: ICD-10-CM

## 2020-09-22 DIAGNOSIS — F11.90 CHRONIC, CONTINUOUS USE OF OPIOIDS: ICD-10-CM

## 2020-09-22 DIAGNOSIS — M54.2 CHRONIC NECK PAIN: ICD-10-CM

## 2020-09-22 RX ORDER — HYDROCODONE BITARTRATE AND ACETAMINOPHEN 5; 325 MG/1; MG/1
TABLET ORAL
Qty: 120 TABLET | Refills: 0 | Status: SHIPPED | OUTPATIENT
Start: 2020-09-22 | End: 2020-10-12

## 2020-09-22 RX ORDER — ZOLPIDEM TARTRATE 10 MG/1
TABLET ORAL
Qty: 30 TABLET | Refills: 5 | Status: SHIPPED | OUTPATIENT
Start: 2020-09-22 | End: 2021-03-15

## 2020-09-22 NOTE — TELEPHONE ENCOUNTER
Routing refill request to provider for review/approval because:  Drug not on the [facility] refill protocol     Inga Galan RN, BSN, PHN  [facility]

## 2020-09-22 NOTE — TELEPHONE ENCOUNTER
Requested Prescriptions   Pending Prescriptions Disp Refills    zolpidem (AMBIEN) 10 MG tablet [Pharmacy Med Name: ZOLPIDEM TARTRATE 10MG TABS] 30 tablet 5     Sig: TAKE ONE TABLET BY MOUTH EVERY NIGHT AT BEDTIME AS NEEDED FOR SLEEP       There is no refill protocol information for this order        Routing refill request to provider for review/approval because:  Drug not on the [facility] refill protocol   Deandra NealRN,BSN  [facility]

## 2020-09-24 NOTE — NURSING NOTE
"Chief Complaint   Patient presents with     Pain       Initial /76 (BP Location: Right arm, Patient Position: Sitting, Cuff Size: Adult Regular)  Pulse 84  Temp 98.8  F (37.1  C) (Oral)  Ht 5' 5\" (1.651 m)  Wt 122 lb 2 oz (55.4 kg)  BMI 20.32 kg/m2 Estimated body mass index is 20.32 kg/(m^2) as calculated from the following:    Height as of this encounter: 5' 5\" (1.651 m).    Weight as of this encounter: 122 lb 2 oz (55.4 kg).  Medication Reconciliation: complete     Eva GHOTRA, Certified Medical Assistant (AAMA)August 28, 2017 10:17 AM      " Pt is in radiology for L hip and spine x-rays.

## 2020-10-12 ENCOUNTER — OFFICE VISIT (OUTPATIENT)
Dept: FAMILY MEDICINE | Facility: CLINIC | Age: 70
End: 2020-10-12
Payer: COMMERCIAL

## 2020-10-12 VITALS
WEIGHT: 130.03 LBS | TEMPERATURE: 97.8 F | HEART RATE: 88 BPM | BODY MASS INDEX: 21.64 KG/M2 | DIASTOLIC BLOOD PRESSURE: 81 MMHG | OXYGEN SATURATION: 97 % | SYSTOLIC BLOOD PRESSURE: 132 MMHG

## 2020-10-12 DIAGNOSIS — F11.90 CHRONIC, CONTINUOUS USE OF OPIOIDS: ICD-10-CM

## 2020-10-12 DIAGNOSIS — M54.2 CHRONIC NECK PAIN: ICD-10-CM

## 2020-10-12 DIAGNOSIS — Z87.891 PERSONAL HISTORY OF TOBACCO USE: ICD-10-CM

## 2020-10-12 DIAGNOSIS — Z12.11 SCREEN FOR COLON CANCER: ICD-10-CM

## 2020-10-12 DIAGNOSIS — G89.29 CHRONIC NECK PAIN: ICD-10-CM

## 2020-10-12 DIAGNOSIS — F33.1 MODERATE EPISODE OF RECURRENT MAJOR DEPRESSIVE DISORDER (H): Primary | ICD-10-CM

## 2020-10-12 DIAGNOSIS — D69.1 ABNORMAL PLATELETS (H): ICD-10-CM

## 2020-10-12 DIAGNOSIS — D36.9 ADENOMATOUS POLYP: ICD-10-CM

## 2020-10-12 DIAGNOSIS — R09.89 TICKLE IN THROAT: ICD-10-CM

## 2020-10-12 DIAGNOSIS — Z13.1 SCREENING FOR DIABETES MELLITUS: ICD-10-CM

## 2020-10-12 LAB
ERYTHROCYTE [DISTWIDTH] IN BLOOD BY AUTOMATED COUNT: 13 % (ref 10–15)
GLUCOSE SERPL-MCNC: 85 MG/DL (ref 70–99)
HCT VFR BLD AUTO: 37.3 % (ref 35–47)
HGB BLD-MCNC: 12.9 G/DL (ref 11.7–15.7)
MCH RBC QN AUTO: 30.6 PG (ref 26.5–33)
MCHC RBC AUTO-ENTMCNC: 34.6 G/DL (ref 31.5–36.5)
MCV RBC AUTO: 88 FL (ref 78–100)
PLATELET # BLD AUTO: 417 10E9/L (ref 150–450)
RBC # BLD AUTO: 4.22 10E12/L (ref 3.8–5.2)
WBC # BLD AUTO: 6 10E9/L (ref 4–11)

## 2020-10-12 PROCEDURE — 36415 COLL VENOUS BLD VENIPUNCTURE: CPT | Performed by: FAMILY MEDICINE

## 2020-10-12 PROCEDURE — 80307 DRUG TEST PRSMV CHEM ANLYZR: CPT | Mod: 90 | Performed by: FAMILY MEDICINE

## 2020-10-12 PROCEDURE — 82947 ASSAY GLUCOSE BLOOD QUANT: CPT | Performed by: FAMILY MEDICINE

## 2020-10-12 PROCEDURE — G0296 VISIT TO DETERM LDCT ELIG: HCPCS | Performed by: FAMILY MEDICINE

## 2020-10-12 PROCEDURE — 85027 COMPLETE CBC AUTOMATED: CPT | Performed by: FAMILY MEDICINE

## 2020-10-12 PROCEDURE — 99000 SPECIMEN HANDLING OFFICE-LAB: CPT | Performed by: FAMILY MEDICINE

## 2020-10-12 PROCEDURE — 99214 OFFICE O/P EST MOD 30 MIN: CPT | Mod: 25 | Performed by: FAMILY MEDICINE

## 2020-10-12 PROCEDURE — 96127 BRIEF EMOTIONAL/BEHAV ASSMT: CPT | Performed by: FAMILY MEDICINE

## 2020-10-12 RX ORDER — HYDROCODONE BITARTRATE AND ACETAMINOPHEN 5; 325 MG/1; MG/1
1 TABLET ORAL EVERY 6 HOURS PRN
Qty: 120 TABLET | Refills: 0 | Status: SHIPPED | OUTPATIENT
Start: 2020-10-19 | End: 2020-11-19

## 2020-10-12 RX ORDER — BUPROPION HYDROCHLORIDE 300 MG/1
300 TABLET ORAL EVERY MORNING
Qty: 90 TABLET | Refills: 3 | Status: SHIPPED | OUTPATIENT
Start: 2020-10-12 | End: 2021-10-06

## 2020-10-12 ASSESSMENT — PATIENT HEALTH QUESTIONNAIRE - PHQ9: SUM OF ALL RESPONSES TO PHQ QUESTIONS 1-9: 4

## 2020-10-12 ASSESSMENT — PAIN SCALES - GENERAL: PAINLEVEL: MODERATE PAIN (5)

## 2020-10-12 NOTE — PROGRESS NOTES
Subjective     Beatriz Francis is a 69 year old female who presents to clinic today for the following health issues:    HPI         Chronic/Recurring Back Pain Follow Up      Where is your back pain located? (Select all that apply) neck both sides, lower back both sides    How would you describe your back pain?  dull ache    Where does your back pain spread? Nowhere, some sciatic pain    Since your last clinic visit for back pain, how has your pain changed? unchanged    Does your back pain interfere with your job? Not applicable    Since your last visit, have you tried any new treatment? No    How many servings of fruits and vegetables do you eat daily?  2-3    On average, how many sweetened beverages do you drink each day (Examples: soda, juice, sweet tea, etc.  Do NOT count diet or artificially sweetened beverages)?   3-4     How many days per week do you exercise enough to make your heart beat faster? 4- daily life activities    How many minutes a day do you exercise enough to make your heart beat faster? 30 - 60    How many days per week do you miss taking your medication? 0    Fasting for labs if needed.  Amanda Wilder MA    Depression Followup    How are you doing with your depression since your last visit? Improved some    Are you having other symptoms that might be associated with depression? No    Have you had a significant life event?  No     Are you feeling anxious or having panic attacks?   No    Do you have any concerns with your use of alcohol or other drugs? No    Social History     Tobacco Use     Smoking status: Current Every Day Smoker     Packs/day: 1.00     Years: 45.00     Pack years: 45.00     Types: Cigarettes     Smokeless tobacco: Never Used     Tobacco comment: Just under a pack. 50  yr. hx.   Substance Use Topics     Alcohol use: No     Alcohol/week: 0.0 standard drinks     Comment: 3-4x's/year.     Drug use: No     PHQ 6/11/2020 6/11/2020 10/12/2020   PHQ-9 Total Score 16 16 4   Q9:  Thoughts of better off dead/self-harm past 2 weeks Several days Several days Not at all     HIRAL-7 SCORE 9/5/2019 6/11/2020 6/11/2020   Total Score 3 8 8     Last PHQ-9 10/12/2020   1.  Little interest or pleasure in doing things 1   2.  Feeling down, depressed, or hopeless 1   3.  Trouble falling or staying asleep, or sleeping too much 1   4.  Feeling tired or having little energy 1   5.  Poor appetite or overeating 0   6.  Feeling bad about yourself 0   7.  Trouble concentrating 0   8.  Moving slowly or restless 0   Q9: Thoughts of better off dead/self-harm past 2 weeks 0   PHQ-9 Total Score 4   Difficulty at work, home, or with people Not difficult at all     In the past two weeks have you had thoughts of suicide or self-harm?  No.    Do you have concerns about your personal safety or the safety of others?   No    Suicide Assessment Five-step Evaluation and Treatment (SAFE-T)  Constitutional, HEENT, cardiovascular, pulmonary, gi and gu systems are negative, except as otherwise noted.      Objective    /81 (BP Location: Left arm, Patient Position: Chair, Cuff Size: Adult Regular)   Pulse 88   Temp 97.8  F (36.6  C) (Oral)   Wt 59 kg (130 lb 0.5 oz)   SpO2 97%   BMI 21.64 kg/m    Body mass index is 21.64 kg/m .  Physical Exam   GENERAL: healthy, alert and no distress  EYES: Eyes grossly normal to inspection, PERRL and conjunctivae and sclerae normal  HENT: normal cephalic/atraumatic, nose and mouth without ulcers or lesions, oropharynx clear and oral mucous membranes moist  NECK: no adenopathy, no asymmetry, masses, or scars and thyroid normal to palpation  RESP: lungs clear to auscultation - no rales, rhonchi or wheezes  CV: regular rate and rhythm, normal S1 S2, no S3 or S4, no murmur, click or rub, no peripheral edema and peripheral pulses strong  MS: no gross musculoskeletal defects noted, no edema  SKIN: no suspicious lesions or rashes  NEURO: Normal strength and tone, mentation intact and speech  normal  PSYCH: mentation appears normal, affect normal/bright            Assessment & Plan     Moderate episode of recurrent major depressive disorder (H)  Patient feels that the Wellbutrin has really helped her symptoms of depression and this is documented in the improvement in her PHQ 9 score with depression now in remission.  We will keep her on Wellbutrin 300 mg daily for probably 6 to 12 months.  Follow-up is requested for recheck in 3 months.  - buPROPion (WELLBUTRIN XL) 300 MG 24 hr tablet; Take 1 tablet (300 mg) by mouth every morning    Chronic, continuous use of opioids  Since she was here I did issue a refill of her hydrocodone that she will be able to  on October 19.  She was due for the comprehensive urine drug screen with medications due to the pain care package.   was reviewed today and appropriate.  - HYDROcodone-acetaminophen (NORCO) 5-325 MG tablet; Take 1 tablet by mouth every 6 hours as needed for severe pain Max 4 per day  - Drug  Screen Comprehensive, Urine w/o Reported Meds (Pain Care Package)    Screen for colon cancer   Previous history of adenomatous polyp so an order was placed for colonoscopy which will be due next month.  - GASTROENTEROLOGY ADULT REF PROCEDURE ONLY; Future    Chronic neck pain  As above.  - HYDROcodone-acetaminophen (NORCO) 5-325 MG tablet; Take 1 tablet by mouth every 6 hours as needed for severe pain Max 4 per day  - Drug  Screen Comprehensive, Urine w/o Reported Meds (Pain Care Package)    Abnormal platelets (H)  Recheck CBC at this time.  - CBC with Platelets      Personal history of tobacco use  She does qualify for and would like to do lung cancer screening.  - Prof fee: Shared Decisionmaking for Lung Cancer Screening  - CT Chest Lung Cancer Scrn Low Dose wo; Future    Screening for diabetes mellitus  Annual screening for diabetes was done today.  - Glucose    Tickle in throat  Patient has had recent onset of a tickle in her throat causing a dry cough at  night waking her up.  She is on omeprazole.  She had no globus sensation, dysphagia, or odynophagia or other alarm symptoms.  In the short-term I suggested trying something like Tums before bedtime to see if that would be helpful in preventing this.    Adenomatous polyp  As above.  - GASTROENTEROLOGY ADULT REF PROCEDURE ONLY; Future     Tobacco Cessation:   reports that she has been smoking cigarettes. She has a 45.00 pack-year smoking history. She has never used smokeless tobacco.  Tobacco Cessation Action Plan: Information offered: Patient not interested at this time             Return in about 3 months (around 1/12/2021) for Physical Exam, Pain Recheck.    Freddie Mesa MD  [facility]    Lung Cancer Screening Shared Decision Making Visit     Beatriz Francis is eligible for lung cancer screening on the basis of the information provided in my signed lung cancer screening order.     I have discussed with patient the risks and benefits of screening for lung cancer with low-dose CT.     The risks include:  radiation exposure: one low dose chest CT has as much ionizing radiation as about 15 chest x-rays or 6 months of background radiation living in [place]    false positives: 96% of positive findings/nodules are NOT cancer, but some might still require additional diagnostic evaluation, including biopsy  over-diagnosis: some slow growing cancers that might never have been clinically significant will be detected and treated unnecessarily     The benefit of early detection of lung cancer is contingent upon adherence to annual screening or more frequent follow up if indicated.     Furthermore, reaping the benefits of screening requires Beatriz Francis to be willing and physically able to undergo diagnostic procedures, if indicated. Although no specific guide is available for determining severity of comorbidities, it is reasonable to withhold screening in patients who have  greater mortality risk from other diseases.     We did discuss that the only way to prevent lung cancer is to not smoke. Smoking cessation counseling was given, duration < 3 minutes.      I did not offer risk estimation using a calculator such as this one:    ShouldIScreen

## 2020-10-12 NOTE — PATIENT INSTRUCTIONS

## 2020-10-13 LAB — COMPREHEN DRUG ANALYSIS UR: NORMAL

## 2020-10-14 ENCOUNTER — ANCILLARY PROCEDURE (OUTPATIENT)
Dept: CT IMAGING | Facility: CLINIC | Age: 70
End: 2020-10-14
Attending: FAMILY MEDICINE
Payer: COMMERCIAL

## 2020-10-14 DIAGNOSIS — Z87.891 PERSONAL HISTORY OF TOBACCO USE: ICD-10-CM

## 2020-10-14 PROCEDURE — G0297 LDCT FOR LUNG CA SCREEN: HCPCS | Mod: TC | Performed by: RADIOLOGY

## 2020-11-15 ENCOUNTER — MYC REFILL (OUTPATIENT)
Dept: FAMILY MEDICINE | Facility: CLINIC | Age: 70
End: 2020-11-15

## 2020-11-15 DIAGNOSIS — J43.9 PULMONARY EMPHYSEMA, UNSPECIFIED EMPHYSEMA TYPE (H): ICD-10-CM

## 2020-11-15 DIAGNOSIS — M54.2 CHRONIC NECK PAIN: ICD-10-CM

## 2020-11-15 DIAGNOSIS — K21.9 GASTROESOPHAGEAL REFLUX DISEASE WITHOUT ESOPHAGITIS: ICD-10-CM

## 2020-11-15 DIAGNOSIS — G89.29 CHRONIC NECK PAIN: ICD-10-CM

## 2020-11-18 DIAGNOSIS — M54.2 CHRONIC NECK PAIN: ICD-10-CM

## 2020-11-18 DIAGNOSIS — F11.90 CHRONIC, CONTINUOUS USE OF OPIOIDS: ICD-10-CM

## 2020-11-18 DIAGNOSIS — G89.29 CHRONIC NECK PAIN: ICD-10-CM

## 2020-11-18 RX ORDER — ALBUTEROL SULFATE 90 UG/1
2 AEROSOL, METERED RESPIRATORY (INHALATION) EVERY 6 HOURS PRN
Qty: 414 G | Refills: 0 | Status: SHIPPED | OUTPATIENT
Start: 2020-11-18 | End: 2021-08-07

## 2020-11-18 RX ORDER — MELOXICAM 15 MG/1
15 TABLET ORAL DAILY PRN
Qty: 90 TABLET | Refills: 1 | Status: SHIPPED | OUTPATIENT
Start: 2020-11-18 | End: 2022-02-01

## 2020-11-18 NOTE — TELEPHONE ENCOUNTER
"Requested Prescriptions   Pending Prescriptions Disp Refills    meloxicam (MOBIC) 15 MG tablet 90 tablet 1     Sig: Take 1 tablet (15 mg) by mouth daily as needed for moderate pain       NSAID Medications Failed - 11/15/2020  4:02 PM        Failed - Normal ALT on file in past 12 months     Recent Labs   Lab Test 05/10/17  0857   ALT 18             Failed - Normal AST on file in past 12 months     Recent Labs   Lab Test 05/10/17  0857   AST 12             Failed - Patient is age 6-64 years        Failed - Normal serum creatinine on file in past 12 months     Recent Labs   Lab Test 05/10/17  0857   CR 0.59       Ok to refill medication if creatinine is low          Passed - Blood pressure under 140/90 in past 12 months     BP Readings from Last 3 Encounters:   10/12/20 132/81   06/11/20 133/84   01/13/20 123/87                 Passed - Recent (12 mo) or future (30 days) visit within the authorizing provider's specialty     Patient has had an office visit with the authorizing provider or a provider within the authorizing providers department within the previous 12 mos or has a future within next 30 days. See \"Patient Info\" tab in inbasket, or \"Choose Columns\" in Meds & Orders section of the refill encounter.              Passed - Normal CBC on file in past 12 months     Recent Labs   Lab Test 10/12/20  1141   WBC 6.0   RBC 4.22   HGB 12.9   HCT 37.3                    Passed - Medication is active on med list        Passed - No active pregnancy on record        Passed - No positive pregnancy test in past 12 months          omeprazole (PRILOSEC) 20 MG DR capsule 90 capsule 3     Sig: Take 1 capsule (20 mg) by mouth daily       PPI Protocol Passed - 11/15/2020  4:02 PM        Passed - Not on Clopidogrel (unless Pantoprazole ordered)        Passed - No diagnosis of osteoporosis on record        Passed - Recent (12 mo) or future (30 days) visit within the authorizing provider's specialty     Patient has had an " "office visit with the authorizing provider or a provider within the authorizing providers department within the previous 12 mos or has a future within next 30 days. See \"Patient Info\" tab in inbasket, or \"Choose Columns\" in Meds & Orders section of the refill encounter.              Passed - Medication is active on med list        Passed - Patient is age 18 or older        Passed - No active pregnacy on record        Passed - No positive pregnancy test in past 12 months          albuterol (VENTOLIN HFA) 108 (90 Base) MCG/ACT inhaler 414 g 0       Asthma Maintenance Inhalers - Anticholinergics Passed - 11/15/2020  4:02 PM        Passed - Patient is age 12 years or older        Passed - Recent (12 mo) or future (30 days) visit within the authorizing provider's specialty     Patient has had an office visit with the authorizing provider or a provider within the authorizing providers department within the previous 12 mos or has a future within next 30 days. See \"Patient Info\" tab in inbasket, or \"Choose Columns\" in Meds & Orders section of the refill encounter.              Passed - Medication is active on med list       Short-Acting Beta Agonist Inhalers Protocol  Passed - 11/15/2020  4:02 PM        Passed - Patient is age 12 or older        Passed - Recent (12 mo) or future (30 days) visit within the authorizing provider's specialty     Patient has had an office visit with the authorizing provider or a provider within the authorizing providers department within the previous 12 mos or has a future within next 30 days. See \"Patient Info\" tab in inbasket, or \"Choose Columns\" in Meds & Orders section of the refill encounter.              Passed - Medication is active on med list           Routing refill request to provider for review/approval because:  Failed protocol.  Deandra LYNNEN-RN  [facility]    "

## 2020-11-19 RX ORDER — HYDROCODONE BITARTRATE AND ACETAMINOPHEN 5; 325 MG/1; MG/1
1 TABLET ORAL EVERY 6 HOURS PRN
Qty: 120 TABLET | Refills: 0 | Status: SHIPPED | OUTPATIENT
Start: 2020-11-19 | End: 2020-12-16

## 2020-11-19 NOTE — TELEPHONE ENCOUNTER
Routing refill request to provider for review/approval because:  Drug not on the [facility] refill protocol     Inga Galan RN, BSN, PHN  [facility]: [place]

## 2020-12-14 DIAGNOSIS — F11.90 CHRONIC, CONTINUOUS USE OF OPIOIDS: ICD-10-CM

## 2020-12-14 DIAGNOSIS — G89.29 CHRONIC NECK PAIN: ICD-10-CM

## 2020-12-14 DIAGNOSIS — M54.2 CHRONIC NECK PAIN: ICD-10-CM

## 2020-12-16 RX ORDER — HYDROCODONE BITARTRATE AND ACETAMINOPHEN 5; 325 MG/1; MG/1
1 TABLET ORAL EVERY 6 HOURS PRN
Qty: 120 TABLET | Refills: 0 | Status: SHIPPED | OUTPATIENT
Start: 2020-12-16 | End: 2021-01-18

## 2020-12-16 NOTE — TELEPHONE ENCOUNTER
Requested Prescriptions   Pending Prescriptions Disp Refills     HYDROcodone-acetaminophen (NORCO) 5-325 MG tablet [Pharmacy Med Name: HYDROCODONE/APAP 5-325MG TAB] 120 tablet 0     Sig: TAKE 1 TABLET BY MOUTH EVERY 6 HOURS AS NEEDED FOR SEVERE PAIN MAX 4 PER DAY       There is no refill protocol information for this order              Last Written Prescription Date:  11/19/20  Last Fill Quantity: 120,   # refills: 0  Last Office Visit: 10/12/20  Future Office visit:       Routing refill request to provider for review/approval because:  Drug not on the [facility] or [facility] refill protocol or controlled substance  Writer is not [facility] delegate

## 2021-01-18 DIAGNOSIS — M54.2 CHRONIC NECK PAIN: ICD-10-CM

## 2021-01-18 DIAGNOSIS — G89.29 CHRONIC NECK PAIN: ICD-10-CM

## 2021-01-18 DIAGNOSIS — F11.90 CHRONIC, CONTINUOUS USE OF OPIOIDS: ICD-10-CM

## 2021-01-18 RX ORDER — HYDROCODONE BITARTRATE AND ACETAMINOPHEN 5; 325 MG/1; MG/1
1 TABLET ORAL EVERY 6 HOURS PRN
Qty: 120 TABLET | Refills: 0 | Status: SHIPPED | OUTPATIENT
Start: 2021-01-18 | End: 2021-02-17

## 2021-02-17 DIAGNOSIS — G89.29 CHRONIC NECK PAIN: ICD-10-CM

## 2021-02-17 DIAGNOSIS — M54.2 CHRONIC NECK PAIN: ICD-10-CM

## 2021-02-17 DIAGNOSIS — F11.90 CHRONIC, CONTINUOUS USE OF OPIOIDS: ICD-10-CM

## 2021-02-17 RX ORDER — HYDROCODONE BITARTRATE AND ACETAMINOPHEN 5; 325 MG/1; MG/1
1 TABLET ORAL EVERY 6 HOURS PRN
Qty: 120 TABLET | Refills: 0 | Status: SHIPPED | OUTPATIENT
Start: 2021-02-17 | End: 2021-03-15

## 2021-02-24 ENCOUNTER — OFFICE VISIT (OUTPATIENT)
Dept: FAMILY MEDICINE | Facility: CLINIC | Age: 71
End: 2021-02-24
Payer: COMMERCIAL

## 2021-02-24 ENCOUNTER — ANCILLARY PROCEDURE (OUTPATIENT)
Dept: GENERAL RADIOLOGY | Facility: CLINIC | Age: 71
End: 2021-02-24
Attending: FAMILY MEDICINE
Payer: COMMERCIAL

## 2021-02-24 VITALS
DIASTOLIC BLOOD PRESSURE: 82 MMHG | BODY MASS INDEX: 22.33 KG/M2 | HEIGHT: 65 IN | TEMPERATURE: 97.8 F | WEIGHT: 134 LBS | HEART RATE: 70 BPM | SYSTOLIC BLOOD PRESSURE: 132 MMHG | RESPIRATION RATE: 18 BRPM

## 2021-02-24 DIAGNOSIS — S69.91XA INJURY OF RIGHT HAND, INITIAL ENCOUNTER: ICD-10-CM

## 2021-02-24 DIAGNOSIS — S62.356A CLOSED NONDISPLACED FRACTURE OF SHAFT OF FIFTH METACARPAL BONE OF RIGHT HAND, INITIAL ENCOUNTER: Primary | ICD-10-CM

## 2021-02-24 PROCEDURE — 73130 X-RAY EXAM OF HAND: CPT | Mod: RT | Performed by: RADIOLOGY

## 2021-02-24 PROCEDURE — 99214 OFFICE O/P EST MOD 30 MIN: CPT | Performed by: FAMILY MEDICINE

## 2021-02-24 ASSESSMENT — MIFFLIN-ST. JEOR: SCORE: 1138.7

## 2021-02-24 NOTE — NURSING NOTE
"Chief Complaint   Patient presents with     Fall     /82 (Cuff Size: Adult Regular)   Pulse 70   Temp 97.8  F (36.6  C) (Tympanic)   Resp 18   Ht 1.651 m (5' 5\")   Wt 60.8 kg (134 lb)   Breastfeeding No   BMI 22.30 kg/m   Estimated body mass index is 22.3 kg/m  as calculated from the following:    Height as of this encounter: 1.651 m (5' 5\").    Weight as of this encounter: 60.8 kg (134 lb).  Patient presents to the clinic using No DME      Health Maintenance that is potentially due pending provider review:    Health Maintenance Due   Topic Date Due     MEDICARE ANNUAL WELLNESS VISIT  10/24/2019     LIPID  10/24/2019     FALL RISK ASSESSMENT  10/24/2019     ADVANCE CARE PLANNING  02/19/2020     COLORECTAL CANCER SCREENING  11/09/2020     TREATMENT AGREEMENT FOR CHRONIC PAIN MANAGEMENT  01/13/2021     ZOSTER IMMUNIZATION (3 of 3) 02/16/2021                "

## 2021-02-24 NOTE — PATIENT INSTRUCTIONS
Patient Education     Closed Hand Fracture (Adult)  You have a fracture, or broken bone, in your hand. This may be a small crack or chip in the bone. Or it may be a major break with the broken parts pushed out of place. A closed fracture means that the broken bone has not gone through the skin. A hand fracture is often treated with a splint or cast. It usually takes 4 to 6 weeks to heal. Severe injuries may require surgery.   Home care    Keep your arm raised to reduce pain and swelling. When sitting or lying down, raise your arm above heart level. You can do this by placing your arm on a pillow that rests on your chest or on a pillow at your side. This is most important during the first 48 hours after injury.    Apply an ice pack over the injured area for no more than 15 to 20 minutes. Do this every 1 to 2 hours for the first 24 to 48 hours. Continue with ice packs as needed to ease pain and swelling. To make an ice pack, put ice cubes in a plastic bag that seals at the top. Wrap the bag in a clean, thin towel or cloth. Never put ice or an ice pack directly on the skin. You can place the ice pack inside the sling and directly over the cast or splint. As the ice melts, be careful that the cast or splint doesn t get wet.    Keep the cast or splint completely dry at all times. Bathe with your cast or splint out of the water, protected with 2 large plastic bags. Place 1 bag outside the other. Tape each bag with duct tape at the top end or use rubber bands. If a fiberglass cast or splint gets wet, dry it with a hair dryer on a cool setting.    You may use over-the-counter pain medicine to control pain, unless another pain medicine was prescribed. If you have chronic liver or kidney disease or ever had a stomach ulcer or GI bleeding, talk with your provider before using these medicines.    Follow-up care  Follow up with your healthcare provider in 1 week, or as advised. This is to be sure the bone is healing correctly.  If you were given a splint, it may be changed to a cast at your follow-up visit.   If X-rays were taken, you will be told of any new findings that may affect your care.  When to seek medical advice  Call your healthcare provider right away if any of these occur:    The plaster cast or splint becomes wet or soft    The fiberglass cast or splint stays wet for more than 24 hours    The cast or splint has a bad smell    The plaster cast or splint becomes loose    There is increased tightness or pain under the cast or splint    The fingers on your injured hand become swollen, cold, blue, numb, or tingly  [employer] last reviewed this educational content on 4/1/2018 2000-2020 [employer]. [address] [place] [address], [place] [address]. All rights reserved. This information is not intended as a substitute for professional medical care. Always follow your healthcare professional's instructions.

## 2021-02-25 ENCOUNTER — OFFICE VISIT (OUTPATIENT)
Dept: ORTHOPEDICS | Facility: CLINIC | Age: 71
End: 2021-02-25
Payer: COMMERCIAL

## 2021-02-25 ENCOUNTER — TELEPHONE (OUTPATIENT)
Dept: FAMILY MEDICINE | Facility: CLINIC | Age: 71
End: 2021-02-25

## 2021-02-25 VITALS
WEIGHT: 133 LBS | BODY MASS INDEX: 22.16 KG/M2 | SYSTOLIC BLOOD PRESSURE: 138 MMHG | HEIGHT: 65 IN | DIASTOLIC BLOOD PRESSURE: 82 MMHG

## 2021-02-25 DIAGNOSIS — S62.356A CLOSED NONDISPLACED FRACTURE OF SHAFT OF FIFTH METACARPAL BONE OF RIGHT HAND, INITIAL ENCOUNTER: Primary | ICD-10-CM

## 2021-02-25 PROCEDURE — 26600 TREAT METACARPAL FRACTURE: CPT | Mod: RT | Performed by: PEDIATRICS

## 2021-02-25 ASSESSMENT — MIFFLIN-ST. JEOR: SCORE: 1134.16

## 2021-02-25 NOTE — TELEPHONE ENCOUNTER
I spoke with schedulers yesterday and was told about appointment for today.  Discussed with the schedulers again today.  No appointment available at [place], although I [place] an [place] [facility] [place] this afternoon. Patient informed and all questions answered.     Dr Loera

## 2021-02-25 NOTE — PROGRESS NOTES
ASSESSMENT & PLAN    Beatriz was seen today for pain.    Diagnoses and all orders for this visit:    Closed nondisplaced fracture of shaft of fifth metacarpal bone of right hand, initial encounter  -     Cast/splint application      Will place in ulnar gutter splint with close follow up in 1 week.  Discussed alignment, slight displacement, possible deformities.    Plan:  - Today's Plan of Care:  Ulnar gutter splint  Continue with relative rest and activity modification, Ice, Compression, and Elevation.  Can apply ice 10-15 minutes 3-4 times per day as needed.    -We also discussed other future treatment options:  exos splint    Follow Up: 1 week with repeat x-rays    Concerning signs and symptoms were reviewed.  The patient expressed understanding of this management plan and all questions were answered at this time.    Madhuri Seymour MD CA  Primary Care Sports Medicine  [facility]    -----  Chief Complaint   Patient presents with     Right Hand - Pain       SUBJECTIVE  Beatriz Francis is a/an 70 year old female who is seen as a self referral for evaluation of right hand.     The patient is seen by themselves.  The patient is right handed    Onset: 2 day(s) ago. Patient describes injury as FOOSH injury after falling on ice on 2/23/21. She had xrays completed yesterday and placed in a wrist brace.    Location of Pain: right ulnar hand  Worsened by: use of hand  Better with: rest  Treatments tried: rest/activity avoidance, elevation, ice and casting/splinting/bracing  Associated symptoms: swelling and rash from brace    Orthopedic/Surgical history: NO  Social History/Occupation: retired    No family history pertinent to patient's problem today.    REVIEW OF SYSTEMS:  Review of Systems  Skin: yes bruising, yes swelling  Musculoskeletal: as above  Neurologic: no numbness, paresthesias  Remainder of review of systems is negative including constitutional, CV, pulmonary, GI, except as noted in  "HPI or medical history.    OBJECTIVE:  /82   Ht 1.651 m (5' 5\")   Wt 60.3 kg (133 lb)   BMI 22.13 kg/m     General: healthy, alert and in no distress  HEENT: no scleral icterus or conjunctival erythema  Skin: no suspicious lesions or rash. No jaundice.  CV: distal perfusion intact  Resp: normal respiratory effort without conversational dyspnea   Psych: normal mood and affect  Gait: normal steady gait with appropriate coordination and balance  Neuro: Normal light sensory exam of upper extremity right    Bilateral Wrist and Hand exam  Inspection:       Swelling throughout right hand  - no rotational deformity    Tender:       5th metacarpal, mild 4th and 3rd metacarpal    Non Tender:       Remainder of hand    ROM:       Decreased ability to flex fingers due to pain and swelling    Neurovascular:       2+ radial pulses bilaterally with brisk capillary refill and      normal sensation to light touch in the radial, median and ulnar nerve distributions    RADIOLOGY:  I independently visualized and reviewed these images with the patient  XR 2/24/2021 - oblique 5th metacarpal fracture, slight displacement    Xr Hand Right G/e 3 Views  Result Date: 2/24/2021  HAND RIGHT THREE OR MORE VIEWS   2/24/2021 2:07 PM HISTORY: Right hand injury, swollen. Injury of right hand, initial encounter. FINDINGS: Second and third MCP osteoarthritis. Degenerative arthrosis at the thumb CMC and IP joints. Mild positive ulnar variance.   IMPRESSION: Oblique fracture of the fifth metacarpal diaphysis with slight displacement. PAXTON GAN MD    Cast/splint application    Date/Time: 3/1/2021 8:18 AM  Performed by: Chet Woody ATC  Authorized by: Madhuri Seymour MD     Consent:     Consent obtained:  Verbal    Consent given by:  Patient    Risks discussed:  Discoloration, numbness, pain and swelling    Alternatives discussed:  No treatment  Pre-procedure details:     Sensation:  Normal  Procedure details:     Location:  Hand    Hand:  R " hand    Splint type:  Ulnar gutter    Supplies:  Fiberglass  Post-procedure details:     Pain:  Improved    Sensation:  Normal    Patient tolerance of procedure:  Tolerated well, no immediate complications    Patient provided with cast or splint care instructions: Yes          Review of the result(s) of each unique test - XR

## 2021-02-25 NOTE — LETTER
2/25/2021         RE: Beatriz Francis  [facility] [address] [place] [id number]        Dear Colleague,    Thank you for referring your patient, Beatriz Francis, to the [facility]. Please see a copy of my visit note below.    ASSESSMENT & PLAN    Beatriz was seen today for pain.    Diagnoses and all orders for this visit:    Closed nondisplaced fracture of shaft of fifth metacarpal bone of right hand, initial encounter  -     Cast/splint application      Will place in ulnar gutter splint with close follow up in 1 week.  Discussed alignment, slight displacement, possible deformities.    Plan:  - Today's Plan of Care:  Ulnar gutter splint  Continue with relative rest and activity modification, Ice, Compression, and Elevation.  Can apply ice 10-15 minutes 3-4 times per day as needed.    -We also discussed other future treatment options:  exos splint    Follow Up: 1 week with repeat x-rays    Concerning signs and symptoms were reviewed.  The patient expressed understanding of this management plan and all questions were answered at this time.    Madhuri Seymour MD CA  Primary Care Sports Medicine  [facility]    -----  Chief Complaint   Patient presents with     Right Hand - Pain       SUBJECTIVE  Beatriz Francis is a/an 70 year old female who is seen as a self referral for evaluation of right hand.     The patient is seen by themselves.  The patient is right handed    Onset: 2 day(s) ago. Patient describes injury as FOOSH injury after falling on ice on 2/23/21. She had xrays completed yesterday and placed in a wrist brace.    Location of Pain: right ulnar hand  Worsened by: use of hand  Better with: rest  Treatments tried: rest/activity avoidance, elevation, ice and casting/splinting/bracing  Associated symptoms: swelling and rash from brace    Orthopedic/Surgical history: NO  Social History/Occupation: retired    No family history pertinent to  "patient's problem today.    REVIEW OF SYSTEMS:  Review of Systems  Skin: yes bruising, yes swelling  Musculoskeletal: as above  Neurologic: no numbness, paresthesias  Remainder of review of systems is negative including constitutional, CV, pulmonary, GI, except as noted in HPI or medical history.    OBJECTIVE:  /82   Ht 1.651 m (5' 5\")   Wt 60.3 kg (133 lb)   BMI 22.13 kg/m     General: healthy, alert and in no distress  HEENT: no scleral icterus or conjunctival erythema  Skin: no suspicious lesions or rash. No jaundice.  CV: distal perfusion intact  Resp: normal respiratory effort without conversational dyspnea   Psych: normal mood and affect  Gait: normal steady gait with appropriate coordination and balance  Neuro: Normal light sensory exam of upper extremity right    Bilateral Wrist and Hand exam  Inspection:       Swelling throughout right hand  - no rotational deformity    Tender:       5th metacarpal, mild 4th and 3rd metacarpal    Non Tender:       Remainder of hand    ROM:       Decreased ability to flex fingers due to pain and swelling    Neurovascular:       2+ radial pulses bilaterally with brisk capillary refill and      normal sensation to light touch in the radial, median and ulnar nerve distributions    RADIOLOGY:  I independently visualized and reviewed these images with the patient  XR 2/24/2021 - oblique 5th metacarpal fracture, slight displacement    Xr Hand Right G/e 3 Views  Result Date: 2/24/2021  HAND RIGHT THREE OR MORE VIEWS   2/24/2021 2:07 PM HISTORY: Right hand injury, swollen. Injury of right hand, initial encounter. FINDINGS: Second and third MCP osteoarthritis. Degenerative arthrosis at the thumb CMC and IP joints. Mild positive ulnar variance.   IMPRESSION: Oblique fracture of the fifth metacarpal diaphysis with slight displacement. PAXTON GAN MD    Cast/splint application    Date/Time: 3/1/2021 8:18 AM  Performed by: Chet Woody ATC  Authorized by: Madhuri Seymour MD "     Consent:     Consent obtained:  Verbal    Consent given by:  Patient    Risks discussed:  Discoloration, numbness, pain and swelling    Alternatives discussed:  No treatment  Pre-procedure details:     Sensation:  Normal  Procedure details:     Location:  Hand    Hand:  R hand    Splint type:  Ulnar gutter    Supplies:  Fiberglass  Post-procedure details:     Pain:  Improved    Sensation:  Normal    Patient tolerance of procedure:  Tolerated well, no immediate complications    Patient provided with cast or splint care instructions: Yes          Review of the result(s) of each unique test - XR             Again, thank you for allowing me to participate in the care of your patient.        Sincerely,        Madhuri Seymour MD

## 2021-02-25 NOTE — TELEPHONE ENCOUNTER
Patient had OV yesterday for fracture Right hand.  OV notes state she has appt tomorrow (indicating today) with ortho.  Patient calls to clarify this because via [employer] ortho appt is 3-4-21?  RN checked appts as well and patient is correct.    Do you want patient seen today in ortho or ok to wait until next week?    Routing to provider.  Dyana FERMIN MSN, RN

## 2021-02-25 NOTE — PATIENT INSTRUCTIONS
Plan:  - Today's Plan of Care:  Ulnar gutter splint  Continue with relative rest and activity modification, Ice, Compression, and Elevation.  Can apply ice 10-15 minutes 3-4 times per day as needed.    -We also discussed other future treatment options:  exos splint    Follow Up: 1 week with repeat x-rays    If you have any further questions for your physician or physician s care team you can call [phone] and use option 3 to leave a voice message. Calls received during business hours will be returned same day.

## 2021-03-03 ENCOUNTER — OFFICE VISIT (OUTPATIENT)
Dept: ORTHOPEDICS | Facility: CLINIC | Age: 71
End: 2021-03-03
Payer: COMMERCIAL

## 2021-03-03 ENCOUNTER — ANCILLARY PROCEDURE (OUTPATIENT)
Dept: GENERAL RADIOLOGY | Facility: CLINIC | Age: 71
End: 2021-03-03
Attending: PEDIATRICS
Payer: COMMERCIAL

## 2021-03-03 VITALS
BODY MASS INDEX: 22.16 KG/M2 | DIASTOLIC BLOOD PRESSURE: 82 MMHG | SYSTOLIC BLOOD PRESSURE: 129 MMHG | WEIGHT: 133 LBS | HEIGHT: 65 IN

## 2021-03-03 DIAGNOSIS — S62.356D CLOSED NONDISPLACED FRACTURE OF SHAFT OF FIFTH METACARPAL BONE OF RIGHT HAND WITH ROUTINE HEALING, SUBSEQUENT ENCOUNTER: Primary | ICD-10-CM

## 2021-03-03 DIAGNOSIS — S62.356D CLOSED NONDISPLACED FRACTURE OF SHAFT OF FIFTH METACARPAL BONE OF RIGHT HAND WITH ROUTINE HEALING, SUBSEQUENT ENCOUNTER: ICD-10-CM

## 2021-03-03 PROCEDURE — 73130 X-RAY EXAM OF HAND: CPT | Mod: RT | Performed by: RADIOLOGY

## 2021-03-03 PROCEDURE — 99207 PR FRACTURE CARE IN GLOBAL PERIOD: CPT | Performed by: PEDIATRICS

## 2021-03-03 ASSESSMENT — MIFFLIN-ST. JEOR: SCORE: 1134.16
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
## 2021-03-03 NOTE — PATIENT INSTRUCTIONS
Plan:  - Today's Plan of Care:  Continue ulnar gutter splint full time - ok to remove for hygiene    -We also discussed other future treatment options:  Cast or Exos  Occupational Therapy    Follow Up: 3 weeks with x-rays    If you have any further questions for your physician or physician s care team you can call [phone] and use option 3 to leave a voice message. Calls received during business hours will be returned same day.       Caring for Your Cast     A cast is used to protect an injured body part and allow it to heal by limiting the amount of motion occurring around the injury. Pain and swelling of the injured area is normal for 48 hours after your cast is put on. If you have swelling, wiggle your toes or fingers to ease it. Doing so encourages blood flow to your arm or leg.     It is important that you keep your cast dry, unless your doctor tells you differently. If the padding of the cast gets wet, your skin may be damaged and become infected. When showering or taking a bath, put the cast in a heavy plastic bag that can be held in place with a rubber band. If your cast gets wet and does not dry out in four to five hours, call your doctor s office.   To keep the cast clean, use wash clothes or baby wipes around it.   You may experience some itching inside the cast. This is normal. Avoid putting anything in the cast, even your finger, as you can injure your skin and cause infection. Try shaking some talcum powder or blowing cool air from a hair dryer into the cast to ease itching.   If these signs or symptoms develop, call your doctor immediately.       Pain gets worse     Swelling that cuts off blood flow that does not go away, even when you lift the body part above the level of your heart     Fever after itching. It may be related to an infection.     Fluid draining from your skin under the cast     Your cast may become loose as swelling goes down. If the cast feels too loose or if it is so loose you  can take it off, call your doctor s office.     Your doctor or  will give you recommendations for activity based on your injury. Some sports allow casts if properly padded by a doctor or .     For complete healing, your cast should only be removed at the direction of your doctor or clinic staff. A special saw ensures its safe removal and protects the skin and other tissue under the cast.

## 2021-03-03 NOTE — RESULT ENCOUNTER NOTE
These results were discussed during office visit.    Madhuri Seymour MD, CAQ  Primary Care Sports Medicine  [facility]
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
## 2021-03-15 DIAGNOSIS — G47.00 INSOMNIA, UNSPECIFIED TYPE: ICD-10-CM

## 2021-03-15 DIAGNOSIS — M54.2 CHRONIC NECK PAIN: ICD-10-CM

## 2021-03-15 DIAGNOSIS — F11.90 CHRONIC, CONTINUOUS USE OF OPIOIDS: ICD-10-CM

## 2021-03-15 DIAGNOSIS — G89.29 CHRONIC NECK PAIN: ICD-10-CM

## 2021-03-15 RX ORDER — HYDROCODONE BITARTRATE AND ACETAMINOPHEN 5; 325 MG/1; MG/1
1 TABLET ORAL EVERY 6 HOURS PRN
Qty: 120 TABLET | Refills: 0 | Status: SHIPPED | OUTPATIENT
Start: 2021-03-15 | End: 2021-04-15

## 2021-03-15 RX ORDER — ZOLPIDEM TARTRATE 10 MG/1
TABLET ORAL
Qty: 30 TABLET | Refills: 2 | Status: SHIPPED | OUTPATIENT
Start: 2021-03-15 | End: 2021-06-09

## 2021-03-24 ENCOUNTER — ANCILLARY PROCEDURE (OUTPATIENT)
Dept: GENERAL RADIOLOGY | Facility: CLINIC | Age: 71
End: 2021-03-24
Attending: PEDIATRICS
Payer: COMMERCIAL

## 2021-03-24 ENCOUNTER — OFFICE VISIT (OUTPATIENT)
Dept: ORTHOPEDICS | Facility: CLINIC | Age: 71
End: 2021-03-24
Payer: COMMERCIAL

## 2021-03-24 VITALS
DIASTOLIC BLOOD PRESSURE: 99 MMHG | BODY MASS INDEX: 22.16 KG/M2 | HEIGHT: 65 IN | WEIGHT: 133 LBS | SYSTOLIC BLOOD PRESSURE: 142 MMHG

## 2021-03-24 DIAGNOSIS — S62.356D CLOSED NONDISPLACED FRACTURE OF SHAFT OF FIFTH METACARPAL BONE OF RIGHT HAND WITH ROUTINE HEALING, SUBSEQUENT ENCOUNTER: ICD-10-CM

## 2021-03-24 DIAGNOSIS — S62.356D CLOSED NONDISPLACED FRACTURE OF SHAFT OF FIFTH METACARPAL BONE OF RIGHT HAND WITH ROUTINE HEALING, SUBSEQUENT ENCOUNTER: Primary | ICD-10-CM

## 2021-03-24 DIAGNOSIS — M19.041 ARTHRITIS OF RIGHT HAND: ICD-10-CM

## 2021-03-24 DIAGNOSIS — J30.1 SEASONAL ALLERGIC RHINITIS DUE TO POLLEN: ICD-10-CM

## 2021-03-24 PROCEDURE — 99207 PR FRACTURE CARE IN GLOBAL PERIOD: CPT | Performed by: PEDIATRICS

## 2021-03-24 PROCEDURE — 73130 X-RAY EXAM OF HAND: CPT | Mod: RT | Performed by: RADIOLOGY

## 2021-03-24 RX ORDER — FLUTICASONE PROPIONATE 50 MCG
SPRAY, SUSPENSION (ML) NASAL
Qty: 16 G | Refills: 11 | Status: SHIPPED | OUTPATIENT
Start: 2021-03-24 | End: 2022-07-25

## 2021-03-24 ASSESSMENT — MIFFLIN-ST. JEOR: SCORE: 1134.16
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
## 2021-03-24 NOTE — PATIENT INSTRUCTIONS
Plan:  - Today's Plan of Care:  Wean splint, start range of motion exercises  Gradually return to activities, limit heavy lifting  [phone]    Follow Up: 1 month with 3 view x-ray of right hand    If you have any further questions for your physician or physician s care team you can call [phone] and use option 3 to leave a voice message. Calls received during business hours will be returned same day.
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
## 2021-03-24 NOTE — RESULT ENCOUNTER NOTE
These results were discussed during office visit.    Madhuri Seymour MD, CAQ  Primary Care Sports Medicine  [facility]

## 2021-04-15 DIAGNOSIS — M54.2 CHRONIC NECK PAIN: ICD-10-CM

## 2021-04-15 DIAGNOSIS — G89.29 CHRONIC NECK PAIN: ICD-10-CM

## 2021-04-15 DIAGNOSIS — F11.90 CHRONIC, CONTINUOUS USE OF OPIOIDS: ICD-10-CM

## 2021-04-15 RX ORDER — HYDROCODONE BITARTRATE AND ACETAMINOPHEN 5; 325 MG/1; MG/1
1 TABLET ORAL EVERY 6 HOURS PRN
Qty: 120 TABLET | Refills: 0 | Status: SHIPPED | OUTPATIENT
Start: 2021-04-15 | End: 2021-05-13

## 2021-04-15 NOTE — TELEPHONE ENCOUNTER
Requested Prescriptions   Pending Prescriptions Disp Refills     HYDROcodone-acetaminophen (NORCO) 5-325 MG tablet [Pharmacy Med Name: HYDROCODONE-ACETAMINOPHE 5-325 TABS] 120 tablet 0     Sig: TAKE 1 TABLET BY MOUTH EVERY 6 HOURS AS NEEDED FOR SEVERE PAIN MAX 4 PER DAY       There is no refill protocol information for this order              Last Written Prescription Date:  3/15/21  Last Fill Quantity: 120,   # refills: 0  Last Office Visit: 10/12/20  Future Office visit:    Next 5 appointments (look out 90 days)    Apr 21, 2021 11:00 AM  Return Visit with Madhuri Seymour MD  [facility] [place] ([facility] ) [address]  [address]  [facility] [place] [address] [phone]           Routing refill request to provider for review/approval because:  Drug not on the [facility], [facility] or [facility] refill protocol or controlled substance

## 2021-04-21 ENCOUNTER — OFFICE VISIT (OUTPATIENT)
Dept: ORTHOPEDICS | Facility: CLINIC | Age: 71
End: 2021-04-21
Payer: COMMERCIAL

## 2021-04-21 ENCOUNTER — ANCILLARY PROCEDURE (OUTPATIENT)
Dept: GENERAL RADIOLOGY | Facility: CLINIC | Age: 71
End: 2021-04-21
Attending: PEDIATRICS
Payer: COMMERCIAL

## 2021-04-21 VITALS
SYSTOLIC BLOOD PRESSURE: 138 MMHG | WEIGHT: 133 LBS | BODY MASS INDEX: 22.16 KG/M2 | DIASTOLIC BLOOD PRESSURE: 80 MMHG | HEIGHT: 65 IN

## 2021-04-21 DIAGNOSIS — S62.356D CLOSED NONDISPLACED FRACTURE OF SHAFT OF FIFTH METACARPAL BONE OF RIGHT HAND WITH ROUTINE HEALING, SUBSEQUENT ENCOUNTER: ICD-10-CM

## 2021-04-21 DIAGNOSIS — S62.356D CLOSED NONDISPLACED FRACTURE OF SHAFT OF FIFTH METACARPAL BONE OF RIGHT HAND WITH ROUTINE HEALING, SUBSEQUENT ENCOUNTER: Primary | ICD-10-CM

## 2021-04-21 DIAGNOSIS — M19.041 ARTHRITIS OF RIGHT HAND: ICD-10-CM

## 2021-04-21 PROCEDURE — 99207 PR FRACTURE CARE IN GLOBAL PERIOD: CPT | Performed by: PEDIATRICS

## 2021-04-21 PROCEDURE — 73130 X-RAY EXAM OF HAND: CPT | Mod: RT | Performed by: RADIOLOGY

## 2021-04-21 ASSESSMENT — MIFFLIN-ST. JEOR: SCORE: 1134.16

## 2021-04-21 NOTE — RESULT ENCOUNTER NOTE
These results were discussed during office visit.    Madhuri Seymour MD, CAQ  Primary Care Sports Medicine  [facility]

## 2021-04-21 NOTE — PATIENT INSTRUCTIONS
Plan:  - Today's Plan of Care:  Gradual return to activities as tolerated  Consider Splinting third MCP  Can schedule Rehab[facility] [phone]    -We also discussed other future treatment options:  Referral to Hand Surgeon    Follow Up: 2 months    If you have any further questions for your physician or physician s care team you can call [phone] and use option 3 to leave a voice message. Calls received during business hours will be returned same day.
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
## 2021-05-09 ENCOUNTER — HEALTH MAINTENANCE LETTER (OUTPATIENT)
Age: 71
End: 2021-05-09

## 2021-05-13 DIAGNOSIS — F11.90 CHRONIC, CONTINUOUS USE OF OPIOIDS: ICD-10-CM

## 2021-05-13 DIAGNOSIS — M54.2 CHRONIC NECK PAIN: ICD-10-CM

## 2021-05-13 DIAGNOSIS — G89.29 CHRONIC NECK PAIN: ICD-10-CM

## 2021-05-13 RX ORDER — HYDROCODONE BITARTRATE AND ACETAMINOPHEN 5; 325 MG/1; MG/1
1 TABLET ORAL EVERY 6 HOURS PRN
Qty: 120 TABLET | Refills: 0 | Status: SHIPPED | OUTPATIENT
Start: 2021-05-13 | End: 2021-06-09

## 2021-05-13 NOTE — TELEPHONE ENCOUNTER
Dr. Mesa,    Requested Prescriptions   Pending Prescriptions Disp Refills     HYDROcodone-acetaminophen (NORCO) 5-325 MG tablet [Pharmacy Med Name: HYDROCODONE/APAP 5-325MG TAB] 120 tablet 0     Sig: TAKE 1 TABLET BY MOUTH EVERY 6 HOURS AS NEEDED FOR SEVERE PAIN MAX 4 PER DAY       There is no refill protocol information for this order        Routing refill request to provider for review/approval because:  Drug not on the [facility] refill protocol     Madhuri Felton RN  [facility]

## 2021-05-29 ASSESSMENT — ENCOUNTER SYMPTOMS
COUGH: 1
NERVOUS/ANXIOUS: 0
NAUSEA: 0
DIARRHEA: 0
JOINT SWELLING: 0
WEAKNESS: 0
HEMATOCHEZIA: 0
BREAST MASS: 0
HEADACHES: 0
SORE THROAT: 0
DIZZINESS: 0
CONSTIPATION: 0
DYSURIA: 0
ARTHRALGIAS: 1
HEMATURIA: 0
FREQUENCY: 0
SHORTNESS OF BREATH: 1
ABDOMINAL PAIN: 0
HEARTBURN: 1
CHILLS: 0
FEVER: 0
MYALGIAS: 1
PALPITATIONS: 0
EYE PAIN: 0
PARESTHESIAS: 0

## 2021-05-29 ASSESSMENT — ACTIVITIES OF DAILY LIVING (ADL): CURRENT_FUNCTION: NO ASSISTANCE NEEDED

## 2021-06-01 ENCOUNTER — OFFICE VISIT (OUTPATIENT)
Dept: FAMILY MEDICINE | Facility: CLINIC | Age: 71
End: 2021-06-01
Payer: COMMERCIAL

## 2021-06-01 VITALS
RESPIRATION RATE: 12 BRPM | DIASTOLIC BLOOD PRESSURE: 84 MMHG | SYSTOLIC BLOOD PRESSURE: 138 MMHG | BODY MASS INDEX: 21.83 KG/M2 | HEART RATE: 84 BPM | HEIGHT: 65 IN | WEIGHT: 131 LBS

## 2021-06-01 DIAGNOSIS — Z79.899 ENCOUNTER FOR LONG-TERM CURRENT USE OF MEDICATION: ICD-10-CM

## 2021-06-01 DIAGNOSIS — Z12.11 COLON CANCER SCREENING: ICD-10-CM

## 2021-06-01 DIAGNOSIS — Z13.220 SCREENING FOR HYPERLIPIDEMIA: ICD-10-CM

## 2021-06-01 DIAGNOSIS — Z00.00 MEDICARE ANNUAL WELLNESS VISIT, SUBSEQUENT: Primary | ICD-10-CM

## 2021-06-01 LAB
CHOLEST SERPL-MCNC: 211 MG/DL
HDLC SERPL-MCNC: 50 MG/DL
LDLC SERPL CALC-MCNC: 115 MG/DL
NONHDLC SERPL-MCNC: 161 MG/DL
TRIGL SERPL-MCNC: 232 MG/DL

## 2021-06-01 PROCEDURE — 80061 LIPID PANEL: CPT | Performed by: FAMILY MEDICINE

## 2021-06-01 PROCEDURE — 80306 DRUG TEST PRSMV INSTRMNT: CPT | Performed by: FAMILY MEDICINE

## 2021-06-01 PROCEDURE — 36415 COLL VENOUS BLD VENIPUNCTURE: CPT | Performed by: FAMILY MEDICINE

## 2021-06-01 PROCEDURE — 99213 OFFICE O/P EST LOW 20 MIN: CPT | Mod: 25 | Performed by: FAMILY MEDICINE

## 2021-06-01 PROCEDURE — 99397 PER PM REEVAL EST PAT 65+ YR: CPT | Performed by: FAMILY MEDICINE

## 2021-06-01 ASSESSMENT — ANXIETY QUESTIONNAIRES
GAD7 TOTAL SCORE: 1
GAD7 TOTAL SCORE: 1
1. FEELING NERVOUS, ANXIOUS, OR ON EDGE: NOT AT ALL
2. NOT BEING ABLE TO STOP OR CONTROL WORRYING: NOT AT ALL
7. FEELING AFRAID AS IF SOMETHING AWFUL MIGHT HAPPEN: NOT AT ALL
1. FEELING NERVOUS, ANXIOUS, OR ON EDGE: NOT AT ALL
6. BECOMING EASILY ANNOYED OR IRRITABLE: NOT AT ALL
5. BEING SO RESTLESS THAT IT IS HARD TO SIT STILL: NOT AT ALL
7. FEELING AFRAID AS IF SOMETHING AWFUL MIGHT HAPPEN: NOT AT ALL
3. WORRYING TOO MUCH ABOUT DIFFERENT THINGS: NOT AT ALL
3. WORRYING TOO MUCH ABOUT DIFFERENT THINGS: NOT AT ALL
6. BECOMING EASILY ANNOYED OR IRRITABLE: NOT AT ALL
IF YOU CHECKED OFF ANY PROBLEMS ON THIS QUESTIONNAIRE, HOW DIFFICULT HAVE THESE PROBLEMS MADE IT FOR YOU TO DO YOUR WORK, TAKE CARE OF THINGS AT HOME, OR GET ALONG WITH OTHER PEOPLE: NOT DIFFICULT AT ALL
5. BEING SO RESTLESS THAT IT IS HARD TO SIT STILL: NOT AT ALL
2. NOT BEING ABLE TO STOP OR CONTROL WORRYING: NOT AT ALL

## 2021-06-01 ASSESSMENT — ENCOUNTER SYMPTOMS
HEMATOCHEZIA: 0
DIARRHEA: 0
SORE THROAT: 0
HEARTBURN: 0
WEAKNESS: 0
DYSURIA: 0
NERVOUS/ANXIOUS: 0
NAUSEA: 0
EYE PAIN: 0
HEMATURIA: 0
HEADACHES: 0
PARESTHESIAS: 0
JOINT SWELLING: 0
FEVER: 0
ABDOMINAL PAIN: 0
ARTHRALGIAS: 1
CONSTIPATION: 0
BREAST MASS: 0
MYALGIAS: 1
CHILLS: 0
FREQUENCY: 0
SHORTNESS OF BREATH: 0
DIZZINESS: 0
PALPITATIONS: 0
COUGH: 1

## 2021-06-01 ASSESSMENT — PATIENT HEALTH QUESTIONNAIRE - PHQ9
SUM OF ALL RESPONSES TO PHQ QUESTIONS 1-9: 3
5. POOR APPETITE OR OVEREATING: SEVERAL DAYS
5. POOR APPETITE OR OVEREATING: SEVERAL DAYS

## 2021-06-01 ASSESSMENT — ACTIVITIES OF DAILY LIVING (ADL): CURRENT_FUNCTION: NO ASSISTANCE NEEDED

## 2021-06-01 ASSESSMENT — MIFFLIN-ST. JEOR: SCORE: 1125.09

## 2021-06-01 NOTE — LETTER
Opioid / Opioid Plus Controlled Substance Agreement    This is an agreement between you and your provider about the safe and appropriate use of controlled substance/opioids prescribed by your care team. Controlled substances are medicines that can cause physical and mental dependence (abuse).    There are strict laws about having and using these medicines. We here at [facility] are committing to working with you in your efforts to get better. To support you in this work, we ll help you schedule regular office appointments for medicine refills. If we must cancel or change your appointment for any reason, we ll make sure you have enough medicine to last until your next appointment.     As a Provider, I will:    Listen carefully to your concerns and treat you with respect.     Recommend a treatment plan that I believe is in your best interest. This plan may involve therapies other than opioid pain medication.     Talk with you often about the possible benefits, and the risk of harm of any medicine that we prescribe for you.     Provide a plan on how to taper (discontinue or go off) using this medicine if the decision is made to stop its use.    As a Patient, I understand that opioid(s):     Are a controlled substance prescribed by my care team to help me function or work and manage my condition(s).     Are strong medicines and can cause serious side effects such as:    Drowsiness, which can seriously affect my driving ability    A lower breathing rate, enough to cause death    Harm to my thinking ability     Depression     Abuse of and addiction to this medicine    Need to be taken exactly as prescribed. Combining opioids with certain medicines or chemicals (such as illegal drugs, sedatives, sleeping pills, and benzodiazepines) can be dangerous or even fatal. If I stop opioids suddenly, I may have severe withdrawal symptoms.    Do not work for all types of pain nor for all patients. If they re not helpful, I may  be asked to stop them.        The risks, benefits and side effects of these medicine(s) were explained to me. I agree that:  1. I will take part in other treatments as advised by my care team. This may be psychiatry or counseling, physical therapy, behavioral therapy, group treatment or a referral to a specialist.     2. I will keep all my appointments. I understand that this is part of the monitoring of opioids. My care team may require an office visit for EVERY opioid/controlled substance refill. If I miss appointments or don t follow instructions, my care team may stop my medicine.    3. I will take my medicines as prescribed. I will not change the dose or schedule unless my care team tells me to. There will be no refills if I run out early.     4. I may be asked to come to the clinic and complete a urine drug test or complete a pill count at any time. If I don t give a urine sample or participate in a pill count, the care team may stop my medicine.    5. I will only receive prescriptions from this clinic for chronic pain. If I am treated by another provider for acute pain issues, I will tell them that I am taking opioid pain medication for chronic pain and that I have a treatment agreement with this provider. I will inform my [facility] care team within one business day if I am given a prescription for any pain medication by another healthcare provider. My [facility] care team can contact other providers and pharmacists about my use of any medicines.    6. It is up to me to make sure that I don t run out of my medicines on weekends or holidays. If my care team is willing to refill my opioid prescription without a visit, I must request refills only during office hours. Refills may take up to 3 business days to process. I will use one pharmacy to fill all my opioid and other controlled substance prescriptions. I will notify the clinic about any changes to my insurance or medication  availability.    7. I am responsible for my prescriptions. If the medicine/prescription is lost, stolen or destroyed, it will not be replaced. I also agree not to share controlled substance medicines with anyone.    8. I am aware I should not use any illegal or recreational drugs. I agree not to drink alcohol unless my care team says I can.       9. If I enroll in the [place] [facility] program, I will tell my care team prior to my next refill.     10. I will tell my care team right away if I become pregnant, have a new medical problem treated outside of my regular clinic, or have a change in my medications.    11. I understand that this medicine can affect my thinking, judgment and reaction time. Alcohol and drugs affect the brain and body, which can affect the safety of my driving. Being under the influence of alcohol or drugs can affect my decision-making, behaviors, personal safety, and the safety of others. Driving while impaired (DWI) can occur if a person is driving, operating, or in physical control of a car, motorcycle, boat, snowmobile, ATV, motorbike, off-road vehicle, or any other motor vehicle ([place] Statute 169A.20). I understand the risk if I choose to drive or operate any vehicle or machinery.    I understand that if I do not follow any of the conditions above, my prescriptions or treatment may be stopped or changed.          Opioids  What You Need to Know    What are opioids?   Opioids are pain medicines that must be prescribed by a doctor. They are also known as narcotics.     Examples are:   1. morphine (MS Contin, Jennifer)  2. oxycodone (Oxycontin)  3. oxycodone and acetaminophen (Percocet)  4. hydrocodone and acetaminophen (Vicodin, Norco)   5. fentanyl patch (Duragesic)   6. hydromorphone (Dilaudid)   7. methadone  8. codeine (Tylenol #3)     What do opioids do well?   Opioids are best for severe short-term pain such as after a surgery or injury. They may work well for cancer pain. They may  help some people with long-lasting (chronic) pain.     What do opioids NOT do well?   Opioids never get rid of pain entirely, and they don t work well for most patients with chronic pain. Opioids don t reduce swelling, one of the causes of pain.                                    Other ways to manage chronic pain and improve function include:       Treat the health problem that may be causing pain    Anti-inflammation medicines, which reduce swelling and tenderness, such as ibuprofen (Advil, Motrin) or naproxen (Aleve)    Acetaminophen (Tylenol)    Antidepressants and anti-seizure medicines, especially for nerve pain    Topical treatments such as patches or creams    Injections or nerve blocks    Chiropractic or osteopathic treatment    Acupuncture, massage, deep breathing, meditation, visual imagery, aromatherapy    Use heat or ice at the pain site    Physical therapy     Exercise    Stop smoking    Take part in therapy       Risks and side effects     Talk to your doctor before you start or decide to keep taking opioids. Possible side effects include:      Lowering your breathing rate enough to cause death    Overdose, including death, especially if taking higher than prescribed doses    Worse depression symptoms; less pleasure in things you usually enjoy    Feeling tired or sluggish    Slower thoughts or cloudy thinking    Being more sensitive to pain over time; pain is harder to control    Trouble sleeping or restless sleep    Changes in hormone levels (for example, less testosterone)    Changes in sex drive or ability to have sex    Constipation    Unsafe driving    Itching and sweating    Dizziness    Nausea, throwing up and dry mouth    What else should I know about opioids?    Opioids may lead to dependence, tolerance, or addiction.      Dependence means that if you stop or reduce the medicine too quickly, you will have withdrawal symptoms. These include loose poop (diarrhea), jitters, flu-like symptoms,  nervousness and tremors. Dependence is not the same as addiction.                       Tolerance means needing higher doses over time to get the same effect. This may increase the chance of serious side effects.      Addiction is when people improperly use a substance that harms their body, their mind or their relations with others. Use of opiates can cause a relapse of addiction if you have a history of drug or alcohol abuse.      People who have used opioids for a long time may have a lower quality of life, worse depression, higher levels of pain and more visits to doctors.    You can overdose on opioids. Take these steps to lower your risk of overdose:    1. Recognize the signs:  Signs of overdose include decrease or loss of consciousness (blackout), slowed breathing, trouble waking up and blue lips. If someone is worried about overdose, they should call 911.    2. Talk to your doctor about Narcan (naloxone).   If you are at risk for overdose, you may be given a prescription for Narcan. This medicine very quickly reverses the effects of opioids.   If you overdose, a friend or family member can give you Narcan while waiting for the ambulance. They need to know the signs of overdose and how to give Narcan.     3. Don't use alcohol or street drugs.   Taking them with opioids can cause death.    4. Do not take any of these medicines unless your doctor says it s OK. Taking these with opioids can cause death:    Benzodiazepines, such as lorazepam (Ativan), alprazolam (Xanax) or diazepam (Valium)    Muscle relaxers, such as cyclobenzaprine (Flexeril)    Sleeping pills like zolpidem (Ambien)     Other opioids      How to keep you and other people safe while taking opioids:    1. Never share your opioids with others.  Opioid medicines are regulated by the Drug Enforcement Agency ([employer]). Selling or sharing medications is a criminal act.    2. Be sure to store opioids in a secure place, locked up if possible. Young children  can easily swallow them and overdose.    3. When you are traveling with your medicines, keep them in the original bottles. If you use a pill box, be sure you also carry a copy of your medicine list from your clinic or pharmacy.    4. Safe disposal of opioids    Most pharmacies have places to get rid of medicine, called disposal kiosks. Medicine disposal options are also available in every [facility]. Search your county and  medication disposal  to find more options. You can find more details at:  [email or web address]-[email or web address]     I agree that my provider, clinic care team, and pharmacy may work with any city, state or federal law enforcement agency that investigates the misuse, sale, or other diversion of my controlled medicine. I will allow my provider to discuss my care with, or share a copy of, this agreement with any other treating provider, pharmacy or emergency room where I receive care.    I have read this agreement and have asked questions about anything I did not understand.    _______________________________________________________  Patient Signature - Beatriz Francis _____________________                   Date     _______________________________________________________  Provider Signature - Constantine Loera MD   _____________________                   Date     _______________________________________________________  Witness Signature (required if provider not present while patient signing)   _____________________                   Date
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
## 2021-06-01 NOTE — LETTER
June 2, 2021      Beatriz Francis  [address] [place] [id number]        Dear ,    We are writing to inform you of your test results.    {results letter list:[id number]}    Resulted Orders   Lipid panel reflex to direct LDL Fasting   Result Value Ref Range    Cholesterol 211 (H) <200 mg/dL      Comment:      Desirable:       <200 mg/dl    Triglycerides 232 (H) <150 mg/dL      Comment:      Borderline high:  150-199 mg/dl  High:             200-499 mg/dl  Very high:       >499 mg/dl  Non Fasting      HDL Cholesterol 50 >49 mg/dL    LDL Cholesterol Calculated 115 (H) <100 mg/dL      Comment:      Above desirable:  100-129 mg/dl  Borderline High:  130-159 mg/dL  High:             160-189 mg/dL  Very high:       >189 mg/dl      Non HDL Cholesterol 161 (H) <130 mg/dL      Comment:      Above Desirable:  130-159 mg/dl  Borderline high:  160-189 mg/dl  High:             190-219 mg/dl  Very high:       >219 mg/dl     [id number] - Urine Drugs of Abuse Panel 13 - (Screening)   Result Value Ref Range    Cannabinoids (02-zpf-8-carboxy-9-THC) Not Detected NDET^Not Detected ng/mL      Comment:      Cutoff for a negative cannabinoid is 50 ng/mL or less.    Phencyclidine (Phencyclidine) Not Detected NDET^Not Detected ng/mL      Comment:      Cutoff for a negative PCP is 25 ng/mL or less.    Cocaine (Benzoylecgonine) Not Detected NDET^Not Detected ng/mL      Comment:      Cutoff for a negative cocaine is 150 ng/ml or less.    Methamphetamine (d-Methamphetamine) Not Detected NDET^Not Detected ng/mL      Comment:      Cutoff for a negative methamphetamine is 500 ng/ml or less.    Opiates (Morphine) Detected, Abnormal Result (A) NDET^Not Detected ng/mL      Comment:      Cutoff for a positive opiate is greater than 100 ng/ml.  This is an unconfirmed screening result to be used for medical purposes only.   Order [id number] for confirmation or individual confirmation tests to [facility].      Amphetamine (d-Amphetamine) Not  Detected NDET^Not Detected ng/mL      Comment:      Cutoff for a negative amphetamine is 500 ng/mL or less.    Benzodiazepines (Nordiazepam) Not Detected NDET^Not Detected ng/mL      Comment:      Cutoff for a negative benzodiazepine is 150 ng/ml or less.    Tricyclic Antidepressants (Desipramine) Not Detected NDET^Not Detected ng/mL      Comment:      Cutoff for a negative tricyclic antidepressant is 300 ng/ml or less.    Methadone (Methadone) Not Detected NDET^Not Detected ng/mL      Comment:      Cutoff for a negative methadone is 200 ng/ml or less.    Barbiturates (Butalbital) Not Detected NDET^Not Detected ng/mL      Comment:      Cutoff for a negative barbituate is 200 ng/ml or less.    Oxycodone (Oxycodone) Not Detected NDET^Not Detected ng/mL      Comment:      Cutoff for a negative Oxycodone is 100 ng/mL or less.    Propoxyphene (Norpropoxyphene) Not Detected NDET^Not Detected ng/mL      Comment:      Cutoff for a negative propoxyphene is 300 ng/ml or less    Buprenorphine (Buprenorphine) Not Detected NDET^Not Detected ng/mL      Comment:      Cutoff for a negative buprenorphine is 10 ng/ml or less       If you have any questions or concerns, please call the clinic at the number listed above.       Sincerely,      Constantine Loera MD

## 2021-06-02 LAB

## 2021-06-02 ASSESSMENT — ANXIETY QUESTIONNAIRES: GAD7 TOTAL SCORE: 1

## 2021-06-03 DIAGNOSIS — E78.2 MIXED HYPERLIPIDEMIA: Primary | ICD-10-CM

## 2021-06-09 DIAGNOSIS — G47.00 INSOMNIA, UNSPECIFIED TYPE: ICD-10-CM

## 2021-06-09 DIAGNOSIS — F11.90 CHRONIC, CONTINUOUS USE OF OPIOIDS: ICD-10-CM

## 2021-06-09 DIAGNOSIS — M54.2 CHRONIC NECK PAIN: ICD-10-CM

## 2021-06-09 DIAGNOSIS — G89.29 CHRONIC NECK PAIN: ICD-10-CM

## 2021-06-09 RX ORDER — HYDROCODONE BITARTRATE AND ACETAMINOPHEN 5; 325 MG/1; MG/1
TABLET ORAL
Qty: 120 TABLET | Refills: 0 | Status: SHIPPED | OUTPATIENT
Start: 2021-06-13 | End: 2021-07-12

## 2021-06-09 RX ORDER — ZOLPIDEM TARTRATE 10 MG/1
TABLET ORAL
Qty: 30 TABLET | Refills: 2 | Status: SHIPPED | OUTPATIENT
Start: 2021-06-13 | End: 2021-09-13

## 2021-06-23 DIAGNOSIS — E78.2 MIXED HYPERLIPIDEMIA: ICD-10-CM

## 2021-06-23 LAB
ALBUMIN SERPL-MCNC: 4.2 G/DL (ref 3.4–5)
ALP SERPL-CCNC: 117 U/L (ref 40–150)
ALT SERPL W P-5'-P-CCNC: 21 U/L (ref 0–50)
ANION GAP SERPL CALCULATED.3IONS-SCNC: 8 MMOL/L (ref 3–14)
AST SERPL W P-5'-P-CCNC: 9 U/L (ref 0–45)
BILIRUB SERPL-MCNC: 0.4 MG/DL (ref 0.2–1.3)
BUN SERPL-MCNC: 10 MG/DL (ref 7–30)
CALCIUM SERPL-MCNC: 9.1 MG/DL (ref 8.5–10.1)
CHLORIDE SERPL-SCNC: 92 MMOL/L (ref 94–109)
CO2 SERPL-SCNC: 27 MMOL/L (ref 20–32)
CREAT SERPL-MCNC: 0.62 MG/DL (ref 0.52–1.04)
GFR SERPL CREATININE-BSD FRML MDRD: >90 ML/MIN/{1.73_M2}
GLUCOSE SERPL-MCNC: 88 MG/DL (ref 70–99)
POTASSIUM SERPL-SCNC: 4.6 MMOL/L (ref 3.4–5.3)
PROT SERPL-MCNC: 7.8 G/DL (ref 6.8–8.8)
SODIUM SERPL-SCNC: 127 MMOL/L (ref 133–144)

## 2021-06-23 PROCEDURE — 36415 COLL VENOUS BLD VENIPUNCTURE: CPT | Performed by: FAMILY MEDICINE

## 2021-06-23 PROCEDURE — 80053 COMPREHEN METABOLIC PANEL: CPT | Performed by: FAMILY MEDICINE

## 2021-06-24 DIAGNOSIS — E87.1 HYPONATREMIA: Primary | ICD-10-CM

## 2021-06-25 DIAGNOSIS — J43.9 PULMONARY EMPHYSEMA, UNSPECIFIED EMPHYSEMA TYPE (H): ICD-10-CM

## 2021-06-25 RX ORDER — TIOTROPIUM BROMIDE 18 UG/1
1 CAPSULE ORAL; RESPIRATORY (INHALATION) DAILY
Qty: 90 CAPSULE | Refills: 11 | Status: SHIPPED | OUTPATIENT
Start: 2021-06-25 | End: 2022-09-13

## 2021-06-25 NOTE — TELEPHONE ENCOUNTER
Routing refill request to provider for review/approval because:  Last ordered by Joanie Cortez MD

## 2021-07-03 DIAGNOSIS — E87.1 HYPONATREMIA: ICD-10-CM

## 2021-07-03 LAB
ANION GAP SERPL CALCULATED.3IONS-SCNC: 9 MMOL/L (ref 3–14)
BUN SERPL-MCNC: 10 MG/DL (ref 7–30)
CALCIUM SERPL-MCNC: 8.5 MG/DL (ref 8.5–10.1)
CHLORIDE SERPL-SCNC: 94 MMOL/L (ref 94–109)
CO2 SERPL-SCNC: 25 MMOL/L (ref 20–32)
CREAT SERPL-MCNC: 0.63 MG/DL (ref 0.52–1.04)
GFR SERPL CREATININE-BSD FRML MDRD: >90 ML/MIN/{1.73_M2}
GLUCOSE SERPL-MCNC: 94 MG/DL (ref 70–99)
OSMOLALITY SERPL: 264 MMOL/KG (ref 280–301)
OSMOLALITY UR: 340 MMOL/KG (ref 100–1200)
POTASSIUM SERPL-SCNC: 4.5 MMOL/L (ref 3.4–5.3)
SODIUM SERPL-SCNC: 128 MMOL/L (ref 133–144)

## 2021-07-03 PROCEDURE — 80048 BASIC METABOLIC PNL TOTAL CA: CPT | Performed by: FAMILY MEDICINE

## 2021-07-03 PROCEDURE — 84300 ASSAY OF URINE SODIUM: CPT | Performed by: FAMILY MEDICINE

## 2021-07-03 PROCEDURE — 83930 ASSAY OF BLOOD OSMOLALITY: CPT | Performed by: FAMILY MEDICINE

## 2021-07-03 PROCEDURE — 36415 COLL VENOUS BLD VENIPUNCTURE: CPT | Performed by: FAMILY MEDICINE

## 2021-07-03 PROCEDURE — 99000 SPECIMEN HANDLING OFFICE-LAB: CPT | Performed by: FAMILY MEDICINE

## 2021-07-03 PROCEDURE — 83935 ASSAY OF URINE OSMOLALITY: CPT | Mod: 90 | Performed by: FAMILY MEDICINE

## 2021-07-05 DIAGNOSIS — E87.1 HYPONATREMIA: Primary | ICD-10-CM

## 2021-07-05 LAB — SODIUM UR-SCNC: 36 MMOL/L

## 2021-07-12 DIAGNOSIS — G89.29 CHRONIC NECK PAIN: ICD-10-CM

## 2021-07-12 DIAGNOSIS — F11.90 CHRONIC, CONTINUOUS USE OF OPIOIDS: ICD-10-CM

## 2021-07-12 DIAGNOSIS — M54.2 CHRONIC NECK PAIN: ICD-10-CM

## 2021-07-12 RX ORDER — HYDROCODONE BITARTRATE AND ACETAMINOPHEN 5; 325 MG/1; MG/1
TABLET ORAL
Qty: 120 TABLET | Refills: 0 | Status: SHIPPED | OUTPATIENT
Start: 2021-07-15 | End: 2021-08-12

## 2021-07-12 NOTE — TELEPHONE ENCOUNTER
Routing to [facility] as they have seen pt most recently. Please follow up with pt.    Hallie Costa RN  [facility]

## 2021-07-12 NOTE — TELEPHONE ENCOUNTER
Controlled Substance Refill Request for Norco  Problem List Complete:    Patient is followed by Freddie Mesa MD   for ongoing prescription of pain medication.  All refills should be approved by this provider, or covering partner.    Medication(s): hydrocodone.   Maximum quantity per month: 120, 6/21/17 discussion started about increasing quantity per month.  Pain is not well controlled.  Once quantity increased to 120 to provide 4 tablets daily, she had much improved ability for daily activities.  Clinic visit frequency required: Q 3  months     Controlled substance agreement on file: Yes       Date(s): 1/25/2010, 6/21/17    Pain Clinic evaluation in the past: No    DIRE Total Score(s):  No flowsheet data found.    Last [facility] website verification: 9/14/15, 6/22/17, 2/2018, 12/05/18, 03/04/19, 06/03/19, 09/05/19, 01/13/20, 04/06/20     [email or web address]    Tried gabapentin and developed side effects  6/2017 trying duloxetine, didn't help        Last Written Prescription Date:  6/13/21  Last Fill Quantity: 120,   # refills: 0    Last Office Visit with [facility] [place] care provider: 6/1/21    Future Office visit:     Controlled substance agreement:   Encounter-Level CSA - 06/21/2017:    Controlled Substance Agreement - Scan on 6/23/2017 12:37 PM: CONTROLLED SUBSTANCE AGREEMENT     Patient-Level CSA:    Controlled Substance Agreement - Opioid - Scan on 6/1/2021 12:37 PM  Controlled Substance Agreement - Opioid - Scan on 1/13/2020  4:04 PM  Controlled Substance Agreement - Opioid - Scan on 3/4/2019  2:18 PM         Last Urine Drug Screen:   Pain Drug SCR UR W RPTD Meds   Date Value Ref Range Status   08/28/2017 FINAL  Final     Comment:     (Note)  ====================================================================  [facility] COMP DRUG ANALYSIS,UR  ====================================================================  Specimen Alert  Note:  Urinary creatinine is low; ability to detect some  drugs may be  compromised.  Interpret results  with caution.  ====================================================================  Test                             Result       Flag       Units        Drug Present and Declared for Prescription Verification   Hydrocodone                    569          EXPECTED   ng/mg creat   Norhydrocodone                 1513         EXPECTED   ng/mg creat    Sources of hydrocodone include scheduled prescription    medications. Norhydrocodone is an expected metabolite of    hydrocodone.   Duloxetine                     PRESENT      EXPECTED                 Acetaminophen                  PRESENT      EXPECTED                Drug Present not Declared for Prescription Verification   Ephedrine/Pseudoephedrine      PRESENT      UNEXPECTED               Phenylpropanolamine            PRESENT      UNEXPECTED                Source of ephedrine/pseudoephedrine is most commonly    pseudoephedrine in over-the-counter or prescription cold and    allergy medications. Phenylpropanolamine is an expected    metabolite of ephedrine/pseudoephedrine.   Doxylamine                     PRESENT      UNEXPECTED               Dextromethorphan               PRESENT      UNEXPECTED               Dextrorphan/Levorphanol        PRESENT      UNEXPECTED                Dextrorphan is an expected metabolite of dextromethorphan, an    over-the-counter or prescription cough suppressant. Dextrorphan    cannot be distinguished from the scheduled prescription    medication levorphanol by the method used for analysis.  Drug Absent but Declared for Prescription Verification   Zolpidem                       Not Detected UNEXPECTED                Zolpidem, as indicated in the declared medication list, is not    always detected even when used as directed.  ====================================================================  Test                      Result    Flag   Units      Ref Range        Creatinine              16        L       mg/dL      >=20            ====================================================================  Declared Medications:  The flagging and interpretation on this report are based on the  following declared medications.  Unexpected results may arise from  inaccuracies in the declared medications.  **Note: The testing scope of this panel includes these medications:  Duloxetine  Duloxetine (Cymbalta)  Hydrocodone  Hydrocodone (Harriet)  **Note: The testing scope of this panel does not include small to  moderate amounts of these reported medications:  Acetaminophen (Norco)  Zolpidem  Zolpidem (Ambien)  ====================================================================  For clinical consultation, please call [phone].  ====================================================================  Analysis performed by [facility], [address] [place], [place] [id number] [place] [address] Drug Analysis UR   Date Value Ref Range Status   10/12/2020 FINAL  Final     Comment:     (Note)  ====================================================================  COMPREHENSIVE DRUG ANALYSIS,UR  ====================================================================  Test                             Result       Flag       Units        Drug Present   Hydrocodone                    1838                    ng/mg creat   Dihydrocodeine                 257                     ng/mg creat   Norhydrocodone                 2752                    ng/mg creat    Sources of hydrocodone include scheduled prescription    medications. Dihydrocodeine and norhydrocodone are expected    metabolites of hydrocodone. Dihydrocodeine is also available as a    scheduled prescription medication.   Zolpidem Acid                  PRESENT                                Zolpidem acid is an expected metabolite of zolpidem.   Bupropion                      PRESENT                               Hydroxybupropion               PRESENT                                 Hydroxybupropion is an expected metabolite of bupropion.   Acetaminophen                  PRESENT                               Doxylamine                     PRESENT                               Dextromethorphan               PRESENT                               Dextrorphan/Levorphanol        PRESENT                                Dextrorphan is an expected metabolite of dextromethorphan, an    over-the-counter or prescription cough suppressant. Dextrorphan    cannot be distinguished from the scheduled prescription    medication levorphanol by the method used for analysis.   Guaifenesin                    PRESENT                                Guaifenesin may be administered as an over-the-counter or    prescription drug; it may also be present as a breakdown product    of methocarbamol.  ====================================================================  Test                      Result    Flag   Units      Ref Range        Creatinine              21               mg/dL      >=20            ====================================================================  For clinical consultation, please call [phone].  ====================================================================  Analysis performed by [employer] [place] [address]     ,   Cannabinoids (59-[id number]-3-[id number]-9-THC)   Date Value Ref Range Status   06/01/2021 Not Detected NDET^Not Detected ng/mL Final     Comment:     Cutoff for a negative cannabinoid is 50 ng/mL or less.     Phencyclidine (Phencyclidine)   Date Value Ref Range Status   06/01/2021 Not Detected NDET^Not Detected ng/mL Final     Comment:     Cutoff for a negative PCP is 25 ng/mL or less.     Cocaine (Benzoylecgonine)   Date Value Ref Range Status   06/01/2021 Not Detected NDET^Not Detected ng/mL Final     Comment:     Cutoff for a negative cocaine is 150 ng/ml or less.     Methamphetamine (d-Methamphetamine)   Date Value Ref Range Status   06/01/2021 Not Detected  NDET^Not Detected ng/mL Final     Comment:     Cutoff for a negative methamphetamine is 500 ng/ml or less.     Opiates (Morphine)   Date Value Ref Range Status   06/01/2021 Detected, Abnormal Result (A) NDET^Not Detected ng/mL Final     Comment:     Cutoff for a positive opiate is greater than 100 ng/ml.  This is an unconfirmed screening result to be used for medical purposes only.   Order [id number] for confirmation or individual confirmation tests to [facility].       Amphetamine (d-Amphetamine)   Date Value Ref Range Status   06/01/2021 Not Detected NDET^Not Detected ng/mL Final     Comment:     Cutoff for a negative amphetamine is 500 ng/mL or less.     Benzodiazepines (Nordiazepam)   Date Value Ref Range Status   06/01/2021 Not Detected NDET^Not Detected ng/mL Final     Comment:     Cutoff for a negative benzodiazepine is 150 ng/ml or less.     Tricyclic Antidepressants (Desipramine)   Date Value Ref Range Status   06/01/2021 Not Detected NDET^Not Detected ng/mL Final     Comment:     Cutoff for a negative tricyclic antidepressant is 300 ng/ml or less.     Methadone (Methadone)   Date Value Ref Range Status   06/01/2021 Not Detected NDET^Not Detected ng/mL Final     Comment:     Cutoff for a negative methadone is 200 ng/ml or less.     Barbiturates (Butalbital)   Date Value Ref Range Status   06/01/2021 Not Detected NDET^Not Detected ng/mL Final     Comment:     Cutoff for a negative barbituate is 200 ng/ml or less.     Oxycodone (Oxycodone)   Date Value Ref Range Status   06/01/2021 Not Detected NDET^Not Detected ng/mL Final     Comment:     Cutoff for a negative Oxycodone is 100 ng/mL or less.     Propoxyphene (Norpropoxyphene)   Date Value Ref Range Status   06/01/2021 Not Detected NDET^Not Detected ng/mL Final     Comment:     Cutoff for a negative propoxyphene is 300 ng/ml or less     Buprenorphine (Buprenorphine)   Date Value Ref Range Status   06/01/2021 Not Detected NDET^Not Detected ng/mL Final     Comment:      Cutoff for a negative buprenorphine is 10 ng/ml or less        Processing:  Rx to be electronically transmitted to pharmacy by provider      [email or web address]       checked in past 3 months?  Provider to review  in Epic

## 2021-07-12 NOTE — TELEPHONE ENCOUNTER
The patient had a Medicare annual wellness visit with Dr. Loera and so it is unclear if the patient is going to continue to follow with me for the chronic pain issues since my practice moved down to [place].  Please consider checking with the patient.    Freddie Mesa MD

## 2021-08-06 ENCOUNTER — HOSPITAL ENCOUNTER (EMERGENCY)
Facility: CLINIC | Age: 71
Discharge: HOME | End: 2021-08-07
Attending: EMERGENCY MEDICINE | Admitting: EMERGENCY MEDICINE
Payer: COMMERCIAL

## 2021-08-06 ENCOUNTER — APPOINTMENT (OUTPATIENT)
Dept: CT IMAGING | Facility: CLINIC | Age: 71
End: 2021-08-06
Attending: EMERGENCY MEDICINE
Payer: COMMERCIAL

## 2021-08-06 DIAGNOSIS — F41.0 PANIC ATTACK: ICD-10-CM

## 2021-08-06 DIAGNOSIS — R06.02 SOB (SHORTNESS OF BREATH): ICD-10-CM

## 2021-08-06 DIAGNOSIS — Z72.0 TOBACCO ABUSE: ICD-10-CM

## 2021-08-06 DIAGNOSIS — J43.9 PULMONARY EMPHYSEMA, UNSPECIFIED EMPHYSEMA TYPE (H): ICD-10-CM

## 2021-08-06 DIAGNOSIS — J98.4 PNEUMONITIS: ICD-10-CM

## 2021-08-06 LAB
ANION GAP SERPL CALCULATED.3IONS-SCNC: 6 MMOL/L (ref 3–14)
BASOPHILS # BLD AUTO: 0.1 10E3/UL (ref 0–0.2)
BASOPHILS NFR BLD AUTO: 1 %
BUN SERPL-MCNC: 9 MG/DL (ref 7–30)
CALCIUM SERPL-MCNC: 8.6 MG/DL (ref 8.5–10.1)
CHLORIDE BLD-SCNC: 100 MMOL/L (ref 94–109)
CO2 SERPL-SCNC: 27 MMOL/L (ref 20–32)
CREAT SERPL-MCNC: 0.62 MG/DL (ref 0.52–1.04)
EOSINOPHIL # BLD AUTO: 0.2 10E3/UL (ref 0–0.7)
EOSINOPHIL NFR BLD AUTO: 2 %
ERYTHROCYTE [DISTWIDTH] IN BLOOD BY AUTOMATED COUNT: 12.8 % (ref 10–15)
GFR SERPL CREATININE-BSD FRML MDRD: >90 ML/MIN/1.73M2
GLUCOSE BLD-MCNC: 109 MG/DL (ref 70–99)
HCT VFR BLD AUTO: 35.4 % (ref 35–47)
HGB BLD-MCNC: 12.2 G/DL (ref 11.7–15.7)
HOLD SPECIMEN: NORMAL
IMM GRANULOCYTES # BLD: 0 10E3/UL
IMM GRANULOCYTES NFR BLD: 0 %
LYMPHOCYTES # BLD AUTO: 3.8 10E3/UL (ref 0.8–5.3)
LYMPHOCYTES NFR BLD AUTO: 37 %
MCH RBC QN AUTO: 30.6 PG (ref 26.5–33)
MCHC RBC AUTO-ENTMCNC: 34.5 G/DL (ref 31.5–36.5)
MCV RBC AUTO: 89 FL (ref 78–100)
MONOCYTES # BLD AUTO: 0.8 10E3/UL (ref 0–1.3)
MONOCYTES NFR BLD AUTO: 7 %
NEUTROPHILS # BLD AUTO: 5.4 10E3/UL (ref 1.6–8.3)
NEUTROPHILS NFR BLD AUTO: 53 %
NRBC # BLD AUTO: 0 10E3/UL
NRBC BLD AUTO-RTO: 0 /100
NT-PROBNP SERPL-MCNC: 58 PG/ML (ref 0–900)
PLATELET # BLD AUTO: 483 10E3/UL (ref 150–450)
POTASSIUM BLD-SCNC: 3.4 MMOL/L (ref 3.4–5.3)
RBC # BLD AUTO: 3.99 10E6/UL (ref 3.8–5.2)
SODIUM SERPL-SCNC: 133 MMOL/L (ref 133–144)
TROPONIN I SERPL-MCNC: <0.015 UG/L (ref 0–0.04)
WBC # BLD AUTO: 10.2 10E3/UL (ref 4–11)

## 2021-08-06 PROCEDURE — 250N000009 HC RX 250: Performed by: EMERGENCY MEDICINE

## 2021-08-06 PROCEDURE — 250N000013 HC RX MED GY IP 250 OP 250 PS 637: Performed by: EMERGENCY MEDICINE

## 2021-08-06 PROCEDURE — 71275 CT ANGIOGRAPHY CHEST: CPT

## 2021-08-06 PROCEDURE — 36415 COLL VENOUS BLD VENIPUNCTURE: CPT | Performed by: EMERGENCY MEDICINE

## 2021-08-06 PROCEDURE — 99291 CRITICAL CARE FIRST HOUR: CPT | Mod: 25 | Performed by: EMERGENCY MEDICINE

## 2021-08-06 PROCEDURE — 94640 AIRWAY INHALATION TREATMENT: CPT | Performed by: EMERGENCY MEDICINE

## 2021-08-06 PROCEDURE — 250N000011 HC RX IP 250 OP 636: Performed by: EMERGENCY MEDICINE

## 2021-08-06 PROCEDURE — 80048 BASIC METABOLIC PNL TOTAL CA: CPT | Performed by: EMERGENCY MEDICINE

## 2021-08-06 PROCEDURE — 99284 EMERGENCY DEPT VISIT MOD MDM: CPT | Performed by: EMERGENCY MEDICINE

## 2021-08-06 PROCEDURE — 85025 COMPLETE CBC W/AUTO DIFF WBC: CPT | Performed by: EMERGENCY MEDICINE

## 2021-08-06 PROCEDURE — 84484 ASSAY OF TROPONIN QUANT: CPT | Performed by: EMERGENCY MEDICINE

## 2021-08-06 PROCEDURE — 83880 ASSAY OF NATRIURETIC PEPTIDE: CPT | Performed by: EMERGENCY MEDICINE

## 2021-08-06 RX ORDER — ALBUTEROL SULFATE 90 UG/1
6 AEROSOL, METERED RESPIRATORY (INHALATION) EVERY 30 MIN PRN
Status: DISCONTINUED | OUTPATIENT
Start: 2021-08-06 | End: 2021-08-07 | Stop reason: HOSPADM

## 2021-08-06 RX ORDER — AZITHROMYCIN 250 MG/1
TABLET, FILM COATED ORAL
Qty: 4 TABLET | Refills: 0 | Status: SHIPPED | OUTPATIENT
Start: 2021-08-07 | End: 2021-08-10

## 2021-08-06 RX ORDER — IOPAMIDOL 755 MG/ML
66 INJECTION, SOLUTION INTRAVASCULAR ONCE
Status: COMPLETED | OUTPATIENT
Start: 2021-08-06 | End: 2021-08-06

## 2021-08-06 RX ORDER — AZITHROMYCIN 250 MG/1
500 TABLET, FILM COATED ORAL ONCE
Status: COMPLETED | OUTPATIENT
Start: 2021-08-07 | End: 2021-08-07

## 2021-08-06 RX ADMIN — SODIUM CHLORIDE 94 ML: 9 INJECTION, SOLUTION INTRAVENOUS at 23:02

## 2021-08-06 RX ADMIN — ALBUTEROL SULFATE 6 PUFF: 90 AEROSOL, METERED RESPIRATORY (INHALATION) at 22:25

## 2021-08-06 RX ADMIN — IOPAMIDOL 66 ML: 755 INJECTION, SOLUTION INTRAVENOUS at 23:03

## 2021-08-06 ASSESSMENT — ENCOUNTER SYMPTOMS
ABDOMINAL PAIN: 0
FEVER: 0
COUGH: 1
NUMBNESS: 1
SHORTNESS OF BREATH: 1

## 2021-08-07 ENCOUNTER — MYC REFILL (OUTPATIENT)
Dept: FAMILY MEDICINE | Facility: CLINIC | Age: 71
End: 2021-08-07

## 2021-08-07 VITALS
RESPIRATION RATE: 12 BRPM | SYSTOLIC BLOOD PRESSURE: 142 MMHG | TEMPERATURE: 98.6 F | OXYGEN SATURATION: 96 % | HEART RATE: 102 BPM | DIASTOLIC BLOOD PRESSURE: 89 MMHG

## 2021-08-07 DIAGNOSIS — B00.1 RECURRENT COLD SORES: ICD-10-CM

## 2021-08-07 DIAGNOSIS — J43.9 PULMONARY EMPHYSEMA, UNSPECIFIED EMPHYSEMA TYPE (H): ICD-10-CM

## 2021-08-07 PROCEDURE — 250N000013 HC RX MED GY IP 250 OP 250 PS 637: Performed by: EMERGENCY MEDICINE

## 2021-08-07 RX ORDER — ACYCLOVIR 400 MG/1
400 TABLET ORAL EVERY 8 HOURS
Qty: 15 TABLET | Refills: 11 | Status: CANCELLED | OUTPATIENT
Start: 2021-08-07

## 2021-08-07 RX ADMIN — AMOXICILLIN AND CLAVULANATE POTASSIUM 1 TABLET: 875; 125 TABLET, FILM COATED ORAL at 00:16

## 2021-08-07 RX ADMIN — AZITHROMYCIN MONOHYDRATE 500 MG: 250 TABLET ORAL at 00:17

## 2021-08-07 NOTE — ED NOTES
Pt arrived via EMS, experienced severe coughing this evening that then caused pt to become SOB, panicky, dizzy, lightheaded.   Pt had tingling in her feet and hands, and burning lungs when she laid down to catch her breath.  She was so SOB she couldn't do her inhaler.  By time EMS arrived she was significantly improved but in route noted she 'feels fuzzy' in the head, and is slow to reply to questions.  She feels pressure from the occiput to the temporal area.      Pt recently returned from [place], they drove. Pt denies leg or calf pain.  Pt is a heavy smoker.  She denies any CP or pressure, or any cardiac history.  Neuro: only abnormal as above stated.   LUNG: loud bronchial breath sounds heard through out all lobes  Heart: S1, S2, no MRG noted, no edema, not JVD noted  Legs: no edema, no pain, negative Homans'    18g IV started, labs drawn,  MD updated on above.   at bedside.    PLAN:  Will await MD assessment and orders.
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
## 2021-08-07 NOTE — DISCHARGE INSTRUCTIONS
Antibiotics as prescribed.  Be seen if new or worsening symptoms develop.  Continue home medications

## 2021-08-09 ENCOUNTER — OFFICE VISIT (OUTPATIENT)
Dept: FAMILY MEDICINE | Facility: CLINIC | Age: 71
End: 2021-08-09
Payer: COMMERCIAL

## 2021-08-09 VITALS
BODY MASS INDEX: 20.8 KG/M2 | OXYGEN SATURATION: 96 % | SYSTOLIC BLOOD PRESSURE: 138 MMHG | RESPIRATION RATE: 18 BRPM | DIASTOLIC BLOOD PRESSURE: 84 MMHG | HEART RATE: 84 BPM | WEIGHT: 125 LBS | TEMPERATURE: 98.3 F

## 2021-08-09 DIAGNOSIS — E78.5 HYPERLIPIDEMIA LDL GOAL <130: ICD-10-CM

## 2021-08-09 DIAGNOSIS — B00.1 RECURRENT COLD SORES: ICD-10-CM

## 2021-08-09 DIAGNOSIS — F33.1 MODERATE EPISODE OF RECURRENT MAJOR DEPRESSIVE DISORDER (H): ICD-10-CM

## 2021-08-09 DIAGNOSIS — Z87.01 HISTORY OF RECENT PNEUMONIA: Primary | ICD-10-CM

## 2021-08-09 DIAGNOSIS — Z72.0 TOBACCO ABUSE: ICD-10-CM

## 2021-08-09 DIAGNOSIS — L98.9 SKIN LESION: ICD-10-CM

## 2021-08-09 PROCEDURE — 99214 OFFICE O/P EST MOD 30 MIN: CPT | Performed by: FAMILY MEDICINE

## 2021-08-09 RX ORDER — ATORVASTATIN CALCIUM 10 MG/1
10 TABLET, FILM COATED ORAL DAILY
Qty: 90 TABLET | Refills: 1 | Status: SHIPPED | OUTPATIENT
Start: 2021-08-09 | End: 2022-01-26

## 2021-08-09 RX ORDER — ACYCLOVIR 400 MG/1
400 TABLET ORAL EVERY 8 HOURS
Qty: 15 TABLET | Refills: 11 | Status: SHIPPED | OUTPATIENT
Start: 2021-08-09 | End: 2022-09-15

## 2021-08-09 NOTE — PROGRESS NOTES
Assessment & Plan     History of recent pneumonia  Patient was in ER recently with community-acquired pneumonia and panic attack.  CT chest findings reviewed.  Symptoms improved significantly.  Recommended to complete Augmentin and azithromycin as prescribed.  Stressed on smoking cessation    Moderate episode of recurrent major depressive disorder (H)  Symptoms well controlled, suggested to continue Wellbutrin for now and taper off once stop smoking    Tobacco abuse  Smoking about half a pack of cigarette per day, associated health hazards explained in detail.  Sister to continue Wellbutrin for now    Recurrent cold sores  Known to have cold sores, acyclovir refilled for as needed use  - acyclovir (ZOVIRAX) 400 MG tablet; Take 1 tablet (400 mg) by mouth every 8 hours    Hyperlipidemia LDL goal <130  Lipitor refilled  - atorvastatin (LIPITOR) 10 MG tablet; Take 1 tablet (10 mg) by mouth daily    Skin lesion  Having small facial skin lump/induration, dermatology referral placed  - Adult Dermatology Referral; Future         Constantine Loera MD  [facility]    Sheyla Henderson is a 70 year old who presents for the following health issues     HPI     ED/UC Followup:    Facility:  [facility] Emergency Dept   Date of visit: 08/06/2021  Reason for visit: Shortness of breath and altered mental status   Current Status: Patient states that she is back to her baseline/what is normal for her      Known to have major depression, recurrent cold sores, hyperlipidemia, tobacco abuse.  Due for med refills    Review of Systems   Constitutional, HEENT, cardiovascular, pulmonary, GI, , musculoskeletal, neuro, skin, endocrine and psych systems are negative, except as otherwise noted.      Objective    /84 (BP Location: Right arm, Patient Position: Sitting, Cuff Size: Adult Regular)   Pulse 84   Temp 98.3  F (36.8  C) (Tympanic)   Resp 18   Wt 56.7 kg (125 lb)   SpO2 96%   BMI  20.80 kg/m    Body mass index is 20.8 kg/m .  Physical Exam   GENERAL: alert and no distress  NECK: no adenopathy, no asymmetry, masses, or scars and thyroid normal to palpation  RESP: lungs clear to auscultation - no rales, rhonchi or wheezes  CV: regular rate and rhythm, normal S1 S2, no S3 or S4, no murmur, click or rub, no peripheral edema and peripheral pulses strong  ABDOMEN: soft, nontender, no hepatosplenomegaly, no masses and bowel sounds normal  MS: no gross musculoskeletal defects noted, no edema  SKIN: small facial skin lump/induration as shown below  NEURO: Normal strength and tone, mentation intact and speech normal  PSYCH: mentation appears normal, affect normal/bright

## 2021-08-10 RX ORDER — ALBUTEROL SULFATE 90 UG/1
2 AEROSOL, METERED RESPIRATORY (INHALATION) EVERY 6 HOURS PRN
Qty: 18 G | Refills: 1 | Status: SHIPPED | OUTPATIENT
Start: 2021-08-10 | End: 2022-07-26

## 2021-08-10 NOTE — TELEPHONE ENCOUNTER
Pending Prescriptions:                       Disp   Refills    albuterol (VENTOLIN HFA) 108 (90 Base) MC*414 g  0            Sig: Inhale 2 puffs into the lungs every 6 hours as           needed for shortness of breath / dyspnea or           wheezing    Prescription approved per [facility] Refill Protocol.       Kong Hicks RN   [facility]

## 2021-08-11 DIAGNOSIS — F11.90 CHRONIC, CONTINUOUS USE OF OPIOIDS: ICD-10-CM

## 2021-08-11 DIAGNOSIS — G89.29 CHRONIC NECK PAIN: ICD-10-CM

## 2021-08-11 DIAGNOSIS — M54.2 CHRONIC NECK PAIN: ICD-10-CM

## 2021-08-11 NOTE — TELEPHONE ENCOUNTER
Requested Prescriptions   Pending Prescriptions Disp Refills     HYDROcodone-acetaminophen (NORCO) 5-325 MG tablet [Pharmacy Med Name: HYDROCODONE/APAP 5-325MG TAB] 120 tablet 0     Sig: TAKE ONE TABLET BY MOUTH EVERY 6 HOURS AS NEEDED FOR SEVERE PAIN. MAXIMUM OF 4 TABLETS PER DAY.       There is no refill protocol information for this order        Last Written Prescription Date:  7/15/21  Last Fill Quantity: 120,  # refills: 0   Last office visit: 8/9/2021 with prescribing provider:     Future Office Visit:

## 2021-08-12 RX ORDER — HYDROCODONE BITARTRATE AND ACETAMINOPHEN 5; 325 MG/1; MG/1
TABLET ORAL
Qty: 120 TABLET | Refills: 0 | Status: SHIPPED | OUTPATIENT
Start: 2021-08-14 | End: 2021-09-13

## 2021-08-27 ENCOUNTER — MYC MEDICAL ADVICE (OUTPATIENT)
Dept: FAMILY MEDICINE | Facility: CLINIC | Age: 71
End: 2021-08-27

## 2021-09-10 DIAGNOSIS — M54.2 CHRONIC NECK PAIN: ICD-10-CM

## 2021-09-10 DIAGNOSIS — F11.90 CHRONIC, CONTINUOUS USE OF OPIOIDS: ICD-10-CM

## 2021-09-10 DIAGNOSIS — G47.00 INSOMNIA, UNSPECIFIED TYPE: ICD-10-CM

## 2021-09-10 DIAGNOSIS — G89.29 CHRONIC NECK PAIN: ICD-10-CM

## 2021-09-13 RX ORDER — ZOLPIDEM TARTRATE 10 MG/1
TABLET ORAL
Qty: 30 TABLET | Refills: 2 | Status: SHIPPED | OUTPATIENT
Start: 2021-09-13 | End: 2021-12-09

## 2021-09-13 RX ORDER — HYDROCODONE BITARTRATE AND ACETAMINOPHEN 5; 325 MG/1; MG/1
TABLET ORAL
Qty: 120 TABLET | Refills: 0 | Status: SHIPPED | OUTPATIENT
Start: 2021-09-13 | End: 2021-10-11

## 2021-09-23 ENCOUNTER — OFFICE VISIT (OUTPATIENT)
Dept: DERMATOLOGY | Facility: CLINIC | Age: 71
End: 2021-09-23
Attending: FAMILY MEDICINE
Payer: COMMERCIAL

## 2021-09-23 ENCOUNTER — TELEPHONE (OUTPATIENT)
Dept: DERMATOLOGY | Facility: CLINIC | Age: 71
End: 2021-09-23

## 2021-09-23 VITALS — DIASTOLIC BLOOD PRESSURE: 103 MMHG | OXYGEN SATURATION: 97 % | SYSTOLIC BLOOD PRESSURE: 159 MMHG | HEART RATE: 104 BPM

## 2021-09-23 DIAGNOSIS — L72.0 EIC (EPIDERMAL INCLUSION CYST): ICD-10-CM

## 2021-09-23 DIAGNOSIS — L57.8 SOLAR ELASTOSIS: ICD-10-CM

## 2021-09-23 DIAGNOSIS — L70.0 ACNE VULGARIS: Primary | ICD-10-CM

## 2021-09-23 PROCEDURE — 99203 OFFICE O/P NEW LOW 30 MIN: CPT | Performed by: PHYSICIAN ASSISTANT

## 2021-09-23 RX ORDER — TAZAROTENE 0.05 MG/G
CREAM CUTANEOUS
Qty: 60 G | Refills: 3 | Status: SHIPPED | OUTPATIENT
Start: 2021-09-23

## 2021-09-23 NOTE — TELEPHONE ENCOUNTER
Prior Authorization Retail Medication Request    Medication/Dose: tazorac 0.05% cream  ICD code (if different than what is on RX):    Previously Tried and Failed:    Rationale:      Insurance Name:  [facility]  Insurance ID:  [id number]  [phone]      Thank You,  Jana Fuentes, [facility]

## 2021-09-23 NOTE — PATIENT INSTRUCTIONS
Wash with a gentle cleanser - CeraVe hydrating cleanser, Neutrogena Ultra gentle Hydrating cleanser     Once per day apply a pea size of Tazorac cream to entire face     Moisturizer over

## 2021-09-23 NOTE — LETTER
9/23/2021         RE: Beatriz Francis  [facility] [place] [id number]        Dear Colleague,    Thank you for referring your patient, Beatriz Francis, to the [facility]. Please see a copy of my visit note below.    HPI:   Chief complaints: Beatriz Francis is a pleasant 70 year old female who presents for evaluation of persistent pimples/blocked pores on the chin. They have been present for years. She has tried Differin gel and multiple OTC washes but these have not helped. The areas can be painful      PHYSICAL EXAM:    BP (!) 159/103   Pulse 104   SpO2 97%   Skin exam performed as follows: Type 2 skin. Mood appropriate  Alert and Oriented X 3. Well developed, well nourished in no distress.  General appearance: Normal  Head including face: Normal  Eyes: conjunctiva and lids: Normal  Mouth: Lips, teeth, gums: Normal  Neck: Normal  Chest-breast/axillae: Normal  Back: Normal  Spleen and liver: Normal  Cardiovascular: Exam of peripheral vascular system by observation for swelling, varicosities, edema: Normal  Genitalia: groin, buttocks: Normal  Extremities: digits/nails (clubbing): Normal  Eccrine and Apocrine glands: Normal  Right upper extremity: Normal  Left upper extremity: Normal  Right lower extremity: Normal  Left lower extremity: Normal  Skin: Scalp and body hair: See below    Numerous macrocomedones and cysts around the chin    ASSESSMENT/PLAN:     1. 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 Discussed that ultimately these will be difficult to treat and will need time. She will need to give retinoids at least 9-12 months to start working  --Start Tazorac 0.05% cream QD  2. Beatriz to follow up with Primary Care provider regarding elevated blood pressure.        Follow-up: yearly  CC:   Scribed By: Lolly Magana, MS, PA-C          Again, thank you for allowing me to participate in the care of your  patient.        Sincerely,        Lolly French PA-C

## 2021-09-23 NOTE — PROGRESS NOTES
HPI:   Chief complaints: Beatriz Francis is a pleasant 70 year old female who presents for evaluation of persistent pimples/blocked pores on the chin. They have been present for years. She has tried Differin gel and multiple OTC washes but these have not helped. The areas can be painful      PHYSICAL EXAM:    BP (!) 159/103   Pulse 104   SpO2 97%   Skin exam performed as follows: Type 2 skin. Mood appropriate  Alert and Oriented X 3. Well developed, well nourished in no distress.  General appearance: Normal  Head including face: Normal  Eyes: conjunctiva and lids: Normal  Mouth: Lips, teeth, gums: Normal  Neck: Normal  Chest-breast/axillae: Normal  Back: Normal  Spleen and liver: Normal  Cardiovascular: Exam of peripheral vascular system by observation for swelling, varicosities, edema: Normal  Genitalia: groin, buttocks: Normal  Extremities: digits/nails (clubbing): Normal  Eccrine and Apocrine glands: Normal  Right upper extremity: Normal  Left upper extremity: Normal  Right lower extremity: Normal  Left lower extremity: Normal  Skin: Scalp and body hair: See below    Numerous macrocomedones and cysts around the chin    ASSESSMENT/PLAN:     1. 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 Discussed that ultimately these will be difficult to treat and will need time. She will need to give retinoids at least 9-12 months to start working  --Start Tazorac 0.05% cream QD  2. Beatriz to follow up with Primary Care provider regarding elevated blood pressure.        Follow-up: yearly  CC:   Scribed By: Lolly Magana MS, PA-C

## 2021-09-23 NOTE — TELEPHONE ENCOUNTER
Central Prior Authorization Team  Phone: [phone]    PA Initiation    Medication: tazorac 0.05% cream  Insurance Company: [facility] - Phone [phone] Fax [phone]  Pharmacy Filling the Rx: [place], [place] - [address] [place] [address]  Filling Pharmacy Phone: [phone]  Filling Pharmacy Fax:    Start Date: 9/23/2021

## 2021-09-28 NOTE — TELEPHONE ENCOUNTER
Prior Authorization Approval    Authorization Effective Date: 6/22/2021  Authorization Expiration Date: 9/23/2022  Medication: tazorac 0.05% cream- APPROVED   Approved Dose/Quantity:   Reference #:     Insurance Company: [employer] [place] - Phone [phone] Fax [phone]  Expected CoPay:       CoPay Card Available:      Foundation Assistance Needed:    Which Pharmacy is filling the prescription (Not needed for infusion/clinic administered): [facility] [place], [place] - [address] [place] [address]  Pharmacy Notified: Yes  Patient Notified:  **Instructed pharmacy to notify patient when script is ready to /ship.**

## 2021-10-07 ENCOUNTER — MYC MEDICAL ADVICE (OUTPATIENT)
Dept: FAMILY MEDICINE | Facility: CLINIC | Age: 71
End: 2021-10-07

## 2021-10-07 DIAGNOSIS — Z79.2 NEED FOR PROPHYLACTIC ANTIBIOTIC: Primary | ICD-10-CM

## 2021-10-07 RX ORDER — AMOXICILLIN 500 MG/1
CAPSULE ORAL
Qty: 4 CAPSULE | Refills: 1 | Status: SHIPPED | OUTPATIENT
Start: 2021-10-07 | End: 2022-07-25

## 2021-10-11 DIAGNOSIS — G89.29 CHRONIC NECK PAIN: ICD-10-CM

## 2021-10-11 DIAGNOSIS — F11.90 CHRONIC, CONTINUOUS USE OF OPIOIDS: ICD-10-CM

## 2021-10-11 DIAGNOSIS — M54.2 CHRONIC NECK PAIN: ICD-10-CM

## 2021-10-11 RX ORDER — HYDROCODONE BITARTRATE AND ACETAMINOPHEN 5; 325 MG/1; MG/1
TABLET ORAL
Qty: 120 TABLET | Refills: 0 | Status: SHIPPED | OUTPATIENT
Start: 2021-10-12 | End: 2021-11-09

## 2021-10-24 ENCOUNTER — HEALTH MAINTENANCE LETTER (OUTPATIENT)
Age: 71
End: 2021-10-24

## 2021-11-09 DIAGNOSIS — F11.90 CHRONIC, CONTINUOUS USE OF OPIOIDS: ICD-10-CM

## 2021-11-09 DIAGNOSIS — M54.2 CHRONIC NECK PAIN: ICD-10-CM

## 2021-11-09 DIAGNOSIS — G89.29 CHRONIC NECK PAIN: ICD-10-CM

## 2021-11-10 RX ORDER — HYDROCODONE BITARTRATE AND ACETAMINOPHEN 5; 325 MG/1; MG/1
TABLET ORAL
Qty: 120 TABLET | Refills: 0 | Status: SHIPPED | OUTPATIENT
Start: 2021-11-10 | End: 2021-12-09

## 2021-11-10 NOTE — TELEPHONE ENCOUNTER
Requested Prescriptions   Pending Prescriptions Disp Refills     HYDROcodone-acetaminophen (NORCO) 5-325 MG tablet 120 tablet 0       There is no refill protocol information for this order        Last Written Prescription Date:  10/12/21  Last Fill Quantity: 120,  # refills: 0   Last office visit: Visit date not found with prescribing provider:     Future Office Visit:

## 2021-11-26 ENCOUNTER — TELEPHONE (OUTPATIENT)
Dept: FAMILY MEDICINE | Facility: CLINIC | Age: 71
End: 2021-11-26
Payer: COMMERCIAL

## 2021-11-26 ENCOUNTER — E-VISIT (OUTPATIENT)
Dept: FAMILY MEDICINE | Facility: CLINIC | Age: 71
End: 2021-11-26
Payer: COMMERCIAL

## 2021-11-26 DIAGNOSIS — R39.9 UTI SYMPTOMS: Primary | ICD-10-CM

## 2021-11-26 DIAGNOSIS — N39.0 ACUTE UTI (URINARY TRACT INFECTION): ICD-10-CM

## 2021-11-26 PROCEDURE — 99421 OL DIG E/M SVC 5-10 MIN: CPT | Performed by: FAMILY MEDICINE

## 2021-11-26 RX ORDER — NITROFURANTOIN 25; 75 MG/1; MG/1
100 CAPSULE ORAL 2 TIMES DAILY
Qty: 14 CAPSULE | Refills: 0 | Status: SHIPPED | OUTPATIENT
Start: 2021-11-26 | End: 2021-12-03

## 2021-11-26 NOTE — TELEPHONE ENCOUNTER
Reason for call:  Took 4 amoxicillin yesterday for symptoms of UTI   Symptom or request:  Burning and pressure started yesterday and wants more antibiotics to take care of it    Duration (how long have symptoms been present): yesterday    Have you been treated for this before? Yes    Phone Number patient can be reached at:  Home number on file [phone] (home)    Best Time:  any    Can we leave a detailed message on this number:  YES    Call taken on 11/26/2021 at 10:48 AM by Tiffany Chicas

## 2021-11-26 NOTE — TELEPHONE ENCOUNTER
Called patient, she reported pain with urination yesterday. She took 4 amoxicillin caps that were prescribed prior to a dental appt which she ended up not going to. Her symptoms are gone today. Encouraged to drink lots of fluids. Needs an appt or e-visit if urinary symptoms return, fever or abdominal pain. She verbalizes understanding and states she was going to try and do an e-visit now.  Leilani LOZANO RN

## 2021-11-26 NOTE — PATIENT INSTRUCTIONS
Dear Beatriz Francis    After reviewing your responses, I've been able to diagnose you with a urinary tract infection, which is a common infection of the bladder with bacteria.  This is not a sexually transmitted infection, though urinating immediately after intercourse can help prevent infections.  Drinking lots of fluids is also helpful to clear your current infection and prevent the next one.      I have sent a prescription for antibiotics to your pharmacy to treat this infection.    It is important that you take all of your prescribed medication even if your symptoms are improving after a few doses.  Taking all of your medicine helps prevent the symptoms from returning.     If your symptoms worsen, you develop pain in your back or stomach, develop fevers, or are not improving in 5 days, please contact your primary care provider for an appointment or visit any of our convenient Walk-in or Urgent Care Centers to be seen, which can be found on our website here.    Thanks again for choosing us as your health care partner,    Constantine Loera MD

## 2021-12-08 DIAGNOSIS — G89.29 CHRONIC NECK PAIN: ICD-10-CM

## 2021-12-08 DIAGNOSIS — G47.00 INSOMNIA, UNSPECIFIED TYPE: ICD-10-CM

## 2021-12-08 DIAGNOSIS — M54.2 CHRONIC NECK PAIN: ICD-10-CM

## 2021-12-08 DIAGNOSIS — F11.90 CHRONIC, CONTINUOUS USE OF OPIOIDS: ICD-10-CM

## 2021-12-09 RX ORDER — HYDROCODONE BITARTRATE AND ACETAMINOPHEN 5; 325 MG/1; MG/1
1 TABLET ORAL EVERY 6 HOURS PRN
Qty: 120 TABLET | Refills: 0 | Status: SHIPPED | OUTPATIENT
Start: 2021-12-09 | End: 2022-01-06

## 2021-12-09 RX ORDER — ZOLPIDEM TARTRATE 10 MG/1
TABLET ORAL
Qty: 30 TABLET | Refills: 2 | Status: SHIPPED | OUTPATIENT
Start: 2021-12-09 | End: 2022-03-08

## 2021-12-09 NOTE — TELEPHONE ENCOUNTER
Routing refill request to provider for review/approval because:  Drug not on the [facility] refill protocol     EMILY Leigh

## 2022-01-01 ENCOUNTER — TELEPHONE (OUTPATIENT)
Dept: FAMILY MEDICINE | Facility: CLINIC | Age: 72
End: 2022-01-01

## 2022-01-01 ENCOUNTER — VIRTUAL VISIT (OUTPATIENT)
Dept: FAMILY MEDICINE | Facility: CLINIC | Age: 72
End: 2022-01-01
Payer: COMMERCIAL

## 2022-01-01 ENCOUNTER — HEALTH MAINTENANCE LETTER (OUTPATIENT)
Age: 72
End: 2022-01-01

## 2022-01-01 DIAGNOSIS — F11.90 CHRONIC, CONTINUOUS USE OF OPIOIDS: ICD-10-CM

## 2022-01-01 DIAGNOSIS — G89.29 CHRONIC NECK PAIN: ICD-10-CM

## 2022-01-01 DIAGNOSIS — M54.2 CHRONIC NECK PAIN: ICD-10-CM

## 2022-01-01 DIAGNOSIS — F33.1 MODERATE EPISODE OF RECURRENT MAJOR DEPRESSIVE DISORDER (H): ICD-10-CM

## 2022-01-01 DIAGNOSIS — G47.00 INSOMNIA, UNSPECIFIED TYPE: ICD-10-CM

## 2022-01-01 PROCEDURE — 99214 OFFICE O/P EST MOD 30 MIN: CPT | Mod: 95 | Performed by: NURSE PRACTITIONER

## 2022-01-01 RX ORDER — HYDROCODONE BITARTRATE AND ACETAMINOPHEN 5; 325 MG/1; MG/1
1 TABLET ORAL EVERY 6 HOURS PRN
Qty: 120 TABLET | Refills: 0 | Status: SHIPPED | OUTPATIENT
Start: 2022-01-01 | End: 2023-01-01

## 2022-01-01 RX ORDER — HYDROCODONE BITARTRATE AND ACETAMINOPHEN 5; 325 MG/1; MG/1
1 TABLET ORAL EVERY 6 HOURS PRN
Qty: 120 TABLET | Refills: 0 | Status: SHIPPED | OUTPATIENT
Start: 2022-01-01 | End: 2022-01-01

## 2022-01-01 RX ORDER — HYDROCODONE BITARTRATE AND ACETAMINOPHEN 5; 325 MG/1; MG/1
TABLET ORAL
Qty: 120 TABLET | Refills: 0 | Status: SHIPPED | OUTPATIENT
Start: 2022-01-01 | End: 2023-01-01

## 2022-01-01 RX ORDER — HYDROCODONE BITARTRATE AND ACETAMINOPHEN 5; 325 MG/1; MG/1
TABLET ORAL
Qty: 120 TABLET | Refills: 0 | OUTPATIENT
Start: 2022-01-01

## 2022-01-01 RX ORDER — BUPROPION HYDROCHLORIDE 300 MG/1
300 TABLET ORAL EVERY MORNING
Qty: 90 TABLET | Refills: 0 | Status: SHIPPED | OUTPATIENT
Start: 2022-01-01 | End: 2023-01-01

## 2022-01-01 RX ORDER — ZOLPIDEM TARTRATE 10 MG/1
TABLET ORAL
Qty: 30 TABLET | Refills: 2 | Status: SHIPPED | OUTPATIENT
Start: 2022-01-01 | End: 2023-01-01

## 2022-01-06 DIAGNOSIS — G89.29 CHRONIC NECK PAIN: ICD-10-CM

## 2022-01-06 DIAGNOSIS — M54.2 CHRONIC NECK PAIN: ICD-10-CM

## 2022-01-06 DIAGNOSIS — F11.90 CHRONIC, CONTINUOUS USE OF OPIOIDS: ICD-10-CM

## 2022-01-06 RX ORDER — HYDROCODONE BITARTRATE AND ACETAMINOPHEN 5; 325 MG/1; MG/1
TABLET ORAL
Qty: 120 TABLET | Refills: 0 | Status: SHIPPED | OUTPATIENT
Start: 2022-01-08 | End: 2022-02-03

## 2022-01-06 NOTE — TELEPHONE ENCOUNTER
Routing refill request to provider for review/approval because:  Drug not on the [facility] refill protocol   Last refilled 12/9/21

## 2022-01-12 ENCOUNTER — OFFICE VISIT (OUTPATIENT)
Dept: FAMILY MEDICINE | Facility: CLINIC | Age: 72
End: 2022-01-12
Payer: COMMERCIAL

## 2022-01-12 VITALS
WEIGHT: 118 LBS | SYSTOLIC BLOOD PRESSURE: 122 MMHG | OXYGEN SATURATION: 98 % | TEMPERATURE: 99 F | RESPIRATION RATE: 16 BRPM | HEART RATE: 96 BPM | HEIGHT: 65 IN | BODY MASS INDEX: 19.66 KG/M2 | DIASTOLIC BLOOD PRESSURE: 88 MMHG

## 2022-01-12 DIAGNOSIS — Z12.11 COLON CANCER SCREENING: ICD-10-CM

## 2022-01-12 DIAGNOSIS — R06.02 SOB (SHORTNESS OF BREATH): ICD-10-CM

## 2022-01-12 DIAGNOSIS — Z23 NEED FOR PROPHYLACTIC VACCINATION AND INOCULATION AGAINST INFLUENZA: ICD-10-CM

## 2022-01-12 DIAGNOSIS — R63.4 WEIGHT LOSS: ICD-10-CM

## 2022-01-12 DIAGNOSIS — R53.83 FATIGUE, UNSPECIFIED TYPE: Primary | ICD-10-CM

## 2022-01-12 LAB
ALBUMIN SERPL-MCNC: 3.9 G/DL (ref 3.4–5)
ALP SERPL-CCNC: 103 U/L (ref 40–150)
ALT SERPL W P-5'-P-CCNC: 21 U/L (ref 0–50)
ANION GAP SERPL CALCULATED.3IONS-SCNC: 5 MMOL/L (ref 3–14)
AST SERPL W P-5'-P-CCNC: 12 U/L (ref 0–45)
BILIRUB SERPL-MCNC: 0.2 MG/DL (ref 0.2–1.3)
BUN SERPL-MCNC: 10 MG/DL (ref 7–30)
CALCIUM SERPL-MCNC: 9 MG/DL (ref 8.5–10.1)
CHLORIDE BLD-SCNC: 99 MMOL/L (ref 94–109)
CO2 SERPL-SCNC: 28 MMOL/L (ref 20–32)
CREAT SERPL-MCNC: 0.56 MG/DL (ref 0.52–1.04)
ERYTHROCYTE [DISTWIDTH] IN BLOOD BY AUTOMATED COUNT: 12.8 % (ref 10–15)
GFR SERPL CREATININE-BSD FRML MDRD: >90 ML/MIN/1.73M2
GLUCOSE BLD-MCNC: 94 MG/DL (ref 70–99)
HCT VFR BLD AUTO: 36.9 % (ref 35–47)
HGB BLD-MCNC: 12.8 G/DL (ref 11.7–15.7)
MCH RBC QN AUTO: 31.1 PG (ref 26.5–33)
MCHC RBC AUTO-ENTMCNC: 34.7 G/DL (ref 31.5–36.5)
MCV RBC AUTO: 90 FL (ref 78–100)
PLATELET # BLD AUTO: 413 10E3/UL (ref 150–450)
POTASSIUM BLD-SCNC: 4.6 MMOL/L (ref 3.4–5.3)
PROT SERPL-MCNC: 7.4 G/DL (ref 6.8–8.8)
RBC # BLD AUTO: 4.12 10E6/UL (ref 3.8–5.2)
SODIUM SERPL-SCNC: 132 MMOL/L (ref 133–144)
TSH SERPL DL<=0.005 MIU/L-ACNC: 0.78 MU/L (ref 0.4–4)
WBC # BLD AUTO: 7.2 10E3/UL (ref 4–11)

## 2022-01-12 PROCEDURE — 80053 COMPREHEN METABOLIC PANEL: CPT | Performed by: FAMILY MEDICINE

## 2022-01-12 PROCEDURE — 85027 COMPLETE CBC AUTOMATED: CPT | Performed by: FAMILY MEDICINE

## 2022-01-12 PROCEDURE — 36415 COLL VENOUS BLD VENIPUNCTURE: CPT | Performed by: FAMILY MEDICINE

## 2022-01-12 PROCEDURE — G0008 ADMIN INFLUENZA VIRUS VAC: HCPCS | Performed by: FAMILY MEDICINE

## 2022-01-12 PROCEDURE — 84443 ASSAY THYROID STIM HORMONE: CPT | Performed by: FAMILY MEDICINE

## 2022-01-12 PROCEDURE — 99214 OFFICE O/P EST MOD 30 MIN: CPT | Mod: 25 | Performed by: FAMILY MEDICINE

## 2022-01-12 PROCEDURE — 90662 IIV NO PRSV INCREASED AG IM: CPT | Performed by: FAMILY MEDICINE

## 2022-01-12 ASSESSMENT — MIFFLIN-ST. JEOR: SCORE: 1061.12

## 2022-01-12 ASSESSMENT — PAIN SCALES - GENERAL: PAINLEVEL: SEVERE PAIN (6)

## 2022-01-12 NOTE — PROGRESS NOTES
Assessment & Plan     Fatigue, unspecified type  Symptoms are nonspecific and differentials are broad including cardiovascular, pulmonary, oncological and metabolic etiology.  Lost more than 12 pounds in the last 6 months, unintentional.  CBC, CMP, TSH, CT chest abdomen/pelvis and exercise stress test ordered for further evaluation.  Recommended to continue well hydration, healthy diet and current medications.  Stressed on smoking cessation.  Follow-up in 4 weeks or earlier if needed.  All questions answered.  - CBC with platelets; Future  - Comprehensive metabolic panel (BMP + Alb, Alk Phos, ALT, AST, Total. Bili, TP); Future  - TSH with free T4 reflex; Future  - CT Chest Abdomen Pelvis w/o & w Contrast; Future    Colon cancer screening  Screening colonoscopy ordered  - Adult Gastro Ref - Procedure Only; Future    Weight loss  - TSH with free T4 reflex; Future  - CT Chest Abdomen Pelvis w/o & w Contrast; Future    SOB (shortness of breath)  - Exercise Stress Test - Adult; Future      Constantine Loera MD  [facility]    Sheyla Henderson is a 71 year old who presents for the following health issues     HPI     Concern -   Onset: worse for last 2 weeks   Description: Pt states she wakes in the morning and does not feel rested. She has headache, dizziness, heartburn, body aches, chronic shortness of breath and weakness  Intensity: moderate  Progression of Symptoms:  same  Accompanying Signs & Symptoms: no fever, chills, cough, abdominal pain, chest pain, diarrhea, constipation   Previous history of similar problem: none   Therapies tried and outcome: none      Review of Systems   Constitutional, HEENT, cardiovascular, pulmonary, GI, , musculoskeletal, neuro, skin, endocrine and psych systems are negative, except as otherwise noted.      Objective    /88 (BP Location: Right arm, Patient Position: Sitting, Cuff Size: Adult Regular)   Pulse 96   Temp 99  F (37.2  C) (Tympanic)    "Resp 16    1.651 m (5' 5\")   Wt 53.5 kg (118 lb)   SpO2 98%   BMI 19.64 kg/m    Body mass index is 19.64 kg/m .  Physical Exam   GENERAL: alert and no distress  EYES: Eyes grossly normal to inspection, PERRL and conjunctivae and sclerae normal  HENT: normal cephalic/atraumatic, nose and mouth without ulcers or lesions, oropharynx clear and oral mucous membranes moist  NECK: no adenopathy, no asymmetry, masses, or scars and thyroid normal to palpation  RESP: lungs clear to auscultation - no rales, rhonchi or wheezes  CV: regular rate and rhythm, normal S1 S2, no S3 or S4, no murmur, click or rub, no peripheral edema and peripheral pulses strong  ABDOMEN: soft, nontender, no hepatosplenomegaly, no masses  MS: no gross musculoskeletal defects noted, no edema  SKIN: no suspicious lesions or rashes  NEURO: Normal strength and tone, mentation intact and speech normal  PSYCH: mentation appears normal, affect normal/bright      Wt Readings from Last 10 Encounters:   01/12/22 53.5 kg (118 lb)   08/09/21 56.7 kg (125 lb)   06/01/21 59.4 kg (131 lb)   04/21/21 60.3 kg (133 lb)   03/24/21 60.3 kg (133 lb)   03/03/21 60.3 kg (133 lb)   02/25/21 60.3 kg (133 lb)   02/24/21 60.8 kg (134 lb)   10/12/20 59 kg (130 lb 0.5 oz)   06/11/20 57.6 kg (127 lb)             "

## 2022-01-18 ENCOUNTER — TELEPHONE (OUTPATIENT)
Dept: FAMILY MEDICINE | Facility: CLINIC | Age: 72
End: 2022-01-18
Payer: COMMERCIAL

## 2022-01-18 DIAGNOSIS — R06.02 SOB (SHORTNESS OF BREATH): Primary | ICD-10-CM

## 2022-01-18 NOTE — TELEPHONE ENCOUNTER
Good afternoon, Dr. Loera,    We have Beatriz scheduled for a stress test (without imaging) on 1/20/22. Based on her current risk factors of smoking and family hx I did consult our supervising hospitalist who would be reading the test that day. He recommended she complete a stress echocardiogram. If you are okay with this change I am wondering if you would be willing to place new orders for a stress echocardiogram?    Thank you for you time,     Michael Bowen MS, Exercise Physiologist  [facility]  [phone]

## 2022-01-26 DIAGNOSIS — E78.5 HYPERLIPIDEMIA LDL GOAL <130: ICD-10-CM

## 2022-01-26 RX ORDER — ATORVASTATIN CALCIUM 10 MG/1
10 TABLET, FILM COATED ORAL DAILY
Qty: 90 TABLET | Refills: 1 | Status: SHIPPED | OUTPATIENT
Start: 2022-01-26 | End: 2022-07-25

## 2022-02-01 ENCOUNTER — HOSPITAL ENCOUNTER (OUTPATIENT)
Dept: CT IMAGING | Facility: CLINIC | Age: 72
Discharge: HOME | End: 2022-02-01
Attending: FAMILY MEDICINE | Admitting: FAMILY MEDICINE
Payer: COMMERCIAL

## 2022-02-01 DIAGNOSIS — R63.4 WEIGHT LOSS: ICD-10-CM

## 2022-02-01 DIAGNOSIS — K21.9 GASTROESOPHAGEAL REFLUX DISEASE WITHOUT ESOPHAGITIS: ICD-10-CM

## 2022-02-01 DIAGNOSIS — R53.83 FATIGUE, UNSPECIFIED TYPE: ICD-10-CM

## 2022-02-01 DIAGNOSIS — M54.2 CHRONIC NECK PAIN: ICD-10-CM

## 2022-02-01 DIAGNOSIS — G89.29 CHRONIC NECK PAIN: ICD-10-CM

## 2022-02-01 PROCEDURE — 74177 CT ABD & PELVIS W/CONTRAST: CPT

## 2022-02-01 PROCEDURE — 250N000009 HC RX 250: Performed by: RADIOLOGY

## 2022-02-01 PROCEDURE — 250N000011 HC RX IP 250 OP 636: Performed by: RADIOLOGY

## 2022-02-01 RX ORDER — MELOXICAM 15 MG/1
15 TABLET ORAL DAILY PRN
Qty: 90 TABLET | Refills: 0 | Status: SHIPPED | OUTPATIENT
Start: 2022-02-01 | End: 2022-05-01

## 2022-02-01 RX ORDER — IOPAMIDOL 755 MG/ML
51 INJECTION, SOLUTION INTRAVASCULAR ONCE
Status: COMPLETED | OUTPATIENT
Start: 2022-02-01 | End: 2022-02-01

## 2022-02-01 RX ADMIN — IOPAMIDOL 51 ML: 755 INJECTION, SOLUTION INTRAVENOUS at 12:52

## 2022-02-01 RX ADMIN — SODIUM CHLORIDE 53 ML: 9 INJECTION, SOLUTION INTRAVENOUS at 12:52

## 2022-02-01 NOTE — TELEPHONE ENCOUNTER
Routing refill request to provider for review/approval because:  Failed protocol, exceeds age    Refill sent for omeprazole, per protocol    Marlene Caceres RN   [facility]

## 2022-02-03 DIAGNOSIS — M54.2 CHRONIC NECK PAIN: ICD-10-CM

## 2022-02-03 DIAGNOSIS — G89.29 CHRONIC NECK PAIN: ICD-10-CM

## 2022-02-03 DIAGNOSIS — F11.90 CHRONIC, CONTINUOUS USE OF OPIOIDS: ICD-10-CM

## 2022-02-03 RX ORDER — HYDROCODONE BITARTRATE AND ACETAMINOPHEN 5; 325 MG/1; MG/1
TABLET ORAL
Qty: 120 TABLET | Refills: 0 | Status: SHIPPED | OUTPATIENT
Start: 2022-02-06 | End: 2022-03-08

## 2022-02-09 DIAGNOSIS — Z11.59 ENCOUNTER FOR SCREENING FOR OTHER VIRAL DISEASES: Primary | ICD-10-CM

## 2022-02-27 ENCOUNTER — LAB (OUTPATIENT)
Dept: LAB | Facility: CLINIC | Age: 72
End: 2022-02-27
Payer: COMMERCIAL

## 2022-02-27 DIAGNOSIS — Z11.59 ENCOUNTER FOR SCREENING FOR OTHER VIRAL DISEASES: ICD-10-CM

## 2022-02-27 PROCEDURE — U0003 INFECTIOUS AGENT DETECTION BY NUCLEIC ACID (DNA OR RNA); SEVERE ACUTE RESPIRATORY SYNDROME CORONAVIRUS 2 (SARS-COV-2) (CORONAVIRUS DISEASE [COVID-19]), AMPLIFIED PROBE TECHNIQUE, MAKING USE OF HIGH THROUGHPUT TECHNOLOGIES AS DESCRIBED BY CMS-2020-01-R: HCPCS

## 2022-02-27 PROCEDURE — U0005 INFEC AGEN DETEC AMPLI PROBE: HCPCS

## 2022-02-28 LAB — SARS-COV-2 RNA RESP QL NAA+PROBE: NEGATIVE

## 2022-03-01 ENCOUNTER — ANESTHESIA EVENT (OUTPATIENT)
Dept: GASTROENTEROLOGY | Facility: CLINIC | Age: 72
End: 2022-03-01
Payer: COMMERCIAL

## 2022-03-01 ASSESSMENT — COPD QUESTIONNAIRES: COPD: 1

## 2022-03-01 ASSESSMENT — LIFESTYLE VARIABLES: TOBACCO_USE: 1

## 2022-03-01 NOTE — ANESTHESIA PREPROCEDURE EVALUATION
Anesthesia Pre-Procedure Evaluation    Patient: Beatriz Francis   MRN: [id number] : 1950        Procedure : Procedure(s):  COLONOSCOPY          Past Medical History:   Diagnosis Date     Abnormal platelets (H) 4/15/2015     Chronic neck pain     on chronic pain meds     COPD (chronic obstructive pulmonary disease) (H)      Depression, major      Eczema      Herpes simplex, oral      Insomnia      Lupus (H)     lupus tumidus, 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     Pulmonary nodule     long standing, likely scarring      Past Surgical History:   Procedure Laterality Date     ARTHROSCOPY SHLDR ROTATOR CUFF REPAIR, SUBACROMIAL DECOMP, DIST CLAVICLE RESECTION, BICEP TENODESIS Left 2016    Procedure: ARTHROSCOPY SHOULDER ROTATOR CUFF REPAIR, SUBACROMIAL DECOMPRESSION, DISTAL CLAVICLE RESECTION, OPEN BICEP TENODESIS REPAIR;  Surgeon: Freddie Espino MD;  Location: [facility] OR     ARTHROSCOPY SHOULDER Left 2017    Procedure: ARTHROSCOPY SHOULDER;  Surgeon: Ankit Morton MD;  [facility]     BIOPSY       COLONOSCOPY       EYE SURGERY  -lasic     HYSTERECTOMY, PAP NO LONGER INDICATED       REVERSE ARTHROPLASTY SHOULDER Left 2/15/2017    Procedure: REVERSE ARTHROPLASTY SHOULDER;  Surgeon: Ankit Morton MD;  [facility]: [facility] OR     ROTATOR CUFF REPAIR RT/LT  ,    right     ROTATOR CUFF REPAIR RT/LT  3/5/04    left     ROTATOR CUFF REPAIR RT/LT  2009    Right     ZZC SHOULDER SURG PROC UNLISTED  2016      Allergies   Allergen Reactions     Nkda [No Known Drug Allergies]      Seasonal Allergies       Social History     Tobacco Use     Smoking status: Current Every Day Smoker     Packs/day: 0.50     Years: 45.00     Pack years: 22.50     Types: Cigarettes     Smokeless tobacco: Never Used     Tobacco comment: Just under a pack. 50  yr. hx.   Substance Use Topics     Alcohol use: No     Alcohol/week: 0.0 standard drinks     Comment: 3-4x's/year.      Wt Readings from Last 1  Encounters:   01/12/22 53.5 kg (118 lb)        Anesthesia Evaluation   Pt has had prior anesthetic. Type: General, MAC and Regional.        ROS/MED HX  ENT/Pulmonary: Comment: Pulm nodule      (+) allergic rhinitis, tobacco use, Current use, COPD,     Neurologic: Comment: Lupus        Cardiovascular:     (+) Dyslipidemia -----    METS/Exercise Tolerance:     Hematologic:  - neg hematologic  ROS     Musculoskeletal: Comment: Cervicalgia        GI/Hepatic:     (+) GERD,     Renal/Genitourinary:  - neg Renal ROS     Endo:  - neg endo ROS     Psychiatric/Substance Use:     (+) psychiatric history depression     Infectious Disease:  - neg infectious disease ROS     Malignancy:  - neg malignancy ROS     Other:      (+) , H/O Chronic Pain,        Physical Exam    Airway        Mallampati: II   TM distance: > 3 FB   Neck ROM: full   Mouth opening: > 3 cm    Respiratory Devices and Support         Dental  no notable dental history         Cardiovascular   cardiovascular exam normal          Pulmonary   pulmonary exam normal                OUTSIDE LABS:  CBC:   Lab Results   Component Value Date    WBC 7.2 01/12/2022    WBC 10.2 08/06/2021    HGB 12.8 01/12/2022    HGB 12.2 08/06/2021    HCT 36.9 01/12/2022    HCT 35.4 08/06/2021     01/12/2022     (H) 08/06/2021     BMP:   Lab Results   Component Value Date     (L) 01/12/2022     08/06/2021    POTASSIUM 4.6 01/12/2022    POTASSIUM 3.4 08/06/2021    CHLORIDE 99 01/12/2022    CHLORIDE 100 08/06/2021    CO2 28 01/12/2022    CO2 27 08/06/2021    BUN 10 01/12/2022    BUN 9 08/06/2021    CR 0.56 01/12/2022    CR 0.62 08/06/2021    GLC 94 01/12/2022     (H) 08/06/2021     COAGS: No results found for: PTT, INR, FIBR  POC: No results found for: BGM, HCG, HCGS  HEPATIC:   Lab Results   Component Value Date    ALBUMIN 3.9 01/12/2022    PROTTOTAL 7.4 01/12/2022    ALT 21 01/12/2022    AST 12 01/12/2022    ALKPHOS 103 01/12/2022    BILITOTAL 0.2  01/12/2022     OTHER:   Lab Results   Component Value Date    MARI 9.0 01/12/2022    TSH 0.78 01/12/2022    CRP 3.7 01/09/2017    SED 15 01/09/2017       Anesthesia Plan    ASA Status:  2      Anesthesia Type: General.   Induction: Intravenous.           Consents    Anesthesia Plan(s) and associated risks, benefits, and realistic alternatives discussed. Questions answered and patient/representative(s) expressed understanding.    - Discussed:     - Discussed with:  Patient         Postoperative Care    Pain management: IV analgesics, Oral pain medications.   PONV prophylaxis: Ondansetron (or other 5HT-3), Dexamethasone or Solumedrol     Comments:                ALEJANDRO Sarmiento CRNA

## 2022-03-02 ENCOUNTER — HOSPITAL ENCOUNTER (OUTPATIENT)
Facility: CLINIC | Age: 72
Discharge: HOME | End: 2022-03-02
Attending: SURGERY | Admitting: SURGERY
Payer: COMMERCIAL

## 2022-03-02 ENCOUNTER — ANESTHESIA (OUTPATIENT)
Dept: GASTROENTEROLOGY | Facility: CLINIC | Age: 72
End: 2022-03-02
Payer: COMMERCIAL

## 2022-03-02 VITALS
BODY MASS INDEX: 18.33 KG/M2 | DIASTOLIC BLOOD PRESSURE: 81 MMHG | HEART RATE: 78 BPM | SYSTOLIC BLOOD PRESSURE: 126 MMHG | WEIGHT: 110 LBS | TEMPERATURE: 98.3 F | HEIGHT: 65 IN | RESPIRATION RATE: 16 BRPM | OXYGEN SATURATION: 100 %

## 2022-03-02 LAB — COLONOSCOPY: NORMAL

## 2022-03-02 PROCEDURE — 45385 COLONOSCOPY W/LESION REMOVAL: CPT | Mod: PT | Performed by: SURGERY

## 2022-03-02 PROCEDURE — 250N000011 HC RX IP 250 OP 636: Performed by: NURSE ANESTHETIST, CERTIFIED REGISTERED

## 2022-03-02 PROCEDURE — 258N000003 HC RX IP 258 OP 636: Performed by: SURGERY

## 2022-03-02 PROCEDURE — 250N000009 HC RX 250: Performed by: SURGERY

## 2022-03-02 PROCEDURE — 370N000017 HC ANESTHESIA TECHNICAL FEE, PER MIN: Performed by: SURGERY

## 2022-03-02 PROCEDURE — 88305 TISSUE EXAM BY PATHOLOGIST: CPT | Mod: TC | Performed by: SURGERY

## 2022-03-02 PROCEDURE — 250N000009 HC RX 250: Performed by: NURSE ANESTHETIST, CERTIFIED REGISTERED

## 2022-03-02 RX ORDER — ONDANSETRON 2 MG/ML
4 INJECTION INTRAMUSCULAR; INTRAVENOUS
Status: DISCONTINUED | OUTPATIENT
Start: 2022-03-02 | End: 2022-03-02 | Stop reason: HOSPADM

## 2022-03-02 RX ORDER — PROPOFOL 10 MG/ML
INJECTION, EMULSION INTRAVENOUS PRN
Status: DISCONTINUED | OUTPATIENT
Start: 2022-03-02 | End: 2022-03-02

## 2022-03-02 RX ORDER — LIDOCAINE 40 MG/G
CREAM TOPICAL
Status: DISCONTINUED | OUTPATIENT
Start: 2022-03-02 | End: 2022-03-02 | Stop reason: HOSPADM

## 2022-03-02 RX ORDER — NALOXONE HYDROCHLORIDE 0.4 MG/ML
0.2 INJECTION, SOLUTION INTRAMUSCULAR; INTRAVENOUS; SUBCUTANEOUS
Status: CANCELLED | OUTPATIENT
Start: 2022-03-02

## 2022-03-02 RX ORDER — SODIUM CHLORIDE, SODIUM LACTATE, POTASSIUM CHLORIDE, CALCIUM CHLORIDE 600; 310; 30; 20 MG/100ML; MG/100ML; MG/100ML; MG/100ML
INJECTION, SOLUTION INTRAVENOUS CONTINUOUS
Status: DISCONTINUED | OUTPATIENT
Start: 2022-03-02 | End: 2022-03-02 | Stop reason: HOSPADM

## 2022-03-02 RX ORDER — LIDOCAINE HYDROCHLORIDE 10 MG/ML
INJECTION, SOLUTION EPIDURAL; INFILTRATION; INTRACAUDAL; PERINEURAL PRN
Status: DISCONTINUED | OUTPATIENT
Start: 2022-03-02 | End: 2022-03-02

## 2022-03-02 RX ORDER — FLUMAZENIL 0.1 MG/ML
0.2 INJECTION, SOLUTION INTRAVENOUS
Status: CANCELLED | OUTPATIENT
Start: 2022-03-02 | End: 2022-03-03

## 2022-03-02 RX ORDER — NALOXONE HYDROCHLORIDE 0.4 MG/ML
0.4 INJECTION, SOLUTION INTRAMUSCULAR; INTRAVENOUS; SUBCUTANEOUS
Status: CANCELLED | OUTPATIENT
Start: 2022-03-02

## 2022-03-02 RX ORDER — PROPOFOL 10 MG/ML
INJECTION, EMULSION INTRAVENOUS CONTINUOUS PRN
Status: DISCONTINUED | OUTPATIENT
Start: 2022-03-02 | End: 2022-03-02

## 2022-03-02 RX ADMIN — LIDOCAINE HYDROCHLORIDE 50 MG: 10 INJECTION, SOLUTION EPIDURAL; INFILTRATION; INTRACAUDAL; PERINEURAL at 12:17

## 2022-03-02 RX ADMIN — SODIUM CHLORIDE, POTASSIUM CHLORIDE, SODIUM LACTATE AND CALCIUM CHLORIDE: 600; 310; 30; 20 INJECTION, SOLUTION INTRAVENOUS at 11:24

## 2022-03-02 RX ADMIN — PROPOFOL 50 MG: 10 INJECTION, EMULSION INTRAVENOUS at 12:17

## 2022-03-02 RX ADMIN — PROPOFOL 150 MCG/KG/MIN: 10 INJECTION, EMULSION INTRAVENOUS at 12:17

## 2022-03-02 RX ADMIN — LIDOCAINE HYDROCHLORIDE 0.1 ML: 10 INJECTION, SOLUTION EPIDURAL; INFILTRATION; INTRACAUDAL; PERINEURAL at 11:24

## 2022-03-02 NOTE — ANESTHESIA CARE TRANSFER NOTE
Patient: Beatriz Francis    Procedure: Procedure(s):  COLONOSCOPY, FLEXIBLE, WITH LESION REMOVAL USING SNARE       Diagnosis: Colon cancer screening [Z12.11]  Diagnosis Additional Information: No value filed.    Anesthesia Type:   General     Note:    Oropharynx: oropharynx clear of all foreign objects  Level of Consciousness: awake  Oxygen Supplementation: room air      Dentition: dentition unchanged  Vital Signs Stable: post-procedure vital signs reviewed and stable  Report to RN Given: handoff report given  Patient transferred to: Phase II    Handoff Report: Identifed the Patient, Identified the Reponsible Provider, Reviewed the pertinent medical history, Discussed the surgical course, Reviewed Intra-OP anesthesia mangement and issues during anesthesia, Set expectations for post-procedure period and Allowed opportunity for questions and acknowledgement of understanding      Vitals:  Vitals Value Taken Time   BP     Temp     Pulse     Resp     SpO2         Electronically Signed By: ALEJANDRO Beasley CRNA  March 2, 2022  12:48 PM
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
## 2022-03-02 NOTE — ANESTHESIA POSTPROCEDURE EVALUATION
Patient: Beatriz Francis    Procedure: Procedure(s):  COLONOSCOPY, FLEXIBLE, WITH LESION REMOVAL USING SNARE       Anesthesia Type:  General    Note:  Disposition: Outpatient   Postop Pain Control: Uneventful            Sign Out: Well controlled pain   PONV: No   Neuro/Psych: Uneventful            Sign Out: Acceptable/Baseline neuro status   Airway/Respiratory: Uneventful            Sign Out: Acceptable/Baseline resp. status   CV/Hemodynamics:    Other NRE: NONE   DID A NON-ROUTINE EVENT OCCUR? No           Last vitals:  Vitals Value Taken Time   /70 03/02/22 1300   Temp     Pulse 85 03/02/22 1300   Resp     SpO2 82 % 03/02/22 1259   Vitals shown include unvalidated device data.    Electronically Signed By: ALEJANDRO Beasley CRNA  March 2, 2022  1:04 PM

## 2022-03-03 DIAGNOSIS — G47.00 INSOMNIA, UNSPECIFIED TYPE: ICD-10-CM

## 2022-03-03 DIAGNOSIS — G89.29 CHRONIC NECK PAIN: ICD-10-CM

## 2022-03-03 DIAGNOSIS — F11.90 CHRONIC, CONTINUOUS USE OF OPIOIDS: ICD-10-CM

## 2022-03-03 DIAGNOSIS — M54.2 CHRONIC NECK PAIN: ICD-10-CM

## 2022-03-04 LAB
PATH REPORT.COMMENTS IMP SPEC: NORMAL
PATH REPORT.COMMENTS IMP SPEC: NORMAL
PATH REPORT.FINAL DX SPEC: NORMAL
PATH REPORT.GROSS SPEC: NORMAL
PATH REPORT.MICROSCOPIC SPEC OTHER STN: NORMAL
PATH REPORT.RELEVANT HX SPEC: NORMAL
PHOTO IMAGE: NORMAL

## 2022-03-04 PROCEDURE — 88305 TISSUE EXAM BY PATHOLOGIST: CPT | Mod: 26 | Performed by: PATHOLOGY

## 2022-03-08 ENCOUNTER — TELEPHONE (OUTPATIENT)
Dept: FAMILY MEDICINE | Facility: CLINIC | Age: 72
End: 2022-03-08
Payer: COMMERCIAL

## 2022-03-08 DIAGNOSIS — M54.2 CHRONIC NECK PAIN: ICD-10-CM

## 2022-03-08 DIAGNOSIS — G89.29 CHRONIC NECK PAIN: ICD-10-CM

## 2022-03-08 DIAGNOSIS — F11.90 CHRONIC, CONTINUOUS USE OF OPIOIDS: ICD-10-CM

## 2022-03-08 RX ORDER — HYDROCODONE BITARTRATE AND ACETAMINOPHEN 5; 325 MG/1; MG/1
TABLET ORAL
Qty: 120 TABLET | Refills: 0 | Status: SHIPPED | OUTPATIENT
Start: 2022-03-08 | End: 2022-03-09

## 2022-03-08 RX ORDER — ZOLPIDEM TARTRATE 10 MG/1
TABLET ORAL
Qty: 30 TABLET | Refills: 2 | Status: SHIPPED | OUTPATIENT
Start: 2022-03-08 | End: 2022-06-01

## 2022-03-08 NOTE — TELEPHONE ENCOUNTER
Hi there,   Pharmacy received an electronic Rx for hydrocodone-acteminophen 5-325 from Dr Loera, however this did not correctly process into the pharmacy system, is the provider able to resend the prescription please?    Thank You!    Hero Phelan CPhT  [facility]

## 2022-03-08 NOTE — TELEPHONE ENCOUNTER
Patient calling to check on hydrocodone.  Said she called it in a week ago,      Sapna Ibarra on 3/8/2022 at 11:11 AM

## 2022-03-09 RX ORDER — HYDROCODONE BITARTRATE AND ACETAMINOPHEN 5; 325 MG/1; MG/1
TABLET ORAL
Qty: 120 TABLET | Refills: 0 | Status: SHIPPED | OUTPATIENT
Start: 2022-03-09 | End: 2022-04-04
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
## 2022-04-02 DIAGNOSIS — M54.2 CHRONIC NECK PAIN: ICD-10-CM

## 2022-04-02 DIAGNOSIS — G89.29 CHRONIC NECK PAIN: ICD-10-CM

## 2022-04-02 DIAGNOSIS — F11.90 CHRONIC, CONTINUOUS USE OF OPIOIDS: ICD-10-CM

## 2022-04-04 RX ORDER — HYDROCODONE BITARTRATE AND ACETAMINOPHEN 5; 325 MG/1; MG/1
TABLET ORAL
Qty: 120 TABLET | Refills: 0 | Status: SHIPPED | OUTPATIENT
Start: 2022-04-08 | End: 2022-05-04

## 2022-04-04 NOTE — TELEPHONE ENCOUNTER
Requested Prescriptions   Pending Prescriptions Disp Refills     HYDROcodone-acetaminophen (NORCO) 5-325 MG tablet [Pharmacy Med Name: HYDROCODONE/APAP 5-325MG TAB] 120 tablet 0     Sig: TAKE ONE TABLET BY MOUTH EVERY 6 HOURS AS NEEDED FOR SEVERE PAIN . MAXIMUM FOUR TABLETS PER DAY       There is no refill protocol information for this order        Last Written Prescription Date:  3/9/22  Last Fill Quantity: 120,  # refills: 0   Last office visit: 1/12/2022 with prescribing provider:     Future Office Visit:

## 2022-04-30 DIAGNOSIS — G89.29 CHRONIC NECK PAIN: ICD-10-CM

## 2022-04-30 DIAGNOSIS — M54.2 CHRONIC NECK PAIN: ICD-10-CM

## 2022-05-01 RX ORDER — MELOXICAM 15 MG/1
15 TABLET ORAL DAILY PRN
Qty: 90 TABLET | Refills: 0 | Status: SHIPPED | OUTPATIENT
Start: 2022-05-01 | End: 2022-07-25

## 2022-05-03 DIAGNOSIS — G89.29 CHRONIC NECK PAIN: ICD-10-CM

## 2022-05-03 DIAGNOSIS — M54.2 CHRONIC NECK PAIN: ICD-10-CM

## 2022-05-03 DIAGNOSIS — F11.90 CHRONIC, CONTINUOUS USE OF OPIOIDS: ICD-10-CM

## 2022-05-04 RX ORDER — HYDROCODONE BITARTRATE AND ACETAMINOPHEN 5; 325 MG/1; MG/1
TABLET ORAL
Qty: 120 TABLET | Refills: 0 | Status: SHIPPED | OUTPATIENT
Start: 2022-05-07 | End: 2022-06-01

## 2022-05-04 NOTE — TELEPHONE ENCOUNTER
Requested Prescriptions   Pending Prescriptions Disp Refills     HYDROcodone-acetaminophen (NORCO) 5-325 MG tablet [Pharmacy Med Name: HYDROCODONE/APAP 5-325MG TAB] 120 tablet 0     Sig: *EARLIEST FILL DATE 4/8/22*  TAKE ONE TABLET BY MOUTH EVERY 6 HOURS AS NEEDED FOR SEVERE PAIN . MAXIMUM FOUR TABLETS PER DAY       There is no refill protocol information for this order        Last Written Prescription Date:  4/8/22  Last Fill Quantity: 120,  # refills: 0   Last office visit: 1/12/2022 with prescribing provider:     Future Office Visit:

## 2022-06-01 DIAGNOSIS — G89.29 CHRONIC NECK PAIN: ICD-10-CM

## 2022-06-01 DIAGNOSIS — G47.00 INSOMNIA, UNSPECIFIED TYPE: ICD-10-CM

## 2022-06-01 DIAGNOSIS — F11.90 CHRONIC, CONTINUOUS USE OF OPIOIDS: ICD-10-CM

## 2022-06-01 DIAGNOSIS — M54.2 CHRONIC NECK PAIN: ICD-10-CM

## 2022-06-01 RX ORDER — HYDROCODONE BITARTRATE AND ACETAMINOPHEN 5; 325 MG/1; MG/1
TABLET ORAL
Qty: 120 TABLET | Refills: 0 | Status: SHIPPED | OUTPATIENT
Start: 2022-06-05 | End: 2022-06-30

## 2022-06-01 RX ORDER — ZOLPIDEM TARTRATE 10 MG/1
TABLET ORAL
Qty: 30 TABLET | Refills: 2 | Status: SHIPPED | OUTPATIENT
Start: 2022-06-01 | End: 2022-07-25

## 2022-06-16 ENCOUNTER — OFFICE VISIT (OUTPATIENT)
Dept: URGENT CARE | Facility: URGENT CARE | Age: 72
End: 2022-06-16
Payer: COMMERCIAL

## 2022-06-16 VITALS
HEART RATE: 94 BPM | WEIGHT: 116 LBS | DIASTOLIC BLOOD PRESSURE: 89 MMHG | OXYGEN SATURATION: 96 % | BODY MASS INDEX: 19.3 KG/M2 | TEMPERATURE: 97.8 F | SYSTOLIC BLOOD PRESSURE: 136 MMHG

## 2022-06-16 DIAGNOSIS — J44.1 COPD EXACERBATION (H): Primary | ICD-10-CM

## 2022-06-16 PROCEDURE — 99214 OFFICE O/P EST MOD 30 MIN: CPT | Performed by: FAMILY MEDICINE

## 2022-06-16 RX ORDER — PREDNISONE 20 MG/1
TABLET ORAL
Qty: 15 TABLET | Refills: 0 | Status: SHIPPED | OUTPATIENT
Start: 2022-06-16 | End: 2022-07-25

## 2022-06-16 RX ORDER — BENZONATATE 100 MG/1
100 CAPSULE ORAL 3 TIMES DAILY PRN
Qty: 21 CAPSULE | Refills: 1 | Status: SHIPPED | OUTPATIENT
Start: 2022-06-16 | End: 2022-07-25

## 2022-06-16 RX ORDER — AZITHROMYCIN 250 MG/1
TABLET, FILM COATED ORAL
Qty: 6 TABLET | Refills: 0 | Status: SHIPPED | OUTPATIENT
Start: 2022-06-16 | End: 2022-06-21
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
## 2022-06-16 NOTE — PATIENT INSTRUCTIONS
Prednisone 40mg for 5 days then 20mg a day for the last 5 days      Azithromycin antibiotic as prescribed    For cough (can take all of them together):   - Dextromethorphan 'DM' (over the counter - can be found in Robitussin/Delsym/generic cough meds)  - Honey: lozenges/cough drops or mix honey in warm water  - Tessalon/Benzonatate (prescription) every 8 hours as needed

## 2022-06-29 DIAGNOSIS — M54.2 CHRONIC NECK PAIN: ICD-10-CM

## 2022-06-29 DIAGNOSIS — F11.90 CHRONIC, CONTINUOUS USE OF OPIOIDS: ICD-10-CM

## 2022-06-29 DIAGNOSIS — G89.29 CHRONIC NECK PAIN: ICD-10-CM

## 2022-06-29 NOTE — TELEPHONE ENCOUNTER
Requested Prescriptions   Pending Prescriptions Disp Refills    HYDROcodone-acetaminophen (NORCO) 5-325 MG tablet [Pharmacy Med Name: HYDROCODONE/APAP 5-325MG TAB] 120 tablet 0     Sig: TAKE ONE TABLET BY MOUTH EVERY 6 HOURS AS NEEDED FOR SEVERE PAIN (DO NOT TAKE MORE THAN 4 TABLETS PER DAY)        There is no refill protocol information for this order

## 2022-06-30 RX ORDER — HYDROCODONE BITARTRATE AND ACETAMINOPHEN 5; 325 MG/1; MG/1
TABLET ORAL
Qty: 120 TABLET | Refills: 0 | Status: SHIPPED | OUTPATIENT
Start: 2022-07-04 | End: 2022-08-04

## 2022-07-23 DIAGNOSIS — M54.2 CHRONIC NECK PAIN: ICD-10-CM

## 2022-07-23 DIAGNOSIS — E78.5 HYPERLIPIDEMIA LDL GOAL <130: ICD-10-CM

## 2022-07-23 DIAGNOSIS — G89.29 CHRONIC NECK PAIN: ICD-10-CM

## 2022-07-24 ASSESSMENT — ANXIETY QUESTIONNAIRES
3. WORRYING TOO MUCH ABOUT DIFFERENT THINGS: NOT AT ALL
GAD7 TOTAL SCORE: 2
4. TROUBLE RELAXING: SEVERAL DAYS
7. FEELING AFRAID AS IF SOMETHING AWFUL MIGHT HAPPEN: NOT AT ALL
8. IF YOU CHECKED OFF ANY PROBLEMS, HOW DIFFICULT HAVE THESE MADE IT FOR YOU TO DO YOUR WORK, TAKE CARE OF THINGS AT HOME, OR GET ALONG WITH OTHER PEOPLE?: NOT DIFFICULT AT ALL
6. BECOMING EASILY ANNOYED OR IRRITABLE: SEVERAL DAYS
7. FEELING AFRAID AS IF SOMETHING AWFUL MIGHT HAPPEN: NOT AT ALL
IF YOU CHECKED OFF ANY PROBLEMS ON THIS QUESTIONNAIRE, HOW DIFFICULT HAVE THESE PROBLEMS MADE IT FOR YOU TO DO YOUR WORK, TAKE CARE OF THINGS AT HOME, OR GET ALONG WITH OTHER PEOPLE: NOT DIFFICULT AT ALL
GAD7 TOTAL SCORE: 2
2. NOT BEING ABLE TO STOP OR CONTROL WORRYING: NOT AT ALL
5. BEING SO RESTLESS THAT IT IS HARD TO SIT STILL: NOT AT ALL
1. FEELING NERVOUS, ANXIOUS, OR ON EDGE: NOT AT ALL
GAD7 TOTAL SCORE: 2

## 2022-07-24 ASSESSMENT — ENCOUNTER SYMPTOMS
EYE PAIN: 0
FREQUENCY: 1
SHORTNESS OF BREATH: 1
ABDOMINAL PAIN: 0
HEARTBURN: 1
PALPITATIONS: 0
ARTHRALGIAS: 1
DYSURIA: 0
SORE THROAT: 0
WEAKNESS: 1
HEMATURIA: 0
MYALGIAS: 0
PARESTHESIAS: 0
NERVOUS/ANXIOUS: 0
CONSTIPATION: 1
NAUSEA: 1
FEVER: 0
COUGH: 1
CHILLS: 0
DIZZINESS: 0
HEMATOCHEZIA: 0
DIARRHEA: 0
HEADACHES: 1
BREAST MASS: 0
JOINT SWELLING: 0

## 2022-07-24 ASSESSMENT — PATIENT HEALTH QUESTIONNAIRE - PHQ9
10. IF YOU CHECKED OFF ANY PROBLEMS, HOW DIFFICULT HAVE THESE PROBLEMS MADE IT FOR YOU TO DO YOUR WORK, TAKE CARE OF THINGS AT HOME, OR GET ALONG WITH OTHER PEOPLE: NOT DIFFICULT AT ALL
SUM OF ALL RESPONSES TO PHQ QUESTIONS 1-9: 5
SUM OF ALL RESPONSES TO PHQ QUESTIONS 1-9: 5

## 2022-07-24 ASSESSMENT — ACTIVITIES OF DAILY LIVING (ADL): CURRENT_FUNCTION: NO ASSISTANCE NEEDED

## 2022-07-25 ENCOUNTER — OFFICE VISIT (OUTPATIENT)
Dept: FAMILY MEDICINE | Facility: CLINIC | Age: 72
End: 2022-07-25
Payer: COMMERCIAL

## 2022-07-25 VITALS
TEMPERATURE: 98.4 F | SYSTOLIC BLOOD PRESSURE: 138 MMHG | HEIGHT: 65 IN | BODY MASS INDEX: 19.49 KG/M2 | DIASTOLIC BLOOD PRESSURE: 79 MMHG | WEIGHT: 117 LBS | RESPIRATION RATE: 18 BRPM | HEART RATE: 76 BPM

## 2022-07-25 DIAGNOSIS — M54.2 CHRONIC NECK PAIN: ICD-10-CM

## 2022-07-25 DIAGNOSIS — Z79.899 ENCOUNTER FOR LONG-TERM (CURRENT) USE OF MEDICATIONS: ICD-10-CM

## 2022-07-25 DIAGNOSIS — Z00.00 ENCOUNTER FOR MEDICARE ANNUAL WELLNESS EXAM: Primary | ICD-10-CM

## 2022-07-25 DIAGNOSIS — F17.200 NICOTINE DEPENDENCE, UNCOMPLICATED, UNSPECIFIED NICOTINE PRODUCT TYPE: ICD-10-CM

## 2022-07-25 DIAGNOSIS — G89.29 CHRONIC NECK PAIN: ICD-10-CM

## 2022-07-25 DIAGNOSIS — F11.90 CHRONIC, CONTINUOUS USE OF OPIOIDS: ICD-10-CM

## 2022-07-25 DIAGNOSIS — F33.1 MODERATE EPISODE OF RECURRENT MAJOR DEPRESSIVE DISORDER (H): ICD-10-CM

## 2022-07-25 DIAGNOSIS — R22.31 MASS OF RIGHT AXILLA: ICD-10-CM

## 2022-07-25 DIAGNOSIS — E78.5 HYPERLIPIDEMIA LDL GOAL <130: ICD-10-CM

## 2022-07-25 DIAGNOSIS — K21.9 GASTROESOPHAGEAL REFLUX DISEASE WITHOUT ESOPHAGITIS: ICD-10-CM

## 2022-07-25 DIAGNOSIS — Z13.220 SCREENING FOR HYPERLIPIDEMIA: ICD-10-CM

## 2022-07-25 DIAGNOSIS — J43.9 PULMONARY EMPHYSEMA, UNSPECIFIED EMPHYSEMA TYPE (H): ICD-10-CM

## 2022-07-25 DIAGNOSIS — G47.00 INSOMNIA, UNSPECIFIED TYPE: ICD-10-CM

## 2022-07-25 DIAGNOSIS — E87.1 LOW SODIUM LEVELS: ICD-10-CM

## 2022-07-25 LAB
ANION GAP SERPL CALCULATED.3IONS-SCNC: 4 MMOL/L (ref 3–14)
BUN SERPL-MCNC: 12 MG/DL (ref 7–30)
CALCIUM SERPL-MCNC: 9.1 MG/DL (ref 8.5–10.1)
CHLORIDE BLD-SCNC: 97 MMOL/L (ref 94–109)
CHOLEST SERPL-MCNC: 141 MG/DL
CO2 SERPL-SCNC: 29 MMOL/L (ref 20–32)
CREAT SERPL-MCNC: 0.53 MG/DL (ref 0.52–1.04)
CREAT UR-MCNC: 9 MG/DL
FASTING STATUS PATIENT QL REPORTED: NORMAL
GFR SERPL CREATININE-BSD FRML MDRD: >90 ML/MIN/1.73M2
GLUCOSE BLD-MCNC: 95 MG/DL (ref 70–99)
HDLC SERPL-MCNC: 60 MG/DL
LDLC SERPL CALC-MCNC: 67 MG/DL
NONHDLC SERPL-MCNC: 81 MG/DL
POTASSIUM BLD-SCNC: 5 MMOL/L (ref 3.4–5.3)
SODIUM SERPL-SCNC: 130 MMOL/L (ref 133–144)
TRIGL SERPL-MCNC: 71 MG/DL

## 2022-07-25 PROCEDURE — 80061 LIPID PANEL: CPT | Performed by: NURSE PRACTITIONER

## 2022-07-25 PROCEDURE — 36415 COLL VENOUS BLD VENIPUNCTURE: CPT | Performed by: NURSE PRACTITIONER

## 2022-07-25 PROCEDURE — G0438 PPPS, INITIAL VISIT: HCPCS | Performed by: NURSE PRACTITIONER

## 2022-07-25 PROCEDURE — 99214 OFFICE O/P EST MOD 30 MIN: CPT | Mod: 25 | Performed by: NURSE PRACTITIONER

## 2022-07-25 PROCEDURE — 80048 BASIC METABOLIC PNL TOTAL CA: CPT | Performed by: NURSE PRACTITIONER

## 2022-07-25 PROCEDURE — 80307 DRUG TEST PRSMV CHEM ANLYZR: CPT | Performed by: NURSE PRACTITIONER

## 2022-07-25 RX ORDER — SUCRALFATE 1 G/1
1 TABLET ORAL 4 TIMES DAILY
Qty: 40 TABLET | Refills: 0 | Status: SHIPPED | OUTPATIENT
Start: 2022-07-25 | End: 2022-08-04

## 2022-07-25 RX ORDER — ATORVASTATIN CALCIUM 10 MG/1
TABLET, FILM COATED ORAL
Qty: 90 TABLET | Refills: 1 | Status: SHIPPED | OUTPATIENT
Start: 2022-07-25 | End: 2023-01-01

## 2022-07-25 RX ORDER — ZOLPIDEM TARTRATE 10 MG/1
TABLET ORAL
Qty: 30 TABLET | Refills: 2 | Status: SHIPPED | OUTPATIENT
Start: 2022-08-01 | End: 2022-01-01

## 2022-07-25 RX ORDER — MELOXICAM 15 MG/1
15 TABLET ORAL DAILY PRN
Qty: 30 TABLET | Refills: 0 | Status: SHIPPED | OUTPATIENT
Start: 2022-07-25 | End: 2022-08-18

## 2022-07-25 ASSESSMENT — ANXIETY QUESTIONNAIRES
6. BECOMING EASILY ANNOYED OR IRRITABLE: NOT AT ALL
GAD7 TOTAL SCORE: 0
5. BEING SO RESTLESS THAT IT IS HARD TO SIT STILL: NOT AT ALL
2. NOT BEING ABLE TO STOP OR CONTROL WORRYING: NOT AT ALL
3. WORRYING TOO MUCH ABOUT DIFFERENT THINGS: NOT AT ALL
1. FEELING NERVOUS, ANXIOUS, OR ON EDGE: NOT AT ALL
7. FEELING AFRAID AS IF SOMETHING AWFUL MIGHT HAPPEN: NOT AT ALL

## 2022-07-25 ASSESSMENT — ENCOUNTER SYMPTOMS
JOINT SWELLING: 0
SORE THROAT: 0
WEAKNESS: 1
CONSTIPATION: 1
SHORTNESS OF BREATH: 1
DIARRHEA: 0
DIZZINESS: 0
HEMATURIA: 0
MYALGIAS: 0
COUGH: 1
NERVOUS/ANXIOUS: 0
CHILLS: 0
ARTHRALGIAS: 1
EYE PAIN: 0
FEVER: 0
NAUSEA: 1
FREQUENCY: 1
HEMATOCHEZIA: 0
ABDOMINAL PAIN: 0
HEADACHES: 1
PARESTHESIAS: 0
DYSURIA: 0
PALPITATIONS: 0
HEARTBURN: 1
BREAST MASS: 0

## 2022-07-25 ASSESSMENT — PATIENT HEALTH QUESTIONNAIRE - PHQ9
5. POOR APPETITE OR OVEREATING: NOT AT ALL
SUM OF ALL RESPONSES TO PHQ QUESTIONS 1-9: 5

## 2022-07-25 ASSESSMENT — PAIN SCALES - GENERAL: PAINLEVEL: NO PAIN (0)

## 2022-07-25 ASSESSMENT — ACTIVITIES OF DAILY LIVING (ADL): CURRENT_FUNCTION: NO ASSISTANCE NEEDED

## 2022-07-25 NOTE — LETTER
Opioid / Opioid Plus Controlled Substance Agreement    This is an agreement between you and your provider about the safe and appropriate use of controlled substance/opioids prescribed by your care team. Controlled substances are medicines that can cause physical and mental dependence (abuse).    There are strict laws about having and using these medicines. We here at [facility] are committing to working with you in your efforts to get better. To support you in this work, we ll help you schedule regular office appointments for medicine refills. If we must cancel or change your appointment for any reason, we ll make sure you have enough medicine to last until your next appointment.     As a Provider, I will:    Listen carefully to your concerns and treat you with respect.     Recommend a treatment plan that I believe is in your best interest. This plan may involve therapies other than opioid pain medication.     Talk with you often about the possible benefits, and the risk of harm of any medicine that we prescribe for you.     Provide a plan on how to taper (discontinue or go off) using this medicine if the decision is made to stop its use.    As a Patient, I understand that opioid(s):     Are a controlled substance prescribed by my care team to help me function or work and manage my condition(s).     Are strong medicines and can cause serious side effects such as:    Drowsiness, which can seriously affect my driving ability    A lower breathing rate, enough to cause death    Harm to my thinking ability     Depression     Abuse of and addiction to this medicine    Need to be taken exactly as prescribed. Combining opioids with certain medicines or chemicals (such as illegal drugs, sedatives, sleeping pills, and benzodiazepines) can be dangerous or even fatal. If I stop opioids suddenly, I may have severe withdrawal symptoms.    Do not work for all types of pain nor for all patients. If they re not helpful, I may  be asked to stop them.        The risks, benefits and side effects of these medicine(s) were explained to me. I agree that:  1. I will take part in other treatments as advised by my care team. This may be psychiatry or counseling, physical therapy, behavioral therapy, group treatment or a referral to a specialist.     2. I will keep all my appointments. I understand that this is part of the monitoring of opioids. My care team may require an office visit for EVERY opioid/controlled substance refill. If I miss appointments or don t follow instructions, my care team may stop my medicine.    3. I will take my medicines as prescribed. I will not change the dose or schedule unless my care team tells me to. There will be no refills if I run out early.     4. I may be asked to come to the clinic and complete a urine drug test or complete a pill count at any time. If I don t give a urine sample or participate in a pill count, the care team may stop my medicine.    5. I will only receive prescriptions from this clinic for chronic pain. If I am treated by another provider for acute pain issues, I will tell them that I am taking opioid pain medication for chronic pain and that I have a treatment agreement with this provider. I will inform my [facility] care team within one business day if I am given a prescription for any pain medication by another healthcare provider. My [facility] care team can contact other providers and pharmacists about my use of any medicines.    6. It is up to me to make sure that I don t run out of my medicines on weekends or holidays. If my care team is willing to refill my opioid prescription without a visit, I must request refills only during office hours. Refills may take up to 3 business days to process. I will use one pharmacy to fill all my opioid and other controlled substance prescriptions. I will notify the clinic about any changes to my insurance or medication  availability.    7. I am responsible for my prescriptions. If the medicine/prescription is lost, stolen or destroyed, it will not be replaced. I also agree not to share controlled substance medicines with anyone.    8. I am aware I should not use any illegal or recreational drugs. I agree not to drink alcohol unless my care team says I can.       9. If I enroll in the [place] [facility] Cann[facility] program, I will tell my care team prior to my next refill.     10. I will tell my care team right away if I become pregnant, have a new medical problem treated outside of my regular clinic, or have a change in my medications.    11. I understand that this medicine can affect my thinking, judgment and reaction time. Alcohol and drugs affect the brain and body, which can affect the safety of my driving. Being under the influence of alcohol or drugs can affect my decision-making, behaviors, personal safety, and the safety of others. Driving while impaired (DWI) can occur if a person is driving, operating, or in physical control of a car, motorcycle, boat, snowmobile, ATV, motorbike, off-road vehicle, or any other motor vehicle ([place] Statute 169A.20). I understand the risk if I choose to drive or operate any vehicle or machinery.    I understand that if I do not follow any of the conditions above, my prescriptions or treatment may be stopped or changed.          Opioids  What You Need to Know    What are opioids?   Opioids are pain medicines that must be prescribed by a doctor. They are also known as narcotics.     Examples are:   1. morphine (MS Contin, Jennifer)  2. oxycodone (Oxycontin)  3. oxycodone and acetaminophen (Percocet)  4. hydrocodone and acetaminophen (Vicodin, Norco)   5. fentanyl patch (Duragesic)   6. hydromorphone (Dilaudid)   7. methadone  8. codeine (Tylenol #3)     What do opioids do well?   Opioids are best for severe short-term pain such as after a surgery or injury. They may work well for cancer pain. They may  help some people with long-lasting (chronic) pain.     What do opioids NOT do well?   Opioids never get rid of pain entirely, and they don t work well for most patients with chronic pain. Opioids don t reduce swelling, one of the causes of pain.                                    Other ways to manage chronic pain and improve function include:       Treat the health problem that may be causing pain    Anti-inflammation medicines, which reduce swelling and tenderness, such as ibuprofen (Advil, Motrin) or naproxen (Aleve)    Acetaminophen (Tylenol)    Antidepressants and anti-seizure medicines, especially for nerve pain    Topical treatments such as patches or creams    Injections or nerve blocks    Chiropractic or osteopathic treatment    Acupuncture, massage, deep breathing, meditation, visual imagery, aromatherapy    Use heat or ice at the pain site    Physical therapy     Exercise    Stop smoking    Take part in therapy       Risks and side effects     Talk to your doctor before you start or decide to keep taking opioids. Possible side effects include:      Lowering your breathing rate enough to cause death    Overdose, including death, especially if taking higher than prescribed doses    Worse depression symptoms; less pleasure in things you usually enjoy    Feeling tired or sluggish    Slower thoughts or cloudy thinking    Being more sensitive to pain over time; pain is harder to control    Trouble sleeping or restless sleep    Changes in hormone levels (for example, less testosterone)    Changes in sex drive or ability to have sex    Constipation    Unsafe driving    Itching and sweating    Dizziness    Nausea, throwing up and dry mouth    What else should I know about opioids?    Opioids may lead to dependence, tolerance, or addiction.      Dependence means that if you stop or reduce the medicine too quickly, you will have withdrawal symptoms. These include loose poop (diarrhea), jitters, flu-like symptoms,  nervousness and tremors. Dependence is not the same as addiction.                       Tolerance means needing higher doses over time to get the same effect. This may increase the chance of serious side effects.      Addiction is when people improperly use a substance that harms their body, their mind or their relations with others. Use of opiates can cause a relapse of addiction if you have a history of drug or alcohol abuse.      People who have used opioids for a long time may have a lower quality of life, worse depression, higher levels of pain and more visits to doctors.    You can overdose on opioids. Take these steps to lower your risk of overdose:    1. Recognize the signs:  Signs of overdose include decrease or loss of consciousness (blackout), slowed breathing, trouble waking up and blue lips. If someone is worried about overdose, they should call 911.    2. Talk to your doctor about Narcan (naloxone).   If you are at risk for overdose, you may be given a prescription for Narcan. This medicine very quickly reverses the effects of opioids.   If you overdose, a friend or family member can give you Narcan while waiting for the ambulance. They need to know the signs of overdose and how to give Narcan.     3. Don't use alcohol or street drugs.   Taking them with opioids can cause death.    4. Do not take any of these medicines unless your doctor says it s OK. Taking these with opioids can cause death:    Benzodiazepines, such as lorazepam (Ativan), alprazolam (Xanax) or diazepam (Valium)    Muscle relaxers, such as cyclobenzaprine (Flexeril)    Sleeping pills like zolpidem (Ambien)     Other opioids      How to keep you and other people safe while taking opioids:    1. Never share your opioids with others.  Opioid medicines are regulated by the Drug Enforcement Agency ([employer]AT[place]). Selling or sharing medications is a criminal act.    2. Be sure to store opioids in a secure place, locked up if possible. Young children  can easily swallow them and overdose.    3. When you are traveling with your medicines, keep them in the original bottles. If you use a pill box, be sure you also carry a copy of your medicine list from your clinic or pharmacy.    4. Safe disposal of opioids    Most pharmacies have places to get rid of medicine, called disposal kiosks. Medicine disposal options are also available in every [facility]. Search your county and  medication disposal  to find more options. You can find more details at:  [email or web address]     I agree that my provider, clinic care team, and pharmacy may work with any city, state or federal law enforcement agency that investigates the misuse, sale, or other diversion of my controlled medicine. I will allow my provider to discuss my care with, or share a copy of, this agreement with any other treating provider, pharmacy or emergency room where I receive care.    I have read this agreement and have asked questions about anything I did not understand.    _______________________________________________________  Patient Signature - Beatriz Francis _____________________                   Date     _______________________________________________________  Provider Signature - ALEJANDRO Odonnell CNP   _____________________                   Date     _______________________________________________________  Witness Signature (required if provider not present while patient signing)   _____________________                   Date

## 2022-07-25 NOTE — TELEPHONE ENCOUNTER
"Please address this request one lab work has been processed. Thank you!      Requested Prescriptions   Pending Prescriptions Disp Refills     atorvastatin (LIPITOR) 10 MG tablet [Pharmacy Med Name: ATORVASTATIN CALCIUM 10MG TABS] 90 tablet 1     Sig: TAKE ONE TABLET BY MOUTH ONCE DAILY       Statins Protocol Failed - 7/23/2022  5:02 AM        Failed - LDL on file in past 12 months     Recent Labs   Lab Test 06/01/21  1205   *             Passed - No abnormal creatine kinase in past 12 months     No lab results found.             Passed - Recent (12 mo) or future (30 days) visit within the authorizing provider's specialty     Patient has had an office visit with the authorizing provider or a provider within the authorizing providers department within the previous 12 mos or has a future within next 30 days. See \"Patient Info\" tab in inbasket, or \"Choose Columns\" in Meds & Orders section of the refill encounter.              Passed - Medication is active on med list        Passed - Patient is age 18 or older        Passed - No active pregnancy on record        Passed - No positive pregnancy test in past 12 months             "

## 2022-07-25 NOTE — PATIENT INSTRUCTIONS
Patient Education   Personalized Prevention Plan  You are due for the preventive services outlined below.  Your care team is available to assist you in scheduling these services.  If you have already completed any of these items, please share that information with your care team to update in your medical record.  Health Maintenance Due   Topic Date Due     ANNUAL REVIEW OF HM ORDERS  Never done     COVID-19 Vaccine (3 - Booster for Moderna series) 09/02/2021     Cholesterol Lab  06/01/2022     URINE DRUG SCREEN  06/01/2022     TREATMENT AGREEMENT FOR CHRONIC PAIN MANAGEMENT  06/01/2022     Mammogram  07/06/2022       Understanding USDA MyPlate  The USDA has guidelines to help you make healthy food choices. These are called MyPlate. MyPlate shows the food groups that make up healthy meals using the image of a place setting. Before you eat, think about the healthiest choices for what to put on your plate or in your cup or bowl. To learn more about building a healthy plate, visit www.choosemyplate.gov.    The food groups    Fruits. Any fruit or 100% fruit juice counts as part of the Fruit Group. Fruits may be fresh, canned, frozen, or dried, and may be whole, cut-up, or pureed. Make 1/2 of your plate fruits and vegetables.    Vegetables. Any vegetable or 100% vegetable juice counts as a member of the Vegetable Group. Vegetables may be fresh, frozen, canned, or dried. They can be served raw or cooked and may be whole, cut-up, or mashed. Make 1/2 of your plate fruits and vegetables.    Grains. All foods made from grains are part of the Grains Group. These include wheat, rice, oats, cornmeal, and barley. Grains are often used to make foods such as bread, pasta, oatmeal, cereal, tortillas, and grits. Grains should be no more than 1/4 of your plate. At least half of your grains should be whole grains.    Protein. This group includes meat, poultry, seafood, beans and peas, eggs, processed soy products (such as tofu), nuts  (including nut butters), and seeds. Make protein choices no more than 1/4 of your plate. Meat and poultry choices should be lean or low fat.    Dairy. The Dairy Group includes all fluid milk products and foods made from milk that contain calcium, such as yogurt and cheese. (Foods that have little calcium, such as cream, butter, and cream cheese, are not part of this group.) Most dairy choices should be low-fat or fat-free.    Oils. Oils aren't a food group, but they do contain essential nutrients. However it's important to watch your intake of oils. These are fats that are liquid at room temperature. They include canola, corn, olive, soybean, vegetable, and sunflower oil. Foods that are mainly oil include mayonnaise, certain salad dressings, and soft margarines. You likely already get your daily oil allowance from the foods you eat.  Things to limit  Eating healthy also means limiting these things in your diet:       Salt (sodium). Many processed foods have a lot of sodium. To keep sodium intake down, eat fresh vegetables, meats, poultry, and seafood when possible. Purchase low-sodium, reduced-sodium, or no-salt-added food products at the store. And don't add salt to your meals at home. Instead, season them with herbs and spices such as dill, oregano, cumin, and paprika. Or try adding flavor with lemon or lime zest and juice.    Saturated fat. Saturated fats are most often found in animal products such as beef, pork, and chicken. They are often solid at room temperature, such as butter. To reduce your saturated fat intake, choose leaner cuts of meat and poultry. And try healthier cooking methods such as grilling, broiling, roasting, or baking. For a simple lower-fat swap, use plain nonfat yogurt instead of mayonnaise when making potato salad or macaroni salad.    Added sugars. These are sugars added to foods. They are in foods such as ice cream, candy, soda, fruit drinks, sports drinks, energy drinks, cookies,  pastries, jams, and syrups. Cut down on added sugars by sharing sweet treats with a family member or friend. You can also choose fruit for dessert, and drink water or other unsweetened beverages.     [employer] last reviewed this educational content on 6/1/2020 2000-2021 [employer]. All rights reserved. This information is not intended as a substitute for professional medical care. Always follow your healthcare professional's instructions.          Urinary Incontinence, Female (Adult)   Urinary incontinence means loss of bladder control. This problem affects many women, especially as they get older. If you have incontinence, you may be embarrassed to ask for help. But know that this problem can be treated.   Types of Incontinence  There are different types of incontinence. Two of the main types are described here. You can have more than one type.     Stress incontinence. With this type, urine leaks when pressure (stress) is put on the bladder. This may happen when you cough, sneeze, or laugh. Stress incontinence most often occurs because the pelvic floor muscles that support the bladder and urethra are weak. This can happen after pregnancy and vaginal childbirth or a hysterectomy. It can also be due to excess body weight or hormone changes.    Urge incontinence (also called overactive bladder). With this type, a sudden urge to urinate is felt often. This may happen even though there may not be much urine in the bladder. The need to urinate often during the night is common. Urge incontinence most often occurs because of bladder spasms. This may be due to bladder irritation or infection. Damage to bladder nerves or pelvic muscles, constipation, and certain medicines can also lead to urge incontinence.  Treatment depends on the cause. Further evaluation is needed to find the type you have. This will likely include an exam and certain tests. Based on the results, you and your healthcare provider can then  plan treatment. Until a diagnosis is made, the home care tips below can help ease symptoms.   Home care    Do pelvic floor muscle exercises, if they are prescribed. The pelvic floor muscles help support the bladder and urethra. Many women find that their symptoms improve when doing special exercises that strengthen these muscles. To do the exercises, contract the muscles you would use to stop your stream of urine. But do this when you re not urinating. Hold for 10 seconds, then relax. Repeat 10 to 20 times in a row, at least 3 times a day. Your healthcare provider may give you other instructions for how to do the exercises and how often.    Keep a bladder diary. This helps track how often and how much you urinate over a set period of time. Bring this diary with you to your next visit with the provider. The information can help your provider learn more about your bladder problem.    Lose weight, if advised to by your provider. Extra weight puts pressure on the bladder. Your provider can help you create a weight-loss plan that s right for you. This may include exercising more and making certain diet changes.    Don't have foods and drinks that may irritate the bladder. These can include alcohol and caffeinated drinks.    Quit smoking. Smoking and other tobacco use can lead to a long-term (chronic) cough that strains the pelvic floor muscles. Smoking may also damage the bladder and urethra. Talk with your provider about treatments or methods you can use to quit smoking.    If drinking large amounts of fluid makes you have symptoms, you may be advised to limit your fluid intake. You may also be advised to drink most of your fluids during the day and to limit fluids at night.    If you re worried about urine leakage or accidents, you may wear absorbent pads to catch urine. Change the pads often. This helps reduce discomfort. It may also reduce the risk of skin or bladder infections.    Follow-up care  Follow up with your  "healthcare provider, or as directed. It may take some to find the right treatment for your problem. But healthy lifestyle changes can be made right away. These include such things as exercising on a regular basis, eating a healthy diet, losing weight (if needed), and quitting smoking. Your treatment plan may include special therapies or medicines. Certain procedures or surgery may also be options. Talk about any questions you have with your provider.   When to seek medical advice  Call the healthcare provider right away if any of these occur:    Fever of 100.4 F (38 C) or higher, or as directed by your provider    Bladder pain or fullness    Belly swelling    Nausea or vomiting    Back pain    Weakness, dizziness, or fainting  [employer] last reviewed this educational content on 1/1/2020 2000-2021 [employer]. All rights reserved. This information is not intended as a substitute for professional medical care. Always follow your healthcare professional's instructions.          Depression and Suicide in Older Adults    Nearly 2 million older Americans have some type of depression. Some of them even take their own lives. Yet depression among older adults is often ignored. Learn the warning signs. You may help spare a loved one needless pain. You may also save a life.   What is depression?  Depression is a common and serious illness that affects the way you think and feel. It is not a normal part of aging, nor is it a sign of weakness, a character flaw, or something you can snap out of. Most people with depression need treatment to get better. The most common symptom is a feeling of deep sadness. People who are depressed also may seem tired and listless. And nothing seems to give them pleasure. It s normal to grieve or be sad sometimes. But sadness lessens or passes with time. Depression rarely goes away or improves on its own. A person with clinical depression can't \"snap out of it.\" Other symptoms of " depression are:     Sleeping more or less than normal    Eating more or less than normal    Having headaches, stomachaches, or other pains that don t go away    Feeling nervous,  empty,  or worthless    Crying a great deal    Thinking or talking about suicide or death    Loss of interest in activities previously enjoyed    Social isolation    Feeling confused or forgetful  What causes it?  The causes of depression aren t fully known. But it is thought to result from a complex blend of these factors:     Biochemistry. Certain chemicals in the brain play a role.    Genes. Depression does run in families.    Life stress. Life stresses can also trigger depression in some people. Older adults often face many stressors, such as death of friends or a spouse, health problems, and financial concerns.    Chronic conditions. This includes conditions such as diabetes, heart disease, or cancer. These can cause symptoms of depression. Medicine side effects can cause changes in thoughts and behaviors.  How you can help  Often, depressed people may not want to ask for help. When they do, they may be ignored. Or, they may receive the wrong treatment. You can help by showing parents and older friends love and support. If they seem depressed, don t lecture the person, ignore the symptoms, or discount the symptoms as a  normal  part of aging -which they are not. Get involved, listen, and show interest and support.   Help them understand that depression is a treatable illness. Tell them you can help them find the right treatment. Offer to go to their healthcare provider's appointment with them for support when the symptoms are discussed. With their approval, contact a local mental health center, social service agency, or hospital about services.   You can be an advocate for him or her at healthcare appointments. Many older adults have chronic illnesses that can cause symptoms of depression. Medicine side effects can change thoughts and  behaviors. You can help make sure that the healthcare provider looks at all of these factors. He or she should refer your family member or friend to a mental healthcare provider when needed. in some cases, untreated depression can lead to a misdiagnosis. A person may be diagnosed with a brain disorder such as dementia. If the healthcare provider does not take the issue of depression seriously, help your family member or friend to find another provider.   Don't be afraid to ask  If you think an older person you care about could be suicidal, ask,  Have you thought about suicide?  Most people will tell you the truth. If they say  yes,  they may already have a plan for how and when they will attempt it. Find out as much as you can. The more detailed the plan, and the easier it is to carry out, the more danger the person is in right now. Tell the person you are there for them and do not want them to harm him or herself. Don't wait to get help for the person. Call the person's healthcare provider, local hospital, or emergency services.   To learn more    National Suicide Prevention Lifeline (crisis hotline) 378-695-UOMN (684-450-2496)    National [place] of Mental Hgewil463-486-8698fdg.[facility] Centerh.nih.gov    National [place] on Mental Cqbmrnn427-156-6039zxp.jose.org    Mental Health Bpixzbo706-386-1706gcd.[facility].org    National Suicide Huqzyru342-OUSFXHQ (794-304-4147)    Call 911  Never leave the person alone. A person who is actively suicidal needs psychiatric care right away. They will need constant supervision. Never leave the person out of sight. Call 911 or the national 24-hour suicide crisis hotline at 573-361-XHPO (926-191-4962). You can also take the person to the closest emergency room.   [employer] last reviewed this educational content on 5/1/2020 2000-2021 [employer]. All rights reserved. This information is not intended as a substitute for professional medical care. Always follow your healthcare  professional's instructions.             How to Quit Smoking  Smoking is a hard habit to break. About 50% of all people who have ever smoked have been able to quit. Most people who still smoke want to quit. Here are some of the best ways to stop smoking.     Keep in mind the health benefits of quitting  The health benefits of quitting start right away. They keep improving the longer you go without smoking. Knowing this can help inspire you to stay on track. These benefits occur at any age. If you are 17 or 70, quitting is a good choice. Some of the health benefits after your last cigarette include:     After 20 minutes: Your blood pressure and pulse return to normal.    After 8 hours: Your oxygen levels return to normal.    After 2 days: Your ability to smell and taste start to improve as damaged nerves regrow.    After 2 to 3 weeks: Your circulation and lung function improve.    After 1 to 9 months: Your coughing, congestion, and shortness of breath decrease. Your tiredness decreases.    After 1 year: Your risk of heart attack decreases by 50%.    After 5 years: Your risk of lung cancer decreases by 50%. Your risk of stroke becomes the same as a nonsmoker s.  Go cold turkey  Most former smokers quit cold turkey. This means stopping all at once. Trying to cut back slowly often doesn't work as well. This may be because it continues the habit of smoking. Also you may inhale more smoke while smoking fewer cigarettes. This leads to the same amount of nicotine in your body.   Get support  Support programs can be a big help, especially for heavy smokers. These groups offer lectures, ways to change behavior, and peer support. Here are some ways to find a support program:     Free national quitline 800-QUIT-NOW (194-780-2694)    [facility] quit-smoking programs    American Lung Association 296-785-4881    American Cancer Society 878-237-6241  Support at home is important too. Family and friends can offer praise and  reassurance. If the smoker in your life finds it hard to quit, encourage them to keep trying.   Try over-the-counter medicine  Nicotine replacement therapy may make it easier to quit. Some aids are available without a prescription. These include a nicotine patch, gum, and lozenges. But it's best to use these under the care of your healthcare provider. The skin patch gives a steady supply of nicotine. Nicotine gum and lozenges give short-time doses of low levels of nicotine. Both methods reduce the craving for cigarettes. If you have nausea, vomiting, dizziness, weakness, or a fast heartbeat, stop using these products. See your provider.   Ask about prescription medicine  After reviewing your smoking patterns and past attempts to quit, your healthcare provider may offer a prescription medicine such as bupropion, varenicline, a nicotine inhaler, or nasal spray. Each has advantages and side effects. Your provider can review these with you.   Keep trying  Most smokers make many attempts at quitting before they succeed. It s important not to give up.   To learn more  For more on how to quit smoking, try these resources:     www.cdc.gov/tobacco/quit_smoking/ 800-QUIT-NOW (280-402-0662)    www.smokefree.gov 877-44U-QUIT (933-502-7744)    www.lung.org/stop-smoking/ 800-LUNGUSA (375-087-3846)  [employer] last reviewed this educational content on 12/1/2019 2000-2021 [employer]. All rights reserved. This information is not intended as a substitute for professional medical care. Always follow your healthcare professional's instructions.        My Nicotine/Tobacco Cessation Action Plan    Name: Beatriz Francis   Date of Birth 1950  Date: 7/25/2022    My provider: Constantine Loera   My clinic: [address] [address] [phone]          [facility]   Quit for Good      You have quit smoking!      You can quit for good, even if you've tried  before Ways to help you stay tobacco free:    When are you still having cravings?    How can you distract yourself from cravings or triggers?     What healthy ways can you cope? For example: Deep breathing, managing stressors, focus on benefits of being a non-smoker, create a cravings kit (gum, water)         YELLOW         ZONE Readiness to Quit      You are taking steps to quit tobacco      You know which quit methods or medications you plan to use Ways to get ready to quit:      Use a smoking log to track your use, cravings, and triggers (like walking, drinking, or being with smokers)      Set a quit date      Tell family, friends, coworkers that you plan to quit      Anticipate and plan for challenges      Remove tobacco products from your spaces      Talk to your provider or [facility] pharmacist about getting help to quit           RED    ZONE Pre-Contemplation      You are not ready to set a quit date      What would your future look like without smoking or using tobacco?      It is never too late to quit   Reasons why you might quit:      Why do you use tobacco? There are social, physical, and emotional reasons    What are the rewards of quitting? Money and time saved, improvement of health (lowered blood pressure, reduced risk for heart attack, stroke, cancer, and lung disease)            General Resources    Quit Partner* - Offers free online and phone support, including personalized coaching, email and text support, educational materials, and quit medication delivered by mail (nicotine patches, gum or lozenges). Provides help building a personal quit plan, tools to track progress and social network for quitters. Call 6-835-QUIT-NOW or visit www.quitpartnermn.[facility]     American Lung Association - Freedom from Smoking is an online course for quitting. Includes relaxation exercises, social networking with fellow quitters and access to experts. Cost associated with online program, available at  freedomfromsmoking.org      Centers for Disease Control and Prevention* - Free tools and resources, including free coaching, quit plans, educational materials and referrals. Go to www.cdc.gov/tobacco or call support line at 4-598-QUIT-NOW    National [place] for Tobacco Cessation* - Ex Program is a free online program designed to  re-learn life without cigarettes  while creating a personal plan and tracking progress. Also gives free access to other smokers trying to quit and [facility] experts. Visit www.becomeanex.org     Nicotine Anonymous* - Free program for abstaining from tobacco use, adapted from the 12 steps of Alcoholics Anonymous. Meetings consist of two or more people sharing their experiences, struggles and successes. Online and telephone meetings as well as e-mail and pen pal networking. To find a meeting near you, visit www.nicotineGlucoVistaanonymous.org    [employer] - Cait  - [place] to Quit Smoking is a mobile iPhone dane  QuitNow! Quit Smoking is a mobile dane for Android and iPhone smartphone    Smokefree.gov* - Offers a step-by-step quitting guide and access to National Cancer [place] experts via instant messaging and telephone at 5-440-86I-QUIT. Experts communicate via free online  LiveHelp  chat Monday through Friday, 9 a.m. to 9 p.m. EST. Visit online at www.smokefree.gov     Pregnancy Resources    March of Dimes Foundation* - Free information about tobacco and alcohol use during and after pregnancy. Get answers to your questions for free by e-mailing experts. Go to https://www.marchofdimes.org/pregnancy/smoking-during-pregnancy.aspx    American Pregnancy Association* - Educational information and step-by-step recommendations and tips for quitting success. Visit: www.americanpregnancy.org/pregnancy-health/smoking-during-pregnancy    Adolescent Resources    American Legacy Foundation -  Truth  campaign is the largest smoking prevention program for youth in the [place] Visit the website at  "www.[employer] for educational information about tobacco, addiction, and empowering teens to make informed decisions about tobacco.    American Cancer Society* - Information about smokeless tobacco, addiction, drug and non-drug options for quitting. Go to www.cancer.org and search for  smokeless tobacco\".     Resources    Centers for Disease Control and Prevention - Pathways to [place] is a free booklet with information, advice and tips on quitting and ways to take action in your community. Download this free booklet at www.cdc.gov/tobacco/quit_smoking/how_to_quit/pathways/index.htm    LGBTQ Resources    National LGBT Tobacco Control Network - Free online educational material on providing support to others, developing a quit plan and resources on variation in tobacco use among different populations. Visit www.lgbttobacco.org    * Available in Urdu  "

## 2022-07-26 RX ORDER — ALBUTEROL SULFATE 90 UG/1
2 AEROSOL, METERED RESPIRATORY (INHALATION) EVERY 6 HOURS PRN
Qty: 18 G | Refills: 1 | Status: SHIPPED | OUTPATIENT
Start: 2022-07-26 | End: 2023-01-01

## 2022-07-26 NOTE — TELEPHONE ENCOUNTER
Prescription approved per [facility] Refill Protocol     Brittany Palencia     RN MSN

## 2022-07-28 ENCOUNTER — MYC MEDICAL ADVICE (OUTPATIENT)
Dept: FAMILY MEDICINE | Facility: CLINIC | Age: 72
End: 2022-07-28

## 2022-07-28 ENCOUNTER — HOSPITAL ENCOUNTER (OUTPATIENT)
Dept: ULTRASOUND IMAGING | Facility: CLINIC | Age: 72
Discharge: HOME | End: 2022-07-28
Attending: NURSE PRACTITIONER | Admitting: NURSE PRACTITIONER
Payer: COMMERCIAL

## 2022-07-28 DIAGNOSIS — G89.29 CHRONIC NECK PAIN: ICD-10-CM

## 2022-07-28 DIAGNOSIS — F11.90 CHRONIC, CONTINUOUS USE OF OPIOIDS: Primary | ICD-10-CM

## 2022-07-28 DIAGNOSIS — R22.31 MASS OF RIGHT AXILLA: ICD-10-CM

## 2022-07-28 DIAGNOSIS — M54.2 CHRONIC NECK PAIN: ICD-10-CM

## 2022-07-28 PROCEDURE — 76882 US LMTD JT/FCL EVL NVASC XTR: CPT | Mod: RT

## 2022-07-29 ENCOUNTER — MYC MEDICAL ADVICE (OUTPATIENT)
Dept: FAMILY MEDICINE | Facility: CLINIC | Age: 72
End: 2022-07-29

## 2022-07-29 DIAGNOSIS — K02.9 TOOTH DECAY: Primary | ICD-10-CM

## 2022-08-01 RX ORDER — AMOXICILLIN 875 MG
875 TABLET ORAL 2 TIMES DAILY
Qty: 20 TABLET | Refills: 0 | Status: SHIPPED | OUTPATIENT
Start: 2022-08-01 | End: 2022-08-04

## 2022-08-04 ENCOUNTER — VIRTUAL VISIT (OUTPATIENT)
Dept: FAMILY MEDICINE | Facility: CLINIC | Age: 72
End: 2022-08-04
Payer: COMMERCIAL

## 2022-08-04 DIAGNOSIS — M54.2 CHRONIC NECK PAIN: ICD-10-CM

## 2022-08-04 DIAGNOSIS — F11.90 CHRONIC, CONTINUOUS USE OF OPIOIDS: ICD-10-CM

## 2022-08-04 DIAGNOSIS — F33.1 MODERATE EPISODE OF RECURRENT MAJOR DEPRESSIVE DISORDER (H): ICD-10-CM

## 2022-08-04 DIAGNOSIS — K21.9 GASTROESOPHAGEAL REFLUX DISEASE WITHOUT ESOPHAGITIS: ICD-10-CM

## 2022-08-04 DIAGNOSIS — G89.29 CHRONIC NECK PAIN: ICD-10-CM

## 2022-08-04 PROCEDURE — 99214 OFFICE O/P EST MOD 30 MIN: CPT | Mod: 95 | Performed by: NURSE PRACTITIONER

## 2022-08-04 RX ORDER — HYDROCODONE BITARTRATE AND ACETAMINOPHEN 5; 325 MG/1; MG/1
1 TABLET ORAL EVERY 6 HOURS PRN
Qty: 120 TABLET | Refills: 0 | Status: ON HOLD | OUTPATIENT
Start: 2022-09-04 | End: 2022-09-07

## 2022-08-04 RX ORDER — HYDROCODONE BITARTRATE AND ACETAMINOPHEN 5; 325 MG/1; MG/1
TABLET ORAL
Qty: 120 TABLET | Refills: 0 | Status: SHIPPED | OUTPATIENT
Start: 2022-08-04 | End: 2022-01-01

## 2022-08-04 RX ORDER — HYDROCODONE BITARTRATE AND ACETAMINOPHEN 5; 325 MG/1; MG/1
1 TABLET ORAL EVERY 6 HOURS PRN
Qty: 120 TABLET | Refills: 0 | Status: ON HOLD | OUTPATIENT
Start: 2022-01-01 | End: 2022-09-07

## 2022-08-04 RX ORDER — SUCRALFATE 1 G/1
1 TABLET ORAL 4 TIMES DAILY
Qty: 40 TABLET | Refills: 0 | Status: SHIPPED | OUTPATIENT
Start: 2022-08-04 | End: 2022-08-19

## 2022-08-04 ASSESSMENT — ANXIETY QUESTIONNAIRES
GAD7 TOTAL SCORE: 2
3. WORRYING TOO MUCH ABOUT DIFFERENT THINGS: NOT AT ALL
GAD7 TOTAL SCORE: 2
6. BECOMING EASILY ANNOYED OR IRRITABLE: NOT AT ALL
1. FEELING NERVOUS, ANXIOUS, OR ON EDGE: SEVERAL DAYS
IF YOU CHECKED OFF ANY PROBLEMS ON THIS QUESTIONNAIRE, HOW DIFFICULT HAVE THESE PROBLEMS MADE IT FOR YOU TO DO YOUR WORK, TAKE CARE OF THINGS AT HOME, OR GET ALONG WITH OTHER PEOPLE: NOT DIFFICULT AT ALL
7. FEELING AFRAID AS IF SOMETHING AWFUL MIGHT HAPPEN: NOT AT ALL
4. TROUBLE RELAXING: NOT AT ALL
2. NOT BEING ABLE TO STOP OR CONTROL WORRYING: SEVERAL DAYS
5. BEING SO RESTLESS THAT IT IS HARD TO SIT STILL: NOT AT ALL

## 2022-08-04 NOTE — PROGRESS NOTES
Beatriz is a 71 year old who is being evaluated via a billable video visit.      How would you like to obtain your AVS? MyChart  If the video visit is dropped, the invitation should be resent by: Text to cell phone: [phone]  Will anyone else be joining your video visit? No        Assessment & Plan     Chronic neck pain   Controlled no change in treatment plan  - HYDROcodone-acetaminophen (NORCO) 5-325 MG tablet; TAKE ONE TABLET BY MOUTH EVERY 6 HOURS AS NEEDED FOR SEVERE PAIN (DO NOT TAKE MORE THAN 4 TABLETS PER DAY)  - HYDROcodone-acetaminophen (NORCO) 5-325 MG tablet; Take 1 tablet by mouth every 6 hours as needed for pain Do not take more then 4 tablet per day  - HYDROcodone-acetaminophen (NORCO) 5-325 MG tablet; Take 1 tablet by mouth every 6 hours as needed for pain Do not take more then 4 tablets per day    Chronic, continuous use of opioids   Controlled no change in treatment plan  - HYDROcodone-acetaminophen (NORCO) 5-325 MG tablet; TAKE ONE TABLET BY MOUTH EVERY 6 HOURS AS NEEDED FOR SEVERE PAIN (DO NOT TAKE MORE THAN 4 TABLETS PER DAY)  - HYDROcodone-acetaminophen (NORCO) 5-325 MG tablet; Take 1 tablet by mouth every 6 hours as needed for pain Do not take more then 4 tablet per day  - HYDROcodone-acetaminophen (NORCO) 5-325 MG tablet; Take 1 tablet by mouth every 6 hours as needed for pain Do not take more then 4 tablets per day    Gastroesophageal reflux disease without esophagitis  Uncontrolled will obtain a upper GI  - sucralfate (CARAFATE) 1 GM tablet; Take 1 tablet (1 g) by mouth 4 times daily  - Adult GI  Referral - Procedure Only; Future    Moderate episode of recurrent major depressive disorder (H)   Controlled no change in treatment plan      No follow-ups on file.    ALEJANDRO Odonnell CNP  [facility]    Sheyla Henderson is a 71 year old, presenting for the following health issues:  Pain Management      History of Present Illness       Reason for  visit:  Followup    She eats 2-3 servings of fruits and vegetables daily.She consumes 3 sweetened beverage(s) daily.She exercises with enough effort to increase her heart rate 10 to 19 minutes per day.  She exercises with enough effort to increase her heart rate 3 or less days per week.   She is taking medications regularly.       Pain History:  When did you first notice your pain? - Chronic Pain   Have you seen this provider for your pain in the past?   Yes   Where in your body do you have pain? Shoulder and neck   Are you seeing anyone else for your pain? No    PHQ-9 SCORE 6/1/2021 7/24/2022 7/25/2022   PHQ-9 Total Score - - -   PHQ-9 Total Score MyChart - 5 (Mild depression) -   PHQ-9 Total Score 3 5 5       HIRAL-7 SCORE 6/1/2021 7/24/2022 7/25/2022   Total Score - 2 (minimal anxiety) -   Total Score 1 2 0               Chronic Pain Follow Up:    Location of pain: Shoulder neck pain   Analgesia/pain control:    - Recent changes:  none    - Overall control: Tolerable with discomfort    - Current treatments: NORCO    Adherence:     - Do you ever take more pain medicine than prescribed? No     PDMP Review       Value Time User    State PDMP site checked  Yes 7/12/2021  1:29 PM Freddie Mesa MD        Last CSA Agreement:   CSA -- Patient Level:    Controlled Substance Agreement - Opioid - Scan on 7/25/2022  2:02 PM  Controlled Substance Agreement - Opioid - Scan on 6/1/2021 12:37 PM  Controlled Substance Agreement - Opioid - Scan on 1/13/2020  4:04 PM  Controlled Substance Agreement - Opioid - Scan on 3/4/2019  2:18 PM       Last UDS: 7/27/2022            Review of Systems   Constitutional, HEENT, cardiovascular, pulmonary, gi and gu systems are negative, except as otherwise noted.      Objective           Vitals:  No vitals were obtained today due to virtual visit.    Physical Exam   GENERAL: Healthy, alert and no distress  EYES: Eyes grossly normal to inspection.  No discharge or erythema, or obvious  scleral/conjunctival abnormalities.  RESP: No audible wheeze, cough, or visible cyanosis.  No visible retractions or increased work of breathing.    SKIN: Visible skin clear. No significant rash, abnormal pigmentation or lesions.  NEURO: Cranial nerves grossly intact.  Mentation and speech appropriate for age.  PSYCH: Mentation appears normal, affect normal/bright, judgement and insight intact, normal speech and appearance well-groomed.                Video-Visit Details    Video Start Time: 10:23 AM    Type of service:  Video Visit    Video End Time:10:37 AM    Originating Location (pt. Location): Home    Distant Location (provider location):  [facility]     Platform used for Video Visit: [employer]    .  ..

## 2022-08-06 ENCOUNTER — LAB (OUTPATIENT)
Dept: LAB | Facility: CLINIC | Age: 72
End: 2022-08-06
Payer: COMMERCIAL

## 2022-08-06 DIAGNOSIS — E87.1 LOW SODIUM LEVELS: ICD-10-CM

## 2022-08-06 DIAGNOSIS — G89.29 CHRONIC NECK PAIN: ICD-10-CM

## 2022-08-06 DIAGNOSIS — F11.90 CHRONIC, CONTINUOUS USE OF OPIOIDS: ICD-10-CM

## 2022-08-06 DIAGNOSIS — M54.2 CHRONIC NECK PAIN: ICD-10-CM

## 2022-08-06 LAB
ANION GAP SERPL CALCULATED.3IONS-SCNC: 7 MMOL/L (ref 3–14)
BUN SERPL-MCNC: 9 MG/DL (ref 7–30)
CALCIUM SERPL-MCNC: 8.4 MG/DL (ref 8.5–10.1)
CHLORIDE BLD-SCNC: 100 MMOL/L (ref 94–109)
CO2 SERPL-SCNC: 28 MMOL/L (ref 20–32)
CREAT SERPL-MCNC: 0.53 MG/DL (ref 0.52–1.04)
CREAT UR-MCNC: 26 MG/DL
GFR SERPL CREATININE-BSD FRML MDRD: >90 ML/MIN/1.73M2
GLUCOSE BLD-MCNC: 82 MG/DL (ref 70–99)
POTASSIUM BLD-SCNC: 4.2 MMOL/L (ref 3.4–5.3)
SODIUM SERPL-SCNC: 135 MMOL/L (ref 133–144)

## 2022-08-06 PROCEDURE — 80307 DRUG TEST PRSMV CHEM ANLYZR: CPT

## 2022-08-06 PROCEDURE — 80048 BASIC METABOLIC PNL TOTAL CA: CPT

## 2022-08-06 PROCEDURE — 36415 COLL VENOUS BLD VENIPUNCTURE: CPT

## 2022-08-09 LAB
DHC UR CFM-MCNC: 135 NG/ML
DHC/CREAT UR: 519 NG/MG {CREAT}
HYDROCODONE UR CFM-MCNC: 247 NG/ML
HYDROCODONE/CREAT UR: 950 NG/MG {CREAT}

## 2022-08-17 DIAGNOSIS — G89.29 CHRONIC NECK PAIN: ICD-10-CM

## 2022-08-17 DIAGNOSIS — M54.2 CHRONIC NECK PAIN: ICD-10-CM

## 2022-08-18 RX ORDER — MELOXICAM 15 MG/1
15 TABLET ORAL DAILY PRN
Qty: 30 TABLET | Refills: 0 | Status: SHIPPED | OUTPATIENT
Start: 2022-08-18 | End: 2022-09-15

## 2022-08-18 NOTE — TELEPHONE ENCOUNTER
Routing to provider for consideration protocol not met   NSAID Medications Failed 08/17/2022 05:03 AM   Protocol Details  Patient is age 6-64 years

## 2022-08-19 DIAGNOSIS — K21.9 GASTROESOPHAGEAL REFLUX DISEASE WITHOUT ESOPHAGITIS: ICD-10-CM

## 2022-08-19 RX ORDER — SUCRALFATE 1 G/1
1 TABLET ORAL 4 TIMES DAILY
Qty: 40 TABLET | Refills: 0 | Status: SHIPPED | OUTPATIENT
Start: 2022-08-19 | End: 2022-09-07

## 2022-08-19 NOTE — TELEPHONE ENCOUNTER
Prescription approved per [facility] Refill Protocol     Brittany Palencia     RN MSN

## 2022-08-29 ENCOUNTER — ANCILLARY PROCEDURE (OUTPATIENT)
Dept: MAMMOGRAPHY | Facility: CLINIC | Age: 72
End: 2022-08-29
Attending: FAMILY MEDICINE
Payer: COMMERCIAL

## 2022-08-29 DIAGNOSIS — Z12.31 VISIT FOR SCREENING MAMMOGRAM: ICD-10-CM

## 2022-08-29 PROCEDURE — 77067 SCR MAMMO BI INCL CAD: CPT | Mod: TC | Performed by: RADIOLOGY

## 2022-08-29 PROCEDURE — 77063 BREAST TOMOSYNTHESIS BI: CPT | Mod: TC | Performed by: RADIOLOGY

## 2022-09-04 DIAGNOSIS — K21.9 GASTROESOPHAGEAL REFLUX DISEASE WITHOUT ESOPHAGITIS: ICD-10-CM

## 2022-09-06 ENCOUNTER — ANESTHESIA EVENT (OUTPATIENT)
Dept: GASTROENTEROLOGY | Facility: CLINIC | Age: 72
End: 2022-09-06
Payer: COMMERCIAL

## 2022-09-06 ASSESSMENT — COPD QUESTIONNAIRES: COPD: 1

## 2022-09-06 ASSESSMENT — LIFESTYLE VARIABLES: TOBACCO_USE: 1

## 2022-09-06 NOTE — ANESTHESIA PREPROCEDURE EVALUATION
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     Pulmonary nodule     long standing, likely scarring      Past Surgical History:   Procedure Laterality Date     ARTHROSCOPY SHLDR ROTATOR CUFF REPAIR, SUBACROMIAL DECOMP, DIST CLAVICLE RESECTION, BICEP TENODESIS Left 2016    Procedure: ARTHROSCOPY SHOULDER ROTATOR CUFF REPAIR, SUBACROMIAL DECOMPRESSION, DISTAL CLAVICLE RESECTION, OPEN BICEP TENODESIS REPAIR;  Surgeon: Freddie Espino MD;  Location: [facility]     ARTHROSCOPY SHOULDER Left 2017    Procedure: ARTHROSCOPY SHOULDER;  Surgeon: Ankit Morton MD;  [facility]     BIOPSY       COLONOSCOPY       COLONOSCOPY N/A 3/2/2022    Procedure: COLONOSCOPY, FLEXIBLE, WITH LESION REMOVAL USING SNARE;  Surgeon: Davi Sibley DO;  Location: [facility]     EYE SURGERY  -lasic     HYSTERECTOMY, PAP NO LONGER INDICATED       REVERSE ARTHROPLASTY SHOULDER Left 2/15/2017    Procedure: REVERSE ARTHROPLASTY SHOULDER;  Surgeon: Ankit Morton MD;  [facility]     ROTATOR CUFF REPAIR RT/LT  ,    right     ROTATOR CUFF REPAIR RT/LT  3/5/04    left     ROTATOR CUFF REPAIR RT/LT  2009    Right     ZZC SHOULDER SURG PROC UNLISTED  2016      Allergies   Allergen Reactions     Nkda [No Known Drug Allergies]      Seasonal Allergies       Social History     Tobacco Use     Smoking status: Current Every Day Smoker     Packs/day: 0.50     Years: 45.00     Pack years: 22.50     Types: Cigarettes     Smokeless tobacco: Never Used     Tobacco  comment: Just under a pack. 52  yr. hx.   Substance Use Topics     Alcohol use: No     Alcohol/week: 0.0 standard drinks     Comment: 3-4x's/year.      Wt Readings from Last 1 Encounters:   07/25/22 53.1 kg (117 lb)        Anesthesia Evaluation            ROS/MED HX  ENT/Pulmonary:     (+) allergic rhinitis, tobacco use, 23  Pack-Year Hx,  COPD,  (-) recent URI   Neurologic:  - neg neurologic ROS     Cardiovascular:     (+) Dyslipidemia ----- (-) LOVELL   METS/Exercise Tolerance:     Hematologic: Comments: Blood dyscrasia - neg hematologic  ROS     Musculoskeletal:  - neg musculoskeletal ROS     GI/Hepatic:     (+) GERD,     Renal/Genitourinary:  - neg Renal ROS     Endo:  - neg endo ROS     Psychiatric/Substance Use:     (+) psychiatric history depression     Infectious Disease:  - neg infectious disease ROS     Malignancy:  - neg malignancy ROS     Other: Comment: lupus - neg other ROS          Physical Exam    Airway  airway exam normal      Mallampati: II   TM distance: > 3 FB   Neck ROM: full   Mouth opening: > 3 cm    Respiratory Devices and Support         Dental  no notable dental history         Cardiovascular   cardiovascular exam normal          Pulmonary   pulmonary exam normal                OUTSIDE LABS:  CBC:   Lab Results   Component Value Date    WBC 7.2 01/12/2022    WBC 10.2 08/06/2021    HGB 12.8 01/12/2022    HGB 12.2 08/06/2021    HCT 36.9 01/12/2022    HCT 35.4 08/06/2021     01/12/2022     (H) 08/06/2021     BMP:   Lab Results   Component Value Date     08/06/2022     (L) 07/25/2022    POTASSIUM 4.2 08/06/2022    POTASSIUM 5.0 07/25/2022    CHLORIDE 100 08/06/2022    CHLORIDE 97 07/25/2022    CO2 28 08/06/2022    CO2 29 07/25/2022    BUN 9 08/06/2022    BUN 12 07/25/2022    CR 0.53 08/06/2022    CR 0.53 07/25/2022    GLC 82 08/06/2022    GLC 95 07/25/2022     COAGS: No results found for: PTT, INR, FIBR  POC: No results found for: BGM, HCG, HCGS  HEPATIC:   Lab Results    Component Value Date    ALBUMIN 3.9 01/12/2022    PROTTOTAL 7.4 01/12/2022    ALT 21 01/12/2022    AST 12 01/12/2022    ALKPHOS 103 01/12/2022    BILITOTAL 0.2 01/12/2022     OTHER:   Lab Results   Component Value Date    MARI 8.4 (L) 08/06/2022    TSH 0.78 01/12/2022    CRP 3.7 01/09/2017    SED 15 01/09/2017       Anesthesia Plan    ASA Status:  3   NPO Status:  NPO Appropriate    Anesthesia Type: General.   Induction: Intravenous.   Maintenance: TIVA.        Consents    Anesthesia Plan(s) and associated risks, benefits, and realistic alternatives discussed. Questions answered and patient/representative(s) expressed understanding.     - Discussed: Risks, Benefits and Alternatives for BOTH SEDATION and the PROCEDURE were discussed     - Discussed with:  Patient         Postoperative Care            Comments:                ALEJANDRO Beasley CRNA

## 2022-09-07 ENCOUNTER — ANESTHESIA (OUTPATIENT)
Dept: GASTROENTEROLOGY | Facility: CLINIC | Age: 72
End: 2022-09-07
Payer: COMMERCIAL

## 2022-09-07 ENCOUNTER — HOSPITAL ENCOUNTER (OUTPATIENT)
Facility: CLINIC | Age: 72
Discharge: HOME | End: 2022-09-07
Attending: SURGERY | Admitting: SURGERY
Payer: COMMERCIAL

## 2022-09-07 VITALS
DIASTOLIC BLOOD PRESSURE: 81 MMHG | WEIGHT: 115 LBS | TEMPERATURE: 97.4 F | RESPIRATION RATE: 18 BRPM | OXYGEN SATURATION: 96 % | BODY MASS INDEX: 19.16 KG/M2 | HEART RATE: 84 BPM | SYSTOLIC BLOOD PRESSURE: 135 MMHG | HEIGHT: 65 IN

## 2022-09-07 LAB — UPPER GI ENDOSCOPY: NORMAL

## 2022-09-07 PROCEDURE — 88305 TISSUE EXAM BY PATHOLOGIST: CPT | Mod: TC | Performed by: SURGERY

## 2022-09-07 PROCEDURE — 250N000011 HC RX IP 250 OP 636: Performed by: NURSE ANESTHETIST, CERTIFIED REGISTERED

## 2022-09-07 PROCEDURE — 43239 EGD BIOPSY SINGLE/MULTIPLE: CPT | Performed by: SURGERY

## 2022-09-07 PROCEDURE — 370N000017 HC ANESTHESIA TECHNICAL FEE, PER MIN: Performed by: SURGERY

## 2022-09-07 PROCEDURE — 258N000003 HC RX IP 258 OP 636: Performed by: SURGERY

## 2022-09-07 PROCEDURE — 250N000009 HC RX 250: Performed by: NURSE ANESTHETIST, CERTIFIED REGISTERED

## 2022-09-07 PROCEDURE — 250N000009 HC RX 250: Performed by: SURGERY

## 2022-09-07 RX ORDER — PROPOFOL 10 MG/ML
INJECTION, EMULSION INTRAVENOUS CONTINUOUS PRN
Status: DISCONTINUED | OUTPATIENT
Start: 2022-09-07 | End: 2022-09-07

## 2022-09-07 RX ORDER — GLYCOPYRROLATE 0.2 MG/ML
INJECTION, SOLUTION INTRAMUSCULAR; INTRAVENOUS PRN
Status: DISCONTINUED | OUTPATIENT
Start: 2022-09-07 | End: 2022-09-07

## 2022-09-07 RX ORDER — ONDANSETRON 4 MG/1
4 TABLET, ORALLY DISINTEGRATING ORAL EVERY 30 MIN PRN
Status: DISCONTINUED | OUTPATIENT
Start: 2022-09-07 | End: 2022-09-07 | Stop reason: HOSPADM

## 2022-09-07 RX ORDER — LIDOCAINE HYDROCHLORIDE 10 MG/ML
INJECTION, SOLUTION EPIDURAL; INFILTRATION; INTRACAUDAL; PERINEURAL PRN
Status: DISCONTINUED | OUTPATIENT
Start: 2022-09-07 | End: 2022-09-07

## 2022-09-07 RX ORDER — LIDOCAINE 40 MG/G
CREAM TOPICAL
Status: DISCONTINUED | OUTPATIENT
Start: 2022-09-07 | End: 2022-09-07 | Stop reason: HOSPADM

## 2022-09-07 RX ORDER — NALOXONE HYDROCHLORIDE 0.4 MG/ML
0.4 INJECTION, SOLUTION INTRAMUSCULAR; INTRAVENOUS; SUBCUTANEOUS
Status: DISCONTINUED | OUTPATIENT
Start: 2022-09-07 | End: 2022-09-07 | Stop reason: HOSPADM

## 2022-09-07 RX ORDER — ONDANSETRON 2 MG/ML
4 INJECTION INTRAMUSCULAR; INTRAVENOUS
Status: DISCONTINUED | OUTPATIENT
Start: 2022-09-07 | End: 2022-09-07 | Stop reason: HOSPADM

## 2022-09-07 RX ORDER — FLUMAZENIL 0.1 MG/ML
0.2 INJECTION, SOLUTION INTRAVENOUS
Status: CANCELLED | OUTPATIENT
Start: 2022-09-07 | End: 2022-09-08

## 2022-09-07 RX ORDER — FENTANYL CITRATE 50 UG/ML
25 INJECTION, SOLUTION INTRAMUSCULAR; INTRAVENOUS EVERY 5 MIN PRN
Status: DISCONTINUED | OUTPATIENT
Start: 2022-09-07 | End: 2022-09-07 | Stop reason: HOSPADM

## 2022-09-07 RX ORDER — MEPERIDINE HYDROCHLORIDE 25 MG/ML
12.5 INJECTION INTRAMUSCULAR; INTRAVENOUS; SUBCUTANEOUS
Status: DISCONTINUED | OUTPATIENT
Start: 2022-09-07 | End: 2022-09-07 | Stop reason: HOSPADM

## 2022-09-07 RX ORDER — NALOXONE HYDROCHLORIDE 0.4 MG/ML
0.2 INJECTION, SOLUTION INTRAMUSCULAR; INTRAVENOUS; SUBCUTANEOUS
Status: DISCONTINUED | OUTPATIENT
Start: 2022-09-07 | End: 2022-09-07 | Stop reason: HOSPADM

## 2022-09-07 RX ORDER — SUCRALFATE 1 G/1
TABLET ORAL
Qty: 40 TABLET | Refills: 0 | Status: SHIPPED | OUTPATIENT
Start: 2022-09-07 | End: 2022-01-01

## 2022-09-07 RX ORDER — ONDANSETRON 2 MG/ML
4 INJECTION INTRAMUSCULAR; INTRAVENOUS EVERY 30 MIN PRN
Status: DISCONTINUED | OUTPATIENT
Start: 2022-09-07 | End: 2022-09-07 | Stop reason: HOSPADM

## 2022-09-07 RX ORDER — FENTANYL CITRATE 50 UG/ML
25 INJECTION, SOLUTION INTRAMUSCULAR; INTRAVENOUS
Status: DISCONTINUED | OUTPATIENT
Start: 2022-09-07 | End: 2022-09-07 | Stop reason: HOSPADM

## 2022-09-07 RX ORDER — SODIUM CHLORIDE, SODIUM LACTATE, POTASSIUM CHLORIDE, CALCIUM CHLORIDE 600; 310; 30; 20 MG/100ML; MG/100ML; MG/100ML; MG/100ML
INJECTION, SOLUTION INTRAVENOUS CONTINUOUS
Status: DISCONTINUED | OUTPATIENT
Start: 2022-09-07 | End: 2022-09-07 | Stop reason: HOSPADM

## 2022-09-07 RX ORDER — HYDROMORPHONE HCL IN WATER/PF 6 MG/30 ML
0.2 PATIENT CONTROLLED ANALGESIA SYRINGE INTRAVENOUS EVERY 5 MIN PRN
Status: DISCONTINUED | OUTPATIENT
Start: 2022-09-07 | End: 2022-09-07 | Stop reason: HOSPADM

## 2022-09-07 RX ORDER — OXYCODONE HYDROCHLORIDE 5 MG/1
5 TABLET ORAL EVERY 4 HOURS PRN
Status: DISCONTINUED | OUTPATIENT
Start: 2022-09-07 | End: 2022-09-07 | Stop reason: HOSPADM

## 2022-09-07 RX ADMIN — TOPICAL ANESTHETIC 1 SPRAY: 200 SPRAY DENTAL; PERIODONTAL at 13:13

## 2022-09-07 RX ADMIN — LIDOCAINE HYDROCHLORIDE 50 MG: 10 INJECTION, SOLUTION EPIDURAL; INFILTRATION; INTRACAUDAL; PERINEURAL at 13:13

## 2022-09-07 RX ADMIN — PROPOFOL 200 MCG/KG/MIN: 10 INJECTION, EMULSION INTRAVENOUS at 13:13

## 2022-09-07 RX ADMIN — SODIUM CHLORIDE, POTASSIUM CHLORIDE, SODIUM LACTATE AND CALCIUM CHLORIDE: 600; 310; 30; 20 INJECTION, SOLUTION INTRAVENOUS at 12:22

## 2022-09-07 RX ADMIN — LIDOCAINE HYDROCHLORIDE 0.1 ML: 10 INJECTION, SOLUTION EPIDURAL; INFILTRATION; INTRACAUDAL; PERINEURAL at 12:23

## 2022-09-07 RX ADMIN — GLYCOPYRROLATE 0.2 MG: 0.2 INJECTION, SOLUTION INTRAMUSCULAR; INTRAVENOUS at 13:13

## 2022-09-07 ASSESSMENT — ACTIVITIES OF DAILY LIVING (ADL): ADLS_ACUITY_SCORE: 35

## 2022-09-07 NOTE — ANESTHESIA POSTPROCEDURE EVALUATION
Patient: Beatriz Francis    Procedure: Procedure(s):  ESOPHAGOGASTRODUODENOSCOPY, WITH BIOPSY       Anesthesia Type:  No value filed.    Note:  Disposition: Outpatient   Postop Pain Control: Uneventful            Sign Out: Well controlled pain   PONV: No   Neuro/Psych: Uneventful            Sign Out: Acceptable/Baseline neuro status   Airway/Respiratory: Uneventful            Sign Out: Acceptable/Baseline resp. status   CV/Hemodynamics: Uneventful            Sign Out: Acceptable CV status; No obvious hypovolemia; No obvious fluid overload   Other NRE: NONE   DID A NON-ROUTINE EVENT OCCUR? No           Last vitals:  Vitals:    09/07/22 1159   BP: (!) 142/92   Pulse: 92   Resp: 18   Temp: 36.9  C (98.4  F)   SpO2: 98%       Electronically Signed By: ALEJANDRO Hoffman CRNA  September 7, 2022  1:30 PM

## 2022-09-07 NOTE — LETTER
Beatriz Francis  [address] [place] [address]  September 13, 2022    Dear Beatriz,   This letter is to inform you of the results of your pathology report on your upper endoscopy (EGD).    Your pathology report was:  Showed findings consistent with a mildly inflamed stomach. If you continue to have symptoms please follow up with your primary care doctor or with myself.    A(1).  Stomach, antrum, biopsy  -Antral type gastric mucosa with mild chronic inflammation.  - Negative for H. Pylori organisms on routine stains.  - Negative for intestinal metaplasia.   -Negative for dysplasia or malignancy       If you have any questions or concerns please do not hesitate to call my office at [phone].  Sincerely,   Davi Sibley, DO   [facility]

## 2022-09-07 NOTE — TELEPHONE ENCOUNTER
How long is she to stay on Carafate? Original Rx was for 10 days and has been refilled 2 times per protocol
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
## 2022-09-07 NOTE — ANESTHESIA CARE TRANSFER NOTE
Patient: Beatriz Francis    Procedure: Procedure(s):  ESOPHAGOGASTRODUODENOSCOPY, WITH BIOPSY       Diagnosis: GERD without esophagitis [K21.9]  Diagnosis Additional Information: No value filed.    Anesthesia Type:   No value filed.     Note:    Oropharynx: spontaneously breathing  Level of Consciousness: drowsy  Oxygen Supplementation: room air    Independent Airway: airway patency satisfactory and stable  Dentition: dentition unchanged  Vital Signs Stable: post-procedure vital signs reviewed and stable  Report to RN Given: handoff report given  Patient transferred to: Phase II    Handoff Report: Identifed the Patient, Identified the Reponsible Provider, Reviewed the pertinent medical history, Discussed the surgical course, Reviewed Intra-OP anesthesia mangement and issues during anesthesia, Set expectations for post-procedure period and Allowed opportunity for questions and acknowledgement of understanding      Vitals:  Vitals Value Taken Time   BP     Temp     Pulse     Resp     SpO2         Electronically Signed By: ALEJANDRO Hoffman CRNA  September 7, 2022  1:30 PM

## 2022-09-09 PROCEDURE — 88305 TISSUE EXAM BY PATHOLOGIST: CPT | Mod: 26 | Performed by: PATHOLOGY

## 2022-09-12 DIAGNOSIS — J43.9 PULMONARY EMPHYSEMA, UNSPECIFIED EMPHYSEMA TYPE (H): ICD-10-CM

## 2022-09-13 DIAGNOSIS — B00.1 RECURRENT COLD SORES: ICD-10-CM

## 2022-09-13 DIAGNOSIS — G89.29 CHRONIC NECK PAIN: ICD-10-CM

## 2022-09-13 DIAGNOSIS — M54.2 CHRONIC NECK PAIN: ICD-10-CM

## 2022-09-13 RX ORDER — TIOTROPIUM BROMIDE 18 UG/1
1 CAPSULE ORAL; RESPIRATORY (INHALATION) DAILY
Qty: 90 CAPSULE | Refills: 1 | Status: SHIPPED | OUTPATIENT
Start: 2022-09-13 | End: 2023-01-01

## 2022-09-15 RX ORDER — MELOXICAM 15 MG/1
15 TABLET ORAL DAILY PRN
Qty: 30 TABLET | Refills: 0 | Status: SHIPPED | OUTPATIENT
Start: 2022-09-15 | End: 2022-01-01

## 2022-09-15 RX ORDER — ACYCLOVIR 400 MG/1
400 TABLET ORAL EVERY 8 HOURS
Qty: 15 TABLET | Refills: 11 | Status: SHIPPED | OUTPATIENT
Start: 2022-09-15 | End: 2023-01-01

## 2022-09-15 NOTE — TELEPHONE ENCOUNTER
Routing refill request to provider for review/approval because:  Pt > 66 yrs old- meloxican.  Do you want to add PRN to acyclovir?  YOLIS John RN

## 2022-09-25 ENCOUNTER — MYC MEDICAL ADVICE (OUTPATIENT)
Dept: FAMILY MEDICINE | Facility: CLINIC | Age: 72
End: 2022-09-25

## 2022-09-30 NOTE — PROGRESS NOTES
Beatriz is a 71 year old who is being evaluated via a billable video visit.      How would you like to obtain your AVS? MyChart  If the video visit is dropped, the invitation should be resent by: Text to cell phone: [phone]  Will anyone else be joining your video visit? No          Assessment & Plan     (M54.2,  G89.29) Chronic neck pain  Comment:  Controlled no change in treatment plan filled for 3 months     Plan: HYDROcodone-acetaminophen (NORCO) 5-325 MG         tablet, HYDROcodone-acetaminophen (NORCO) 5-325        MG tablet, HYDROcodone-acetaminophen (NORCO)         5-325 MG tablet      (F11.90) Chronic, continuous use of opioids  Comment:  Controlled no change in treatment plan filled for 3 months     Plan: HYDROcodone-acetaminophen (NORCO) 5-325 MG         tablet, HYDROcodone-acetaminophen (NORCO) 5-325        MG tablet, HYDROcodone-acetaminophen (NORCO)         5-325 MG tablet    56}     See Patient Instructions    No follow-ups on file.    ALEJANDRO Odonnell [facility]    Subjective   Beatriz is a 71 year old, presenting for the following health issues:  Pain (Neck and Shoulders )      HPI     Pain History:  When did you first notice your pain? - Chronic Pain   Have you seen this provider for your pain in the past?   Yes   Where in your body do you have pain? Neck and shoulders   Are you seeing anyone else for your pain? No    PHQ-9 SCORE 6/1/2021 7/24/2022 7/25/2022   PHQ-9 Total Score - - -   PHQ-9 Total Score MyChart - 5 (Mild depression) -   PHQ-9 Total Score 3 5 5       HIRAL-7 SCORE 7/24/2022 7/25/2022 8/4/2022   Total Score 2 (minimal anxiety) - -   Total Score 2 0 2               Chronic Pain Follow Up:    Location of pain: neck and back   Analgesia/pain control:    - Recent changes:  none    - Overall control: Tolerable with discomfort    - Current treatments: none   Adherence:     - Do you ever take more pain medicine than prescribed? Yes:     - When did you  take your last dose of pain medicine?  Today    Adverse effects: No   PDMP Review       Value Time User    State PDMP site checked  Yes 7/12/2021  1:29 PM Freddie Mesa MD        Last CSA Agreement:   CSA -- Patient Level:    Controlled Substance Agreement - Opioid - Scan on 7/25/2022  2:02 PM  Controlled Substance Agreement - Opioid - Scan on 6/1/2021 12:37 PM  Controlled Substance Agreement - Opioid - Scan on 1/13/2020  4:04 PM  Controlled Substance Agreement - Opioid - Scan on 3/4/2019  2:18 PM       Last UDS: 8/9/2022    Review of Systems   Constitutional, HEENT, cardiovascular, pulmonary, gi and gu systems are negative, except as otherwise noted.      Objective           Vitals:  No vitals were obtained today due to virtual visit.    Physical Exam   GENERAL: Healthy, alert and no distress  EYES: Eyes grossly normal to inspection.  No discharge or erythema, or obvious scleral/conjunctival abnormalities.  RESP: No audible wheeze, cough, or visible cyanosis.  No visible retractions or increased work of breathing.    SKIN: Visible skin clear. No significant rash, abnormal pigmentation or lesions.  NEURO: Cranial nerves grossly intact.  Mentation and speech appropriate for age.  PSYCH: Mentation appears normal, affect normal/bright, judgement and insight intact, normal speech and appearance well-groomed.             Video-Visit Details    Video Start Time: 11:05    Type of service:  Video Visit    Video End Time:11:18    Originating Location (pt. Location): Home    Distant Location (provider location):  [facility]     Platform used for Video Visit: [employer]

## 2023-01-01 ENCOUNTER — HOSPITAL ENCOUNTER (EMERGENCY)
Facility: CLINIC | Age: 73
Discharge: HOME | End: 2023-09-13
Attending: EMERGENCY MEDICINE | Admitting: EMERGENCY MEDICINE
Payer: COMMERCIAL

## 2023-01-01 ENCOUNTER — TELEPHONE (OUTPATIENT)
Dept: FAMILY MEDICINE | Facility: CLINIC | Age: 73
End: 2023-01-01
Payer: COMMERCIAL

## 2023-01-01 ENCOUNTER — HOSPITAL ENCOUNTER (OUTPATIENT)
Dept: RESPIRATORY THERAPY | Facility: CLINIC | Age: 73
Discharge: HOME | End: 2023-01-19
Attending: NURSE PRACTITIONER
Payer: COMMERCIAL

## 2023-01-01 ENCOUNTER — CARE COORDINATION (OUTPATIENT)
Dept: CARDIOLOGY | Facility: CLINIC | Age: 73
End: 2023-01-01

## 2023-01-01 ENCOUNTER — TELEPHONE (OUTPATIENT)
Dept: PALLIATIVE MEDICINE | Facility: OTHER | Age: 73
End: 2023-01-01

## 2023-01-01 ENCOUNTER — PATIENT OUTREACH (OUTPATIENT)
Dept: ONCOLOGY | Facility: CLINIC | Age: 73
End: 2023-01-01

## 2023-01-01 ENCOUNTER — VIRTUAL VISIT (OUTPATIENT)
Dept: PULMONOLOGY | Facility: CLINIC | Age: 73
End: 2023-01-01
Payer: COMMERCIAL

## 2023-01-01 ENCOUNTER — HOSPITAL ENCOUNTER (OUTPATIENT)
Dept: CARDIOLOGY | Facility: CLINIC | Age: 73
Discharge: HOME | End: 2023-04-14
Attending: INTERNAL MEDICINE | Admitting: INTERNAL MEDICINE
Payer: COMMERCIAL

## 2023-01-01 ENCOUNTER — LAB (OUTPATIENT)
Dept: LAB | Facility: CLINIC | Age: 73
End: 2023-01-01
Payer: COMMERCIAL

## 2023-01-01 ENCOUNTER — HOSPITAL ENCOUNTER (OUTPATIENT)
Dept: ULTRASOUND IMAGING | Facility: CLINIC | Age: 73
Discharge: HOME | End: 2023-01-19
Attending: NURSE PRACTITIONER
Payer: COMMERCIAL

## 2023-01-01 ENCOUNTER — OFFICE VISIT (OUTPATIENT)
Dept: FAMILY MEDICINE | Facility: CLINIC | Age: 73
End: 2023-01-01
Payer: COMMERCIAL

## 2023-01-01 ENCOUNTER — MYC MEDICAL ADVICE (OUTPATIENT)
Dept: CARDIOLOGY | Facility: CLINIC | Age: 73
End: 2023-01-01
Payer: COMMERCIAL

## 2023-01-01 ENCOUNTER — HOSPITAL ENCOUNTER (OUTPATIENT)
Dept: CT IMAGING | Facility: CLINIC | Age: 73
Discharge: HOME | End: 2023-01-19
Attending: NURSE PRACTITIONER
Payer: COMMERCIAL

## 2023-01-01 ENCOUNTER — TEAM CONFERENCE (OUTPATIENT)
Dept: PULMONOLOGY | Facility: CLINIC | Age: 73
End: 2023-01-01
Payer: COMMERCIAL

## 2023-01-01 ENCOUNTER — TELEPHONE (OUTPATIENT)
Dept: INTERVENTIONAL RADIOLOGY/VASCULAR | Facility: HOSPITAL | Age: 73
End: 2023-01-01
Payer: COMMERCIAL

## 2023-01-01 ENCOUNTER — VIRTUAL VISIT (OUTPATIENT)
Dept: PALLIATIVE CARE | Facility: CLINIC | Age: 73
End: 2023-01-01
Attending: INTERNAL MEDICINE
Payer: COMMERCIAL

## 2023-01-01 ENCOUNTER — VIRTUAL VISIT (OUTPATIENT)
Dept: CARDIOLOGY | Facility: CLINIC | Age: 73
End: 2023-01-01
Payer: COMMERCIAL

## 2023-01-01 ENCOUNTER — TELEPHONE (OUTPATIENT)
Dept: CARDIOLOGY | Facility: CLINIC | Age: 73
End: 2023-01-01

## 2023-01-01 ENCOUNTER — APPOINTMENT (OUTPATIENT)
Dept: RADIATION THERAPY | Facility: OUTPATIENT CENTER | Age: 73
End: 2023-01-01
Payer: COMMERCIAL

## 2023-01-01 ENCOUNTER — ONCOLOGY VISIT (OUTPATIENT)
Dept: SURGERY | Facility: CLINIC | Age: 73
End: 2023-01-01
Attending: INTERNAL MEDICINE
Payer: COMMERCIAL

## 2023-01-01 ENCOUNTER — MYC MEDICAL ADVICE (OUTPATIENT)
Dept: PULMONOLOGY | Facility: CLINIC | Age: 73
End: 2023-01-01
Payer: COMMERCIAL

## 2023-01-01 ENCOUNTER — TELEPHONE (OUTPATIENT)
Dept: ONCOLOGY | Facility: HOSPITAL | Age: 73
End: 2023-01-01
Payer: COMMERCIAL

## 2023-01-01 ENCOUNTER — HOSPITAL ENCOUNTER (OUTPATIENT)
Dept: CARDIAC REHAB | Facility: CLINIC | Age: 73
Discharge: HOME | End: 2023-06-07
Attending: STUDENT IN AN ORGANIZED HEALTH CARE EDUCATION/TRAINING PROGRAM
Payer: COMMERCIAL

## 2023-01-01 ENCOUNTER — HOSPITAL ENCOUNTER (OUTPATIENT)
Dept: CT IMAGING | Facility: CLINIC | Age: 73
Discharge: HOME | End: 2023-06-27
Attending: INTERNAL MEDICINE | Admitting: INTERNAL MEDICINE
Payer: COMMERCIAL

## 2023-01-01 ENCOUNTER — TEAM CONFERENCE (OUTPATIENT)
Dept: PULMONOLOGY | Facility: CLINIC | Age: 73
End: 2023-01-01

## 2023-01-01 ENCOUNTER — VIRTUAL VISIT (OUTPATIENT)
Dept: ANESTHESIOLOGY | Facility: CLINIC | Age: 73
End: 2023-01-01
Attending: ANESTHESIOLOGY
Payer: COMMERCIAL

## 2023-01-01 ENCOUNTER — APPOINTMENT (OUTPATIENT)
Dept: GENERAL RADIOLOGY | Facility: CLINIC | Age: 73
DRG: 643 | End: 2023-01-01
Attending: FAMILY MEDICINE
Payer: COMMERCIAL

## 2023-01-01 ENCOUNTER — MYC MEDICAL ADVICE (OUTPATIENT)
Dept: FAMILY MEDICINE | Facility: CLINIC | Age: 73
End: 2023-01-01
Payer: COMMERCIAL

## 2023-01-01 ENCOUNTER — HOSPITAL ENCOUNTER (OUTPATIENT)
Dept: PET IMAGING | Facility: HOSPITAL | Age: 73
Discharge: HOME | End: 2023-07-24
Attending: STUDENT IN AN ORGANIZED HEALTH CARE EDUCATION/TRAINING PROGRAM | Admitting: STUDENT IN AN ORGANIZED HEALTH CARE EDUCATION/TRAINING PROGRAM
Payer: COMMERCIAL

## 2023-01-01 ENCOUNTER — DOCUMENTATION ONLY (OUTPATIENT)
Dept: SURGERY | Facility: CLINIC | Age: 73
End: 2023-01-01
Payer: COMMERCIAL

## 2023-01-01 ENCOUNTER — OFFICE VISIT (OUTPATIENT)
Dept: RADIATION THERAPY | Facility: OUTPATIENT CENTER | Age: 73
End: 2023-01-01
Payer: COMMERCIAL

## 2023-01-01 ENCOUNTER — VIRTUAL VISIT (OUTPATIENT)
Dept: RADIATION THERAPY | Facility: OUTPATIENT CENTER | Age: 73
End: 2023-01-01
Payer: COMMERCIAL

## 2023-01-01 ENCOUNTER — APPOINTMENT (OUTPATIENT)
Dept: GENERAL RADIOLOGY | Facility: CLINIC | Age: 73
End: 2023-01-01
Attending: EMERGENCY MEDICINE
Payer: COMMERCIAL

## 2023-01-01 ENCOUNTER — TELEPHONE (OUTPATIENT)
Dept: ORTHOPEDICS | Facility: CLINIC | Age: 73
End: 2023-01-01

## 2023-01-01 ENCOUNTER — HOSPITAL ENCOUNTER (INPATIENT)
Facility: CLINIC | Age: 73
LOS: 3 days | Discharge: HOSPICE | DRG: 643 | End: 2023-09-24
Attending: FAMILY MEDICINE | Admitting: STUDENT IN AN ORGANIZED HEALTH CARE EDUCATION/TRAINING PROGRAM
Payer: COMMERCIAL

## 2023-01-01 ENCOUNTER — OFFICE VISIT (OUTPATIENT)
Dept: URGENT CARE | Facility: URGENT CARE | Age: 73
End: 2023-01-01
Payer: COMMERCIAL

## 2023-01-01 ENCOUNTER — E-VISIT (OUTPATIENT)
Dept: FAMILY MEDICINE | Facility: CLINIC | Age: 73
End: 2023-01-01
Payer: COMMERCIAL

## 2023-01-01 ENCOUNTER — HOSPITAL ENCOUNTER (EMERGENCY)
Facility: CLINIC | Age: 73
Discharge: HOME | End: 2023-08-18
Attending: EMERGENCY MEDICINE | Admitting: EMERGENCY MEDICINE
Payer: COMMERCIAL

## 2023-01-01 ENCOUNTER — NURSE TRIAGE (OUTPATIENT)
Dept: NURSING | Facility: CLINIC | Age: 73
End: 2023-01-01
Payer: COMMERCIAL

## 2023-01-01 ENCOUNTER — TELEPHONE (OUTPATIENT)
Dept: ONCOLOGY | Facility: CLINIC | Age: 73
End: 2023-01-01

## 2023-01-01 ENCOUNTER — OFFICE VISIT (OUTPATIENT)
Dept: CARDIOLOGY | Facility: CLINIC | Age: 73
End: 2023-01-01
Attending: NURSE PRACTITIONER
Payer: COMMERCIAL

## 2023-01-01 ENCOUNTER — HOSPITAL ENCOUNTER (OUTPATIENT)
Dept: CT IMAGING | Facility: CLINIC | Age: 73
Discharge: HOME | End: 2023-01-09
Attending: NURSE PRACTITIONER | Admitting: NURSE PRACTITIONER
Payer: COMMERCIAL

## 2023-01-01 ENCOUNTER — VIRTUAL VISIT (OUTPATIENT)
Dept: ONCOLOGY | Facility: CLINIC | Age: 73
End: 2023-01-01
Attending: RADIOLOGY
Payer: COMMERCIAL

## 2023-01-01 ENCOUNTER — HOSPITAL ENCOUNTER (OUTPATIENT)
Dept: ULTRASOUND IMAGING | Facility: HOSPITAL | Age: 73
Discharge: HOME | End: 2023-08-03
Attending: INTERNAL MEDICINE | Admitting: INTERNAL MEDICINE
Payer: COMMERCIAL

## 2023-01-01 ENCOUNTER — PATIENT OUTREACH (OUTPATIENT)
Dept: ONCOLOGY | Facility: CLINIC | Age: 73
End: 2023-01-01
Payer: COMMERCIAL

## 2023-01-01 ENCOUNTER — TELEPHONE (OUTPATIENT)
Dept: FAMILY MEDICINE | Facility: CLINIC | Age: 73
End: 2023-01-01

## 2023-01-01 ENCOUNTER — ANCILLARY PROCEDURE (OUTPATIENT)
Dept: GENERAL RADIOLOGY | Facility: CLINIC | Age: 73
End: 2023-01-01
Attending: NURSE PRACTITIONER
Payer: COMMERCIAL

## 2023-01-01 ENCOUNTER — VIRTUAL VISIT (OUTPATIENT)
Dept: PULMONOLOGY | Facility: CLINIC | Age: 73
End: 2023-01-01
Attending: NURSE PRACTITIONER
Payer: COMMERCIAL

## 2023-01-01 ENCOUNTER — TELEPHONE (OUTPATIENT)
Dept: PALLIATIVE CARE | Facility: CLINIC | Age: 73
End: 2023-01-01

## 2023-01-01 ENCOUNTER — TELEPHONE (OUTPATIENT)
Dept: CARDIOLOGY | Facility: CLINIC | Age: 73
End: 2023-01-01
Payer: COMMERCIAL

## 2023-01-01 ENCOUNTER — CARE COORDINATION (OUTPATIENT)
Dept: ONCOLOGY | Facility: CLINIC | Age: 73
End: 2023-01-01
Payer: COMMERCIAL

## 2023-01-01 ENCOUNTER — APPOINTMENT (OUTPATIENT)
Dept: CT IMAGING | Facility: CLINIC | Age: 73
End: 2023-01-01
Attending: EMERGENCY MEDICINE
Payer: COMMERCIAL

## 2023-01-01 ENCOUNTER — VIRTUAL VISIT (OUTPATIENT)
Dept: PULMONOLOGY | Facility: CLINIC | Age: 73
End: 2023-01-01
Attending: INTERNAL MEDICINE
Payer: COMMERCIAL

## 2023-01-01 ENCOUNTER — DOCUMENTATION ONLY (OUTPATIENT)
Dept: INTERVENTIONAL RADIOLOGY/VASCULAR | Facility: CLINIC | Age: 73
End: 2023-01-01
Payer: COMMERCIAL

## 2023-01-01 ENCOUNTER — PATIENT OUTREACH (OUTPATIENT)
Dept: CARE COORDINATION | Facility: CLINIC | Age: 73
End: 2023-01-01
Payer: COMMERCIAL

## 2023-01-01 ENCOUNTER — VIRTUAL VISIT (OUTPATIENT)
Dept: FAMILY MEDICINE | Facility: CLINIC | Age: 73
End: 2023-01-01
Payer: COMMERCIAL

## 2023-01-01 ENCOUNTER — TRANSCRIBE ORDERS (OUTPATIENT)
Dept: ONCOLOGY | Facility: CLINIC | Age: 73
End: 2023-01-01

## 2023-01-01 ENCOUNTER — HOSPITAL ENCOUNTER (OUTPATIENT)
Dept: MRI IMAGING | Facility: CLINIC | Age: 73
Discharge: HOME | End: 2023-07-25
Attending: RADIOLOGY | Admitting: RADIOLOGY
Payer: COMMERCIAL

## 2023-01-01 ENCOUNTER — TELEPHONE (OUTPATIENT)
Dept: MEDSURG UNIT | Facility: CLINIC | Age: 73
End: 2023-01-01
Payer: COMMERCIAL

## 2023-01-01 ENCOUNTER — APPOINTMENT (OUTPATIENT)
Dept: CT IMAGING | Facility: CLINIC | Age: 73
DRG: 643 | End: 2023-01-01
Attending: FAMILY MEDICINE
Payer: COMMERCIAL

## 2023-01-01 ENCOUNTER — HOSPITAL ENCOUNTER (OUTPATIENT)
Facility: CLINIC | Age: 73
Discharge: HOME | End: 2023-05-01
Admitting: INTERNAL MEDICINE
Payer: COMMERCIAL

## 2023-01-01 ENCOUNTER — TELEPHONE (OUTPATIENT)
Dept: INTERVENTIONAL RADIOLOGY/VASCULAR | Facility: HOSPITAL | Age: 73
End: 2023-01-01

## 2023-01-01 ENCOUNTER — HOSPITAL ENCOUNTER (OUTPATIENT)
Dept: CARDIAC REHAB | Facility: CLINIC | Age: 73
Discharge: HOME | End: 2023-05-31
Attending: STUDENT IN AN ORGANIZED HEALTH CARE EDUCATION/TRAINING PROGRAM
Payer: COMMERCIAL

## 2023-01-01 ENCOUNTER — HOSPITAL ENCOUNTER (OUTPATIENT)
Dept: CARDIAC REHAB | Facility: CLINIC | Age: 73
Discharge: HOME | End: 2023-06-12
Attending: STUDENT IN AN ORGANIZED HEALTH CARE EDUCATION/TRAINING PROGRAM
Payer: COMMERCIAL

## 2023-01-01 ENCOUNTER — HOSPITAL ENCOUNTER (EMERGENCY)
Facility: CLINIC | Age: 73
Discharge: HOME | End: 2023-06-17
Attending: EMERGENCY MEDICINE | Admitting: EMERGENCY MEDICINE
Payer: COMMERCIAL

## 2023-01-01 ENCOUNTER — OFFICE VISIT (OUTPATIENT)
Dept: RADIATION THERAPY | Facility: OUTPATIENT CENTER | Age: 73
End: 2023-01-01
Attending: STUDENT IN AN ORGANIZED HEALTH CARE EDUCATION/TRAINING PROGRAM
Payer: COMMERCIAL

## 2023-01-01 ENCOUNTER — APPOINTMENT (OUTPATIENT)
Dept: MRI IMAGING | Facility: CLINIC | Age: 73
End: 2023-01-01
Attending: FAMILY MEDICINE
Payer: COMMERCIAL

## 2023-01-01 ENCOUNTER — TELEPHONE (OUTPATIENT)
Dept: PALLIATIVE CARE | Facility: CLINIC | Age: 73
End: 2023-01-01
Payer: COMMERCIAL

## 2023-01-01 ENCOUNTER — VIRTUAL VISIT (OUTPATIENT)
Dept: CARDIOLOGY | Facility: CLINIC | Age: 73
End: 2023-01-01
Attending: INTERNAL MEDICINE
Payer: COMMERCIAL

## 2023-01-01 ENCOUNTER — PRE VISIT (OUTPATIENT)
Dept: SURGERY | Facility: CLINIC | Age: 73
End: 2023-01-01
Payer: COMMERCIAL

## 2023-01-01 ENCOUNTER — HOSPITAL ENCOUNTER (EMERGENCY)
Facility: CLINIC | Age: 73
Discharge: HOME | End: 2023-09-02
Attending: FAMILY MEDICINE | Admitting: FAMILY MEDICINE
Payer: COMMERCIAL

## 2023-01-01 ENCOUNTER — HOSPITAL ENCOUNTER (OUTPATIENT)
Dept: CARDIOLOGY | Facility: CLINIC | Age: 73
Discharge: HOME | End: 2023-06-12
Attending: NURSE PRACTITIONER | Admitting: NURSE PRACTITIONER
Payer: COMMERCIAL

## 2023-01-01 ENCOUNTER — HOSPITAL ENCOUNTER (OUTPATIENT)
Dept: CARDIAC REHAB | Facility: CLINIC | Age: 73
Discharge: HOME | End: 2023-05-15
Attending: STUDENT IN AN ORGANIZED HEALTH CARE EDUCATION/TRAINING PROGRAM
Payer: COMMERCIAL

## 2023-01-01 ENCOUNTER — HOSPITAL ENCOUNTER (OUTPATIENT)
Dept: ULTRASOUND IMAGING | Facility: HOSPITAL | Age: 73
Discharge: HOME | End: 2023-09-06
Attending: INTERNAL MEDICINE | Admitting: RADIOLOGY
Payer: COMMERCIAL

## 2023-01-01 ENCOUNTER — APPOINTMENT (OUTPATIENT)
Dept: CARDIOLOGY | Facility: CLINIC | Age: 73
DRG: 643 | End: 2023-01-01
Attending: FAMILY MEDICINE
Payer: COMMERCIAL

## 2023-01-01 ENCOUNTER — TELEPHONE (OUTPATIENT)
Dept: ONCOLOGY | Facility: CLINIC | Age: 73
End: 2023-01-01
Payer: COMMERCIAL

## 2023-01-01 ENCOUNTER — HOSPITAL ENCOUNTER (OUTPATIENT)
Dept: CARDIOLOGY | Facility: CLINIC | Age: 73
Discharge: HOME | End: 2023-03-03
Attending: INTERNAL MEDICINE | Admitting: INTERNAL MEDICINE
Payer: COMMERCIAL

## 2023-01-01 ENCOUNTER — PRE VISIT (OUTPATIENT)
Dept: ONCOLOGY | Facility: CLINIC | Age: 73
End: 2023-01-01
Payer: COMMERCIAL

## 2023-01-01 ENCOUNTER — HOSPITAL ENCOUNTER (OUTPATIENT)
Dept: CT IMAGING | Facility: CLINIC | Age: 73
Discharge: HOME | End: 2023-03-27
Attending: INTERNAL MEDICINE | Admitting: INTERNAL MEDICINE
Payer: COMMERCIAL

## 2023-01-01 VITALS
OXYGEN SATURATION: 99 % | BODY MASS INDEX: 20.07 KG/M2 | HEART RATE: 82 BPM | SYSTOLIC BLOOD PRESSURE: 139 MMHG | WEIGHT: 120.6 LBS | DIASTOLIC BLOOD PRESSURE: 83 MMHG

## 2023-01-01 VITALS
OXYGEN SATURATION: 92 % | DIASTOLIC BLOOD PRESSURE: 76 MMHG | RESPIRATION RATE: 18 BRPM | TEMPERATURE: 98.1 F | HEART RATE: 110 BPM | SYSTOLIC BLOOD PRESSURE: 144 MMHG

## 2023-01-01 VITALS
HEIGHT: 65 IN | RESPIRATION RATE: 20 BRPM | BODY MASS INDEX: 19.66 KG/M2 | HEART RATE: 102 BPM | OXYGEN SATURATION: 95 % | WEIGHT: 118 LBS | SYSTOLIC BLOOD PRESSURE: 132 MMHG | DIASTOLIC BLOOD PRESSURE: 72 MMHG

## 2023-01-01 VITALS
RESPIRATION RATE: 16 BRPM | HEART RATE: 115 BPM | HEIGHT: 64 IN | TEMPERATURE: 98.4 F | WEIGHT: 109 LBS | SYSTOLIC BLOOD PRESSURE: 150 MMHG | BODY MASS INDEX: 18.61 KG/M2 | OXYGEN SATURATION: 96 % | DIASTOLIC BLOOD PRESSURE: 72 MMHG

## 2023-01-01 VITALS
DIASTOLIC BLOOD PRESSURE: 78 MMHG | WEIGHT: 118.2 LBS | BODY MASS INDEX: 19.93 KG/M2 | RESPIRATION RATE: 16 BRPM | OXYGEN SATURATION: 95 % | SYSTOLIC BLOOD PRESSURE: 137 MMHG | HEART RATE: 89 BPM

## 2023-01-01 VITALS
TEMPERATURE: 97.8 F | HEIGHT: 65 IN | DIASTOLIC BLOOD PRESSURE: 82 MMHG | OXYGEN SATURATION: 97 % | SYSTOLIC BLOOD PRESSURE: 126 MMHG | RESPIRATION RATE: 18 BRPM | BODY MASS INDEX: 19.26 KG/M2 | WEIGHT: 115.6 LBS | HEART RATE: 96 BPM

## 2023-01-01 VITALS
RESPIRATION RATE: 16 BRPM | OXYGEN SATURATION: 97 % | DIASTOLIC BLOOD PRESSURE: 88 MMHG | WEIGHT: 122 LBS | SYSTOLIC BLOOD PRESSURE: 151 MMHG | BODY MASS INDEX: 20.33 KG/M2 | HEART RATE: 79 BPM | HEIGHT: 65 IN | TEMPERATURE: 97.8 F

## 2023-01-01 VITALS — HEIGHT: 65 IN | BODY MASS INDEX: 19.33 KG/M2 | WEIGHT: 116 LBS

## 2023-01-01 VITALS
TEMPERATURE: 98.9 F | SYSTOLIC BLOOD PRESSURE: 124 MMHG | HEART RATE: 98 BPM | HEIGHT: 65 IN | BODY MASS INDEX: 19.83 KG/M2 | RESPIRATION RATE: 18 BRPM | DIASTOLIC BLOOD PRESSURE: 70 MMHG | WEIGHT: 119 LBS | OXYGEN SATURATION: 97 %

## 2023-01-01 VITALS
OXYGEN SATURATION: 99 % | WEIGHT: 121 LBS | HEART RATE: 91 BPM | RESPIRATION RATE: 16 BRPM | HEIGHT: 65 IN | TEMPERATURE: 98.1 F | BODY MASS INDEX: 20.16 KG/M2 | SYSTOLIC BLOOD PRESSURE: 120 MMHG | DIASTOLIC BLOOD PRESSURE: 70 MMHG

## 2023-01-01 VITALS
BODY MASS INDEX: 19.77 KG/M2 | WEIGHT: 117 LBS | DIASTOLIC BLOOD PRESSURE: 70 MMHG | SYSTOLIC BLOOD PRESSURE: 117 MMHG | HEART RATE: 103 BPM | TEMPERATURE: 98.1 F | OXYGEN SATURATION: 98 %

## 2023-01-01 VITALS
RESPIRATION RATE: 16 BRPM | HEART RATE: 118 BPM | SYSTOLIC BLOOD PRESSURE: 133 MMHG | HEIGHT: 65 IN | DIASTOLIC BLOOD PRESSURE: 86 MMHG | OXYGEN SATURATION: 96 % | BODY MASS INDEX: 18.7 KG/M2 | TEMPERATURE: 97.2 F | WEIGHT: 112.21 LBS

## 2023-01-01 VITALS
HEART RATE: 126 BPM | RESPIRATION RATE: 18 BRPM | BODY MASS INDEX: 18.99 KG/M2 | SYSTOLIC BLOOD PRESSURE: 138 MMHG | DIASTOLIC BLOOD PRESSURE: 76 MMHG | HEIGHT: 65 IN | OXYGEN SATURATION: 97 % | WEIGHT: 114 LBS

## 2023-01-01 VITALS
RESPIRATION RATE: 18 BRPM | HEIGHT: 65 IN | DIASTOLIC BLOOD PRESSURE: 87 MMHG | BODY MASS INDEX: 19.99 KG/M2 | TEMPERATURE: 97.6 F | HEART RATE: 94 BPM | SYSTOLIC BLOOD PRESSURE: 128 MMHG | DIASTOLIC BLOOD PRESSURE: 81 MMHG | OXYGEN SATURATION: 99 % | BODY MASS INDEX: 18.99 KG/M2 | RESPIRATION RATE: 28 BRPM | HEART RATE: 90 BPM | WEIGHT: 114 LBS | WEIGHT: 120 LBS | OXYGEN SATURATION: 97 % | TEMPERATURE: 98.5 F | HEIGHT: 65 IN | SYSTOLIC BLOOD PRESSURE: 140 MMHG

## 2023-01-01 VITALS
SYSTOLIC BLOOD PRESSURE: 166 MMHG | HEART RATE: 114 BPM | BODY MASS INDEX: 19.66 KG/M2 | DIASTOLIC BLOOD PRESSURE: 95 MMHG | TEMPERATURE: 99.9 F | OXYGEN SATURATION: 97 % | HEIGHT: 65 IN | WEIGHT: 118 LBS | RESPIRATION RATE: 18 BRPM

## 2023-01-01 VITALS
OXYGEN SATURATION: 94 % | SYSTOLIC BLOOD PRESSURE: 137 MMHG | HEART RATE: 115 BPM | DIASTOLIC BLOOD PRESSURE: 80 MMHG | RESPIRATION RATE: 18 BRPM

## 2023-01-01 VITALS
TEMPERATURE: 98 F | HEIGHT: 65 IN | DIASTOLIC BLOOD PRESSURE: 88 MMHG | HEART RATE: 100 BPM | WEIGHT: 118 LBS | OXYGEN SATURATION: 93 % | RESPIRATION RATE: 18 BRPM | SYSTOLIC BLOOD PRESSURE: 154 MMHG | BODY MASS INDEX: 19.66 KG/M2

## 2023-01-01 VITALS — BODY MASS INDEX: 19.49 KG/M2 | HEIGHT: 65 IN | WEIGHT: 117 LBS

## 2023-01-01 VITALS
HEART RATE: 91 BPM | OXYGEN SATURATION: 98 % | WEIGHT: 124 LBS | BODY MASS INDEX: 20.63 KG/M2 | SYSTOLIC BLOOD PRESSURE: 152 MMHG | DIASTOLIC BLOOD PRESSURE: 95 MMHG

## 2023-01-01 VITALS — BODY MASS INDEX: 19.49 KG/M2 | WEIGHT: 117 LBS | HEIGHT: 65 IN

## 2023-01-01 VITALS
DIASTOLIC BLOOD PRESSURE: 85 MMHG | OXYGEN SATURATION: 95 % | HEART RATE: 107 BPM | SYSTOLIC BLOOD PRESSURE: 145 MMHG | RESPIRATION RATE: 18 BRPM

## 2023-01-01 DIAGNOSIS — C79.51 MALIGNANT NEOPLASM METASTATIC TO BONE (H): ICD-10-CM

## 2023-01-01 DIAGNOSIS — E78.5 HYPERLIPIDEMIA LDL GOAL <70: ICD-10-CM

## 2023-01-01 DIAGNOSIS — I65.23 BILATERAL CAROTID ARTERY STENOSIS: ICD-10-CM

## 2023-01-01 DIAGNOSIS — E87.1 HYPONATREMIA: ICD-10-CM

## 2023-01-01 DIAGNOSIS — C34.90 PRIMARY MALIGNANT NEOPLASM OF LUNG METASTATIC TO OTHER SITE, UNSPECIFIED LATERALITY (H): ICD-10-CM

## 2023-01-01 DIAGNOSIS — R00.0 SINUS TACHYCARDIA: Primary | ICD-10-CM

## 2023-01-01 DIAGNOSIS — I25.10 CORONARY ARTERY DISEASE INVOLVING NATIVE CORONARY ARTERY OF NATIVE HEART WITHOUT ANGINA PECTORIS: ICD-10-CM

## 2023-01-01 DIAGNOSIS — C80.1 MALIGNANT NEOPLASM METASTATIC TO LUMBAR SPINE WITH UNKNOWN PRIMARY SITE (H): Primary | ICD-10-CM

## 2023-01-01 DIAGNOSIS — R91.8 PULMONARY NODULES: ICD-10-CM

## 2023-01-01 DIAGNOSIS — I25.10 CORONARY ARTERY DISEASE INVOLVING NATIVE CORONARY ARTERY OF NATIVE HEART WITHOUT ANGINA PECTORIS: Primary | ICD-10-CM

## 2023-01-01 DIAGNOSIS — K59.03 DRUG-INDUCED CONSTIPATION: ICD-10-CM

## 2023-01-01 DIAGNOSIS — M54.41 ACUTE MIDLINE LOW BACK PAIN WITH RIGHT-SIDED SCIATICA: Primary | ICD-10-CM

## 2023-01-01 DIAGNOSIS — I25.83 CORONARY ARTERY DISEASE DUE TO LIPID RICH PLAQUE: ICD-10-CM

## 2023-01-01 DIAGNOSIS — R91.8 PULMONARY NODULES: Primary | ICD-10-CM

## 2023-01-01 DIAGNOSIS — G47.00 INSOMNIA, UNSPECIFIED TYPE: ICD-10-CM

## 2023-01-01 DIAGNOSIS — R79.89 LOW SERUM SODIUM: ICD-10-CM

## 2023-01-01 DIAGNOSIS — I25.10 CORONARY ARTERY DISEASE DUE TO LIPID RICH PLAQUE: ICD-10-CM

## 2023-01-01 DIAGNOSIS — C34.11 MALIGNANT NEOPLASM OF UPPER LOBE OF RIGHT LUNG (H): ICD-10-CM

## 2023-01-01 DIAGNOSIS — E87.1 HYPONATREMIA: Primary | ICD-10-CM

## 2023-01-01 DIAGNOSIS — R06.09 DOE (DYSPNEA ON EXERTION): ICD-10-CM

## 2023-01-01 DIAGNOSIS — R94.39 ABNORMAL CARDIOVASCULAR STRESS TEST: Primary | ICD-10-CM

## 2023-01-01 DIAGNOSIS — C80.1 MALIGNANT NEOPLASM METASTATIC TO LUMBAR SPINE WITH UNKNOWN PRIMARY SITE (H): ICD-10-CM

## 2023-01-01 DIAGNOSIS — M54.2 CHRONIC NECK PAIN: Primary | ICD-10-CM

## 2023-01-01 DIAGNOSIS — Z87.891 PERSONAL HISTORY OF NICOTINE DEPENDENCE: ICD-10-CM

## 2023-01-01 DIAGNOSIS — C78.7 CANCER, METASTATIC TO LIVER (H): ICD-10-CM

## 2023-01-01 DIAGNOSIS — I25.10 CAD (CORONARY ARTERY DISEASE): Primary | ICD-10-CM

## 2023-01-01 DIAGNOSIS — M54.9 BACK PAIN, UNSPECIFIED BACK LOCATION, UNSPECIFIED BACK PAIN LATERALITY, UNSPECIFIED CHRONICITY: ICD-10-CM

## 2023-01-01 DIAGNOSIS — G89.29 CHRONIC NECK PAIN: ICD-10-CM

## 2023-01-01 DIAGNOSIS — M54.50 LUMBAR BACK PAIN: ICD-10-CM

## 2023-01-01 DIAGNOSIS — M54.2 CHRONIC NECK PAIN: ICD-10-CM

## 2023-01-01 DIAGNOSIS — Z12.2 SCREENING FOR LUNG CANCER: ICD-10-CM

## 2023-01-01 DIAGNOSIS — C79.51 MALIGNANT NEOPLASM METASTATIC TO BONE (H): Primary | ICD-10-CM

## 2023-01-01 DIAGNOSIS — R60.0 BILATERAL LEG EDEMA: ICD-10-CM

## 2023-01-01 DIAGNOSIS — I25.83 CORONARY ARTERY DISEASE DUE TO LIPID RICH PLAQUE: Primary | ICD-10-CM

## 2023-01-01 DIAGNOSIS — G89.3 CANCER ASSOCIATED PAIN: ICD-10-CM

## 2023-01-01 DIAGNOSIS — Z00.00 ENCOUNTER FOR MEDICARE ANNUAL WELLNESS EXAM: ICD-10-CM

## 2023-01-01 DIAGNOSIS — C34.11 MALIGNANT NEOPLASM OF UPPER LOBE OF RIGHT LUNG (H): Primary | ICD-10-CM

## 2023-01-01 DIAGNOSIS — K21.9 GASTROESOPHAGEAL REFLUX DISEASE WITHOUT ESOPHAGITIS: ICD-10-CM

## 2023-01-01 DIAGNOSIS — S32.010D COMPRESSION FRACTURE OF L1 VERTEBRA WITH ROUTINE HEALING, SUBSEQUENT ENCOUNTER: Primary | ICD-10-CM

## 2023-01-01 DIAGNOSIS — F11.90 CHRONIC, CONTINUOUS USE OF OPIOIDS: ICD-10-CM

## 2023-01-01 DIAGNOSIS — C79.51 MALIGNANT NEOPLASM METASTATIC TO LUMBAR SPINE WITH UNKNOWN PRIMARY SITE (H): ICD-10-CM

## 2023-01-01 DIAGNOSIS — J44.1 COPD EXACERBATION (H): ICD-10-CM

## 2023-01-01 DIAGNOSIS — G47.00 INSOMNIA, UNSPECIFIED TYPE: Primary | ICD-10-CM

## 2023-01-01 DIAGNOSIS — Z01.818 PRE-OP EVALUATION: Primary | ICD-10-CM

## 2023-01-01 DIAGNOSIS — F33.1 MODERATE EPISODE OF RECURRENT MAJOR DEPRESSIVE DISORDER (H): ICD-10-CM

## 2023-01-01 DIAGNOSIS — S09.90XA MINOR CLOSED HEAD INJURY: ICD-10-CM

## 2023-01-01 DIAGNOSIS — K02.9 DENTAL CARIES: Primary | ICD-10-CM

## 2023-01-01 DIAGNOSIS — Z78.0 ASYMPTOMATIC MENOPAUSAL STATE: ICD-10-CM

## 2023-01-01 DIAGNOSIS — J43.9 PULMONARY EMPHYSEMA, UNSPECIFIED EMPHYSEMA TYPE (H): ICD-10-CM

## 2023-01-01 DIAGNOSIS — J18.9 PNEUMONIA DUE TO INFECTIOUS ORGANISM, UNSPECIFIED LATERALITY, UNSPECIFIED PART OF LUNG: ICD-10-CM

## 2023-01-01 DIAGNOSIS — I47.20 VENTRICULAR TACHYCARDIA (H): ICD-10-CM

## 2023-01-01 DIAGNOSIS — S32.010D COMPRESSION FRACTURE OF L1 VERTEBRA WITH ROUTINE HEALING, SUBSEQUENT ENCOUNTER: ICD-10-CM

## 2023-01-01 DIAGNOSIS — D50.9 IRON DEFICIENCY ANEMIA, UNSPECIFIED IRON DEFICIENCY ANEMIA TYPE: Primary | ICD-10-CM

## 2023-01-01 DIAGNOSIS — R09.89 OTHER SPECIFIED SYMPTOMS AND SIGNS INVOLVING THE CIRCULATORY AND RESPIRATORY SYSTEMS: ICD-10-CM

## 2023-01-01 DIAGNOSIS — I25.10 CORONARY ARTERY DISEASE DUE TO LIPID RICH PLAQUE: Primary | ICD-10-CM

## 2023-01-01 DIAGNOSIS — R00.2 PALPITATIONS: ICD-10-CM

## 2023-01-01 DIAGNOSIS — C79.51 MALIGNANT NEOPLASM METASTATIC TO LUMBAR SPINE WITH UNKNOWN PRIMARY SITE (H): Primary | ICD-10-CM

## 2023-01-01 DIAGNOSIS — I10 BENIGN ESSENTIAL HYPERTENSION: ICD-10-CM

## 2023-01-01 DIAGNOSIS — D50.9 IRON DEFICIENCY ANEMIA, UNSPECIFIED IRON DEFICIENCY ANEMIA TYPE: ICD-10-CM

## 2023-01-01 DIAGNOSIS — R94.39 ABNORMAL CARDIOVASCULAR STRESS TEST: ICD-10-CM

## 2023-01-01 DIAGNOSIS — G89.29 CHRONIC NECK PAIN: Primary | ICD-10-CM

## 2023-01-01 DIAGNOSIS — K92.1 TARRY STOOL: ICD-10-CM

## 2023-01-01 DIAGNOSIS — D50.0 IRON DEFICIENCY ANEMIA DUE TO CHRONIC BLOOD LOSS: ICD-10-CM

## 2023-01-01 DIAGNOSIS — F17.200 TOBACCO DEPENDENCE: Primary | ICD-10-CM

## 2023-01-01 DIAGNOSIS — Z09 HOSPITAL DISCHARGE FOLLOW-UP: ICD-10-CM

## 2023-01-01 DIAGNOSIS — M54.50 ACUTE BILATERAL LOW BACK PAIN WITHOUT SCIATICA: Primary | ICD-10-CM

## 2023-01-01 DIAGNOSIS — D64.9 ANEMIA: ICD-10-CM

## 2023-01-01 DIAGNOSIS — R93.7 ABNORMAL CT SCAN, LUMBAR SPINE: ICD-10-CM

## 2023-01-01 DIAGNOSIS — R33.8 ACUTE URINARY RETENTION: ICD-10-CM

## 2023-01-01 LAB
ABO/RH(D): NORMAL
ACT BLD: 245 SECONDS (ref 74–150)
ACT BLD: 258 SECONDS (ref 74–150)
ALBUMIN SERPL BCG-MCNC: 2.8 G/DL (ref 3.5–5.2)
ALBUMIN SERPL BCG-MCNC: 3 G/DL (ref 3.5–5.2)
ALBUMIN SERPL BCG-MCNC: 3.3 G/DL (ref 3.5–5.2)
ALBUMIN SERPL BCG-MCNC: 3.4 G/DL (ref 3.5–5.2)
ALBUMIN SERPL BCG-MCNC: 3.4 G/DL (ref 3.5–5.2)
ALBUMIN SERPL BCG-MCNC: 4.1 G/DL (ref 3.5–5.2)
ALBUMIN SERPL BCG-MCNC: 4.7 G/DL (ref 3.5–5.2)
ALBUMIN UR-MCNC: 100 MG/DL
ALBUMIN UR-MCNC: NEGATIVE MG/DL
ALP SERPL-CCNC: 1046 U/L (ref 35–104)
ALP SERPL-CCNC: 1050 U/L (ref 35–104)
ALP SERPL-CCNC: 1066 U/L (ref 35–104)
ALP SERPL-CCNC: 1116 U/L (ref 35–104)
ALP SERPL-CCNC: 117 U/L (ref 35–104)
ALP SERPL-CCNC: 118 U/L (ref 35–104)
ALP SERPL-CCNC: 813 U/L (ref 35–104)
ALT SERPL W P-5'-P-CCNC: 113 U/L (ref 0–50)
ALT SERPL W P-5'-P-CCNC: 116 U/L (ref 0–50)
ALT SERPL W P-5'-P-CCNC: 14 U/L (ref 0–50)
ALT SERPL W P-5'-P-CCNC: 143 U/L (ref 0–50)
ALT SERPL W P-5'-P-CCNC: 15 U/L (ref 10–35)
ALT SERPL W P-5'-P-CCNC: 168 U/L (ref 0–50)
ALT SERPL W P-5'-P-CCNC: 35 U/L (ref 0–50)
ANION GAP SERPL CALCULATED.3IONS-SCNC: 10 MMOL/L (ref 7–15)
ANION GAP SERPL CALCULATED.3IONS-SCNC: 11 MMOL/L (ref 7–15)
ANION GAP SERPL CALCULATED.3IONS-SCNC: 13 MMOL/L (ref 7–15)
ANION GAP SERPL CALCULATED.3IONS-SCNC: 13 MMOL/L (ref 7–15)
ANION GAP SERPL CALCULATED.3IONS-SCNC: 14 MMOL/L (ref 7–15)
ANION GAP SERPL CALCULATED.3IONS-SCNC: 15 MMOL/L (ref 7–15)
ANION GAP SERPL CALCULATED.3IONS-SCNC: 8 MMOL/L (ref 7–15)
ANION GAP SERPL CALCULATED.3IONS-SCNC: 8 MMOL/L (ref 7–15)
ANTIBODY SCREEN: NEGATIVE
APPEARANCE UR: CLEAR
APPEARANCE UR: CLEAR
APTT PPP: 28 SECONDS (ref 22–38)
APTT PPP: 36 SECONDS (ref 22–38)
AST SERPL W P-5'-P-CCNC: 140 U/L (ref 0–45)
AST SERPL W P-5'-P-CCNC: 21 U/L (ref 10–35)
AST SERPL W P-5'-P-CCNC: 529 U/L (ref 0–45)
AST SERPL W P-5'-P-CCNC: 531 U/L (ref 0–45)
AST SERPL W P-5'-P-CCNC: 793 U/L (ref 0–45)
AST SERPL W P-5'-P-CCNC: 818 U/L (ref 0–45)
AST SERPL W P-5'-P-CCNC: 9 U/L (ref 0–45)
ATRIAL RATE - MUSE: 80 BPM
ATRIAL RATE - MUSE: 89 BPM
BASOPHILS # BLD AUTO: 0.1 10E3/UL (ref 0–0.2)
BASOPHILS # BLD AUTO: 0.1 10E3/UL (ref 0–0.2)
BASOPHILS # BLD MANUAL: 0 10E3/UL (ref 0–0.2)
BASOPHILS # BLD MANUAL: 0 10E3/UL (ref 0–0.2)
BASOPHILS NFR BLD AUTO: 1 %
BASOPHILS NFR BLD AUTO: 1 %
BASOPHILS NFR BLD MANUAL: 0 %
BASOPHILS NFR BLD MANUAL: 0 %
BILIRUB DIRECT SERPL-MCNC: 0.33 MG/DL (ref 0–0.3)
BILIRUB SERPL-MCNC: 0.2 MG/DL
BILIRUB SERPL-MCNC: 0.3 MG/DL
BILIRUB SERPL-MCNC: 0.4 MG/DL
BILIRUB SERPL-MCNC: 0.7 MG/DL
BILIRUB SERPL-MCNC: 0.7 MG/DL
BILIRUB SERPL-MCNC: 1 MG/DL
BILIRUB SERPL-MCNC: 1 MG/DL
BILIRUB UR QL STRIP: NEGATIVE
BILIRUB UR QL STRIP: NEGATIVE
BLD PROD TYP BPU: NORMAL
BLOOD COMPONENT TYPE: NORMAL
BUN SERPL-MCNC: 10.3 MG/DL (ref 8–23)
BUN SERPL-MCNC: 10.3 MG/DL (ref 8–23)
BUN SERPL-MCNC: 11.5 MG/DL (ref 8–23)
BUN SERPL-MCNC: 13.1 MG/DL (ref 8–23)
BUN SERPL-MCNC: 18.8 MG/DL (ref 8–23)
BUN SERPL-MCNC: 20.2 MG/DL (ref 8–23)
BUN SERPL-MCNC: 22.1 MG/DL (ref 8–23)
BUN SERPL-MCNC: 38 MG/DL (ref 8–23)
BUN SERPL-MCNC: 7.9 MG/DL (ref 8–23)
BUN SERPL-MCNC: 9.7 MG/DL (ref 8–23)
BUN SERPL-MCNC: 9.8 MG/DL (ref 8–23)
CALCIUM SERPL-MCNC: 8.6 MG/DL (ref 8.8–10.2)
CALCIUM SERPL-MCNC: 8.7 MG/DL (ref 8.8–10.2)
CALCIUM SERPL-MCNC: 8.8 MG/DL (ref 8.8–10.2)
CALCIUM SERPL-MCNC: 9.1 MG/DL (ref 8.8–10.2)
CALCIUM SERPL-MCNC: 9.2 MG/DL (ref 8.8–10.2)
CALCIUM SERPL-MCNC: 9.3 MG/DL (ref 8.8–10.2)
CALCIUM SERPL-MCNC: 9.4 MG/DL (ref 8.8–10.2)
CHLORIDE SERPL-SCNC: 81 MMOL/L (ref 98–107)
CHLORIDE SERPL-SCNC: 81 MMOL/L (ref 98–107)
CHLORIDE SERPL-SCNC: 82 MMOL/L (ref 98–107)
CHLORIDE SERPL-SCNC: 84 MMOL/L (ref 98–107)
CHLORIDE SERPL-SCNC: 87 MMOL/L (ref 98–107)
CHLORIDE SERPL-SCNC: 91 MMOL/L (ref 98–107)
CHLORIDE SERPL-SCNC: 94 MMOL/L (ref 98–107)
CHLORIDE SERPL-SCNC: 94 MMOL/L (ref 98–107)
CHLORIDE SERPL-SCNC: 96 MMOL/L (ref 98–107)
CHLORIDE SERPL-SCNC: 97 MMOL/L (ref 98–107)
CHLORIDE SERPL-SCNC: 99 MMOL/L (ref 98–107)
CHOLEST SERPL-MCNC: 133 MG/DL
CK SERPL-CCNC: 66 U/L (ref 26–192)
CODING SYSTEM: NORMAL
COLOR UR AUTO: ABNORMAL
COLOR UR AUTO: YELLOW
CREAT SERPL-MCNC: 0.36 MG/DL (ref 0.51–0.95)
CREAT SERPL-MCNC: 0.36 MG/DL (ref 0.51–0.95)
CREAT SERPL-MCNC: 0.38 MG/DL (ref 0.51–0.95)
CREAT SERPL-MCNC: 0.41 MG/DL (ref 0.51–0.95)
CREAT SERPL-MCNC: 0.41 MG/DL (ref 0.51–0.95)
CREAT SERPL-MCNC: 0.58 MG/DL (ref 0.51–0.95)
CREAT SERPL-MCNC: 0.61 MG/DL (ref 0.51–0.95)
CREAT SERPL-MCNC: 0.62 MG/DL (ref 0.51–0.95)
CREAT SERPL-MCNC: 0.63 MG/DL (ref 0.51–0.95)
CREAT SERPL-MCNC: 0.64 MG/DL (ref 0.51–0.95)
CREAT SERPL-MCNC: 0.68 MG/DL (ref 0.51–0.95)
CREAT UR-MCNC: 26 MG/DL
CROSSMATCH: NORMAL
CRP SERPL-MCNC: 130.83 MG/L
DEPRECATED HCO3 PLAS-SCNC: 22 MMOL/L (ref 22–29)
DEPRECATED HCO3 PLAS-SCNC: 23 MMOL/L (ref 22–29)
DEPRECATED HCO3 PLAS-SCNC: 24 MMOL/L (ref 22–29)
DEPRECATED HCO3 PLAS-SCNC: 25 MMOL/L (ref 22–29)
DEPRECATED HCO3 PLAS-SCNC: 25 MMOL/L (ref 22–29)
DEPRECATED HCO3 PLAS-SCNC: 26 MMOL/L (ref 22–29)
DEPRECATED HCO3 PLAS-SCNC: 27 MMOL/L (ref 22–29)
DEPRECATED HCO3 PLAS-SCNC: 27 MMOL/L (ref 22–29)
DEPRECATED HCO3 PLAS-SCNC: 29 MMOL/L (ref 22–29)
DHC UR CFM-MCNC: 209 NG/ML
DHC/CREAT UR: 804 NG/MG {CREAT}
DIASTOLIC BLOOD PRESSURE - MUSE: NORMAL MMHG
DIASTOLIC BLOOD PRESSURE - MUSE: NORMAL MMHG
DLCOUNC-%PRED-PRE: 85 %
DLCOUNC-PRE: 17.27 ML/MIN/MMHG
DLCOUNC-PRED: 20.13 ML/MIN/MMHG
EGFRCR SERPLBLD CKD-EPI 2021: >90 ML/MIN/1.73M2
EOSINOPHIL # BLD AUTO: 0.4 10E3/UL (ref 0–0.7)
EOSINOPHIL # BLD AUTO: 0.5 10E3/UL (ref 0–0.7)
EOSINOPHIL # BLD MANUAL: 0 10E3/UL (ref 0–0.7)
EOSINOPHIL # BLD MANUAL: 0.2 10E3/UL (ref 0–0.7)
EOSINOPHIL NFR BLD AUTO: 4 %
EOSINOPHIL NFR BLD AUTO: 5 %
EOSINOPHIL NFR BLD MANUAL: 0 %
EOSINOPHIL NFR BLD MANUAL: 1 %
ERV-%PRED-PRE: 65 %
ERV-PRE: 0.65 L
ERV-PRED: 1 L
ERYTHROCYTE [DISTWIDTH] IN BLOOD BY AUTOMATED COUNT: 12.8 % (ref 10–15)
ERYTHROCYTE [DISTWIDTH] IN BLOOD BY AUTOMATED COUNT: 13 % (ref 10–15)
ERYTHROCYTE [DISTWIDTH] IN BLOOD BY AUTOMATED COUNT: 13.8 % (ref 10–15)
ERYTHROCYTE [DISTWIDTH] IN BLOOD BY AUTOMATED COUNT: 14.4 % (ref 10–15)
ERYTHROCYTE [DISTWIDTH] IN BLOOD BY AUTOMATED COUNT: 14.4 % (ref 10–15)
ERYTHROCYTE [DISTWIDTH] IN BLOOD BY AUTOMATED COUNT: 16.1 % (ref 10–15)
ERYTHROCYTE [DISTWIDTH] IN BLOOD BY AUTOMATED COUNT: 18.3 % (ref 10–15)
ERYTHROCYTE [DISTWIDTH] IN BLOOD BY AUTOMATED COUNT: 18.4 % (ref 10–15)
ERYTHROCYTE [DISTWIDTH] IN BLOOD BY AUTOMATED COUNT: 18.5 % (ref 10–15)
ERYTHROCYTE [DISTWIDTH] IN BLOOD BY AUTOMATED COUNT: 18.6 % (ref 10–15)
EXPTIME-PRE: 6.6 SEC
FEF2575-%PRED-POST: 65 %
FEF2575-%PRED-PRE: 40 %
FEF2575-POST: 1.25 L/SEC
FEF2575-PRE: 0.77 L/SEC
FEF2575-PRED: 1.91 L/SEC
FEFMAX-%PRED-PRE: 92 %
FEFMAX-PRE: 5.34 L/SEC
FEFMAX-PRED: 5.8 L/SEC
FERRITIN SERPL-MCNC: 27 NG/ML (ref 11–328)
FEV1-%PRED-PRE: 69 %
FEV1-PRE: 1.6 L
FEV1FEV6-PRE: 65 %
FEV1FEV6-PRED: 79 %
FEV1FVC-PRE: 64 %
FEV1FVC-PRED: 78 %
FEV1SVC-PRE: 62 %
FEV1SVC-PRED: 72 %
FIFMAX-PRE: 3.36 L/SEC
FRCPLETH-%PRED-PRE: 165 %
FRCPLETH-PRE: 4.59 L
FRCPLETH-PRED: 2.77 L
FVC-%PRED-PRE: 85 %
FVC-PRE: 2.51 L
FVC-PRED: 2.95 L
GAW-%PRED-PRE: 48 %
GAW-PRE: 0.5 L/S/CMH2O
GAW-PRED: 1.03 L/S/CMH2O
GFR SERPL CREATININE-BSD FRML MDRD: >90 ML/MIN/1.73M2
GLUCOSE BLDC GLUCOMTR-MCNC: 85 MG/DL (ref 70–99)
GLUCOSE SERPL-MCNC: 110 MG/DL (ref 70–99)
GLUCOSE SERPL-MCNC: 112 MG/DL (ref 70–99)
GLUCOSE SERPL-MCNC: 74 MG/DL (ref 70–99)
GLUCOSE SERPL-MCNC: 79 MG/DL (ref 70–99)
GLUCOSE SERPL-MCNC: 84 MG/DL (ref 70–99)
GLUCOSE SERPL-MCNC: 86 MG/DL (ref 70–99)
GLUCOSE SERPL-MCNC: 87 MG/DL (ref 70–99)
GLUCOSE SERPL-MCNC: 90 MG/DL (ref 70–99)
GLUCOSE SERPL-MCNC: 94 MG/DL (ref 70–99)
GLUCOSE SERPL-MCNC: 96 MG/DL (ref 70–99)
GLUCOSE SERPL-MCNC: 96 MG/DL (ref 70–99)
GLUCOSE UR STRIP-MCNC: NEGATIVE MG/DL
GLUCOSE UR STRIP-MCNC: NEGATIVE MG/DL
HCT VFR BLD AUTO: 22.2 % (ref 35–47)
HCT VFR BLD AUTO: 22.8 % (ref 35–47)
HCT VFR BLD AUTO: 23 % (ref 35–47)
HCT VFR BLD AUTO: 23.1 % (ref 35–47)
HCT VFR BLD AUTO: 23.4 % (ref 35–47)
HCT VFR BLD AUTO: 24.4 % (ref 35–47)
HCT VFR BLD AUTO: 26.2 % (ref 35–47)
HCT VFR BLD AUTO: 26.5 % (ref 35–47)
HCT VFR BLD AUTO: 33.2 % (ref 35–47)
HCT VFR BLD AUTO: 34.5 % (ref 35–47)
HDLC SERPL-MCNC: 69 MG/DL
HGB BLD-MCNC: 11.1 G/DL (ref 11.7–15.7)
HGB BLD-MCNC: 11.5 G/DL (ref 11.7–15.7)
HGB BLD-MCNC: 7.2 G/DL (ref 11.7–15.7)
HGB BLD-MCNC: 7.3 G/DL (ref 11.7–15.7)
HGB BLD-MCNC: 7.4 G/DL (ref 11.7–15.7)
HGB BLD-MCNC: 7.6 G/DL (ref 11.7–15.7)
HGB BLD-MCNC: 7.7 G/DL (ref 11.7–15.7)
HGB BLD-MCNC: 7.9 G/DL (ref 11.7–15.7)
HGB BLD-MCNC: 8 G/DL (ref 11.7–15.7)
HGB BLD-MCNC: 8.1 G/DL (ref 11.7–15.7)
HGB BLD-MCNC: 8.5 G/DL (ref 11.7–15.7)
HGB BLD-MCNC: 8.8 G/DL (ref 11.7–15.7)
HGB UR QL STRIP: ABNORMAL
HGB UR QL STRIP: ABNORMAL
HOLD SPECIMEN: NORMAL
HYDROCODONE UR CFM-MCNC: 880 NG/ML
HYDROCODONE/CREAT UR: 3385 NG/MG {CREAT}
IC-%PRED-PRE: 85 %
IC-PRE: 1.86 L
IC-PRED: 2.19 L
IMM GRANULOCYTES # BLD: 0 10E3/UL
IMM GRANULOCYTES # BLD: 0.1 10E3/UL
IMM GRANULOCYTES NFR BLD: 0 %
IMM GRANULOCYTES NFR BLD: 1 %
INR PPP: 1.03 (ref 0.85–1.15)
INR PPP: 1.27 (ref 0.85–1.15)
INTERPRETATION ECG - MUSE: NORMAL
INTERPRETATION ECG - MUSE: NORMAL
INTERPRETATION: NORMAL
IRON BINDING CAPACITY (ROCHE): 408 UG/DL (ref 240–430)
IRON BINDING CAPACITY (ROCHE): 423 UG/DL (ref 240–430)
IRON SATN MFR SERPL: 5 % (ref 15–46)
IRON SATN MFR SERPL: 6 % (ref 15–46)
IRON SERPL-MCNC: 21 UG/DL (ref 37–145)
IRON SERPL-MCNC: 25 UG/DL (ref 37–145)
ISSUE DATE AND TIME: NORMAL
KETONES UR STRIP-MCNC: NEGATIVE MG/DL
KETONES UR STRIP-MCNC: NEGATIVE MG/DL
LAB DIRECTOR COMMENTS: NORMAL
LAB DIRECTOR DISCLAIMER: NORMAL
LAB DIRECTOR INTERPRETATION: NORMAL
LAB DIRECTOR METHODOLOGY: NORMAL
LAB DIRECTOR RESULTS: NORMAL
LACTATE SERPL-SCNC: 1.9 MMOL/L (ref 0.7–2)
LACTATE SERPL-SCNC: 2 MMOL/L (ref 0.7–2)
LDLC SERPL CALC-MCNC: 52 MG/DL
LEUKOCYTE ESTERASE UR QL STRIP: NEGATIVE
LEUKOCYTE ESTERASE UR QL STRIP: NEGATIVE
LIPASE SERPL-CCNC: 21 U/L (ref 13–60)
LORAZEPAM UR QL CFM: PRESENT
LVEF ECHO: NORMAL
LYMPHOCYTES # BLD AUTO: 2.4 10E3/UL (ref 0.8–5.3)
LYMPHOCYTES # BLD AUTO: 2.7 10E3/UL (ref 0.8–5.3)
LYMPHOCYTES # BLD MANUAL: 0.5 10E3/UL (ref 0.8–5.3)
LYMPHOCYTES # BLD MANUAL: 1.3 10E3/UL (ref 0.8–5.3)
LYMPHOCYTES NFR BLD AUTO: 24 %
LYMPHOCYTES NFR BLD AUTO: 27 %
LYMPHOCYTES NFR BLD MANUAL: 3 %
LYMPHOCYTES NFR BLD MANUAL: 7 %
MAGNESIUM SERPL-MCNC: 1.9 MG/DL (ref 1.7–2.3)
MCH RBC QN AUTO: 25 PG (ref 26.5–33)
MCH RBC QN AUTO: 25.5 PG (ref 26.5–33)
MCH RBC QN AUTO: 25.6 PG (ref 26.5–33)
MCH RBC QN AUTO: 25.9 PG (ref 26.5–33)
MCH RBC QN AUTO: 26 PG (ref 26.5–33)
MCH RBC QN AUTO: 27.3 PG (ref 26.5–33)
MCH RBC QN AUTO: 27.5 PG (ref 26.5–33)
MCH RBC QN AUTO: 28.1 PG (ref 26.5–33)
MCH RBC QN AUTO: 29.1 PG (ref 26.5–33)
MCH RBC QN AUTO: 29.6 PG (ref 26.5–33)
MCHC RBC AUTO-ENTMCNC: 32 G/DL (ref 31.5–36.5)
MCHC RBC AUTO-ENTMCNC: 32.4 G/DL (ref 31.5–36.5)
MCHC RBC AUTO-ENTMCNC: 32.8 G/DL (ref 31.5–36.5)
MCHC RBC AUTO-ENTMCNC: 32.9 G/DL (ref 31.5–36.5)
MCHC RBC AUTO-ENTMCNC: 33 G/DL (ref 31.5–36.5)
MCHC RBC AUTO-ENTMCNC: 33.2 G/DL (ref 31.5–36.5)
MCHC RBC AUTO-ENTMCNC: 33.3 G/DL (ref 31.5–36.5)
MCHC RBC AUTO-ENTMCNC: 33.4 G/DL (ref 31.5–36.5)
MCHC RBC AUTO-ENTMCNC: 33.8 G/DL (ref 31.5–36.5)
MCHC RBC AUTO-ENTMCNC: 33.8 G/DL (ref 31.5–36.5)
MCV RBC AUTO: 77 FL (ref 78–100)
MCV RBC AUTO: 77 FL (ref 78–100)
MCV RBC AUTO: 78 FL (ref 78–100)
MCV RBC AUTO: 83 FL (ref 78–100)
MCV RBC AUTO: 84 FL (ref 78–100)
MCV RBC AUTO: 85 FL (ref 78–100)
MCV RBC AUTO: 87 FL (ref 78–100)
MCV RBC AUTO: 89 FL (ref 78–100)
MONOCYTES # BLD AUTO: 1.3 10E3/UL (ref 0–1.3)
MONOCYTES # BLD AUTO: 1.4 10E3/UL (ref 0–1.3)
MONOCYTES # BLD MANUAL: 1.3 10E3/UL (ref 0–1.3)
MONOCYTES # BLD MANUAL: 1.7 10E3/UL (ref 0–1.3)
MONOCYTES NFR BLD AUTO: 13 %
MONOCYTES NFR BLD AUTO: 14 %
MONOCYTES NFR BLD MANUAL: 10 %
MONOCYTES NFR BLD MANUAL: 7 %
MUCOUS THREADS #/AREA URNS LPF: PRESENT /LPF
MUCOUS THREADS #/AREA URNS LPF: PRESENT /LPF
NEUTROPHILS # BLD AUTO: 5.4 10E3/UL (ref 1.6–8.3)
NEUTROPHILS # BLD AUTO: 5.6 10E3/UL (ref 1.6–8.3)
NEUTROPHILS # BLD MANUAL: 15 10E3/UL (ref 1.6–8.3)
NEUTROPHILS # BLD MANUAL: 16 10E3/UL (ref 1.6–8.3)
NEUTROPHILS NFR BLD AUTO: 52 %
NEUTROPHILS NFR BLD AUTO: 58 %
NEUTROPHILS NFR BLD MANUAL: 85 %
NEUTROPHILS NFR BLD MANUAL: 87 %
NITRATE UR QL: NEGATIVE
NITRATE UR QL: NEGATIVE
NONHDLC SERPL-MCNC: 64 MG/DL
NRBC # BLD AUTO: 0 10E3/UL
NRBC BLD AUTO-RTO: 0 /100
NT-PROBNP SERPL-MCNC: 1011 PG/ML (ref 0–900)
NT-PROBNP SERPL-MCNC: 991 PG/ML (ref 0–900)
OSMOLALITY SERPL: 247 MMOL/KG (ref 280–301)
OSMOLALITY SERPL: 288 MMOL/KG (ref 280–301)
OSMOLALITY UR: 133 MMOL/KG (ref 100–1200)
OSMOLALITY UR: 316 MMOL/KG (ref 100–1200)
P AXIS - MUSE: 68 DEGREES
P AXIS - MUSE: 79 DEGREES
PATH REPORT.ADDENDUM SPEC: ABNORMAL
PATH REPORT.ADDENDUM SPEC: NORMAL
PATH REPORT.COMMENTS IMP SPEC: ABNORMAL
PATH REPORT.COMMENTS IMP SPEC: NORMAL
PATH REPORT.COMMENTS IMP SPEC: YES
PATH REPORT.FINAL DX SPEC: ABNORMAL
PATH REPORT.FINAL DX SPEC: NORMAL
PATH REPORT.GROSS SPEC: ABNORMAL
PATH REPORT.GROSS SPEC: NORMAL
PATH REPORT.MICROSCOPIC SPEC OTHER STN: ABNORMAL
PATH REPORT.MICROSCOPIC SPEC OTHER STN: NORMAL
PATH REPORT.MICROSCOPIC SPEC OTHER STN: NORMAL
PATH REPORT.RELEVANT HX SPEC: ABNORMAL
PATH REPORT.RELEVANT HX SPEC: NORMAL
PH UR STRIP: 6 [PH] (ref 5–7)
PH UR STRIP: 8 [PH] (ref 5–7)
PHOSPHATE SERPL-MCNC: 3 MG/DL (ref 2.5–4.5)
PHOTO IMAGE: ABNORMAL
PHOTO IMAGE: NORMAL
PLAT MORPH BLD: ABNORMAL
PLAT MORPH BLD: ABNORMAL
PLATELET # BLD AUTO: 148 10E3/UL (ref 150–450)
PLATELET # BLD AUTO: 194 10E3/UL (ref 150–450)
PLATELET # BLD AUTO: 253 10E3/UL (ref 150–450)
PLATELET # BLD AUTO: 299 10E3/UL (ref 150–450)
PLATELET # BLD AUTO: 406 10E3/UL (ref 150–450)
PLATELET # BLD AUTO: 441 10E3/UL (ref 150–450)
PLATELET # BLD AUTO: 500 10E3/UL (ref 150–450)
PLATELET # BLD AUTO: 501 10E3/UL (ref 150–450)
PLATELET # BLD AUTO: 632 10E3/UL (ref 150–450)
PLATELET # BLD AUTO: 712 10E3/UL (ref 150–450)
PLATELET # BLD AUTO: 811 10E3/UL (ref 150–450)
POLYCHROMASIA BLD QL SMEAR: SLIGHT
POTASSIUM SERPL-SCNC: 3.8 MMOL/L (ref 3.4–5.3)
POTASSIUM SERPL-SCNC: 4.3 MMOL/L (ref 3.4–5.3)
POTASSIUM SERPL-SCNC: 4.4 MMOL/L (ref 3.4–5.3)
POTASSIUM SERPL-SCNC: 4.4 MMOL/L (ref 3.4–5.3)
POTASSIUM SERPL-SCNC: 4.6 MMOL/L (ref 3.4–5.3)
POTASSIUM SERPL-SCNC: 4.6 MMOL/L (ref 3.4–5.3)
POTASSIUM SERPL-SCNC: 4.9 MMOL/L (ref 3.4–5.3)
POTASSIUM SERPL-SCNC: 5.3 MMOL/L (ref 3.4–5.3)
PR INTERVAL - MUSE: 160 MS
PR INTERVAL - MUSE: 180 MS
PROCALCITONIN SERPL IA-MCNC: 0.69 NG/ML
PROT SERPL-MCNC: 5.6 G/DL (ref 6.4–8.3)
PROT SERPL-MCNC: 5.7 G/DL (ref 6.4–8.3)
PROT SERPL-MCNC: 6.2 G/DL (ref 6.4–8.3)
PROT SERPL-MCNC: 6.2 G/DL (ref 6.4–8.3)
PROT SERPL-MCNC: 6.5 G/DL (ref 6.4–8.3)
PROT SERPL-MCNC: 6.9 G/DL (ref 6.4–8.3)
PROT SERPL-MCNC: 7.7 G/DL (ref 6.4–8.3)
QRS DURATION - MUSE: 90 MS
QRS DURATION - MUSE: 92 MS
QT - MUSE: 372 MS
QT - MUSE: 406 MS
QTC - MUSE: 452 MS
QTC - MUSE: 468 MS
R AXIS - MUSE: 54 DEGREES
R AXIS - MUSE: 58 DEGREES
RBC # BLD AUTO: 2.76 10E6/UL (ref 3.8–5.2)
RBC # BLD AUTO: 2.86 10E6/UL (ref 3.8–5.2)
RBC # BLD AUTO: 2.96 10E6/UL (ref 3.8–5.2)
RBC # BLD AUTO: 2.96 10E6/UL (ref 3.8–5.2)
RBC # BLD AUTO: 3.05 10E6/UL (ref 3.8–5.2)
RBC # BLD AUTO: 3.11 10E6/UL (ref 3.8–5.2)
RBC # BLD AUTO: 3.13 10E6/UL (ref 3.8–5.2)
RBC # BLD AUTO: 3.13 10E6/UL (ref 3.8–5.2)
RBC # BLD AUTO: 3.81 10E6/UL (ref 3.8–5.2)
RBC # BLD AUTO: 3.88 10E6/UL (ref 3.8–5.2)
RBC MORPH BLD: ABNORMAL
RBC MORPH BLD: ABNORMAL
RBC URINE: 1 /HPF
RBC URINE: 2 /HPF
RVPLETH-%PRED-PRE: 183 %
RVPLETH-PRE: 3.86 L
RVPLETH-PRED: 2.11 L
SGAW-%PRED-PRE: 103 %
SGAW-PRE: 0.11 1/CMH2O*S
SGAW-PRED: 0.1 1/CMH2O*S
SIGNIFICANT RESULTS: NORMAL
SODIUM SERPL-SCNC: 119 MMOL/L (ref 136–145)
SODIUM SERPL-SCNC: 119 MMOL/L (ref 136–145)
SODIUM SERPL-SCNC: 120 MMOL/L (ref 136–145)
SODIUM SERPL-SCNC: 121 MMOL/L (ref 136–145)
SODIUM SERPL-SCNC: 122 MMOL/L (ref 136–145)
SODIUM SERPL-SCNC: 123 MMOL/L (ref 136–145)
SODIUM SERPL-SCNC: 123 MMOL/L (ref 136–145)
SODIUM SERPL-SCNC: 124 MMOL/L (ref 136–145)
SODIUM SERPL-SCNC: 126 MMOL/L (ref 136–145)
SODIUM SERPL-SCNC: 127 MMOL/L (ref 136–145)
SODIUM SERPL-SCNC: 129 MMOL/L (ref 136–145)
SODIUM SERPL-SCNC: 130 MMOL/L (ref 136–145)
SODIUM SERPL-SCNC: 131 MMOL/L (ref 136–145)
SODIUM SERPL-SCNC: 131 MMOL/L (ref 136–145)
SODIUM SERPL-SCNC: 132 MMOL/L (ref 136–145)
SODIUM SERPL-SCNC: 135 MMOL/L (ref 136–145)
SODIUM UR-SCNC: <20 MMOL/L
SP GR UR STRIP: 1 (ref 1–1.03)
SP GR UR STRIP: 1.01 (ref 1–1.03)
SPECIMEN DESCRIPTION: NORMAL
SPECIMEN DESCRIPTION: NORMAL
SPECIMEN EXPIRATION DATE: NORMAL
SPECIMEN STATUS: NORMAL
SPECIMEN STATUS: NORMAL
SQUAMOUS EPITHELIAL: <1 /HPF
SRAW-%PRED-PRE: 200 %
SRAW-PRE: 9.52 CMH2O*S
SRAW-PRED: 4.76 CMH2O*S
SYSTOLIC BLOOD PRESSURE - MUSE: NORMAL MMHG
SYSTOLIC BLOOD PRESSURE - MUSE: NORMAL MMHG
T AXIS - MUSE: 73 DEGREES
T AXIS - MUSE: 78 DEGREES
TEST DETAILS, MDL: NORMAL
TLCPLETH-%PRED-PRE: 126 %
TLCPLETH-PRE: 6.45 L
TLCPLETH-PRED: 5.11 L
TRIGL SERPL-MCNC: 61 MG/DL
TSH SERPL DL<=0.005 MIU/L-ACNC: 1.58 UIU/ML (ref 0.3–4.2)
UNIT ABO/RH: NORMAL
UNIT NUMBER: NORMAL
UNIT STATUS: NORMAL
UNIT TYPE ISBT: 6200
UROBILINOGEN UR STRIP-MCNC: NORMAL MG/DL
UROBILINOGEN UR STRIP-MCNC: NORMAL MG/DL
VA-%PRED-PRE: 100 %
VA-PRE: 4.9 L
VC-%PRED-PRE: 81 %
VC-PRE: 2.59 L
VC-PRED: 3.19 L
VENTRICULAR RATE- MUSE: 80 BPM
VENTRICULAR RATE- MUSE: 89 BPM
WBC # BLD AUTO: 10.1 10E3/UL (ref 4–11)
WBC # BLD AUTO: 16.1 10E3/UL (ref 4–11)
WBC # BLD AUTO: 17.2 10E3/UL (ref 4–11)
WBC # BLD AUTO: 17.7 10E3/UL (ref 4–11)
WBC # BLD AUTO: 18.8 10E3/UL (ref 4–11)
WBC # BLD AUTO: 19.4 10E3/UL (ref 4–11)
WBC # BLD AUTO: 6.9 10E3/UL (ref 4–11)
WBC # BLD AUTO: 7.9 10E3/UL (ref 4–11)
WBC # BLD AUTO: 8.8 10E3/UL (ref 4–11)
WBC # BLD AUTO: 9.8 10E3/UL (ref 4–11)
WBC URINE: 1 /HPF
WBC URINE: 3 /HPF

## 2023-01-01 PROCEDURE — 999N000071 HC STATISTIC HEART CATH LAB OR EP LAB

## 2023-01-01 PROCEDURE — G0480 DRUG TEST DEF 1-7 CLASSES: HCPCS | Performed by: NURSE PRACTITIONER

## 2023-01-01 PROCEDURE — 88364 INSITU HYBRIDIZATION (FISH): CPT | Mod: TC | Performed by: INTERNAL MEDICINE

## 2023-01-01 PROCEDURE — 36415 COLL VENOUS BLD VENIPUNCTURE: CPT | Performed by: INTERNAL MEDICINE

## 2023-01-01 PROCEDURE — A9585 GADOBUTROL INJECTION: HCPCS | Performed by: EMERGENCY MEDICINE

## 2023-01-01 PROCEDURE — 93010 ELECTROCARDIOGRAM REPORT: CPT | Performed by: INTERNAL MEDICINE

## 2023-01-01 PROCEDURE — 36415 COLL VENOUS BLD VENIPUNCTURE: CPT | Performed by: FAMILY MEDICINE

## 2023-01-01 PROCEDURE — 99213 OFFICE O/P EST LOW 20 MIN: CPT | Mod: 25 | Performed by: NURSE PRACTITIONER

## 2023-01-01 PROCEDURE — 36430 TRANSFUSION BLD/BLD COMPNT: CPT

## 2023-01-01 PROCEDURE — 83880 ASSAY OF NATRIURETIC PEPTIDE: CPT | Performed by: NURSE PRACTITIONER

## 2023-01-01 PROCEDURE — 99285 EMERGENCY DEPT VISIT HI MDM: CPT | Mod: 25

## 2023-01-01 PROCEDURE — 99223 1ST HOSP IP/OBS HIGH 75: CPT | Performed by: NURSE PRACTITIONER

## 2023-01-01 PROCEDURE — 36415 COLL VENOUS BLD VENIPUNCTURE: CPT | Performed by: RADIOLOGY

## 2023-01-01 PROCEDURE — 36415 COLL VENOUS BLD VENIPUNCTURE: CPT

## 2023-01-01 PROCEDURE — 88360 TUMOR IMMUNOHISTOCHEM/MANUAL: CPT | Mod: 26 | Performed by: PATHOLOGY

## 2023-01-01 PROCEDURE — 250N000013 HC RX MED GY IP 250 OP 250 PS 637: Performed by: STUDENT IN AN ORGANIZED HEALTH CARE EDUCATION/TRAINING PROGRAM

## 2023-01-01 PROCEDURE — 93880 EXTRACRANIAL BILAT STUDY: CPT

## 2023-01-01 PROCEDURE — 120N000001 HC R&B MED SURG/OB

## 2023-01-01 PROCEDURE — 88341 IMHCHEM/IMCYTCHM EA ADD ANTB: CPT | Mod: 26 | Performed by: PATHOLOGY

## 2023-01-01 PROCEDURE — 36415 COLL VENOUS BLD VENIPUNCTURE: CPT | Performed by: NURSE PRACTITIONER

## 2023-01-01 PROCEDURE — 84145 PROCALCITONIN (PCT): CPT

## 2023-01-01 PROCEDURE — 99285 EMERGENCY DEPT VISIT HI MDM: CPT | Performed by: FAMILY MEDICINE

## 2023-01-01 PROCEDURE — 99204 OFFICE O/P NEW MOD 45 MIN: CPT | Mod: 95 | Performed by: INTERNAL MEDICINE

## 2023-01-01 PROCEDURE — 93798 PHYS/QHP OP CAR RHAB W/ECG: CPT

## 2023-01-01 PROCEDURE — 272N000001 HC OR GENERAL SUPPLY STERILE: Performed by: INTERNAL MEDICINE

## 2023-01-01 PROCEDURE — 83930 ASSAY OF BLOOD OSMOLALITY: CPT

## 2023-01-01 PROCEDURE — 74177 CT ABD & PELVIS W/CONTRAST: CPT

## 2023-01-01 PROCEDURE — 88307 TISSUE EXAM BY PATHOLOGIST: CPT | Mod: 26 | Performed by: PATHOLOGY

## 2023-01-01 PROCEDURE — 99204 OFFICE O/P NEW MOD 45 MIN: CPT | Performed by: STUDENT IN AN ORGANIZED HEALTH CARE EDUCATION/TRAINING PROGRAM

## 2023-01-01 PROCEDURE — 99223 1ST HOSP IP/OBS HIGH 75: CPT

## 2023-01-01 PROCEDURE — 88342 IMHCHEM/IMCYTCHM 1ST ANTB: CPT | Mod: TC | Performed by: INTERNAL MEDICINE

## 2023-01-01 PROCEDURE — 99153 MOD SED SAME PHYS/QHP EA: CPT | Performed by: INTERNAL MEDICINE

## 2023-01-01 PROCEDURE — 99214 OFFICE O/P EST MOD 30 MIN: CPT | Performed by: NURSE PRACTITIONER

## 2023-01-01 PROCEDURE — 83880 ASSAY OF NATRIURETIC PEPTIDE: CPT | Performed by: EMERGENCY MEDICINE

## 2023-01-01 PROCEDURE — 86140 C-REACTIVE PROTEIN: CPT

## 2023-01-01 PROCEDURE — 93325 DOPPLER ECHO COLOR FLOW MAPG: CPT | Mod: 26 | Performed by: INTERNAL MEDICINE

## 2023-01-01 PROCEDURE — 36415 COLL VENOUS BLD VENIPUNCTURE: CPT | Performed by: STUDENT IN AN ORGANIZED HEALTH CARE EDUCATION/TRAINING PROGRAM

## 2023-01-01 PROCEDURE — 250N000013 HC RX MED GY IP 250 OP 250 PS 637

## 2023-01-01 PROCEDURE — C1725 CATH, TRANSLUMIN NON-LASER: HCPCS | Performed by: INTERNAL MEDICINE

## 2023-01-01 PROCEDURE — 99215 OFFICE O/P EST HI 40 MIN: CPT | Mod: VID | Performed by: INTERNAL MEDICINE

## 2023-01-01 PROCEDURE — 258N000003 HC RX IP 258 OP 636: Performed by: STUDENT IN AN ORGANIZED HEALTH CARE EDUCATION/TRAINING PROGRAM

## 2023-01-01 PROCEDURE — 85027 COMPLETE CBC AUTOMATED: CPT | Performed by: FAMILY MEDICINE

## 2023-01-01 PROCEDURE — 250N000011 HC RX IP 250 OP 636: Performed by: FAMILY MEDICINE

## 2023-01-01 PROCEDURE — 80053 COMPREHEN METABOLIC PANEL: CPT | Performed by: FAMILY MEDICINE

## 2023-01-01 PROCEDURE — 85347 COAGULATION TIME ACTIVATED: CPT

## 2023-01-01 PROCEDURE — 88341 IMHCHEM/IMCYTCHM EA ADD ANTB: CPT | Mod: TC | Performed by: INTERNAL MEDICINE

## 2023-01-01 PROCEDURE — 99207 PR INPT ADMISSION FROM CLINIC: CPT | Performed by: NURSE PRACTITIONER

## 2023-01-01 PROCEDURE — 99498 ADVNCD CARE PLAN ADDL 30 MIN: CPT | Mod: 25 | Performed by: NURSE PRACTITIONER

## 2023-01-01 PROCEDURE — 86850 RBC ANTIBODY SCREEN: CPT | Performed by: EMERGENCY MEDICINE

## 2023-01-01 PROCEDURE — 80053 COMPREHEN METABOLIC PANEL: CPT | Performed by: NURSE PRACTITIONER

## 2023-01-01 PROCEDURE — 88342 IMHCHEM/IMCYTCHM 1ST ANTB: CPT | Mod: 26 | Performed by: PATHOLOGY

## 2023-01-01 PROCEDURE — 99203 OFFICE O/P NEW LOW 30 MIN: CPT | Mod: 95 | Performed by: ANESTHESIOLOGY

## 2023-01-01 PROCEDURE — 83935 ASSAY OF URINE OSMOLALITY: CPT

## 2023-01-01 PROCEDURE — 84100 ASSAY OF PHOSPHORUS: CPT | Performed by: FAMILY MEDICINE

## 2023-01-01 PROCEDURE — 85610 PROTHROMBIN TIME: CPT | Performed by: NURSE PRACTITIONER

## 2023-01-01 PROCEDURE — 999N000208 ECHOCARDIOGRAM COMPLETE

## 2023-01-01 PROCEDURE — 250N000013 HC RX MED GY IP 250 OP 250 PS 637: Performed by: FAMILY MEDICINE

## 2023-01-01 PROCEDURE — 93454 CORONARY ARTERY ANGIO S&I: CPT | Performed by: INTERNAL MEDICINE

## 2023-01-01 PROCEDURE — 250N000013 HC RX MED GY IP 250 OP 250 PS 637: Performed by: RADIOLOGY

## 2023-01-01 PROCEDURE — 71250 CT THORAX DX C-: CPT

## 2023-01-01 PROCEDURE — 85014 HEMATOCRIT: CPT | Performed by: STUDENT IN AN ORGANIZED HEALTH CARE EDUCATION/TRAINING PROGRAM

## 2023-01-01 PROCEDURE — C1887 CATHETER, GUIDING: HCPCS | Performed by: INTERNAL MEDICINE

## 2023-01-01 PROCEDURE — 96374 THER/PROPH/DIAG INJ IV PUSH: CPT | Mod: 59 | Performed by: FAMILY MEDICINE

## 2023-01-01 PROCEDURE — 82728 ASSAY OF FERRITIN: CPT | Performed by: NURSE PRACTITIONER

## 2023-01-01 PROCEDURE — 83605 ASSAY OF LACTIC ACID: CPT | Performed by: STUDENT IN AN ORGANIZED HEALTH CARE EDUCATION/TRAINING PROGRAM

## 2023-01-01 PROCEDURE — 80048 BASIC METABOLIC PNL TOTAL CA: CPT | Performed by: FAMILY MEDICINE

## 2023-01-01 PROCEDURE — 250N000009 HC RX 250: Performed by: CLINICAL NURSE SPECIALIST

## 2023-01-01 PROCEDURE — 258N000003 HC RX IP 258 OP 636: Performed by: FAMILY MEDICINE

## 2023-01-01 PROCEDURE — 75571 CT HRT W/O DYE W/CA TEST: CPT | Mod: 26 | Performed by: INTERNAL MEDICINE

## 2023-01-01 PROCEDURE — 250N000009 HC RX 250: Performed by: FAMILY MEDICINE

## 2023-01-01 PROCEDURE — 92928 PRQ TCAT PLMT NTRAC ST 1 LES: CPT | Mod: LD | Performed by: INTERNAL MEDICINE

## 2023-01-01 PROCEDURE — 250N000013 HC RX MED GY IP 250 OP 250 PS 637: Performed by: EMERGENCY MEDICINE

## 2023-01-01 PROCEDURE — 250N000011 HC RX IP 250 OP 636: Performed by: STUDENT IN AN ORGANIZED HEALTH CARE EDUCATION/TRAINING PROGRAM

## 2023-01-01 PROCEDURE — 85730 THROMBOPLASTIN TIME PARTIAL: CPT | Performed by: NURSE PRACTITIONER

## 2023-01-01 PROCEDURE — 999N000054 HC STATISTIC EKG NON-CHARGEABLE

## 2023-01-01 PROCEDURE — 85049 AUTOMATED PLATELET COUNT: CPT | Performed by: RADIOLOGY

## 2023-01-01 PROCEDURE — G0452 MOLECULAR PATHOLOGY INTERPR: HCPCS | Mod: 26 | Performed by: PATHOLOGY

## 2023-01-01 PROCEDURE — 85027 COMPLETE CBC AUTOMATED: CPT | Performed by: EMERGENCY MEDICINE

## 2023-01-01 PROCEDURE — 85730 THROMBOPLASTIN TIME PARTIAL: CPT | Performed by: RADIOLOGY

## 2023-01-01 PROCEDURE — 72158 MRI LUMBAR SPINE W/O & W/DYE: CPT

## 2023-01-01 PROCEDURE — 71271 CT THORAX LUNG CANCER SCR C-: CPT

## 2023-01-01 PROCEDURE — 93244 EXT ECG>48HR<7D REV&INTERPJ: CPT | Performed by: INTERNAL MEDICINE

## 2023-01-01 PROCEDURE — 250N000013 HC RX MED GY IP 250 OP 250 PS 637: Performed by: NURSE PRACTITIONER

## 2023-01-01 PROCEDURE — 88307 TISSUE EXAM BY PATHOLOGIST: CPT | Mod: TC | Performed by: INTERNAL MEDICINE

## 2023-01-01 PROCEDURE — 94060 EVALUATION OF WHEEZING: CPT

## 2023-01-01 PROCEDURE — 250N000012 HC RX MED GY IP 250 OP 636 PS 637

## 2023-01-01 PROCEDURE — 99284 EMERGENCY DEPT VISIT MOD MDM: CPT | Mod: 25 | Performed by: EMERGENCY MEDICINE

## 2023-01-01 PROCEDURE — 81001 URINALYSIS AUTO W/SCOPE: CPT | Performed by: EMERGENCY MEDICINE

## 2023-01-01 PROCEDURE — 83935 ASSAY OF URINE OSMOLALITY: CPT | Performed by: STUDENT IN AN ORGANIZED HEALTH CARE EDUCATION/TRAINING PROGRAM

## 2023-01-01 PROCEDURE — 2894A URINE DRUG CONFIRMATION PANEL: CPT | Performed by: NURSE PRACTITIONER

## 2023-01-01 PROCEDURE — 93016 CV STRESS TEST SUPVJ ONLY: CPT | Performed by: INTERNAL MEDICINE

## 2023-01-01 PROCEDURE — 99204 OFFICE O/P NEW MOD 45 MIN: CPT | Performed by: INTERNAL MEDICINE

## 2023-01-01 PROCEDURE — 255N000002 HC RX 255 OP 636: Performed by: FAMILY MEDICINE

## 2023-01-01 PROCEDURE — 80048 BASIC METABOLIC PNL TOTAL CA: CPT | Performed by: NURSE PRACTITIONER

## 2023-01-01 PROCEDURE — 94726 PLETHYSMOGRAPHY LUNG VOLUMES: CPT

## 2023-01-01 PROCEDURE — 80061 LIPID PANEL: CPT | Performed by: INTERNAL MEDICINE

## 2023-01-01 PROCEDURE — 99285 EMERGENCY DEPT VISIT HI MDM: CPT | Performed by: EMERGENCY MEDICINE

## 2023-01-01 PROCEDURE — 250N000011 HC RX IP 250 OP 636: Mod: JZ | Performed by: STUDENT IN AN ORGANIZED HEALTH CARE EDUCATION/TRAINING PROGRAM

## 2023-01-01 PROCEDURE — 86923 COMPATIBILITY TEST ELECTRIC: CPT | Performed by: STUDENT IN AN ORGANIZED HEALTH CARE EDUCATION/TRAINING PROGRAM

## 2023-01-01 PROCEDURE — 84300 ASSAY OF URINE SODIUM: CPT | Performed by: STUDENT IN AN ORGANIZED HEALTH CARE EDUCATION/TRAINING PROGRAM

## 2023-01-01 PROCEDURE — 94729 DIFFUSING CAPACITY: CPT

## 2023-01-01 PROCEDURE — 99213 OFFICE O/P EST LOW 20 MIN: CPT | Performed by: NURSE PRACTITIONER

## 2023-01-01 PROCEDURE — 85007 BL SMEAR W/DIFF WBC COUNT: CPT | Performed by: EMERGENCY MEDICINE

## 2023-01-01 PROCEDURE — 80048 BASIC METABOLIC PNL TOTAL CA: CPT

## 2023-01-01 PROCEDURE — G0463 HOSPITAL OUTPT CLINIC VISIT: HCPCS | Performed by: STUDENT IN AN ORGANIZED HEALTH CARE EDUCATION/TRAINING PROGRAM

## 2023-01-01 PROCEDURE — 88364 INSITU HYBRIDIZATION (FISH): CPT | Mod: 26 | Performed by: PATHOLOGY

## 2023-01-01 PROCEDURE — 250N000011 HC RX IP 250 OP 636: Mod: JZ

## 2023-01-01 PROCEDURE — 87040 BLOOD CULTURE FOR BACTERIA: CPT | Performed by: STUDENT IN AN ORGANIZED HEALTH CARE EDUCATION/TRAINING PROGRAM

## 2023-01-01 PROCEDURE — 80053 COMPREHEN METABOLIC PANEL: CPT | Performed by: STUDENT IN AN ORGANIZED HEALTH CARE EDUCATION/TRAINING PROGRAM

## 2023-01-01 PROCEDURE — 255N000002 HC RX 255 OP 636: Performed by: RADIOLOGY

## 2023-01-01 PROCEDURE — A9585 GADOBUTROL INJECTION: HCPCS | Performed by: RADIOLOGY

## 2023-01-01 PROCEDURE — 36591 DRAW BLOOD OFF VENOUS DEVICE: CPT

## 2023-01-01 PROCEDURE — C1894 INTRO/SHEATH, NON-LASER: HCPCS | Performed by: INTERNAL MEDICINE

## 2023-01-01 PROCEDURE — 85610 PROTHROMBIN TIME: CPT | Performed by: RADIOLOGY

## 2023-01-01 PROCEDURE — 83540 ASSAY OF IRON: CPT

## 2023-01-01 PROCEDURE — 83690 ASSAY OF LIPASE: CPT | Performed by: FAMILY MEDICINE

## 2023-01-01 PROCEDURE — 85018 HEMOGLOBIN: CPT | Performed by: RADIOLOGY

## 2023-01-01 PROCEDURE — 343N000001 HC RX 343: Performed by: STUDENT IN AN ORGANIZED HEALTH CARE EDUCATION/TRAINING PROGRAM

## 2023-01-01 PROCEDURE — 70553 MRI BRAIN STEM W/O & W/DYE: CPT

## 2023-01-01 PROCEDURE — 96361 HYDRATE IV INFUSION ADD-ON: CPT | Performed by: FAMILY MEDICINE

## 2023-01-01 PROCEDURE — 93321 DOPPLER ECHO F-UP/LMTD STD: CPT | Mod: 26 | Performed by: INTERNAL MEDICINE

## 2023-01-01 PROCEDURE — 80053 COMPREHEN METABOLIC PANEL: CPT | Performed by: INTERNAL MEDICINE

## 2023-01-01 PROCEDURE — 82550 ASSAY OF CK (CPK): CPT | Performed by: INTERNAL MEDICINE

## 2023-01-01 PROCEDURE — 99285 EMERGENCY DEPT VISIT HI MDM: CPT | Mod: 25 | Performed by: FAMILY MEDICINE

## 2023-01-01 PROCEDURE — 84295 ASSAY OF SERUM SODIUM: CPT | Performed by: FAMILY MEDICINE

## 2023-01-01 PROCEDURE — C8929 TTE W OR WO FOL WCON,DOPPLER: HCPCS

## 2023-01-01 PROCEDURE — 99204 OFFICE O/P NEW MOD 45 MIN: CPT | Mod: VID | Performed by: STUDENT IN AN ORGANIZED HEALTH CARE EDUCATION/TRAINING PROGRAM

## 2023-01-01 PROCEDURE — 93325 DOPPLER ECHO COLOR FLOW MAPG: CPT | Mod: TC

## 2023-01-01 PROCEDURE — 250N000009 HC RX 250: Performed by: INTERNAL MEDICINE

## 2023-01-01 PROCEDURE — 84443 ASSAY THYROID STIM HORMONE: CPT | Performed by: NURSE PRACTITIONER

## 2023-01-01 PROCEDURE — 81455 SO/HL 51/>GSAP DNA/DNA&RNA: CPT | Performed by: INTERNAL MEDICINE

## 2023-01-01 PROCEDURE — 83735 ASSAY OF MAGNESIUM: CPT | Performed by: FAMILY MEDICINE

## 2023-01-01 PROCEDURE — 96376 TX/PRO/DX INJ SAME DRUG ADON: CPT | Mod: 59 | Performed by: FAMILY MEDICINE

## 2023-01-01 PROCEDURE — 99207 PR NON-BILLABLE SERV PER CHARTING: CPT | Mod: 25 | Performed by: INTERNAL MEDICINE

## 2023-01-01 PROCEDURE — 93321 DOPPLER ECHO F-UP/LMTD STD: CPT | Mod: TC

## 2023-01-01 PROCEDURE — 88312 SPECIAL STAINS GROUP 1: CPT | Mod: 26 | Performed by: PATHOLOGY

## 2023-01-01 PROCEDURE — 88365 INSITU HYBRIDIZATION (FISH): CPT | Mod: 26 | Performed by: PATHOLOGY

## 2023-01-01 PROCEDURE — 99214 OFFICE O/P EST MOD 30 MIN: CPT | Mod: GT | Performed by: NURSE PRACTITIONER

## 2023-01-01 PROCEDURE — 82962 GLUCOSE BLOOD TEST: CPT

## 2023-01-01 PROCEDURE — 99238 HOSP IP/OBS DSCHRG MGMT 30/<: CPT | Performed by: STUDENT IN AN ORGANIZED HEALTH CARE EDUCATION/TRAINING PROGRAM

## 2023-01-01 PROCEDURE — 99284 EMERGENCY DEPT VISIT MOD MDM: CPT | Performed by: EMERGENCY MEDICINE

## 2023-01-01 PROCEDURE — C9600 PERC DRUG-EL COR STENT SING: HCPCS | Performed by: INTERNAL MEDICINE

## 2023-01-01 PROCEDURE — 250N000011 HC RX IP 250 OP 636: Performed by: INTERNAL MEDICINE

## 2023-01-01 PROCEDURE — 93242 EXT ECG>48HR<7D RECORDING: CPT

## 2023-01-01 PROCEDURE — C1874 STENT, COATED/COV W/DEL SYS: HCPCS | Performed by: INTERNAL MEDICINE

## 2023-01-01 PROCEDURE — 84295 ASSAY OF SERUM SODIUM: CPT | Performed by: STUDENT IN AN ORGANIZED HEALTH CARE EDUCATION/TRAINING PROGRAM

## 2023-01-01 PROCEDURE — 99207 PR NON-BILLABLE SERV PER CHARTING: CPT | Performed by: NURSE PRACTITIONER

## 2023-01-01 PROCEDURE — 255N000002 HC RX 255 OP 636: Performed by: EMERGENCY MEDICINE

## 2023-01-01 PROCEDURE — 85025 COMPLETE CBC W/AUTO DIFF WBC: CPT | Performed by: NURSE PRACTITIONER

## 2023-01-01 PROCEDURE — 99418 PROLNG IP/OBS E/M EA 15 MIN: CPT | Performed by: FAMILY MEDICINE

## 2023-01-01 PROCEDURE — 88333 PATH CONSLTJ SURG CYTO XM 1: CPT | Mod: 26 | Performed by: PATHOLOGY

## 2023-01-01 PROCEDURE — P9016 RBC LEUKOCYTES REDUCED: HCPCS | Performed by: STUDENT IN AN ORGANIZED HEALTH CARE EDUCATION/TRAINING PROGRAM

## 2023-01-01 PROCEDURE — 93005 ELECTROCARDIOGRAM TRACING: CPT | Performed by: CLINICAL EXERCISE PHYSIOLOGIST

## 2023-01-01 PROCEDURE — 78815 PET IMAGE W/CT SKULL-THIGH: CPT | Mod: PI

## 2023-01-01 PROCEDURE — 99214 OFFICE O/P EST MOD 30 MIN: CPT | Mod: VID | Performed by: NURSE PRACTITIONER

## 2023-01-01 PROCEDURE — 250N000013 HC RX MED GY IP 250 OP 250 PS 637: Performed by: INTERNAL MEDICINE

## 2023-01-01 PROCEDURE — 99152 MOD SED SAME PHYS/QHP 5/>YRS: CPT | Performed by: INTERNAL MEDICINE

## 2023-01-01 PROCEDURE — 82248 BILIRUBIN DIRECT: CPT | Performed by: NURSE PRACTITIONER

## 2023-01-01 PROCEDURE — 93350 STRESS TTE ONLY: CPT | Mod: 26 | Performed by: INTERNAL MEDICINE

## 2023-01-01 PROCEDURE — 99215 OFFICE O/P EST HI 40 MIN: CPT | Performed by: NURSE PRACTITIONER

## 2023-01-01 PROCEDURE — 74018 RADEX ABDOMEN 1 VIEW: CPT | Mod: TC | Performed by: RADIOLOGY

## 2023-01-01 PROCEDURE — 85027 COMPLETE CBC AUTOMATED: CPT

## 2023-01-01 PROCEDURE — 85014 HEMATOCRIT: CPT | Performed by: NURSE PRACTITIONER

## 2023-01-01 PROCEDURE — 73030 X-RAY EXAM OF SHOULDER: CPT | Mod: LT

## 2023-01-01 PROCEDURE — C1760 CLOSURE DEV, VASC: HCPCS | Performed by: INTERNAL MEDICINE

## 2023-01-01 PROCEDURE — 75571 CT HRT W/O DYE W/CA TEST: CPT

## 2023-01-01 PROCEDURE — 85027 COMPLETE CBC AUTOMATED: CPT | Performed by: NURSE PRACTITIONER

## 2023-01-01 PROCEDURE — 72131 CT LUMBAR SPINE W/O DYE: CPT

## 2023-01-01 PROCEDURE — 83550 IRON BINDING TEST: CPT | Performed by: NURSE PRACTITIONER

## 2023-01-01 PROCEDURE — 83550 IRON BINDING TEST: CPT

## 2023-01-01 PROCEDURE — 36415 COLL VENOUS BLD VENIPUNCTURE: CPT | Performed by: EMERGENCY MEDICINE

## 2023-01-01 PROCEDURE — 93000 ELECTROCARDIOGRAM COMPLETE: CPT | Performed by: NURSE PRACTITIONER

## 2023-01-01 PROCEDURE — 96372 THER/PROPH/DIAG INJ SC/IM: CPT | Performed by: NURSE PRACTITIONER

## 2023-01-01 PROCEDURE — 999N000184 HC STATISTIC TELEMETRY

## 2023-01-01 PROCEDURE — 99214 OFFICE O/P EST MOD 30 MIN: CPT | Mod: 95 | Performed by: NURSE PRACTITIONER

## 2023-01-01 PROCEDURE — 81001 URINALYSIS AUTO W/SCOPE: CPT | Performed by: FAMILY MEDICINE

## 2023-01-01 PROCEDURE — 83540 ASSAY OF IRON: CPT | Performed by: NURSE PRACTITIONER

## 2023-01-01 PROCEDURE — 99152 MOD SED SAME PHYS/QHP 5/>YRS: CPT

## 2023-01-01 PROCEDURE — 93306 TTE W/DOPPLER COMPLETE: CPT | Mod: 26 | Performed by: INTERNAL MEDICINE

## 2023-01-01 PROCEDURE — 258N000003 HC RX IP 258 OP 636: Performed by: NURSE PRACTITIONER

## 2023-01-01 PROCEDURE — 93005 ELECTROCARDIOGRAM TRACING: CPT

## 2023-01-01 PROCEDURE — 250N000011 HC RX IP 250 OP 636: Mod: JZ | Performed by: RADIOLOGY

## 2023-01-01 PROCEDURE — 99285 EMERGENCY DEPT VISIT HI MDM: CPT | Mod: 25 | Performed by: EMERGENCY MEDICINE

## 2023-01-01 PROCEDURE — 92920 PRQ TRLUML C ANGIOP 1ART&/BR: CPT | Performed by: INTERNAL MEDICINE

## 2023-01-01 PROCEDURE — 71045 X-RAY EXAM CHEST 1 VIEW: CPT

## 2023-01-01 PROCEDURE — 85007 BL SMEAR W/DIFF WBC COUNT: CPT | Performed by: FAMILY MEDICINE

## 2023-01-01 PROCEDURE — 80053 COMPREHEN METABOLIC PANEL: CPT | Performed by: EMERGENCY MEDICINE

## 2023-01-01 PROCEDURE — A9552 F18 FDG: HCPCS | Performed by: STUDENT IN AN ORGANIZED HEALTH CARE EDUCATION/TRAINING PROGRAM

## 2023-01-01 PROCEDURE — 93018 CV STRESS TEST I&R ONLY: CPT | Performed by: INTERNAL MEDICINE

## 2023-01-01 PROCEDURE — 85018 HEMOGLOBIN: CPT | Performed by: EMERGENCY MEDICINE

## 2023-01-01 PROCEDURE — 99233 SBSQ HOSP IP/OBS HIGH 50: CPT | Performed by: FAMILY MEDICINE

## 2023-01-01 PROCEDURE — 86901 BLOOD TYPING SEROLOGIC RH(D): CPT | Performed by: EMERGENCY MEDICINE

## 2023-01-01 PROCEDURE — 70450 CT HEAD/BRAIN W/O DYE: CPT

## 2023-01-01 PROCEDURE — 99232 SBSQ HOSP IP/OBS MODERATE 35: CPT | Performed by: STUDENT IN AN ORGANIZED HEALTH CARE EDUCATION/TRAINING PROGRAM

## 2023-01-01 PROCEDURE — 93454 CORONARY ARTERY ANGIO S&I: CPT | Mod: 26 | Performed by: INTERNAL MEDICINE

## 2023-01-01 PROCEDURE — 93797 PHYS/QHP OP CAR RHAB WO ECG: CPT

## 2023-01-01 PROCEDURE — 51798 US URINE CAPACITY MEASURE: CPT | Performed by: EMERGENCY MEDICINE

## 2023-01-01 PROCEDURE — C1769 GUIDE WIRE: HCPCS | Performed by: INTERNAL MEDICINE

## 2023-01-01 PROCEDURE — 99497 ADVNCD CARE PLAN 30 MIN: CPT | Mod: 25 | Performed by: NURSE PRACTITIONER

## 2023-01-01 PROCEDURE — 85025 COMPLETE CBC W/AUTO DIFF WBC: CPT

## 2023-01-01 PROCEDURE — 272N000221 US BIOPSY CORE LYMPH NODE

## 2023-01-01 DEVICE — STENT COR ONYX FRONTIER 26X2.50MM ONYXNG25026UX: Type: IMPLANTABLE DEVICE | Status: FUNCTIONAL

## 2023-01-01 DEVICE — CLOSURE ANGIOSEAL 6FR 610130: Type: IMPLANTABLE DEVICE | Status: FUNCTIONAL

## 2023-01-01 RX ORDER — POLYETHYLENE GLYCOL 3350 17 G/17G
17 POWDER, FOR SOLUTION ORAL DAILY PRN
Status: DISCONTINUED | OUTPATIENT
Start: 2023-01-01 | End: 2023-01-01

## 2023-01-01 RX ORDER — OXYCODONE HYDROCHLORIDE 5 MG/1
15 TABLET ORAL ONCE
Status: COMPLETED | OUTPATIENT
Start: 2023-01-01 | End: 2023-01-01

## 2023-01-01 RX ORDER — LORAZEPAM 2 MG/ML
0.5 INJECTION INTRAMUSCULAR ONCE
Status: COMPLETED | OUTPATIENT
Start: 2023-01-01 | End: 2023-01-01

## 2023-01-01 RX ORDER — OXYCODONE HYDROCHLORIDE 5 MG/1
5 TABLET ORAL EVERY 4 HOURS PRN
Status: DISCONTINUED | OUTPATIENT
Start: 2023-01-01 | End: 2023-01-01

## 2023-01-01 RX ORDER — OXYCODONE HYDROCHLORIDE 10 MG/1
10-20 TABLET ORAL
Qty: 120 TABLET | Refills: 0 | Status: SHIPPED | OUTPATIENT
Start: 2023-01-01

## 2023-01-01 RX ORDER — ZOLPIDEM TARTRATE 10 MG/1
TABLET ORAL
Qty: 30 TABLET | Refills: 2 | Status: SHIPPED | OUTPATIENT
Start: 2023-01-01

## 2023-01-01 RX ORDER — FENTANYL CITRATE 50 UG/ML
25 INJECTION, SOLUTION INTRAMUSCULAR; INTRAVENOUS
Status: DISCONTINUED | OUTPATIENT
Start: 2023-01-01 | End: 2023-01-01 | Stop reason: HOSPADM

## 2023-01-01 RX ORDER — ONDANSETRON 4 MG/1
4 TABLET, ORALLY DISINTEGRATING ORAL EVERY 6 HOURS PRN
Status: DISCONTINUED | OUTPATIENT
Start: 2023-01-01 | End: 2023-01-01 | Stop reason: HOSPADM

## 2023-01-01 RX ORDER — HYDROMORPHONE HYDROCHLORIDE 1 MG/ML
1 INJECTION, SOLUTION INTRAMUSCULAR; INTRAVENOUS; SUBCUTANEOUS
Status: DISCONTINUED | OUTPATIENT
Start: 2023-01-01 | End: 2023-01-01 | Stop reason: HOSPADM

## 2023-01-01 RX ORDER — DEXAMETHASONE 2 MG/1
2 TABLET ORAL 2 TIMES DAILY WITH MEALS
Qty: 10 TABLET | Refills: 0 | Status: SHIPPED | OUTPATIENT
Start: 2023-01-01 | End: 2023-01-01

## 2023-01-01 RX ORDER — PANTOPRAZOLE SODIUM 40 MG/1
40 TABLET, DELAYED RELEASE ORAL
Status: DISCONTINUED | OUTPATIENT
Start: 2023-01-01 | End: 2023-01-01 | Stop reason: HOSPADM

## 2023-01-01 RX ORDER — METOPROLOL SUCCINATE 25 MG/1
25 TABLET, EXTENDED RELEASE ORAL DAILY
Status: DISCONTINUED | OUTPATIENT
Start: 2023-01-01 | End: 2023-01-01 | Stop reason: HOSPADM

## 2023-01-01 RX ORDER — FERROUS GLUCONATE 324(38)MG
324 TABLET ORAL
Qty: 90 TABLET | Refills: 3 | Status: SHIPPED | OUTPATIENT
Start: 2023-01-01

## 2023-01-01 RX ORDER — SODIUM CHLORIDE 9 MG/ML
INJECTION, SOLUTION INTRAVENOUS CONTINUOUS
Status: DISCONTINUED | OUTPATIENT
Start: 2023-01-01 | End: 2023-01-01 | Stop reason: HOSPADM

## 2023-01-01 RX ORDER — OXYCODONE HYDROCHLORIDE 10 MG/1
10-20 TABLET ORAL EVERY 6 HOURS PRN
Qty: 24 TABLET | Refills: 0 | Status: SHIPPED | OUTPATIENT
Start: 2023-01-01 | End: 2023-09-27

## 2023-01-01 RX ORDER — CLOPIDOGREL BISULFATE 75 MG/1
75 TABLET ORAL ONCE
Status: DISCONTINUED | OUTPATIENT
Start: 2023-01-01 | End: 2023-01-01

## 2023-01-01 RX ORDER — NITROGLYCERIN 0.4 MG/1
TABLET SUBLINGUAL
Qty: 30 TABLET | Refills: 0 | Status: ON HOLD | OUTPATIENT
Start: 2023-01-01 | End: 2023-01-01

## 2023-01-01 RX ORDER — ACETAMINOPHEN 325 MG/1
650 TABLET ORAL EVERY 4 HOURS PRN
Status: DISCONTINUED | OUTPATIENT
Start: 2023-01-01 | End: 2023-01-01 | Stop reason: HOSPADM

## 2023-01-01 RX ORDER — NALOXONE HYDROCHLORIDE 0.4 MG/ML
0.2 INJECTION, SOLUTION INTRAMUSCULAR; INTRAVENOUS; SUBCUTANEOUS
Status: DISCONTINUED | OUTPATIENT
Start: 2023-01-01 | End: 2023-01-01 | Stop reason: HOSPADM

## 2023-01-01 RX ORDER — FLUMAZENIL 0.1 MG/ML
0.2 INJECTION, SOLUTION INTRAVENOUS
Status: DISCONTINUED | OUTPATIENT
Start: 2023-01-01 | End: 2023-01-01 | Stop reason: HOSPADM

## 2023-01-01 RX ORDER — HYDROCODONE BITARTRATE AND ACETAMINOPHEN 5; 325 MG/1; MG/1
1 TABLET ORAL EVERY 6 HOURS PRN
Qty: 120 TABLET | Refills: 0 | Status: SHIPPED | OUTPATIENT
Start: 2023-01-01 | End: 2023-01-01

## 2023-01-01 RX ORDER — ONDANSETRON 4 MG/1
4 TABLET, ORALLY DISINTEGRATING ORAL EVERY 6 HOURS PRN
Qty: 10 TABLET | Refills: 0 | Status: SHIPPED | OUTPATIENT
Start: 2023-01-01

## 2023-01-01 RX ORDER — ZOLPIDEM TARTRATE 5 MG/1
5 TABLET ORAL
Status: DISCONTINUED | OUTPATIENT
Start: 2023-01-01 | End: 2023-01-01

## 2023-01-01 RX ORDER — NALOXONE HYDROCHLORIDE 0.4 MG/ML
0.4 INJECTION, SOLUTION INTRAMUSCULAR; INTRAVENOUS; SUBCUTANEOUS
Status: DISCONTINUED | OUTPATIENT
Start: 2023-01-01 | End: 2023-01-01 | Stop reason: HOSPADM

## 2023-01-01 RX ORDER — AMOXICILLIN 250 MG
1 CAPSULE ORAL 2 TIMES DAILY PRN
Status: DISCONTINUED | OUTPATIENT
Start: 2023-01-01 | End: 2023-01-01

## 2023-01-01 RX ORDER — TIOTROPIUM BROMIDE 18 UG/1
CAPSULE ORAL; RESPIRATORY (INHALATION)
Qty: 90 CAPSULE | Refills: 0 | Status: SHIPPED | OUTPATIENT
Start: 2023-01-01 | End: 2023-01-01

## 2023-01-01 RX ORDER — BISACODYL 10 MG
10 SUPPOSITORY, RECTAL RECTAL DAILY PRN
Status: DISCONTINUED | OUTPATIENT
Start: 2023-01-01 | End: 2023-01-01 | Stop reason: HOSPADM

## 2023-01-01 RX ORDER — HYDROMORPHONE HYDROCHLORIDE 2 MG/1
2 TABLET ORAL
Status: COMPLETED | OUTPATIENT
Start: 2023-01-01 | End: 2023-01-01

## 2023-01-01 RX ORDER — ZOLPIDEM TARTRATE 10 MG/1
TABLET ORAL
Qty: 30 TABLET | Refills: 2 | Status: SHIPPED | OUTPATIENT
Start: 2023-01-01 | End: 2023-01-01

## 2023-01-01 RX ORDER — DEXAMETHASONE 4 MG/1
4 TABLET ORAL 3 TIMES DAILY
Qty: 60 TABLET | Refills: 0 | Status: SHIPPED | OUTPATIENT
Start: 2023-01-01 | End: 2023-10-08

## 2023-01-01 RX ORDER — OXYCODONE HYDROCHLORIDE 5 MG/1
10 TABLET ORAL
Status: DISCONTINUED | OUTPATIENT
Start: 2023-01-01 | End: 2023-01-01 | Stop reason: HOSPADM

## 2023-01-01 RX ORDER — FERROUS GLUCONATE 324(38)MG
324 TABLET ORAL
Status: DISCONTINUED | OUTPATIENT
Start: 2023-01-01 | End: 2023-01-01 | Stop reason: HOSPADM

## 2023-01-01 RX ORDER — SUCRALFATE 1 G/1
1 TABLET ORAL 4 TIMES DAILY
Qty: 40 TABLET | Refills: 0 | Status: SHIPPED | OUTPATIENT
Start: 2023-01-01 | End: 2023-01-01

## 2023-01-01 RX ORDER — HYDROMORPHONE HYDROCHLORIDE 1 MG/ML
0.5 INJECTION, SOLUTION INTRAMUSCULAR; INTRAVENOUS; SUBCUTANEOUS
Status: DISCONTINUED | OUTPATIENT
Start: 2023-01-01 | End: 2023-01-01

## 2023-01-01 RX ORDER — MORPHINE SULFATE 30 MG/1
30 TABLET, FILM COATED, EXTENDED RELEASE ORAL EVERY 12 HOURS SCHEDULED
Status: DISCONTINUED | OUTPATIENT
Start: 2023-01-01 | End: 2023-01-01

## 2023-01-01 RX ORDER — ONDANSETRON 2 MG/ML
4 INJECTION INTRAMUSCULAR; INTRAVENOUS EVERY 6 HOURS PRN
Status: DISCONTINUED | OUTPATIENT
Start: 2023-01-01 | End: 2023-01-01 | Stop reason: HOSPADM

## 2023-01-01 RX ORDER — HYDROMORPHONE HYDROCHLORIDE 1 MG/ML
0.5 INJECTION, SOLUTION INTRAMUSCULAR; INTRAVENOUS; SUBCUTANEOUS
Status: COMPLETED | OUTPATIENT
Start: 2023-01-01 | End: 2023-01-01

## 2023-01-01 RX ORDER — LIDOCAINE 40 MG/G
CREAM TOPICAL
Status: DISCONTINUED | OUTPATIENT
Start: 2023-01-01 | End: 2023-01-01 | Stop reason: HOSPADM

## 2023-01-01 RX ORDER — OXYCODONE HYDROCHLORIDE 5 MG/1
10 TABLET ORAL
Status: DISCONTINUED | OUTPATIENT
Start: 2023-01-01 | End: 2023-01-01

## 2023-01-01 RX ORDER — HYDROCODONE BITARTRATE AND ACETAMINOPHEN 7.5; 325 MG/1; MG/1
1 TABLET ORAL EVERY 4 HOURS PRN
Qty: 150 TABLET | Refills: 0 | Status: SHIPPED | OUTPATIENT
Start: 2023-01-01 | End: 2023-01-01

## 2023-01-01 RX ORDER — ASPIRIN 81 MG/1
81 TABLET ORAL DAILY
Status: DISCONTINUED | OUTPATIENT
Start: 2023-01-01 | End: 2023-01-01 | Stop reason: HOSPADM

## 2023-01-01 RX ORDER — HYDRALAZINE HYDROCHLORIDE 20 MG/ML
10 INJECTION INTRAMUSCULAR; INTRAVENOUS EVERY 4 HOURS PRN
Status: DISCONTINUED | OUTPATIENT
Start: 2023-01-01 | End: 2023-01-01 | Stop reason: HOSPADM

## 2023-01-01 RX ORDER — AMOXICILLIN 250 MG
2 CAPSULE ORAL 2 TIMES DAILY PRN
Status: DISCONTINUED | OUTPATIENT
Start: 2023-01-01 | End: 2023-01-01

## 2023-01-01 RX ORDER — POLYETHYLENE GLYCOL 3350 17 G/17G
1 POWDER, FOR SOLUTION ORAL DAILY
COMMUNITY

## 2023-01-01 RX ORDER — METHADONE HYDROCHLORIDE 5 MG/1
5 TABLET ORAL EVERY 8 HOURS
Status: DISCONTINUED | OUTPATIENT
Start: 2023-01-01 | End: 2023-01-01 | Stop reason: HOSPADM

## 2023-01-01 RX ORDER — DEXAMETHASONE 4 MG/1
4 TABLET ORAL 3 TIMES DAILY
Status: DISCONTINUED | OUTPATIENT
Start: 2023-01-01 | End: 2023-01-01 | Stop reason: HOSPADM

## 2023-01-01 RX ORDER — MORPHINE SULFATE 30 MG/1
30 TABLET, FILM COATED, EXTENDED RELEASE ORAL EVERY 12 HOURS
Qty: 28 TABLET | Refills: 0 | Status: SHIPPED | OUTPATIENT
Start: 2023-01-01 | End: 2023-01-01

## 2023-01-01 RX ORDER — ALBUTEROL SULFATE 90 UG/1
AEROSOL, METERED RESPIRATORY (INHALATION)
Qty: 18 G | Refills: 1 | Status: SHIPPED | OUTPATIENT
Start: 2023-01-01 | End: 2023-01-01

## 2023-01-01 RX ORDER — ALBUTEROL SULFATE 0.83 MG/ML
2.5 SOLUTION RESPIRATORY (INHALATION) ONCE
Status: COMPLETED | OUTPATIENT
Start: 2023-01-01 | End: 2023-01-01

## 2023-01-01 RX ORDER — HYDROCODONE BITARTRATE AND ACETAMINOPHEN 5; 325 MG/1; MG/1
TABLET ORAL
Qty: 120 TABLET | Refills: 0 | Status: SHIPPED | OUTPATIENT
Start: 2023-01-01 | End: 2023-01-01

## 2023-01-01 RX ORDER — ONDANSETRON 2 MG/ML
4 INJECTION INTRAMUSCULAR; INTRAVENOUS ONCE
Status: COMPLETED | OUTPATIENT
Start: 2023-01-01 | End: 2023-01-01

## 2023-01-01 RX ORDER — OXYCODONE HYDROCHLORIDE 5 MG/1
20 TABLET ORAL ONCE
Status: COMPLETED | OUTPATIENT
Start: 2023-01-01 | End: 2023-01-01

## 2023-01-01 RX ORDER — KETOROLAC TROMETHAMINE 30 MG/ML
30 INJECTION, SOLUTION INTRAMUSCULAR; INTRAVENOUS ONCE
Status: COMPLETED | OUTPATIENT
Start: 2023-01-01 | End: 2023-01-01

## 2023-01-01 RX ORDER — HEPARIN SODIUM 10000 [USP'U]/100ML
100-1000 INJECTION, SOLUTION INTRAVENOUS CONTINUOUS PRN
Status: DISCONTINUED | OUTPATIENT
Start: 2023-01-01 | End: 2023-01-01 | Stop reason: HOSPADM

## 2023-01-01 RX ORDER — SODIUM CHLORIDE 9 MG/ML
INJECTION, SOLUTION INTRAVENOUS CONTINUOUS
Status: DISCONTINUED | OUTPATIENT
Start: 2023-01-01 | End: 2023-01-01

## 2023-01-01 RX ORDER — AMOXICILLIN 500 MG/1
CAPSULE ORAL
Qty: 4 CAPSULE | Refills: 0 | Status: SHIPPED | OUTPATIENT
Start: 2023-01-01 | End: 2023-01-01

## 2023-01-01 RX ORDER — OXYCODONE HYDROCHLORIDE 5 MG/1
10 TABLET ORAL EVERY 4 HOURS PRN
Status: COMPLETED | OUTPATIENT
Start: 2023-01-01 | End: 2023-01-01

## 2023-01-01 RX ORDER — NITROGLYCERIN 0.4 MG/1
0.4 TABLET SUBLINGUAL EVERY 5 MIN PRN
Status: DISCONTINUED | OUTPATIENT
Start: 2023-01-01 | End: 2023-01-01 | Stop reason: HOSPADM

## 2023-01-01 RX ORDER — METOPROLOL SUCCINATE 25 MG/1
25 TABLET, EXTENDED RELEASE ORAL EVERY EVENING
Status: DISCONTINUED | OUTPATIENT
Start: 2023-01-01 | End: 2023-01-01

## 2023-01-01 RX ORDER — HYDROCODONE BITARTRATE AND ACETAMINOPHEN 5; 325 MG/1; MG/1
TABLET ORAL
Qty: 120 TABLET | Refills: 0 | OUTPATIENT
Start: 2023-01-01

## 2023-01-01 RX ORDER — ROSUVASTATIN CALCIUM 20 MG/1
40 TABLET, COATED ORAL DAILY
Status: DISCONTINUED | OUTPATIENT
Start: 2023-01-01 | End: 2023-01-01 | Stop reason: HOSPADM

## 2023-01-01 RX ORDER — OXYCODONE HYDROCHLORIDE 5 MG/1
5 TABLET ORAL
Qty: 60 TABLET | Refills: 0 | Status: SHIPPED | OUTPATIENT
Start: 2023-01-01 | End: 2023-01-01

## 2023-01-01 RX ORDER — OXYCODONE HYDROCHLORIDE 5 MG/1
20 TABLET ORAL
Status: DISCONTINUED | OUTPATIENT
Start: 2023-01-01 | End: 2023-01-01 | Stop reason: HOSPADM

## 2023-01-01 RX ORDER — LORAZEPAM 0.5 MG/1
0.5 TABLET ORAL EVERY 6 HOURS PRN
Qty: 8 TABLET | Refills: 0 | Status: SHIPPED | OUTPATIENT
Start: 2023-01-01

## 2023-01-01 RX ORDER — DOXYCYCLINE 100 MG/1
100 CAPSULE ORAL 2 TIMES DAILY
Qty: 20 CAPSULE | Refills: 0 | OUTPATIENT
Start: 2023-01-01 | End: 2024-08-08

## 2023-01-01 RX ORDER — NITROGLYCERIN 5 MG/ML
VIAL (ML) INTRAVENOUS
Status: DISCONTINUED | OUTPATIENT
Start: 2023-01-01 | End: 2023-01-01 | Stop reason: HOSPADM

## 2023-01-01 RX ORDER — TIOTROPIUM BROMIDE 18 UG/1
CAPSULE ORAL; RESPIRATORY (INHALATION)
Qty: 90 CAPSULE | Refills: 0 | Status: SHIPPED | OUTPATIENT
Start: 2023-01-01

## 2023-01-01 RX ORDER — HYDROCODONE BITARTRATE AND ACETAMINOPHEN 5; 325 MG/1; MG/1
1 TABLET ORAL EVERY 6 HOURS PRN
Qty: 40 TABLET | Refills: 0 | Status: SHIPPED | OUTPATIENT
Start: 2023-01-01 | End: 2023-01-01

## 2023-01-01 RX ORDER — ZOLPIDEM TARTRATE 10 MG/1
TABLET ORAL
Qty: 30 TABLET | Refills: 2 | Status: CANCELLED | OUTPATIENT
Start: 2023-01-01

## 2023-01-01 RX ORDER — ZOLPIDEM TARTRATE 5 MG/1
5 TABLET ORAL
Status: DISCONTINUED | OUTPATIENT
Start: 2023-01-01 | End: 2023-01-01 | Stop reason: HOSPADM

## 2023-01-01 RX ORDER — DEXAMETHASONE 4 MG/1
4 TABLET ORAL 2 TIMES DAILY WITH MEALS
Qty: 14 TABLET | Refills: 0 | Status: SHIPPED | OUTPATIENT
Start: 2023-01-01 | End: 2023-01-01

## 2023-01-01 RX ORDER — POLYETHYLENE GLYCOL 3350 17 G/17G
17 POWDER, FOR SOLUTION ORAL DAILY
Status: DISCONTINUED | OUTPATIENT
Start: 2023-01-01 | End: 2023-01-01 | Stop reason: HOSPADM

## 2023-01-01 RX ORDER — SENNOSIDES 8.6 MG
8.6 TABLET ORAL 2 TIMES DAILY
Status: DISCONTINUED | OUTPATIENT
Start: 2023-01-01 | End: 2023-01-01 | Stop reason: HOSPADM

## 2023-01-01 RX ORDER — BUPROPION HYDROCHLORIDE 300 MG/1
300 TABLET ORAL EVERY MORNING
Qty: 90 TABLET | Refills: 1 | Status: SHIPPED | OUTPATIENT
Start: 2023-01-01 | End: 2023-01-01

## 2023-01-01 RX ORDER — METOPROLOL TARTRATE 1 MG/ML
5 INJECTION, SOLUTION INTRAVENOUS ONCE
Status: COMPLETED | OUTPATIENT
Start: 2023-01-01 | End: 2023-01-01

## 2023-01-01 RX ORDER — PIPERACILLIN SODIUM, TAZOBACTAM SODIUM 3; .375 G/15ML; G/15ML
3.38 INJECTION, POWDER, LYOPHILIZED, FOR SOLUTION INTRAVENOUS EVERY 6 HOURS
Status: DISCONTINUED | OUTPATIENT
Start: 2023-01-01 | End: 2023-01-01 | Stop reason: HOSPADM

## 2023-01-01 RX ORDER — METOPROLOL SUCCINATE 25 MG/1
12.5 TABLET, EXTENDED RELEASE ORAL EVERY EVENING
Qty: 15 TABLET | Refills: 3 | Status: SHIPPED | OUTPATIENT
Start: 2023-01-01 | End: 2023-01-01

## 2023-01-01 RX ORDER — PREDNISONE 20 MG/1
TABLET ORAL
Qty: 10 TABLET | Refills: 0 | Status: SHIPPED | OUTPATIENT
Start: 2023-01-01 | End: 2023-01-01

## 2023-01-01 RX ORDER — OXYCODONE AND ACETAMINOPHEN 5; 325 MG/1; MG/1
1 TABLET ORAL EVERY 6 HOURS PRN
Qty: 6 TABLET | Refills: 0 | Status: SHIPPED | OUTPATIENT
Start: 2023-01-01 | End: 2023-01-01

## 2023-01-01 RX ORDER — ALBUTEROL SULFATE 0.83 MG/ML
2.5 SOLUTION RESPIRATORY (INHALATION) EVERY 4 HOURS PRN
Status: DISCONTINUED | OUTPATIENT
Start: 2023-01-01 | End: 2023-01-01 | Stop reason: HOSPADM

## 2023-01-01 RX ORDER — FENTANYL CITRATE 50 UG/ML
25-50 INJECTION, SOLUTION INTRAMUSCULAR; INTRAVENOUS EVERY 5 MIN PRN
Status: DISCONTINUED | OUTPATIENT
Start: 2023-01-01 | End: 2023-01-01 | Stop reason: HOSPADM

## 2023-01-01 RX ORDER — CYCLOBENZAPRINE HCL 5 MG
5 TABLET ORAL 3 TIMES DAILY PRN
Qty: 30 TABLET | Refills: 0 | Status: SHIPPED | OUTPATIENT
Start: 2023-01-01 | End: 2023-01-01

## 2023-01-01 RX ORDER — ONDANSETRON 4 MG/1
4 TABLET, ORALLY DISINTEGRATING ORAL EVERY 6 HOURS PRN
Status: DISCONTINUED | OUTPATIENT
Start: 2023-01-01 | End: 2023-01-01

## 2023-01-01 RX ORDER — OXYCODONE HYDROCHLORIDE 10 MG/1
10-20 TABLET ORAL
Qty: 120 TABLET | Refills: 0 | Status: SHIPPED | OUTPATIENT
Start: 2023-01-01 | End: 2023-01-01

## 2023-01-01 RX ORDER — METOPROLOL SUCCINATE 25 MG/1
25 TABLET, EXTENDED RELEASE ORAL DAILY
Qty: 90 TABLET | Refills: 3 | Status: SHIPPED | OUTPATIENT
Start: 2023-01-01 | End: 2023-01-01

## 2023-01-01 RX ORDER — IOPAMIDOL 755 MG/ML
53 INJECTION, SOLUTION INTRAVASCULAR ONCE
Status: COMPLETED | OUTPATIENT
Start: 2023-01-01 | End: 2023-01-01

## 2023-01-01 RX ORDER — ROSUVASTATIN CALCIUM 40 MG/1
40 TABLET, COATED ORAL DAILY
Qty: 90 TABLET | Refills: 3 | Status: SHIPPED | OUTPATIENT
Start: 2023-01-01

## 2023-01-01 RX ORDER — IOPAMIDOL 755 MG/ML
INJECTION, SOLUTION INTRAVASCULAR
Status: DISCONTINUED | OUTPATIENT
Start: 2023-01-01 | End: 2023-01-01 | Stop reason: HOSPADM

## 2023-01-01 RX ORDER — METOPROLOL SUCCINATE 25 MG/1
25 TABLET, EXTENDED RELEASE ORAL EVERY EVENING
Qty: 15 TABLET | Refills: 3 | Status: SHIPPED | OUTPATIENT
Start: 2023-01-01

## 2023-01-01 RX ORDER — HYDROMORPHONE HYDROCHLORIDE 2 MG/1
4 TABLET ORAL EVERY 6 HOURS PRN
Qty: 10 TABLET | Refills: 0 | Status: SHIPPED | OUTPATIENT
Start: 2023-01-01 | End: 2023-01-01

## 2023-01-01 RX ORDER — ATROPINE SULFATE 0.1 MG/ML
0.5 INJECTION INTRAVENOUS
Status: DISCONTINUED | OUTPATIENT
Start: 2023-01-01 | End: 2023-01-01 | Stop reason: HOSPADM

## 2023-01-01 RX ORDER — AMOXICILLIN 250 MG
2 CAPSULE ORAL AT BEDTIME
Status: DISCONTINUED | OUTPATIENT
Start: 2023-01-01 | End: 2023-01-01

## 2023-01-01 RX ORDER — METOPROLOL SUCCINATE 25 MG/1
25 TABLET, EXTENDED RELEASE ORAL EVERY EVENING
Status: DISCONTINUED | OUTPATIENT
Start: 2023-01-01 | End: 2023-01-01 | Stop reason: HOSPADM

## 2023-01-01 RX ORDER — FENTANYL CITRATE 50 UG/ML
INJECTION, SOLUTION INTRAMUSCULAR; INTRAVENOUS
Status: DISCONTINUED | OUTPATIENT
Start: 2023-01-01 | End: 2023-01-01 | Stop reason: HOSPADM

## 2023-01-01 RX ORDER — SODIUM CHLORIDE 9 MG/ML
INJECTION, SOLUTION INTRAVENOUS CONTINUOUS
Status: CANCELLED | OUTPATIENT
Start: 2023-01-01

## 2023-01-01 RX ORDER — ONDANSETRON 2 MG/ML
4 INJECTION INTRAMUSCULAR; INTRAVENOUS EVERY 6 HOURS PRN
Status: DISCONTINUED | OUTPATIENT
Start: 2023-01-01 | End: 2023-01-01

## 2023-01-01 RX ORDER — LIDOCAINE 40 MG/G
CREAM TOPICAL
Status: CANCELLED | OUTPATIENT
Start: 2023-01-01

## 2023-01-01 RX ORDER — METHADONE HYDROCHLORIDE 5 MG/1
5 TABLET ORAL EVERY 12 HOURS
Qty: 60 TABLET | Refills: 0 | Status: SHIPPED | OUTPATIENT
Start: 2023-01-01

## 2023-01-01 RX ORDER — GADOBUTROL 604.72 MG/ML
5 INJECTION INTRAVENOUS ONCE
Status: COMPLETED | OUTPATIENT
Start: 2023-01-01 | End: 2023-01-01

## 2023-01-01 RX ORDER — LORAZEPAM 0.5 MG/1
0.5 TABLET ORAL EVERY 6 HOURS PRN
Status: DISCONTINUED | OUTPATIENT
Start: 2023-01-01 | End: 2023-01-01 | Stop reason: HOSPADM

## 2023-01-01 RX ORDER — OXYCODONE HYDROCHLORIDE 5 MG/1
5-10 TABLET ORAL EVERY 6 HOURS PRN
Qty: 186 TABLET | Refills: 0 | Status: SHIPPED | OUTPATIENT
Start: 2023-01-01 | End: 2023-01-01

## 2023-01-01 RX ORDER — HEPARIN SODIUM 1000 [USP'U]/ML
INJECTION, SOLUTION INTRAVENOUS; SUBCUTANEOUS
Status: DISCONTINUED | OUTPATIENT
Start: 2023-01-01 | End: 2023-01-01 | Stop reason: HOSPADM

## 2023-01-01 RX ORDER — ALBUTEROL SULFATE 90 UG/1
AEROSOL, METERED RESPIRATORY (INHALATION)
Qty: 18 G | Refills: 1 | Status: SHIPPED | OUTPATIENT
Start: 2023-01-01

## 2023-01-01 RX ORDER — HYDROMORPHONE HYDROCHLORIDE 1 MG/ML
.3-.5 INJECTION, SOLUTION INTRAMUSCULAR; INTRAVENOUS; SUBCUTANEOUS
Status: DISCONTINUED | OUTPATIENT
Start: 2023-01-01 | End: 2023-01-01

## 2023-01-01 RX ORDER — METOPROLOL TARTRATE 1 MG/ML
5 INJECTION, SOLUTION INTRAVENOUS
Status: DISCONTINUED | OUTPATIENT
Start: 2023-01-01 | End: 2023-01-01 | Stop reason: HOSPADM

## 2023-01-01 RX ORDER — BUPROPION HYDROCHLORIDE 300 MG/1
300 TABLET ORAL EVERY MORNING
Qty: 90 TABLET | Refills: 0 | Status: SHIPPED | OUTPATIENT
Start: 2023-01-01 | End: 2023-01-01

## 2023-01-01 RX ORDER — CLOPIDOGREL BISULFATE 75 MG/1
75 TABLET ORAL DAILY
Status: DISCONTINUED | OUTPATIENT
Start: 2023-01-01 | End: 2023-01-01 | Stop reason: HOSPADM

## 2023-01-01 RX ORDER — HYDROMORPHONE HYDROCHLORIDE 1 MG/ML
0.5 INJECTION, SOLUTION INTRAMUSCULAR; INTRAVENOUS; SUBCUTANEOUS
Status: DISCONTINUED | OUTPATIENT
Start: 2023-01-01 | End: 2023-01-01 | Stop reason: HOSPADM

## 2023-01-01 RX ADMIN — ONDANSETRON 4 MG: 4 TABLET, ORALLY DISINTEGRATING ORAL at 01:09

## 2023-01-01 RX ADMIN — SODIUM CHLORIDE 500 ML: 9 INJECTION, SOLUTION INTRAVENOUS at 13:49

## 2023-01-01 RX ADMIN — HYDROMORPHONE HYDROCHLORIDE 0.5 MG: 1 INJECTION, SOLUTION INTRAMUSCULAR; INTRAVENOUS; SUBCUTANEOUS at 05:39

## 2023-01-01 RX ADMIN — POLYETHYLENE GLYCOL 3350 17 G: 17 POWDER, FOR SOLUTION ORAL at 09:30

## 2023-01-01 RX ADMIN — FERROUS GLUCONATE 324 MG: 324 TABLET ORAL at 09:31

## 2023-01-01 RX ADMIN — OXYCODONE HYDROCHLORIDE 20 MG: 5 TABLET ORAL at 14:07

## 2023-01-01 RX ADMIN — PIPERACILLIN AND TAZOBACTAM 3.38 G: 3; .375 INJECTION, POWDER, FOR SOLUTION INTRAVENOUS at 15:31

## 2023-01-01 RX ADMIN — PIPERACILLIN AND TAZOBACTAM 3.38 G: 3; .375 INJECTION, POWDER, FOR SOLUTION INTRAVENOUS at 22:07

## 2023-01-01 RX ADMIN — PIPERACILLIN AND TAZOBACTAM 3.38 G: 3; .375 INJECTION, POWDER, FOR SOLUTION INTRAVENOUS at 08:45

## 2023-01-01 RX ADMIN — SENNOSIDES 8.6 MG: 8.6 TABLET, FILM COATED ORAL at 22:07

## 2023-01-01 RX ADMIN — DEXAMETHASONE 4 MG: 4 TABLET ORAL at 09:30

## 2023-01-01 RX ADMIN — OXYCODONE HYDROCHLORIDE 10 MG: 5 TABLET ORAL at 12:44

## 2023-01-01 RX ADMIN — PANTOPRAZOLE SODIUM 40 MG: 40 TABLET, DELAYED RELEASE ORAL at 09:30

## 2023-01-01 RX ADMIN — Medication 15 G: at 09:36

## 2023-01-01 RX ADMIN — LORAZEPAM 0.5 MG: 0.5 TABLET ORAL at 23:18

## 2023-01-01 RX ADMIN — MIDAZOLAM HYDROCHLORIDE 1 MG: 1 INJECTION, SOLUTION INTRAMUSCULAR; INTRAVENOUS at 09:30

## 2023-01-01 RX ADMIN — PIPERACILLIN AND TAZOBACTAM 3.38 G: 3; .375 INJECTION, POWDER, FOR SOLUTION INTRAVENOUS at 03:16

## 2023-01-01 RX ADMIN — BISACODYL 10 MG: 10 SUPPOSITORY RECTAL at 15:26

## 2023-01-01 RX ADMIN — SENNOSIDES AND DOCUSATE SODIUM 2 TABLET: 50; 8.6 TABLET ORAL at 00:11

## 2023-01-01 RX ADMIN — FLUDEOXYGLUCOSE F-18 9.92 MILLICURIE: 500 INJECTION, SOLUTION INTRAVENOUS at 08:29

## 2023-01-01 RX ADMIN — HUMAN ALBUMIN MICROSPHERES AND PERFLUTREN 2 ML: 10; .22 INJECTION, SOLUTION INTRAVENOUS at 10:50

## 2023-01-01 RX ADMIN — ALBUTEROL SULFATE 2.5 MG: 2.5 SOLUTION RESPIRATORY (INHALATION) at 12:35

## 2023-01-01 RX ADMIN — HYDROMORPHONE HYDROCHLORIDE 0.5 MG: 1 INJECTION, SOLUTION INTRAMUSCULAR; INTRAVENOUS; SUBCUTANEOUS at 22:12

## 2023-01-01 RX ADMIN — ONDANSETRON 4 MG: 2 INJECTION INTRAMUSCULAR; INTRAVENOUS at 18:07

## 2023-01-01 RX ADMIN — PANTOPRAZOLE SODIUM 40 MG: 40 TABLET, DELAYED RELEASE ORAL at 08:44

## 2023-01-01 RX ADMIN — FENTANYL CITRATE 50 MCG: 50 INJECTION, SOLUTION INTRAMUSCULAR; INTRAVENOUS at 09:34

## 2023-01-01 RX ADMIN — PANTOPRAZOLE SODIUM 40 MG: 40 TABLET, DELAYED RELEASE ORAL at 06:35

## 2023-01-01 RX ADMIN — Medication 15 G: at 17:48

## 2023-01-01 RX ADMIN — ZOLPIDEM TARTRATE 5 MG: 5 TABLET ORAL at 23:27

## 2023-01-01 RX ADMIN — Medication 15 G: at 12:23

## 2023-01-01 RX ADMIN — ASPIRIN 81 MG: 81 TABLET, COATED ORAL at 09:31

## 2023-01-01 RX ADMIN — PIPERACILLIN AND TAZOBACTAM 3.38 G: 3; .375 INJECTION, POWDER, FOR SOLUTION INTRAVENOUS at 20:49

## 2023-01-01 RX ADMIN — METHADONE HYDROCHLORIDE 5 MG: 5 TABLET ORAL at 00:16

## 2023-01-01 RX ADMIN — OXYCODONE HYDROCHLORIDE 15 MG: 5 TABLET ORAL at 17:14

## 2023-01-01 RX ADMIN — Medication 15 G: at 12:20

## 2023-01-01 RX ADMIN — Medication 15 G: at 23:24

## 2023-01-01 RX ADMIN — ASPIRIN 81 MG: 81 TABLET, COATED ORAL at 08:43

## 2023-01-01 RX ADMIN — SENNOSIDES 8.6 MG: 8.6 TABLET, FILM COATED ORAL at 20:48

## 2023-01-01 RX ADMIN — GADOBUTROL 5 ML: 604.72 INJECTION INTRAVENOUS at 15:37

## 2023-01-01 RX ADMIN — METOPROLOL TARTRATE 5 MG: 5 INJECTION INTRAVENOUS at 02:47

## 2023-01-01 RX ADMIN — ASPIRIN 81 MG: 81 TABLET, COATED ORAL at 07:56

## 2023-01-01 RX ADMIN — KETOROLAC TROMETHAMINE 30 MG: 30 INJECTION, SOLUTION INTRAMUSCULAR; INTRAVENOUS at 13:17

## 2023-01-01 RX ADMIN — SODIUM CHLORIDE 1000 ML: 9 INJECTION, SOLUTION INTRAVENOUS at 17:45

## 2023-01-01 RX ADMIN — DEXAMETHASONE 4 MG: 4 TABLET ORAL at 22:08

## 2023-01-01 RX ADMIN — GADOBUTROL 5 ML: 604.72 INJECTION INTRAVENOUS at 19:33

## 2023-01-01 RX ADMIN — CLOPIDOGREL BISULFATE 75 MG: 75 TABLET ORAL at 07:55

## 2023-01-01 RX ADMIN — LORAZEPAM 0.5 MG: 2 INJECTION INTRAMUSCULAR; INTRAVENOUS at 03:58

## 2023-01-01 RX ADMIN — OXYCODONE HYDROCHLORIDE 10 MG: 5 TABLET ORAL at 12:27

## 2023-01-01 RX ADMIN — DEXAMETHASONE 4 MG: 4 TABLET ORAL at 13:55

## 2023-01-01 RX ADMIN — PIPERACILLIN AND TAZOBACTAM 3.38 G: 3; .375 INJECTION, POWDER, FOR SOLUTION INTRAVENOUS at 21:41

## 2023-01-01 RX ADMIN — HYDROMORPHONE HYDROCHLORIDE 2 MG: 2 TABLET ORAL at 19:14

## 2023-01-01 RX ADMIN — PIPERACILLIN AND TAZOBACTAM 3.38 G: 3; .375 INJECTION, POWDER, FOR SOLUTION INTRAVENOUS at 02:47

## 2023-01-01 RX ADMIN — METHADONE HYDROCHLORIDE 5 MG: 5 TABLET ORAL at 07:56

## 2023-01-01 RX ADMIN — SODIUM CHLORIDE 53 ML: 9 INJECTION, SOLUTION INTRAVENOUS at 18:15

## 2023-01-01 RX ADMIN — DEXAMETHASONE 4 MG: 4 TABLET ORAL at 22:24

## 2023-01-01 RX ADMIN — HYDROMORPHONE HYDROCHLORIDE 0.5 MG: 1 INJECTION, SOLUTION INTRAMUSCULAR; INTRAVENOUS; SUBCUTANEOUS at 02:36

## 2023-01-01 RX ADMIN — METHADONE HYDROCHLORIDE 5 MG: 5 TABLET ORAL at 23:18

## 2023-01-01 RX ADMIN — DEXAMETHASONE 4 MG: 4 TABLET ORAL at 07:55

## 2023-01-01 RX ADMIN — IOPAMIDOL 53 ML: 755 INJECTION, SOLUTION INTRAVENOUS at 18:14

## 2023-01-01 RX ADMIN — METOPROLOL SUCCINATE 25 MG: 25 TABLET, EXTENDED RELEASE ORAL at 17:48

## 2023-01-01 RX ADMIN — OXYCODONE HYDROCHLORIDE 10 MG: 5 TABLET ORAL at 11:27

## 2023-01-01 RX ADMIN — DEXAMETHASONE 4 MG: 4 TABLET ORAL at 15:26

## 2023-01-01 RX ADMIN — Medication 15 G: at 16:43

## 2023-01-01 RX ADMIN — METHADONE HYDROCHLORIDE 5 MG: 5 TABLET ORAL at 08:43

## 2023-01-01 RX ADMIN — PIPERACILLIN AND TAZOBACTAM 3.38 G: 3; .375 INJECTION, POWDER, FOR SOLUTION INTRAVENOUS at 14:57

## 2023-01-01 RX ADMIN — POLYETHYLENE GLYCOL 3350 17 G: 17 POWDER, FOR SOLUTION ORAL at 07:55

## 2023-01-01 RX ADMIN — CLOPIDOGREL BISULFATE 75 MG: 75 TABLET ORAL at 09:30

## 2023-01-01 RX ADMIN — Medication 15 G: at 07:56

## 2023-01-01 RX ADMIN — DEXAMETHASONE 4 MG: 4 TABLET ORAL at 20:48

## 2023-01-01 RX ADMIN — SODIUM CHLORIDE: 9 INJECTION, SOLUTION INTRAVENOUS at 06:58

## 2023-01-01 RX ADMIN — Medication 15 G: at 08:45

## 2023-01-01 RX ADMIN — DEXAMETHASONE 4 MG: 4 TABLET ORAL at 14:57

## 2023-01-01 RX ADMIN — MORPHINE SULFATE 30 MG: 30 TABLET, FILM COATED, EXTENDED RELEASE ORAL at 09:31

## 2023-01-01 RX ADMIN — CLOPIDOGREL BISULFATE 75 MG: 75 TABLET ORAL at 08:44

## 2023-01-01 RX ADMIN — OXYCODONE HYDROCHLORIDE 10 MG: 5 TABLET ORAL at 10:26

## 2023-01-01 RX ADMIN — SENNOSIDES 8.6 MG: 8.6 TABLET, FILM COATED ORAL at 07:56

## 2023-01-01 RX ADMIN — HYDROMORPHONE HYDROCHLORIDE 0.5 MG: 1 INJECTION, SOLUTION INTRAMUSCULAR; INTRAVENOUS; SUBCUTANEOUS at 14:59

## 2023-01-01 RX ADMIN — HYDROMORPHONE HYDROCHLORIDE 0.5 MG: 1 INJECTION, SOLUTION INTRAMUSCULAR; INTRAVENOUS; SUBCUTANEOUS at 00:12

## 2023-01-01 RX ADMIN — FENTANYL CITRATE 50 MCG: 50 INJECTION, SOLUTION INTRAMUSCULAR; INTRAVENOUS at 09:59

## 2023-01-01 RX ADMIN — METHADONE HYDROCHLORIDE 5 MG: 5 TABLET ORAL at 16:43

## 2023-01-01 RX ADMIN — METOPROLOL SUCCINATE 25 MG: 25 TABLET, EXTENDED RELEASE ORAL at 16:43

## 2023-01-01 RX ADMIN — METOPROLOL SUCCINATE 25 MG: 25 TABLET, EXTENDED RELEASE ORAL at 22:24

## 2023-01-01 RX ADMIN — MORPHINE SULFATE 30 MG: 30 TABLET, FILM COATED, EXTENDED RELEASE ORAL at 00:58

## 2023-01-01 RX ADMIN — PIPERACILLIN AND TAZOBACTAM 3.38 G: 3; .375 INJECTION, POWDER, FOR SOLUTION INTRAVENOUS at 10:21

## 2023-01-01 RX ADMIN — HYDROMORPHONE HYDROCHLORIDE 0.5 MG: 1 INJECTION, SOLUTION INTRAMUSCULAR; INTRAVENOUS; SUBCUTANEOUS at 16:50

## 2023-01-01 RX ADMIN — FERROUS GLUCONATE 324 MG: 324 TABLET ORAL at 07:56

## 2023-01-01 RX ADMIN — DEXAMETHASONE 4 MG: 4 TABLET ORAL at 08:43

## 2023-01-01 RX ADMIN — HYDROMORPHONE HYDROCHLORIDE 0.5 MG: 1 INJECTION, SOLUTION INTRAMUSCULAR; INTRAVENOUS; SUBCUTANEOUS at 13:50

## 2023-01-01 RX ADMIN — METHADONE HYDROCHLORIDE 5 MG: 5 TABLET ORAL at 15:57

## 2023-01-01 RX ADMIN — FERROUS GLUCONATE 324 MG: 324 TABLET ORAL at 08:44

## 2023-01-01 RX ADMIN — PIPERACILLIN AND TAZOBACTAM 3.38 G: 3; .375 INJECTION, POWDER, FOR SOLUTION INTRAVENOUS at 03:28

## 2023-01-01 RX ADMIN — SODIUM CHLORIDE 50 ML: 9 INJECTION, SOLUTION INTRAVENOUS at 21:21

## 2023-01-01 RX ADMIN — Medication 15 G: at 22:08

## 2023-01-01 RX ADMIN — PIPERACILLIN AND TAZOBACTAM 3.38 G: 3; .375 INJECTION, POWDER, FOR SOLUTION INTRAVENOUS at 09:30

## 2023-01-01 ASSESSMENT — ACTIVITIES OF DAILY LIVING (ADL)
ADLS_ACUITY_SCORE: 36
ADLS_ACUITY_SCORE: 35
ADLS_ACUITY_SCORE: 36
FALL_HISTORY_WITHIN_LAST_SIX_MONTHS: YES
ADLS_ACUITY_SCORE: 36
ADLS_ACUITY_SCORE: 35
ADLS_ACUITY_SCORE: 35
ADLS_ACUITY_SCORE: 34
DOING_ERRANDS_INDEPENDENTLY_DIFFICULTY: YES
ADLS_ACUITY_SCORE: 36
DIFFICULTY_EATING/SWALLOWING: YES
ADLS_ACUITY_SCORE: 36
ADLS_ACUITY_SCORE: 36
ADLS_ACUITY_SCORE: 35
ADLS_ACUITY_SCORE: 36
DEPENDENT_IADLS:: CLEANING;COOKING;LAUNDRY;SHOPPING;MEAL PREPARATION;MEDICATION MANAGEMENT;MONEY MANAGEMENT;TRANSPORTATION
ADLS_ACUITY_SCORE: 36
ADLS_ACUITY_SCORE: 33
ADLS_ACUITY_SCORE: 33
ADLS_ACUITY_SCORE: 35
ADLS_ACUITY_SCORE: 36
ADLS_ACUITY_SCORE: 34
CHANGE_IN_FUNCTIONAL_STATUS_SINCE_ONSET_OF_CURRENT_ILLNESS/INJURY: YES
ADLS_ACUITY_SCORE: 39
WALKING_OR_CLIMBING_STAIRS: AMBULATION DIFFICULTY, ASSISTANCE 1 PERSON
ADLS_ACUITY_SCORE: 35
CONCENTRATING,_REMEMBERING_OR_MAKING_DECISIONS_DIFFICULTY: NO
EATING/SWALLOWING_MANAGEMENT: DECREASED APPETITE
EATING/SWALLOWING: EATING
ADLS_ACUITY_SCORE: 36
TOILETING_ISSUES: NO
ADLS_ACUITY_SCORE: 35
NUMBER_OF_TIMES_PATIENT_HAS_FALLEN_WITHIN_LAST_SIX_MONTHS: 1
ADLS_ACUITY_SCORE: 39
ADLS_ACUITY_SCORE: 36
ADLS_ACUITY_SCORE: 34
ADLS_ACUITY_SCORE: 36
ADLS_ACUITY_SCORE: 35
ADLS_ACUITY_SCORE: 36
ADLS_ACUITY_SCORE: 33
ADLS_ACUITY_SCORE: 33
ADLS_ACUITY_SCORE: 36
WEAR_GLASSES_OR_BLIND: YES
DRESSING/BATHING_DIFFICULTY: NO
VISION_MANAGEMENT: GLASSES
ADLS_ACUITY_SCORE: 35
ADLS_ACUITY_SCORE: 35
ADLS_ACUITY_SCORE: 41
ADLS_ACUITY_SCORE: 36
WALKING_OR_CLIMBING_STAIRS_DIFFICULTY: YES

## 2023-01-01 ASSESSMENT — PATIENT HEALTH QUESTIONNAIRE - PHQ9
SUM OF ALL RESPONSES TO PHQ QUESTIONS 1-9: 7
10. IF YOU CHECKED OFF ANY PROBLEMS, HOW DIFFICULT HAVE THESE PROBLEMS MADE IT FOR YOU TO DO YOUR WORK, TAKE CARE OF THINGS AT HOME, OR GET ALONG WITH OTHER PEOPLE: SOMEWHAT DIFFICULT
SUM OF ALL RESPONSES TO PHQ QUESTIONS 1-9: 9
10. IF YOU CHECKED OFF ANY PROBLEMS, HOW DIFFICULT HAVE THESE PROBLEMS MADE IT FOR YOU TO DO YOUR WORK, TAKE CARE OF THINGS AT HOME, OR GET ALONG WITH OTHER PEOPLE: SOMEWHAT DIFFICULT
SUM OF ALL RESPONSES TO PHQ QUESTIONS 1-9: 7
SUM OF ALL RESPONSES TO PHQ QUESTIONS 1-9: 9
SUM OF ALL RESPONSES TO PHQ QUESTIONS 1-9: 9
10. IF YOU CHECKED OFF ANY PROBLEMS, HOW DIFFICULT HAVE THESE PROBLEMS MADE IT FOR YOU TO DO YOUR WORK, TAKE CARE OF THINGS AT HOME, OR GET ALONG WITH OTHER PEOPLE: SOMEWHAT DIFFICULT

## 2023-01-01 ASSESSMENT — PAIN SCALES - GENERAL
PAINLEVEL: NO PAIN (0)
PAINLEVEL: SEVERE PAIN (7)
PAINLEVEL: EXTREME PAIN (9)
PAINLEVEL: EXTREME PAIN (8)
PAINLEVEL: EXTREME PAIN (9)
PAINLEVEL: NO PAIN (0)
PAINLEVEL: SEVERE PAIN (6)
PAINLEVEL: NO PAIN (0)
PAINLEVEL: EXTREME PAIN (9)
PAINLEVEL: NO PAIN (0)
PAINLEVEL: WORST PAIN (10)

## 2023-01-01 ASSESSMENT — ANXIETY QUESTIONNAIRES
IF YOU CHECKED OFF ANY PROBLEMS ON THIS QUESTIONNAIRE, HOW DIFFICULT HAVE THESE PROBLEMS MADE IT FOR YOU TO DO YOUR WORK, TAKE CARE OF THINGS AT HOME, OR GET ALONG WITH OTHER PEOPLE: NOT DIFFICULT AT ALL
2. NOT BEING ABLE TO STOP OR CONTROL WORRYING: NOT AT ALL
2. NOT BEING ABLE TO STOP OR CONTROL WORRYING: SEVERAL DAYS
6. BECOMING EASILY ANNOYED OR IRRITABLE: MORE THAN HALF THE DAYS
GAD7 TOTAL SCORE: 16
1. FEELING NERVOUS, ANXIOUS, OR ON EDGE: NOT AT ALL
GAD7 TOTAL SCORE: 3
5. BEING SO RESTLESS THAT IT IS HARD TO SIT STILL: SEVERAL DAYS
6. BECOMING EASILY ANNOYED OR IRRITABLE: SEVERAL DAYS
GAD7 TOTAL SCORE: 4
4. TROUBLE RELAXING: SEVERAL DAYS
IF YOU CHECKED OFF ANY PROBLEMS ON THIS QUESTIONNAIRE, HOW DIFFICULT HAVE THESE PROBLEMS MADE IT FOR YOU TO DO YOUR WORK, TAKE CARE OF THINGS AT HOME, OR GET ALONG WITH OTHER PEOPLE: SOMEWHAT DIFFICULT
7. FEELING AFRAID AS IF SOMETHING AWFUL MIGHT HAPPEN: MORE THAN HALF THE DAYS
7. FEELING AFRAID AS IF SOMETHING AWFUL MIGHT HAPPEN: NOT AT ALL
4. TROUBLE RELAXING: SEVERAL DAYS
GAD7 TOTAL SCORE: 16
7. FEELING AFRAID AS IF SOMETHING AWFUL MIGHT HAPPEN: NOT AT ALL
3. WORRYING TOO MUCH ABOUT DIFFERENT THINGS: MORE THAN HALF THE DAYS
GAD7 TOTAL SCORE: 4
3. WORRYING TOO MUCH ABOUT DIFFERENT THINGS: SEVERAL DAYS
GAD7 TOTAL SCORE: 3
5. BEING SO RESTLESS THAT IT IS HARD TO SIT STILL: MORE THAN HALF THE DAYS
GAD7 TOTAL SCORE: 6
GAD7 TOTAL SCORE: 6
6. BECOMING EASILY ANNOYED OR IRRITABLE: SEVERAL DAYS
2. NOT BEING ABLE TO STOP OR CONTROL WORRYING: NEARLY EVERY DAY
7. FEELING AFRAID AS IF SOMETHING AWFUL MIGHT HAPPEN: NOT AT ALL
1. FEELING NERVOUS, ANXIOUS, OR ON EDGE: NOT AT ALL
1. FEELING NERVOUS, ANXIOUS, OR ON EDGE: SEVERAL DAYS
IF YOU CHECKED OFF ANY PROBLEMS ON THIS QUESTIONNAIRE, HOW DIFFICULT HAVE THESE PROBLEMS MADE IT FOR YOU TO DO YOUR WORK, TAKE CARE OF THINGS AT HOME, OR GET ALONG WITH OTHER PEOPLE: SOMEWHAT DIFFICULT
7. FEELING AFRAID AS IF SOMETHING AWFUL MIGHT HAPPEN: NOT AT ALL
8. IF YOU CHECKED OFF ANY PROBLEMS, HOW DIFFICULT HAVE THESE MADE IT FOR YOU TO DO YOUR WORK, TAKE CARE OF THINGS AT HOME, OR GET ALONG WITH OTHER PEOPLE?: NOT DIFFICULT AT ALL
4. TROUBLE RELAXING: MORE THAN HALF THE DAYS
6. BECOMING EASILY ANNOYED OR IRRITABLE: NOT AT ALL
2. NOT BEING ABLE TO STOP OR CONTROL WORRYING: NOT AT ALL
3. WORRYING TOO MUCH ABOUT DIFFERENT THINGS: NOT AT ALL
3. WORRYING TOO MUCH ABOUT DIFFERENT THINGS: SEVERAL DAYS
4. TROUBLE RELAXING: MORE THAN HALF THE DAYS
1. FEELING NERVOUS, ANXIOUS, OR ON EDGE: NEARLY EVERY DAY
IF YOU CHECKED OFF ANY PROBLEMS ON THIS QUESTIONNAIRE, HOW DIFFICULT HAVE THESE PROBLEMS MADE IT FOR YOU TO DO YOUR WORK, TAKE CARE OF THINGS AT HOME, OR GET ALONG WITH OTHER PEOPLE: NOT DIFFICULT AT ALL
7. FEELING AFRAID AS IF SOMETHING AWFUL MIGHT HAPPEN: NOT AT ALL
5. BEING SO RESTLESS THAT IT IS HARD TO SIT STILL: SEVERAL DAYS
5. BEING SO RESTLESS THAT IT IS HARD TO SIT STILL: SEVERAL DAYS
8. IF YOU CHECKED OFF ANY PROBLEMS, HOW DIFFICULT HAVE THESE MADE IT FOR YOU TO DO YOUR WORK, TAKE CARE OF THINGS AT HOME, OR GET ALONG WITH OTHER PEOPLE?: SOMEWHAT DIFFICULT

## 2023-01-01 ASSESSMENT — ENCOUNTER SYMPTOMS
GASTROINTESTINAL NEGATIVE: 1
COUGH: 1
ALLERGIC/IMMUNOLOGIC NEGATIVE: 1
CONSTIPATION: 1
FEVER: 0
HEMATURIA: 0
BACK PAIN: 1
VOMITING: 1
DIARRHEA: 0
CHILLS: 1
EYES NEGATIVE: 1
HEADACHES: 1
FLANK PAIN: 0
NEUROLOGICAL NEGATIVE: 1
WEAKNESS: 1
SPUTUM PRODUCTION: 1
PALPITATIONS: 0
CARDIOVASCULAR NEGATIVE: 1
PSYCHIATRIC NEGATIVE: 1
ORTHOPNEA: 1
DYSURIA: 0
FREQUENCY: 0
BLOOD IN STOOL: 0
HEMATOLOGIC/LYMPHATIC NEGATIVE: 1
ENDOCRINE NEGATIVE: 1
SHORTNESS OF BREATH: 1
ABDOMINAL PAIN: 1
RESPIRATORY NEGATIVE: 1
NAUSEA: 1

## 2023-01-03 NOTE — PROGRESS NOTES
Beatriz is a 72 year old who is being evaluated via a billable video visit.      How would you like to obtain your AVS? MyChart  If the video visit is dropped, the invitation should be resent by: Text to cell phone: [phone]  Will anyone else be joining your video visit? No        Assessment & Plan     (G47.00) Insomnia, unspecified type  (primary encounter diagnosis)  Comment: Do not recommend high dose of Ambien with the pain medications patient states Ambien is not effective we will not take with pain medications will need follow-up with sleep analysis for her insomnia as it is uncontrolled with current medications and medications that were tried in the past  Plan: Adult Sleep Eval & Management          Referral      (M54.2,  G89.29) Chronic neck pain  Comment:  Controlled no change in treatment plan  Plan: HYDROcodone-acetaminophen (NORCO) 5-325 MG         tablet, HYDROcodone-acetaminophen (NORCO) 5-325        MG tablet, HYDROcodone-acetaminophen (NORCO)         5-325 MG tablet      (F11.90) Chronic, continuous use of opioids  Comment:   Plan: HYDROcodone-acetaminophen (NORCO) 5-325 MG         tablet, HYDROcodone-acetaminophen (NORCO) 5-325        MG tablet, HYDROcodone-acetaminophen (NORCO)         5-325 MG tablet            (Z12.2) Screening for lung cancer  Comment:   Plan: CT Chest Lung Cancer Scrn Low Dose wo            (Z87.891) Personal history of nicotine dependence  Comment:   Plan: CT Chest Lung Cancer Scrn Low Dose wo                 No follow-ups on file.    ALEJANDRO Odonnell CNP  [facility]    Subjective   Beatriz is a 72 year old, presenting for the following health issues:  Medication Refill (Hydrocodone.)      History of Present Illness       Back Pain:  She presents for follow up of back pain. Patient's back pain is a chronic problem.  Location of back pain:  Other  Description of back pain: other  Back pain spreads: right shoulder, left shoulder,  right side of neck and left side of neck    Since patient first noticed back pain, pain is: always present, but gets better and worse  Does back pain interfere with her job:  Not applicable      She eats 0-1 servings of fruits and vegetables daily.She consumes 3 sweetened beverage(s) daily.She exercises with enough effort to increase her heart rate 10 to 19 minutes per day.  She exercises with enough effort to increase her heart rate 3 or less days per week.   She is taking medications regularly.  Today's HIRAL-7 Score: 4       Medication Followup of Hydrocodone      Taking Medication as prescribed: yes    Side Effects:  None    Medication Helping Symptoms:  yes        Review of Systems   Constitutional, HEENT, cardiovascular, pulmonary, gi and gu systems are negative, except as otherwise noted.      Objective           Vitals:  No vitals were obtained today due to virtual visit.    Physical Exam   GENERAL: Healthy, alert and no distress  EYES: Eyes grossly normal to inspection.  No discharge or erythema, or obvious scleral/conjunctival abnormalities.  RESP: No audible wheeze, cough, or visible cyanosis.  No visible retractions or increased work of breathing.    SKIN: Visible skin clear. No significant rash, abnormal pigmentation or lesions.  NEURO: Cranial nerves grossly intact.  Mentation and speech appropriate for age.  PSYCH: Mentation appears normal, affect normal/bright, judgement and insight intact, normal speech and appearance well-groomed.    No results found for any visits on 01/03/23.            Video-Visit Details    Type of service:  Video Visit   Video Start Time: 2:15  Video End Time:2:35     Originating Location (pt. Location): Home  Distant Location (provider location):    Platform used for Video Visit: [employer]

## 2023-01-10 PROBLEM — R91.8 PULMONARY NODULES: Status: ACTIVE | Noted: 2023-01-01

## 2023-01-10 NOTE — TELEPHONE ENCOUNTER
Patient's lung CT did come back with the nodules being followed by the nodule clinic they will have patient be seen.  Incidental finding was that she is severe coronary artery disease will obtain CT calcium scan ultrasound of the carotids and increase her cholesterol medication to 40 mg of Crestor encouraged smoking sensation.  Jesusita Franco CNP

## 2023-01-10 NOTE — PROGRESS NOTES
New Patient: Interventional Pulmonary (Lung nodule) Nurse Navigator Note    Referring provider:   Jesusita Franco APRN CNP [facility]     Referred to (specialty): Interventional Pulmonary (Lung nodule)    Requested provider (if applicable): n/a    Date Referral Received: 1/10/2023    Evaluation for :  Lung nodule-Lung RADS 4B,  New 10 x 5 mm nodule    Clinical History (per Nurse review of records provided):  1/9/2023:  CT Chest Lung Cancer Scrn Low Dose wo   IMPRESSION:   1. ACR Assessment Category:  Lung-RADS Category 4B. Suspicious.  Recommendation:  Chest CT with contrast  (please use exam code IMG  202) or without contrast (please use exam code ), PET/CT and/or  tissue sampling depending on the probability of malignancy and comorbidities, PET/CT may be used when there is a >/= 8 mm solid component.   2. Significant Incidental Finding(s):  Category S: No.    Records Location (Care Everywhere, Media, etc.): [facility]     Records Needed: none    Additional testing needed prior to consult: PFT's

## 2023-01-10 NOTE — TELEPHONE ENCOUNTER
[facility] Radiology Lung Cancer Screening CT result notification:     LDCT/Lung Cancer Screening CT Exam date: 1/9/23  Radiologist Impression AND Recommendations:   1. ACR Assessment Category:  Lung-RADS Category 4B. Suspicious.  Recommendation:  Chest CT with contrast  (please use exam code IMG  202) or without contrast (please use exam code ), PET/CT and/or  tissue sampling depending on the probability of malignancy and  comorbidities, PET/CT may be used when there is a >/= 8 mm solid  component.    Pertinent Findings:  FINDINGS:  New 10 x 5 mm nodule in the right upper lobe on image 67 of  series 3. This has hazy margins suggestive of an infectious or  inflammatory process.     Lungs: Mild emphysema through the upper lungs. No infiltrate or  effusion.     Ordering Provider: Jesusita Francis did not receive the remaining radiology results from their PCP.            Lung RADS 4B/4X Protocol:  Results RN to notify Patient of results/recommendations, place referral to [facility] Lung Nodule clinic within 2 to 4 weeks.  o Results RN place referral to [facility] Lung nodule program and Patient will be scheduled to see the Lung Nodule Specialist within 2 to 4 weeks (Yes/No/NA): Yes  o Relay information to Patient:  [facility] Scheduling team, [phone], will be calling Patient within 1 week to schedule appt - appt only M-F. (Yes/No/NA):   o Results RN routed telephone encounter to [facility] Lung Cancer Screening CNS team (Yes/No/NA): Yes  o Results RN will inform the patient that a team of specialist will be discussing their case at the weekly lung nodule conference Friday morning and someone from the clinic will be contacting the Patient with the next steps. (Yes/No/NA):   o If Patient has questions or concerns, they are welcome to call the Lung Nodule clinic at [phone], press option 2 to speak with the nurse (Yes/No/NA):     RN communicated the lung  nodule finding to the patient (Yes/No):  NO, Left voicemail message requesting a call back to [facility] ED Lab Result RN at [phone].  RN is available every day between 9 a.m. and 5:30 p.m.  See Telephone encounter.  The patient had the following questions:   Correct letter sent as per [facility] Lung nodule protocol (Yes/No):  Yes  Did Patient have any CT's of chest previous? (Yes/No/NA) Yes   2/1/2022 and 8/6/2021  If no comparisons used, inquire if patient has had any chest CT's in the past and if so, request priors.    If scheduling LDCT only, RN will contact Image Scheduling Team  Hours available (all sites below):  Mon-Fri 7A to 7P; Sat 7A to 3:30P.  No schedulers available on Sunday.    [facility] ([facility]): [phone]    North region ([place], [place]): [phone]    South region ([facility]): [phone]    East region ([facility]): [phone]    Lung Nodule Clinics    Pulomonary (Lung Care) Clinic at the [facility]  Floor 2, Check-In D, [address] , [place], [place]  Hours: Mon - Fri 7:00 AM to 5:00 PM    [facility]   Floor 2, [address], [place]  [phone]  Hours: Mon - Fri 7:00 AM - 7:00 PM;  Sat 8:00 AM to 12:00 PM (noon)    [facility] [place]  [address] [place], [place], [phone]  Hours: Mon - Fri 8:00 AM - 4:30 PM    [facility]  [address], [place] [address]  Hours: Mon-Thurs 7:00 AM- 6:30 PM;  Friday 7:00 AM- 5:30 PM    [facility] [place]  [address] [place] [address]  [phone]  Hours: Mon -Thurs 7:00 AM-7:00 PM;  Friday 12:00 PM (noon) - 4 PM      Collette Abdalla RN  [facility]  Lung Nodule and Emergency Dept Lab Result RN  Ph#  [phone]

## 2023-01-10 NOTE — TELEPHONE ENCOUNTER
[facility] Radiology Lung Cancer Screening CT result - Return call    Patient/parent Name  Beatriz    Reason for call  Lung RADS 4B    Radiology Result  FINDINGS:  New 10 x 5 mm nodule in the right upper lobe on image 67 of  series 3. This has hazy margins suggestive of an infectious or  inflammatory process.   Relayed to patient.   She is aware of Lung Nodule Clinic referral, she is aware to expect a call for scheduling and provided phone number to clinic for any questions. She is aware that the specialists will review on Friday and reach out with any changes or concerns.  Patient stated understanding to all this and had no questions at this time.  Collette Abdalla RN  [facility]  Lung Nodule and Emergency Dept Lab Result RN  Ph# [phone]

## 2023-01-18 NOTE — TELEPHONE ENCOUNTER
"Requested Prescriptions   Pending Prescriptions Disp Refills     omeprazole (PRILOSEC) 20 MG DR capsule 90 capsule 3     Sig: Take 1 capsule (20 mg) by mouth daily       PPI Protocol Passed - 1/18/2023  1:35 PM        Passed - Not on Clopidogrel (unless Pantoprazole ordered)        Passed - No diagnosis of osteoporosis on record        Passed - Recent (12 mo) or future (30 days) visit within the authorizing provider's specialty     Patient has had an office visit with the authorizing provider or a provider within the authorizing providers department within the previous 12 mos or has a future within next 30 days. See \"Patient Info\" tab in inbasket, or \"Choose Columns\" in Meds & Orders section of the refill encounter.              Passed - Medication is active on med list        Passed - Patient is age 18 or older        Passed - No active pregnacy on record        Passed - No positive pregnancy test in past 12 months           Prescription approved per [facility] Refill Protocol.  Ameena Gomez RN on 1/18/2023 at 1:46 PM    "

## 2023-01-19 NOTE — PROGRESS NOTES
Complete PFT for pulmonary nodules completed. Patient left the PFT Lab in no distress and without complaint to her next appointment in Imaging.

## 2023-01-23 NOTE — PROGRESS NOTES
Beatriz is a 72 year old who is being evaluated via a billable video visit.      How would you like to obtain your AVS? MyChart  If the video visit is dropped, the invitation should be resent by: Text to cell phone: [phone]  Will anyone else be joining your video visit? No    Video-Visit Details    Type of service:  Video Visit   See note

## 2023-03-03 NOTE — PROGRESS NOTES
CARDIOLOGY CLINIC CONSULTATION    REASON FOR CONSULT: Coronary artery disease    PRIMARY CARE PHYSICIAN:  Jesusita Franco    Today's clinic visit entailed:  Review of the result(s) of each unique test - Labs ECG CT  30 minutes spent on the date of the encounter doing chart review, history and exam, documentation and further activities per the note  Provider  Link to [facility] Help Grid     The level of medical decision making during this visit was of moderate complexity.      HISTORY OF PRESENT ILLNESS:  This is a very pleasant 72-year-old female who denies any prior cardiovascular history.  She has a history of ongoing smoking.  In January of this year she underwent a coronary CT calcium scan.  This showed three-vessel disease with a total score of 359.  There were diffuse thoracic aortic calcification as well.  She has never had cardiac imaging.  She also has carotid imaging done which showed moderate carotid artery disease.  She used to be on atorvastatin 20 mg but that was switched to Crestor 40 mg daily.    Patient denies any chest pain but has been experiencing exertional shortness of breath.  These are atypical symptoms of angina.  No syncope presyncope.  He takes an aspirin.    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     Pulmonary nodule     long standing, likely scarring       MEDICATIONS:  Current Outpatient Medications   Medication     albuterol (VENTOLIN HFA) 108 (90 Base) MCG/ACT inhaler     aspirin (ASA) 81 MG EC tablet     buPROPion (WELLBUTRIN XL) 300 MG 24 hr tablet     HYDROcodone-acetaminophen (NORCO) 5-325 MG tablet     [START ON 3/4/2023] HYDROcodone-acetaminophen (NORCO) 5-325 MG tablet     HYDROcodone-acetaminophen  (NORCO) 5-325 MG tablet     omeprazole (PRILOSEC) 20 MG DR capsule     rosuvastatin (CRESTOR) 40 MG tablet     senna-docusate (SENOKOT-S;PERICOLACE) 8.6-50 MG per tablet     SPIRIVA HANDIHALER 18 MCG inhaled capsule     zolpidem (AMBIEN) 10 MG tablet     acetaminophen (TYLENOL) 325 MG tablet     tazarotene (TAZORAC) 0.05 % external cream     No current facility-administered medications for this visit.       ALLERGIES:  Allergies   Allergen Reactions     Nkda [No Known Drug Allergies]      Seasonal Allergies        SOCIAL HISTORY:  I have reviewed this patient's social history and updated it with pertinent information if needed. Beatriz Francis  reports that she has been smoking cigarettes. She has a 22.50 pack-year smoking history. She has never used smokeless tobacco. She reports that she does not drink alcohol and does not use drugs.    FAMILY HISTORY:  I have reviewed this patient's family history and updated it with pertinent information if needed.   Family History   Problem Relation Age of Onset     Cancer Father         lung     Heart Disease Father      Alcohol/Drug Father      Other Cancer Father      Cancer Paternal Grandmother         lung     Hypertension Mother      Cerebrovascular Disease Mother      Depression Mother      Cancer Maternal Grandfather      Cancer Paternal Grandfather      Diabetes Brother      Hypertension Brother      Hyperlipidemia Brother      Diabetes Brother      Gastrointestinal Disease Brother         Whippo     Other Cancer Brother      Diabetes Brother      Rheumatoid Arthritis Brother      Cardiovascular Brother        REVIEW OF SYSTEMS:  Skin:        Eyes:       ENT:       Respiratory:  Positive for shortness of breath;dyspnea on exertion;cough;wheezing  Cardiovascular:  Negative;palpitations;chest pain;edema;syncope or near-syncope Positive for;fatigue;lightheadedness;dizziness  Gastroenterology:      Genitourinary:       Musculoskeletal:       Neurologic:        Psychiatric:       Heme/Lymph/Imm:       Endocrine:           PHYSICAL EXAM:      BP: (!) 152/95 Pulse: 91     SpO2: 98 %      Vital Signs with Ranges  Pulse:  [91] 91  BP: (152)/(95) 152/95  SpO2:  [98 %] 98 %  124 lbs 0 oz    Constitutional: awake, alert, no distress  Respiratory: Clear to auscultation  Cardiovascular: Regular heart sounds soft systolic murmur JVP normal no edema  GI: nondistended  Neuropsychiatric: appropriate affact    DATA:  Labs: Pertinent cardiac labs reviewed    ASSESSMENT:  Three-vessel coronary artery disease on coronary CT calcium score  Hypertension    RECOMMENDATIONS:  1. Patient has three-vessel CAD noted on a calcium scan.  She has exertional dyspnea.  She also has a soft systolic murmur on exam.  2. I recommend exercise test echocardiogram for evaluation of ischemic heart disease.  If this is abnormal coronary angiography may be indicated and she understands that.  3. I recommend that she continue aspirin statin therapy for life.  She is on high-dose of Crestor at 40 mg daily.  I recommend rechecking lipid panel along with CK levels to ensure they are stable.  4. I have told her to check her blood pressure readings daily at home.  She needs to write these readings down and get it to the next clinic visit.  5. Follow-up with GENNARO in 1 month to follow-up on the results of the stress test and likely need to initiate antihypertensive therapy.    DOROTHEA Carrillo, [facility]  Cardiology - [facility] Heart  March 3, 2023    This note was completed in part using dictation via the Dragon voice recognition software. Although reviewed after completion, some word and grammatical errors may occur and must be interpreted in the appropriate clinical context.  If there are any questions pertaining to this issue, please contact me for further clarification or information.

## 2023-03-03 NOTE — LETTER
3/3/2023    Jesusita Franco, APRN CNP  [address] [place] [id number]    RE: Beatriz Francis       Dear Colleague,     I had the pleasure of seeing Beatriz Francis in the [facility].  CARDIOLOGY CLINIC CONSULTATION    REASON FOR CONSULT: Coronary artery disease    PRIMARY CARE PHYSICIAN:  Jesusita Franco    Today's clinic visit entailed:  Review of the result(s) of each unique test - Labs ECG CT  30 minutes spent on the date of the encounter doing chart review, history and exam, documentation and further activities per the note  Provider  Link to [facility] Help Grid     The level of medical decision making during this visit was of moderate complexity.      HISTORY OF PRESENT ILLNESS:  This is a very pleasant 72-year-old female who denies any prior cardiovascular history.  She has a history of ongoing smoking.  In January of this year she underwent a coronary CT calcium scan.  This showed three-vessel disease with a total score of 359.  There were diffuse thoracic aortic calcification as well.  She has never had cardiac imaging.  She also has carotid imaging done which showed moderate carotid artery disease.  She used to be on atorvastatin 20 mg but that was switched to Crestor 40 mg daily.    Patient denies any chest pain but has been experiencing exertional shortness of breath.  These are atypical symptoms of angina.  No syncope presyncope.  He takes an aspirin.    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     Pulmonary nodule     long standing, likely scarring       MEDICATIONS:  Current Outpatient Medications   Medication     albuterol (VENTOLIN HFA) 108 (90 Base) MCG/ACT inhaler     aspirin (ASA) 81 MG EC tablet      buPROPion (WELLBUTRIN XL) 300 MG 24 hr tablet     HYDROcodone-acetaminophen (NORCO) 5-325 MG tablet     [START ON 3/4/2023] HYDROcodone-acetaminophen (NORCO) 5-325 MG tablet     HYDROcodone-acetaminophen (NORCO) 5-325 MG tablet     omeprazole (PRILOSEC) 20 MG DR capsule     rosuvastatin (CRESTOR) 40 MG tablet     senna-docusate (SENOKOT-S;PERICOLACE) 8.6-50 MG per tablet     SPIRIVA HANDIHALER 18 MCG inhaled capsule     zolpidem (AMBIEN) 10 MG tablet     acetaminophen (TYLENOL) 325 MG tablet     tazarotene (TAZORAC) 0.05 % external cream     No current facility-administered medications for this visit.       ALLERGIES:  Allergies   Allergen Reactions     Nkda [No Known Drug Allergies]      Seasonal Allergies        SOCIAL HISTORY:  I have reviewed this patient's social history and updated it with pertinent information if needed. Beatriz Francis  reports that she has been smoking cigarettes. She has a 22.50 pack-year smoking history. She has never used smokeless tobacco. She reports that she does not drink alcohol and does not use drugs.    FAMILY HISTORY:  I have reviewed this patient's family history and updated it with pertinent information if needed.   Family History   Problem Relation Age of Onset     Cancer Father         lung     Heart Disease Father      Alcohol/Drug Father      Other Cancer Father      Cancer Paternal Grandmother         lung     Hypertension Mother      Cerebrovascular Disease Mother      Depression Mother      Cancer Maternal Grandfather      Cancer Paternal Grandfather      Diabetes Brother      Hypertension Brother      Hyperlipidemia Brother      Diabetes Brother      Gastrointestinal Disease Brother         Whippo     Other Cancer Brother      Diabetes Brother      Rheumatoid Arthritis Brother      Cardiovascular Brother        REVIEW OF SYSTEMS:  Skin:        Eyes:       ENT:       Respiratory:  Positive for shortness of breath;dyspnea on exertion;cough;wheezing  Cardiovascular:   Negative;palpitations;chest pain;edema;syncope or near-syncope Positive for;fatigue;lightheadedness;dizziness  Gastroenterology:      Genitourinary:       Musculoskeletal:       Neurologic:       Psychiatric:       Heme/Lymph/Imm:       Endocrine:           PHYSICAL EXAM:      BP: (!) 152/95 Pulse: 91     SpO2: 98 %      Vital Signs with Ranges  Pulse:  [91] 91  BP: (152)/(95) 152/95  SpO2:  [98 %] 98 %  124 lbs 0 oz    Constitutional: awake, alert, no distress  Respiratory: Clear to auscultation  Cardiovascular: Regular heart sounds soft systolic murmur JVP normal no edema  GI: nondistended  Neuropsychiatric: appropriate affact    DATA:  Labs: Pertinent cardiac labs reviewed    ASSESSMENT:  Three-vessel coronary artery disease on coronary CT calcium score  Hypertension    RECOMMENDATIONS:  1. Patient has three-vessel CAD noted on a calcium scan.  She has exertional dyspnea.  She also has a soft systolic murmur on exam.  2. I recommend exercise test echocardiogram for evaluation of ischemic heart disease.  If this is abnormal coronary angiography may be indicated and she understands that.  3. I recommend that she continue aspirin statin therapy for life.  She is on high-dose of Crestor at 40 mg daily.  I recommend rechecking lipid panel along with CK levels to ensure they are stable.  4. I have told her to check her blood pressure readings daily at home.  She needs to write these readings down and get it to the next clinic visit.  5. Follow-up with GENNARO in 1 month to follow-up on the results of the stress test and likely need to initiate antihypertensive therapy.    DOROTHEA Carrillo, [facility]  Cardiology - [facility] Heart  March 3, 2023    This note was completed in part using dictation via the Dragon voice recognition software. Although reviewed after completion, some word and grammatical errors may occur and must be interpreted in the appropriate clinical context.  If there are any questions pertaining to this issue,  please contact me for further clarification or information.    Thank you for allowing me to participate in the care of your patient.      Sincerely,     Marquise Marquez MD     [facility]  cc:   ALEJANDRO Odonnell CNP  [address] [place] [id number]

## 2023-03-06 NOTE — TELEPHONE ENCOUNTER
[facility] Call Center    Phone Message    May a detailed message be left on voicemail: yes     Reason for Call: Other:     Pt is requesting a call back to schedule an echo stress and follow up in [place]    Action Taken: Other: cardio    Travel Screening: Not Applicable     Thank you!  [facility]

## 2023-03-06 NOTE — RESULT ENCOUNTER NOTE
Na+ slightly low; alk phos slightly elevated; other electrolytes, liver and kidney functions WNL; CK WNL; lipids stable and at goal. Due for 1 month GENNARO follow up. Will discuss low Na with Dr Marquez for recommendations.

## 2023-03-09 NOTE — RESULT ENCOUNTER NOTE
[facility] Emergency Department    [address]    [place] [address]    Phone:  [phone]    Fax:  [phone]                                       Antonio Ravi   MRN: [id number]    Department:  [facility] Emergency Department   Date of Visit:  7/27/2017           Patient Information     Date Of Birth          1964        Your diagnoses for this visit were:     Closed displaced fracture of head of right radius, initial encounter     Fall, initial encounter     Pain of right forearm        You were seen by Ananth Scruggs DO.      Follow-up Information     Follow up with Dane Perez DO.    Specialty:  Orthopedics    Why:  Thursday in the [facility]    Contact information:    [facility]       [address] [place] [id number] [phone]          Discharge Instructions       Please read and follow the handout(s) instructions. Return, if needed, for increased or worsening symptoms and as directed by the handout(s).    See the note for work.    Do not drive if using the hydrocodone medication.    You will be scheduled for a follow-up appointment in your clinic next week.    You may apply ice or cold packs to the area for 10-15 minutes every 1-2 hours as needed to relieve pain and/or swelling.    Use the sling until rechecked in the Ortho clinic.    I hope you feel better soon!    Electronically signed, Ananth Scruggs DO        Discharge References/Attachments     SLING (ENGLISH)    RADIAL HEAD FRACTURE (ENGLISH)      Future Appointments        Provider Department Dept Phone Center    8/3/2017 8:10 AM Dane Perez DO [facility] [phone] [facility]      24 Hour Appointment Hotline       To make an appointment at any [facility], call [phone] ([phone]). If you don't have a family doctor or clinic, we will help you find one. [facility] [place] are conveniently  Dear Beatriz    Here are your results. As you can see, the sodium is low again. Like before, I would encourage you to limit fluid intake to 900 cc 2000 cc a day at most and rechecking a sodium lab only appointment in 1 week.    Please message me if you have any concerns.  Latoya Myers MD located to serve the needs of you and your family.          ED Discharge Orders     ARM ABENAING XL                    Review of your medicines      START taking        Dose / Directions Last dose taken    HYDROcodone-acetaminophen 5-325 MG per tablet   Commonly known as:  NORCO   Dose:  1-2 tablet   Quantity:  15 tablet        Take 1-2 tablets by mouth every 6 hours as needed for moderate to severe pain   Refills:  0          CONTINUE these medicines which may have CHANGED, or have new prescriptions. If we are uncertain of the size of tablets/capsules you have at home, strength may be listed as something that might have changed.        Dose / Directions Last dose taken    ibuprofen 200 MG tablet   Commonly known as:  ADVIL/MOTRIN   Dose:  600 mg   What changed:    - how much to take  - when to take this  - reasons to take this  - additional instructions        Take 3 tablets (600 mg) by mouth every 6 hours as needed for pain or fever (TAKE WITH FOOD) TAKE WITH FOOD AS NEEDED FOR PAIN   Refills:  0                Prescriptions were sent or printed at these locations (2 Prescriptions)                   [facility]   [address] [address]    Telephone:  [phone]   Fax:  [phone]   Hours:                  Not Printed or Sent (1 of 2)         ibuprofen (ADVIL/MOTRIN) 200 MG tablet                 Printed at Department/Unit printer (1 of 2)         HYDROcodone-acetaminophen (NORCO) 5-325 MG per tablet                Procedures and tests performed during your visit     Ice to affected area    Radius/Ulna XR, PA & LAT, right    XR Elbow Right 2 Views      Orders Needing Specimen Collection     None      Pending Results     Date and Time Order Name Status Description    7/27/2017 2342 Radius/Ulna XR, PA & LAT, right Preliminary     7/27/2017 2342 XR Elbow Right 2 Views Preliminary             Pending Culture Results     No orders found for last 3 day(s).           "  Pending Results Instructions     If you had any lab results that were not finalized at the time of your Discharge, you can call the ED Lab Result RN at [phone]. You will be contacted by this team for any positive Lab results or changes in treatment. The nurses are available 7 days a week from 10A to 6:30P.  You can leave a message 24 hours per day and they will return your call.        Thank you for choosing [facility]       Thank you for choosing [facility] for your care. Our goal is always to provide you with excellent care. Hearing back from our patients is one way we can continue to improve our services. Please take a few minutes to complete the written survey that you may receive in the mail after you visit with us. Thank you!        [employer] Information     [employer] lets you send messages to your doctor, view your test results, renew your prescriptions, schedule appointments and more. To sign up, go to [email or web address] . Click on \"Log in\" on the left side of the screen, which will take you to the Welcome page. Then click on \"Sign up Now\" on the right side of the page.     You will be asked to enter the access code listed below, as well as some personal information. Please follow the directions to create your username and password.     Your access code is: [email or web address]  Expires: 10/26/2017  1:14 AM     Your access code will  in 90 days. If you need help or a new code, please call your [facility] clinic or [phone].        Care EveryWhere ID     This is your Care EveryWhere ID. This could be used by other organizations to access your [facility] medical records  [id number]        Equal Access to Services     [facility] Center[facility] : [employer] [facility], wamichelle lang, qavero daniellealamos batista. So [facility] [phone].    ATENCIÓN: Si habla español, tiene a sampson disposición servicios gratuitos de asistencia lingüística. Llame al [phone].    We comply " with applicable federal civil rights laws and [place] laws. We do not discriminate on the basis of race, color, national origin, age, disability sex, sexual orientation or gender identity.            After Visit Summary       This is your record. Keep this with you and show to your community pharmacist(s) and doctor(s) at your next visit.

## 2023-03-15 NOTE — PROGRESS NOTES
Beatriz Francis has an upcoming lab appointment and is eligible to have due Health Maintenance labs drawn per Health Maintenance Protocol Orders (HMPO) process.    Before it can be drawn, a diagnosis is needed for the following lab: CBC    Please review and enter diagnosis as appropriate.     Rika Leavitt

## 2023-03-17 NOTE — TELEPHONE ENCOUNTER
Last Written Prescription Date:  12/19/22  Last Fill Quantity: 90,  # refills: 0   Last office visit: 07/25/22, virtual visit 01/03/23  Future Office Visit:    Routing refill request to provider for review/approval because:  PHQ-9  not current:    PHQ 6/1/2021 7/24/2022 7/25/2022   PHQ-9 Total Score 3 5 5   Q9: Thoughts of better off dead/self-harm past 2 weeks Not at all Not at all Not at all     Leilani LOZANO RN

## 2023-04-03 NOTE — NURSING NOTE
Is the patient currently in the state of [place]? YES    Visit mode:VIDEO    If the visit is dropped, the patient can be reconnected by: VIDEO VISIT: Text to cell phone: [phone]    Will anyone else be joining the visit? NO      How would you like to obtain your AVS? MyChart    Are changes needed to the allergy or medication list? NO    Reason for visit: 3 month follow up      Ibis Pimentel VF

## 2023-04-03 NOTE — TELEPHONE ENCOUNTER
Pulmonary Nodule Conference      Patient Name: Beatriz Francis    Reason for conference discussion (brief overview): 62 woman current smoker with growing RUL nodule. I believe it to be inflammatory and would prefer bx first. Navigation bronc could be challenging but doable.      Specific Question:  Can IR biopsy.    Pertinent Histology:  n/a    Referring Physician: Dr. Cortez    The patient's case was presented at the multidisciplinary conference for the above noted reason.  There was a consensus recommendation for the following actions:     CT + clinic visit with Dr. Cortez in 3 months.     Case Lead: Dr. Cortez    Interventional Radiology Staff Present: Lita Clinton MD    Pt notified of conference consensus via [employer] by writer on 4/7.

## 2023-04-03 NOTE — TELEPHONE ENCOUNTER
Patient returned call and left voicemail message with update blood pressure reading.      Last Blood Pressure: 152/95  Last Heart Rate: 91  Date: 3/3/23  Location: [facility]    Today's Blood Pressure: 133/88  Today's Heart Rate:   Location: Home BP    Patient reported blood pressure updated in Epic. Blood pressure falls within [place] Community Measures guidelines.  Patient will follow up as previously advised.     Patient returned call and left voicemail message with update blood pressure reading.      BP readings for the last three days have been more high than normal.  4/1/23  165/96, 145/94, 153/95  4/2/23  143/91, 131/91, 129/88  4/3/23  142/93, 142/91, 133/88  SHANTE [facility]    .

## 2023-04-03 NOTE — PROGRESS NOTES
"LUNG NODULE & INTERVENTIONAL PULMONARY CLINIC  CLINICS & SURGERY CENTER, [facility], [facility]   VIDEO VISIT    Beatriz Francis MRN# [id number]   Age: 72 year old YOB: 1950     Reason for Consultation: Lung Nodule    Requesting Physician: No referring provider defined for this encounter.       The patient has been notified of following:   \"This video visit will be conducted via a call between you and your physician/provider. We have found that certain health care needs can be provided without the need for an in-person physical exam.  This service lets us provide the care you need with a video conversation.  If a prescription is necessary we can send it directly to your pharmacy.  If lab work is needed we can place an order for that and you can then stop by our lab to have the test done at a later time.  Video visits are billed at different rates depending on your insurance coverage.  Please reach out to your insurance provider with any questions.  If during the course of the call the physician/provider feels a video visit is not appropriate, you will not be charged for this service.\"  Patient has given verbal consent for Video visit? Yes  How would you like to obtain your AVS? Please refer to rooming staff note  Patient would like the video invitation sent by: Please refer to rooming staff note   Will anyone else be joining your video visit? Please refer to rooming staff note       Video-Visit Details     Type of service:  Video Visit  Video Start Time: 1245  Video End Time: 100  Provider Name: Dale Cortez MD, A   Originating Location (pt. Location): Home  Provider Location: Off campus   Distant Location (provider location): Home/Clinic  Platform used for Video Visit: [facility]    Assessment and Plan:    1. New multiple pulmonary lung nodule(s). Given the characteristics on current/previous imaging and risk factors; I would classify this to be Intermediate " (6-65%) risk for cancer. Culprit nodule is RUL 10mm which has grown to 17-18mm. Given her risk factors and interval growth, plan on bx. I would prefer IR given the lack of airway on CT. To be discussed this Friday    2. Tobacco use. Has gotten down to 1/2-1ppd from 2ppd.     Billing: I spent a total of 45min spent on date of encounter which includes prep time, visit with the patient and post visit work including documentation and nursing communication     Dale Cortez MD, MHA  Associate Professor of Medicine  Section of Interventional Pulmonology   Division of Pulmonary, Allergy, Critical Care and Sleep Medicine   [facility]  Pager: [phone]   Office: [phone]  Email: [email or web address]    Thuy Go RN   Interventional Pulmonary Care Coordinator   Office: [phone]  Email: [email or web address]     Gianfranco Lees  Interventional Pulmonary Surgery Scheduler   Office: [phone]  Email: [email or web address]         History:     Beatriz Francis is a 72 year old female with sig h/o for COPD, MDD, lupus who is here for evaluation/followup of Lung Nodule.     - No new resp sx or complaints. Denies dyspnea or cough.   - LDCT performed and found nodules. Here for 3-mo follow-up   - Personal hx of cancer: no  - Family hx of cancer: dad had lung cancer   - Exposure hx: asbestos at her old job. No radon exposure that she knows of   - Tobacco hx: Current Smoker, 2ppd since 15years old. Longest time quit 4mo  - My interpretation of the images relevant for this visit includes: nodules   - My interpretation of the PFT's relevant for this visit includes: Obstructive pattern      Culprit Nodule(s):   1: Right upper lobe nodule and is 10 mm in size/severity and non-calcified in morphology/quality. First seen by chest CT on 1/9/23. There is an interval increase in size     Other active medical problems include:   - has COPD. On spiriva and prn albuterol. Tolerating  well.           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     Pulmonary nodule     long standing, likely scarring             Past Surgical History:      Past Surgical History:   Procedure Laterality Date     ARTHROSCOPY SHLDR ROTATOR CUFF REPAIR, SUBACROMIAL DECOMP, DIST CLAVICLE RESECTION, BICEP TENODESIS Left 7/8/2016    Procedure: ARTHROSCOPY SHOULDER ROTATOR CUFF REPAIR, SUBACROMIAL DECOMPRESSION, DISTAL CLAVICLE RESECTION, OPEN BICEP TENODESIS REPAIR;  Surgeon: Freddie Espino MD;  Location: [facility] OR     ARTHROSCOPY SHOULDER Left 1/23/2017    Procedure: ARTHROSCOPY SHOULDER;  Surgeon: Ankit Morton MD;  Location: [facility] OR     BIOPSY       COLONOSCOPY  2002     COLONOSCOPY N/A 3/2/2022    Procedure: COLONOSCOPY, FLEXIBLE, WITH LESION REMOVAL USING SNARE;  Surgeon: Davi Sibley DO;  Location: [facility]     ESOPHAGOSCOPY, GASTROSCOPY, DUODENOSCOPY (EGD), COMBINED N/A 9/7/2022    Procedure: ESOPHAGOGASTRODUODENOSCOPY, WITH BIOPSY;  Surgeon: Davi Sibley DO;  Location: [facility]     EYE SURGERY  2004-[facility]     HYSTERECTOMY, PAP NO LONGER INDICATED  1990     REVERSE ARTHROPLASTY SHOULDER Left 2/15/2017    Procedure: REVERSE ARTHROPLASTY SHOULDER;  Surgeon: Ankit Morton MD;  Location: [facility] OR     ROTATOR CUFF REPAIR RT/LT  1994,1995    right     ROTATOR CUFF REPAIR RT/LT  3/5/04    left     ROTATOR CUFF REPAIR RT/LT  9/25/2009    Right     ZZC SHOULDER SURG PROC UNLISTED  7/8/2016            Social History:     Social History     Tobacco Use     Smoking status: Every Day     Packs/day: 0.50     Years: 45.00     Pack years: 22.50     Types: Cigarettes     Smokeless tobacco: Never     Tobacco  comments:     Just under a pack. 52  yr. hx.   Vaping Use     Vaping status: Former   Substance Use Topics     Alcohol use: No     Alcohol/week: 0.0 standard drinks of alcohol     Comment: 3-4x's/year.            Family History:     Family History   Problem Relation Age of Onset     Cancer Father         lung     Heart Disease Father      Alcohol/Drug Father      Other Cancer Father      Cancer Paternal Grandmother         lung     Hypertension Mother      Cerebrovascular Disease Mother      Depression Mother      Cancer Maternal Grandfather      Cancer Paternal Grandfather      Diabetes Brother      Hypertension Brother      Hyperlipidemia Brother      Diabetes Brother      Gastrointestinal Disease Brother         [place]     Other Cancer Brother      Diabetes Brother      Rheumatoid Arthritis Brother      Cardiovascular Brother              Allergies:      Allergies   Allergen Reactions     Nkda [No Known Drug Allergies]      Seasonal Allergies             Medications:     Current Outpatient Medications   Medication Sig     acetaminophen (TYLENOL) 325 MG tablet Take 2 tablets (650 mg) by mouth every 4 hours as needed for other (surgical pain) (Patient not taking: Reported on 3/3/2023)     albuterol (VENTOLIN HFA) 108 (90 Base) MCG/ACT inhaler Inhale 2 puffs into the lungs every 6 hours as needed for shortness of breath / dyspnea or wheezing     aspirin (ASA) 81 MG EC tablet Take 1 tablet (81 mg) by mouth daily     buPROPion (WELLBUTRIN XL) 300 MG 24 hr tablet TAKE 1 TABLET (300 MG) BY MOUTH EVERY MORNING     HYDROcodone-acetaminophen (NORCO) 5-325 MG tablet TAKE 1 TABLET BY MOUTH EVERY 6 HOURS AS NEEDED FOR PAIN MAX 4 PER DAY     HYDROcodone-acetaminophen (NORCO) 5-325 MG tablet TAKE ONE TABLET BY MOUTH EVERY 6 HOURS AS NEEDED FOR SEVERE PAIN (DO NOT TAKE MORE THAN 4 TABLETS PER DAY)     omeprazole (PRILOSEC) 20 MG DR capsule Take 1 capsule (20 mg) by mouth daily     rosuvastatin (CRESTOR) 40 MG tablet Take 1  tablet (40 mg) by mouth daily     senna-docusate (SENOKOT-S;PERICOLACE) 8.6-50 MG per tablet Take 2 tablets by mouth 2 times daily     SPIRIVA HANDIHALER 18 MCG inhaled capsule Inhale 1 capsule (18 mcg) into the lungs daily     tazarotene (TAZORAC) 0.05 % external cream User every night (Patient not taking: Reported on 3/3/2023)     zolpidem (AMBIEN) 10 MG tablet TAKE ONE TABLET BY MOUTH EVERY EVENING AT BEDTIME AS NEEDED FOR SLEEP     No current facility-administered medications for this visit.            Review of Systems:     CONSTITUTIONAL: negative for fever, chills, change in weight  INTEGUMENTARY/SKIN: no rash or obvious new lesions  ENT/MOUTH: no sore throat, new sinus pain or nasal drainage  RESP: see interval history  CV: negative for chest pain, palpitations or peripheral edema  GI: no nausea, vomiting, change in stools  : no dysuria  MUSCULOSKELETAL: no myalgias, arthralgias  ENDOCRINE: blood sugars with adequate control  PSYCHIATRIC: mood stable  LYMPHATIC: no new lymphadenopathy  HEME: no bleeding or easy bruisability  NEURO: no numbness, weakness, headaches         Physical Exam:     Constitutional - looks well, in no apparent distress  Eyes - no redness or discharge  Respiratory -breathing appears comfortable.  No cough.  Skin - No appreciable discoloration or lesions (very limited exam)  Neurological - No apparent tremors. Speech fluent and articlate  Psychiatric - no signs of delirium or anxiety     Exam limited to that easily identified on a virtual visit. The rest of a comprehensive physical examination is deferred due to PHE (public health emergency) video visit restrictions.         Current Laboratory Data:   All laboratory and imaging data reviewed.           Previous Cardiology Imaging   No results found for this or any previous visit (from the past 8760 hour(s)).

## 2023-04-14 NOTE — RESULT ENCOUNTER NOTE
No evidence of stress induced ischemia. Follow up with Agatha Oreilly NP on 4/19/23. Pt notified via [employer].

## 2023-04-19 PROBLEM — I10 BENIGN ESSENTIAL HYPERTENSION: Status: ACTIVE | Noted: 2023-01-01

## 2023-04-19 PROBLEM — I25.10 CORONARY ARTERY DISEASE INVOLVING NATIVE CORONARY ARTERY OF NATIVE HEART WITHOUT ANGINA PECTORIS: Status: ACTIVE | Noted: 2023-01-01

## 2023-04-19 PROBLEM — I65.23 BILATERAL CAROTID ARTERY STENOSIS: Status: ACTIVE | Noted: 2023-01-01

## 2023-04-19 NOTE — TELEPHONE ENCOUNTER
Called and discussed with pt and she would like to move forward with cath.  Scheduled pt for virtual visit with Agatha Oreilly CNP tomorrow 4/20/23 for H & P and to discuss procedure.   Pt verbalized an understanding.     Seema Wyatt RN

## 2023-04-19 NOTE — PATIENT INSTRUCTIONS
Medication Changes:  None     Recommendations:  Check blood pressure at least 1 hour after medications. Call the clinic if your blood pressure is consistently greater than 130/80.     Follow-up:  Coronary angiogram at [facility]  See TONYA Snider for cardiology follow up at [place]: 1 to 2 weeks after angiogram.   Call 6 months prior, to schedule.     Cardiology Scheduling~[phone]  Cardiology Clinic RN~[phone] (Usha RN, Seema RN, Fay RN)      Heart catheterization  [facility]      Please call clinic with any new COVID like symptoms prior to procedure.    2.   Take your temperature the morning of the procedure. If it is >100 call the Care Suites at [phone]    3.   Coronary angiogram to be done at [facility] on 5/1/23. Please arrive at 6:30am . If you need to contact [facility] for any reason, please call [phone] option #2.    4.    Follow up with Agatha Oreilly CNP at 12:40pm on 5/10/23 at [facility]    In preparation for your procedure, we require that you do the following:    NO solid foods 8 hours prior to arrival and may have clear liquids up to 2 hours prior to arrival.    Take your usual morning medications with a small sip of water immediately upon arising on the morning of your procedure unless outlined below:    Aspirin:    If you are currently taking 81mg aspirin daily, please take an extra 4 tablets (total of 325mg)  the morning of procedure      You will be unable to drive after your procedure; please arrange to have someone drive you home. Make sure that there is a responsible adult with you for 24 hours after your procedure. Your procedure will be cancelled if you do not have transportation home or someone staying with you for 24 hrs.    Your procedure will be done at [facility] at [address] [place] [address]. Please park in the  Skyway Ramp  on the west side of [facility] on [address]. Take the skyway over [address] to the hospital. Please check in on the first floor, \"Skyway Welcome Desk\" which is one floor down from the skyway level.    If you have any questions about your upcoming procedure, please contact nursing at [facility] Cardiology clinic at [phone] or at [facility] at [phone].          "

## 2023-04-19 NOTE — PATIENT INSTRUCTIONS
Medication Changes:  None     Recommendations:  Check blood pressure at least 1 hour after medications. Call the clinic if your blood pressure is consistently greater than 130/80.     Follow-up:  See Dr. Marquez for cardiology follow up at [facility]: Oct 2023 .   Call 6 months prior, to schedule.     Cardiology Scheduling~[phone]  Cardiology Clinic RN~[phone] (Usha RN, Seema RN, Fay RN)

## 2023-04-19 NOTE — PROGRESS NOTES
Beatriz is a 72 year old who is being evaluated via a billable video visit.      How would you like to obtain your AVS? MyChart  If the video visit is dropped, the invitation should be resent by: Text to cell phone: [phone]  Will anyone else be joining your video visit? No        Video-Visit Details    Type of service:  Video Visit   Video Start Time: 10:54 AM  Video End Time:11:05 AM    Originating Location (pt. Location): Home    Distant Location (provider location):  On-site  Platform used for Video Visit: [facility]      Cardiology Clinic Progress Note  Beatriz Francis MRN# [id number]   YOB: 1950 Age: 72 year old      Primary Cardiologist:   Dr. Marquez          History of Presenting Illness:      Beatriz Francis is a pleasant 72 year old patient with a past cardiac history significant for   1. CAD    1/2023 calcium score three-vessel disease, total score 359 along with diffuse thoracic aortic calcification.    Stress echo 4/2023 no ischemia but reduced sensitivity  2. History of elevated BP  3. Hyperlipidemia  4. Carotid artery disease    Moderate 1/2023   Past medical history significant for ongoing tobacco use, COPD.    Patient was seen by Dr. Marquez in consultation in March 2023.  She had no chest discomfort but was experiencing dyspnea on exertion so stress echo recommended.  Blood pressure was elevated.    Pt presents today for testing follow-up. Stress echo April 2023 showed no evidence of stress-induced ischemia however patient exercised below average exercise tolerance, 4 minutes up to 5.3 METS.  Sensitivity was also decreased due to baseline T wave abnormality.  Lipid profile is well controlled.  She continues with stable hyponatremia.  Results reviewed today.     Blood pressure at the time of her stress echo was 134/82.  She will start monitoring these at home.  She denies any chest discomfort and continues with shortness of breath with exertion.  She has a history of lung disease  and notes improvement in her shortness of breath after using albuterol nebulizer.  I recommended follow-up with PCP regarding her lung disease. Patient reports no chest pain, PND, orthopnea, presyncope, syncope, edema, heart racing, or palpitations.    Labs:  LIPID RESULTS:  Lab Results   Component Value Date    CHOL 133 03/03/2023    CHOL 211 (H) 06/01/2021    HDL 69 03/03/2023    HDL 50 06/01/2021    LDL 52 03/03/2023     (H) 06/01/2021    TRIG 61 03/03/2023    TRIG 232 (H) 06/01/2021    CHOLHDLRATIO 4.8 03/27/2015       LIVER ENZYME RESULTS:  Lab Results   Component Value Date    AST 21 03/03/2023    AST 9 06/23/2021    ALT 15 03/03/2023    ALT 21 06/23/2021       CBC RESULTS:  Lab Results   Component Value Date    WBC 7.9 03/21/2023    WBC 6.0 10/12/2020    RBC 3.88 03/21/2023    RBC 4.22 10/12/2020    HGB 11.5 (L) 03/21/2023    HGB 12.9 10/12/2020    HCT 34.5 (L) 03/21/2023    HCT 37.3 10/12/2020    MCV 89 03/21/2023    MCV 88 10/12/2020    MCH 29.6 03/21/2023    MCH 30.6 10/12/2020    MCHC 33.3 03/21/2023    MCHC 34.6 10/12/2020    RDW 12.8 03/21/2023    RDW 13.0 10/12/2020     03/21/2023     10/12/2020       BMP RESULTS:  Lab Results   Component Value Date     (L) 03/21/2023     (L) 07/03/2021    POTASSIUM 4.9 03/21/2023    POTASSIUM 4.2 08/06/2022    POTASSIUM 4.5 07/03/2021    CHLORIDE 94 (L) 03/21/2023    CHLORIDE 100 08/06/2022    CHLORIDE 94 07/03/2021    CO2 29 03/21/2023    CO2 28 08/06/2022    CO2 25 07/03/2021    ANIONGAP 8 03/21/2023    ANIONGAP 7 08/06/2022    ANIONGAP 9 07/03/2021    GLC 86 03/21/2023    GLC 82 08/06/2022    GLC 94 07/03/2021    BUN 9.7 03/21/2023    BUN 9 08/06/2022    BUN 10 07/03/2021    CR 0.62 03/21/2023    CR 0.63 07/03/2021    GFRESTIMATED >90 03/21/2023    GFRESTIMATED >90 07/03/2021    GFRESTBLACK >90 07/03/2021    MARI 9.4 03/21/2023    MARI 8.5 07/03/2021        A1C RESULTS:  No results found for: A1C    INR RESULTS:  No results found for:  INR    Results reviewed today.       Current Cardiac Medications     Current Outpatient Medications   Medication Sig Dispense Refill     acetaminophen (TYLENOL) 325 MG tablet Take 2 tablets (650 mg) by mouth every 4 hours as needed for other (surgical pain) 100 tablet 0     albuterol (VENTOLIN HFA) 108 (90 Base) MCG/ACT inhaler Inhale 2 puffs into the lungs every 6 hours as needed for shortness of breath / dyspnea or wheezing 18 g 1     aspirin (ASA) 81 MG EC tablet Take 1 tablet (81 mg) by mouth daily 90 tablet 3     buPROPion (WELLBUTRIN XL) 300 MG 24 hr tablet TAKE 1 TABLET (300 MG) BY MOUTH EVERY MORNING 90 tablet 0     HYDROcodone-acetaminophen (NORCO) 5-325 MG tablet TAKE 1 TABLET BY MOUTH EVERY 6 HOURS AS NEEDED FOR PAIN MAX 4 PER  tablet 0     omeprazole (PRILOSEC) 20 MG DR capsule Take 1 capsule (20 mg) by mouth daily 90 capsule 3     rosuvastatin (CRESTOR) 40 MG tablet Take 1 tablet (40 mg) by mouth daily 90 tablet 3     senna-docusate (SENOKOT-S;PERICOLACE) 8.6-50 MG per tablet Take 2 tablets by mouth 2 times daily 50 tablet 0     SPIRIVA HANDIHALER 18 MCG inhaled capsule USING THE HANDIHALER, INHALE THE CONTENTS OF ONE CAPSULE BY MOUTH DAILY 90 capsule 0     tazarotene (TAZORAC) 0.05 % external cream User every night 60 g 3     zolpidem (AMBIEN) 10 MG tablet TAKE ONE TABLET BY MOUTH EVERY EVENING AT BEDTIME AS NEEDED FOR SLEEP 30 tablet 2     HYDROcodone-acetaminophen (NORCO) 5-325 MG tablet TAKE ONE TABLET BY MOUTH EVERY 6 HOURS AS NEEDED FOR SEVERE PAIN (DO NOT TAKE MORE THAN 4 TABLETS PER DAY) 120 tablet 0                      Assessment and Plan:       Plan    Patient Instructions   Medication Changes:  5. None     Recommendations:  1. Check blood pressure at least 1 hour after medications. Call the clinic if your blood pressure is consistently greater than 130/80.     Follow-up:  1. See Dr. Marquez for cardiology follow up at [facility]: Oct 2023 .   Call 6 months prior, to schedule.      Cardiology Scheduling~[phone]  Cardiology Clinic RN~[phone] (Usha RN, Seema RN, Fay RN)                1. CAD    No angina    Continue statin, aspirin      2. History of elevated BP    Controlled    Not on antihypertensives      3. Hyperlipidemia    Last LDL 52 on 3/2023    Continue rosuvastatin 40 mg daily      4. Carotid artery disease    Moderate    Continue statin, aspirin             Thank you for allowing me to participate in this delightful patient's care.      This note was completed in part using Dragon voice recognition software. Although reviewed after completion, some word and grammatical errors may occur.    Agatha De Oliveira, APRN CNP, APRN, CNP           Data:   All laboratory data reviewed        General Appearance:  no distress, normal body habitus, upright.  ENT/Mouth:  membranes moist, no nasal discharge or bleeding gums. Normal head shape, no apparent injury or laceration.  Eyes:  no scleral icterus, normal conjunctivae.  No observed jaundice.  Neck:  no apparent thyromegaly.   Chest/Lungs:  No breathing difficulty while speaking.  No audible wheezing.  Equal chest wall expansion.  No cough.  Cardiovascular:  No obviously elevated jugular venous pressure. No apparent pitting edema bilaterally  Abdomen:  no evidence of abdominal distention.   Extremities:  no cyanosis noted.  Skin:  no xanthelasma, normal skin color. No facial lacerations or other trauma.  Neurologic:  Normal arm motion bilateral, no tremors.  No apparent focal defect.  Psychiatric:  Alert and oriented x3, calm demeanor

## 2023-04-19 NOTE — TELEPHONE ENCOUNTER
I reviewed her case with Dr. Marquez and he noted that if patient was still symptomatic and wants answers then we could proceed with coronary angiogram.  Otherwise, we will continue with medical management.      At her visit today, patient stated that she noticed improvement in her shortness of breath, after using nebulizer.  Can you see if patient prefers to proceed with coronary angiogram?  If so, we can set up appointment to discuss angio H&P.  Otherwise, would have her continue to monitor symptoms and if shortness of breath is no longer improved with nebulizer, let us know.  At that time, could decide whether to proceed with angiogram versus uptitrating medical management.    Agatha De Oliveira, APRN CNP

## 2023-04-19 NOTE — LETTER
4/19/2023    Jesusita Franco, APRN CNP  [address] [place] [id number]    RE: Beatriz Francis       Dear Colleague,     I had the pleasure of seeing Beatriz Francis in the [facility].  Beatriz is a 72 year old who is being evaluated via a billable video visit.      How would you like to obtain your AVS? MyChart  If the video visit is dropped, the invitation should be resent by: Text to cell phone: [phone]  Will anyone else be joining your video visit? No        Video-Visit Details    Type of service:  Video Visit   Video Start Time: 10:54 AM  Video End Time:11:05 AM    Originating Location (pt. Location): Home    Distant Location (provider location):  On-site  Platform used for Video Visit: [facility]      Cardiology Clinic Progress Note  Beatriz Francis MRN# [id number]   YOB: 1950 Age: 72 year old      Primary Cardiologist:   Dr. Marquez          History of Presenting Illness:      Beatriz Francis is a pleasant 72 year old patient with a past cardiac history significant for   CAD  1/2023 calcium score three-vessel disease, total score 359 along with diffuse thoracic aortic calcification.  Stress echo 4/2023 no ischemia but reduced sensitivity  History of elevated BP  Hyperlipidemia  Carotid artery disease  Moderate 1/2023   Past medical history significant for ongoing tobacco use, COPD.    Patient was seen by Dr. Marquez in consultation in March 2023.  She had no chest discomfort but was experiencing dyspnea on exertion so stress echo recommended.  Blood pressure was elevated.    Pt presents today for testing follow-up. Stress echo April 2023 showed no evidence of stress-induced ischemia however patient exercised below average exercise tolerance, 4 minutes up to 5.3 METS.  Sensitivity was also decreased due to baseline T wave abnormality.  Lipid profile is well controlled.  She continues with stable hyponatremia.  Results reviewed today.     Blood pressure at the time  of her stress echo was 134/82.  She will start monitoring these at home.  She denies any chest discomfort and continues with shortness of breath with exertion.  She has a history of lung disease and notes improvement in her shortness of breath after using albuterol nebulizer.  I recommended follow-up with PCP regarding her lung disease. Patient reports no chest pain, PND, orthopnea, presyncope, syncope, edema, heart racing, or palpitations.    Labs:  LIPID RESULTS:  Lab Results   Component Value Date    CHOL 133 03/03/2023    CHOL 211 (H) 06/01/2021    HDL 69 03/03/2023    HDL 50 06/01/2021    LDL 52 03/03/2023     (H) 06/01/2021    TRIG 61 03/03/2023    TRIG 232 (H) 06/01/2021    CHOLHDLRATIO 4.8 03/27/2015       LIVER ENZYME RESULTS:  Lab Results   Component Value Date    AST 21 03/03/2023    AST 9 06/23/2021    ALT 15 03/03/2023    ALT 21 06/23/2021       CBC RESULTS:  Lab Results   Component Value Date    WBC 7.9 03/21/2023    WBC 6.0 10/12/2020    RBC 3.88 03/21/2023    RBC 4.22 10/12/2020    HGB 11.5 (L) 03/21/2023    HGB 12.9 10/12/2020    HCT 34.5 (L) 03/21/2023    HCT 37.3 10/12/2020    MCV 89 03/21/2023    MCV 88 10/12/2020    MCH 29.6 03/21/2023    MCH 30.6 10/12/2020    MCHC 33.3 03/21/2023    MCHC 34.6 10/12/2020    RDW 12.8 03/21/2023    RDW 13.0 10/12/2020     03/21/2023     10/12/2020       BMP RESULTS:  Lab Results   Component Value Date     (L) 03/21/2023     (L) 07/03/2021    POTASSIUM 4.9 03/21/2023    POTASSIUM 4.2 08/06/2022    POTASSIUM 4.5 07/03/2021    CHLORIDE 94 (L) 03/21/2023    CHLORIDE 100 08/06/2022    CHLORIDE 94 07/03/2021    CO2 29 03/21/2023    CO2 28 08/06/2022    CO2 25 07/03/2021    ANIONGAP 8 03/21/2023    ANIONGAP 7 08/06/2022    ANIONGAP 9 07/03/2021    GLC 86 03/21/2023    GLC 82 08/06/2022    GLC 94 07/03/2021    BUN 9.7 03/21/2023    BUN 9 08/06/2022    BUN 10 07/03/2021    CR 0.62 03/21/2023    CR 0.63 07/03/2021    GFRESTIMATED >90  03/21/2023    GFRESTIMATED >90 07/03/2021    GFRESTBLACK >90 07/03/2021    MARI 9.4 03/21/2023    MARI 8.5 07/03/2021        A1C RESULTS:  No results found for: A1C    INR RESULTS:  No results found for: INR    Results reviewed today.       Current Cardiac Medications     Current Outpatient Medications   Medication Sig Dispense Refill    acetaminophen (TYLENOL) 325 MG tablet Take 2 tablets (650 mg) by mouth every 4 hours as needed for other (surgical pain) 100 tablet 0    albuterol (VENTOLIN HFA) 108 (90 Base) MCG/ACT inhaler Inhale 2 puffs into the lungs every 6 hours as needed for shortness of breath / dyspnea or wheezing 18 g 1    aspirin (ASA) 81 MG EC tablet Take 1 tablet (81 mg) by mouth daily 90 tablet 3    buPROPion (WELLBUTRIN XL) 300 MG 24 hr tablet TAKE 1 TABLET (300 MG) BY MOUTH EVERY MORNING 90 tablet 0    HYDROcodone-acetaminophen (NORCO) 5-325 MG tablet TAKE 1 TABLET BY MOUTH EVERY 6 HOURS AS NEEDED FOR PAIN MAX 4 PER  tablet 0    omeprazole (PRILOSEC) 20 MG DR capsule Take 1 capsule (20 mg) by mouth daily 90 capsule 3    rosuvastatin (CRESTOR) 40 MG tablet Take 1 tablet (40 mg) by mouth daily 90 tablet 3    senna-docusate (SENOKOT-S;PERICOLACE) 8.6-50 MG per tablet Take 2 tablets by mouth 2 times daily 50 tablet 0    SPIRIVA HANDIHALER 18 MCG inhaled capsule USING THE HANDIHALER, INHALE THE CONTENTS OF ONE CAPSULE BY MOUTH DAILY 90 capsule 0    tazarotene (TAZORAC) 0.05 % external cream User every night 60 g 3    zolpidem (AMBIEN) 10 MG tablet TAKE ONE TABLET BY MOUTH EVERY EVENING AT BEDTIME AS NEEDED FOR SLEEP 30 tablet 2    HYDROcodone-acetaminophen (NORCO) 5-325 MG tablet TAKE ONE TABLET BY MOUTH EVERY 6 HOURS AS NEEDED FOR SEVERE PAIN (DO NOT TAKE MORE THAN 4 TABLETS PER DAY) 120 tablet 0                      Assessment and Plan:       Plan    Patient Instructions   Medication Changes:  None     Recommendations:  Check blood pressure at least 1 hour after medications. Call the clinic if  your blood pressure is consistently greater than 130/80.     Follow-up:  See Dr. Marquez for cardiology follow up at [place]: Oct 2023 .   Call 6 months prior, to schedule.     Cardiology Scheduling~[phone]  Cardiology Clinic RN~[phone] (Usha RN, Seema RN, Fay RN)                CAD  No angina  Continue statin, aspirin      History of elevated BP  Controlled  Not on antihypertensives      Hyperlipidemia  Last LDL 52 on 3/2023  Continue rosuvastatin 40 mg daily      Carotid artery disease  Moderate  Continue statin, aspirin             Thank you for allowing me to participate in this delightful patient's care.      This note was completed in part using Dragon voice recognition software. Although reviewed after completion, some word and grammatical errors may occur.    ALEJANDRO Caballero CNP, APRJOSE DE JESUS, CNP           Data:   All laboratory data reviewed        General Appearance:  no distress, normal body habitus, upright.  ENT/Mouth:  membranes moist, no nasal discharge or bleeding gums. Normal head shape, no apparent injury or laceration.  Eyes:  no scleral icterus, normal conjunctivae.  No observed jaundice.  Neck:  no apparent thyromegaly.   Chest/Lungs:  No breathing difficulty while speaking.  No audible wheezing.  Equal chest wall expansion.  No cough.  Cardiovascular:  No obviously elevated jugular venous pressure. No apparent pitting edema bilaterally  Abdomen:  no evidence of abdominal distention.   Extremities:  no cyanosis noted.  Skin:  no xanthelasma, normal skin color. No facial lacerations or other trauma.  Neurologic:  Normal arm motion bilateral, no tremors.  No apparent focal defect.  Psychiatric:  Alert and oriented x3, calm demeanor      Thank you for allowing me to participate in the care of your patient.      Sincerely,     ALEJANDRO Caballero CNP     [facility]  cc:   Marquise Marquez MD  [id number]  [place] [id number]

## 2023-04-20 NOTE — PROGRESS NOTES
Beatriz is a 72 year old who is being evaluated via a billable video visit.      How would you like to obtain your AVS? MyChart  If the video visit is dropped, the invitation should be resent by: Text to cell phone: [phone]  Will anyone else be joining your video visit? No        Video-Visit Details    Type of service:  Video Visit   Video Start Time: 10:15 AM  Video End Time:10:24 AM    Originating Location (pt. Location): Home    Distant Location (provider location):  On-site  Platform used for Video Visit: [facility]      Cardiology Clinic Progress Note  Beatriz Francis MRN# [id number]   YOB: 1950 Age: 72 year old      Primary Cardiologist:   Dr. Marquez          History of Presenting Illness:      Beatriz Francis is a pleasant 72 year old patient with a past cardiac history significant for   1. CAD    1/2023 calcium score three-vessel disease, total score 359 along with diffuse thoracic aortic calcification.    Stress echo 4/2023 no ischemia but reduced sensitivity  2. History of elevated BP  3. Hyperlipidemia  4. Carotid artery disease    Moderate 1/2023   Past medical history significant for ongoing tobacco use, COPD.      Patient was seen by Dr. Marquez in consultation in March 2023.  She had no chest discomfort but was experiencing dyspnea on exertion so stress echo recommended.     Pt presents today for angiogram H&P.  Stress echo April 2023 showed no evidence of stress-induced ischemia but patient exercised below average and sensitivity was reduced due to T wave abnormality.  She continues with dyspnea on exertion.  She has a history of lung disease and notes some improvement in her shortness of breath after using albuterol nebulizer.  I reviewed her testing with Dr. Marquez who noted that if the patient continued with symptoms and wanted definitive assessment, could proceed with coronary angiogram.  Patient wishes to proceed with angiogram at this time. Patient reports no chest pain, PND,  orthopnea, presyncope, syncope, edema, heart racing, or palpitations.    Risks and benefits of left heart catheterization and coronary angiogram were discussed with the patient in detail. Including death, MI, stroke, hematoma, possible urgent bypass surgery for failed PCI, use of stents, possible peripheral vascular complications, use of FFR in clinical-decision making, arrhythmia, possible percutaneous coronary intervention, and alternative of medical therapy alone. The risks discussed include risk of heart attack or risk bleeding requiring surgery or transfusion of less than 1%. The risk of stroke or needing emergency surgery is less than 1 in 500. We discussed that contrast is used during the procedure which can potentially damage the kidney. Additionally we discussed the risk of contrast induced allergic reaction, renal dysfunction, and vascular complications. I have discussed the need for DAPT if stenting is required and there are no contraindications for this. No history of bleeding problems or current bleeding, and no scheduled surgeries or procedures in the next year. Patient understands and wishes to proceed with it.  Contrast allergy: no    Blood thinner: no   ASA: Yes  H/o CABG: no   DM: no   Creat/GFR: WNL 3/2023  Bleeding concerns: no   Upcoming surgeries/procedures: no     Labs:  LIPID RESULTS:  Lab Results   Component Value Date    CHOL 133 03/03/2023    CHOL 211 (H) 06/01/2021    HDL 69 03/03/2023    HDL 50 06/01/2021    LDL 52 03/03/2023     (H) 06/01/2021    TRIG 61 03/03/2023    TRIG 232 (H) 06/01/2021    CHOLHDLRATIO 4.8 03/27/2015       LIVER ENZYME RESULTS:  Lab Results   Component Value Date    AST 21 03/03/2023    AST 9 06/23/2021    ALT 15 03/03/2023    ALT 21 06/23/2021       CBC RESULTS:  Lab Results   Component Value Date    WBC 7.9 03/21/2023    WBC 6.0 10/12/2020    RBC 3.88 03/21/2023    RBC 4.22 10/12/2020    HGB 11.5 (L) 03/21/2023    HGB 12.9 10/12/2020    HCT 34.5 (L)  03/21/2023    HCT 37.3 10/12/2020    MCV 89 03/21/2023    MCV 88 10/12/2020    MCH 29.6 03/21/2023    MCH 30.6 10/12/2020    MCHC 33.3 03/21/2023    MCHC 34.6 10/12/2020    RDW 12.8 03/21/2023    RDW 13.0 10/12/2020     03/21/2023     10/12/2020       BMP RESULTS:  Lab Results   Component Value Date     (L) 03/21/2023     (L) 07/03/2021    POTASSIUM 4.9 03/21/2023    POTASSIUM 4.2 08/06/2022    POTASSIUM 4.5 07/03/2021    CHLORIDE 94 (L) 03/21/2023    CHLORIDE 100 08/06/2022    CHLORIDE 94 07/03/2021    CO2 29 03/21/2023    CO2 28 08/06/2022    CO2 25 07/03/2021    ANIONGAP 8 03/21/2023    ANIONGAP 7 08/06/2022    ANIONGAP 9 07/03/2021    GLC 86 03/21/2023    GLC 82 08/06/2022    GLC 94 07/03/2021    BUN 9.7 03/21/2023    BUN 9 08/06/2022    BUN 10 07/03/2021    CR 0.62 03/21/2023    CR 0.63 07/03/2021    GFRESTIMATED >90 03/21/2023    GFRESTIMATED >90 07/03/2021    GFRESTBLACK >90 07/03/2021    MARI 9.4 03/21/2023    MARI 8.5 07/03/2021        A1C RESULTS:  No results found for: A1C    INR RESULTS:  No results found for: INR    Results reviewed today.       Current Cardiac Medications     Current Outpatient Medications   Medication Sig Dispense Refill     acetaminophen (TYLENOL) 325 MG tablet Take 2 tablets (650 mg) by mouth every 4 hours as needed for other (surgical pain) 100 tablet 0     albuterol (VENTOLIN HFA) 108 (90 Base) MCG/ACT inhaler Inhale 2 puffs into the lungs every 6 hours as needed for shortness of breath / dyspnea or wheezing 18 g 1     aspirin (ASA) 81 MG EC tablet Take 1 tablet (81 mg) by mouth daily 90 tablet 3     buPROPion (WELLBUTRIN XL) 300 MG 24 hr tablet TAKE 1 TABLET (300 MG) BY MOUTH EVERY MORNING 90 tablet 0     HYDROcodone-acetaminophen (NORCO) 5-325 MG tablet TAKE 1 TABLET BY MOUTH EVERY 6 HOURS AS NEEDED FOR PAIN MAX 4 PER  tablet 0     omeprazole (PRILOSEC) 20 MG DR capsule Take 1 capsule (20 mg) by mouth daily 90 capsule 3     rosuvastatin (CRESTOR)  40 MG tablet Take 1 tablet (40 mg) by mouth daily 90 tablet 3     senna-docusate (SENOKOT-S;PERICOLACE) 8.6-50 MG per tablet Take 2 tablets by mouth 2 times daily 50 tablet 0     SPIRIVA HANDIHALER 18 MCG inhaled capsule USING THE HANDIHALER, INHALE THE CONTENTS OF ONE CAPSULE BY MOUTH DAILY 90 capsule 0     tazarotene (TAZORAC) 0.05 % external cream User every night 60 g 3     zolpidem (AMBIEN) 10 MG tablet TAKE ONE TABLET BY MOUTH EVERY EVENING AT BEDTIME AS NEEDED FOR SLEEP 30 tablet 2                      Assessment and Plan:       Plan    Patient Instructions   Medication Changes:  1. None     Recommendations:  1. Check blood pressure at least 1 hour after medications. Call the clinic if your blood pressure is consistently greater than 130/80.     Follow-up:  1. Coronary angiogram at [facility]  2. See TONYA Snider for cardiology follow up at [facility]: 1 to 2 weeks after angiogram.   Call 6 months prior, to schedule.     Cardiology Scheduling~[phone]  Cardiology Clinic RN~[phone] (Usha RN, Seema RN, Fay RN)              1. CAD    LOVELL     Continue statin, aspirin        2. History of elevated BP    Controlled at time of stress test     Not on antihypertensives        3. Hyperlipidemia    Last LDL 52 on 3/2023    Continue rosuvastatin 40 mg daily        4. Carotid artery disease    Moderate    Continue statin, aspirin               Thank you for allowing me to participate in this delightful patient's care.      This note was completed in part using Dragon voice recognition software. Although reviewed after completion, some word and grammatical errors may occur.    Agatha De Oliveira, APRN CNP, APRN, CNP           Data:   All laboratory data reviewed        General Appearance:  no distress, normal body habitus, upright.  ENT/Mouth:  membranes moist, no nasal discharge or bleeding gums. Normal head shape, no apparent injury or laceration.  Eyes:  no scleral icterus, normal  conjunctivae.  No observed jaundice.  Neck:  no apparent thyromegaly.   Chest/Lungs:  No breathing difficulty while speaking.  No audible wheezing.  Equal chest wall expansion.  No cough.  Cardiovascular:  No obviously elevated jugular venous pressure. No apparent pitting edema bilaterally  Abdomen:  no evidence of abdominal distention.   Extremities:  no cyanosis noted.  Skin:  no xanthelasma, normal skin color. No facial lacerations or other trauma.  Neurologic:  Normal arm motion bilateral, no tremors.  No apparent focal defect.  Psychiatric:  Alert and oriented x3, calm demeanor

## 2023-04-20 NOTE — LETTER
4/20/2023    Jesusita Franco, APRN CNP  [address] [place] [id number]    RE: Beatriz Francis       Dear Colleague,     I had the pleasure of seeing Beatriz Francis in the [facility].  Beatriz is a 72 year old who is being evaluated via a billable video visit.      How would you like to obtain your AVS? MyChart  If the video visit is dropped, the invitation should be resent by: Text to cell phone: [phone]  Will anyone else be joining your video visit? No        Video-Visit Details    Type of service:  Video Visit   Video Start Time: 10:15 AM  Video End Time:10:24 AM    Originating Location (pt. Location): Home    Distant Location (provider location):  On-site  Platform used for Video Visit: [facility]      Cardiology Clinic Progress Note  Beatriz Francis MRN# [id number]   YOB: 1950 Age: 72 year old      Primary Cardiologist:   Dr. Marquez          History of Presenting Illness:      Beatriz Francis is a pleasant 72 year old patient with a past cardiac history significant for   CAD  1/2023 calcium score three-vessel disease, total score 359 along with diffuse thoracic aortic calcification.  Stress echo 4/2023 no ischemia but reduced sensitivity  History of elevated BP  Hyperlipidemia  Carotid artery disease  Moderate 1/2023   Past medical history significant for ongoing tobacco use, COPD.      Patient was seen by Dr. Marquez in consultation in March 2023.  She had no chest discomfort but was experiencing dyspnea on exertion so stress echo recommended.     Pt presents today for angiogram H&P.  Stress echo April 2023 showed no evidence of stress-induced ischemia but patient exercised below average and sensitivity was reduced due to T wave abnormality.  She continues with dyspnea on exertion.  She has a history of lung disease and notes some improvement in her shortness of breath after using albuterol nebulizer.  I reviewed her testing with Dr. Marquez who noted that if the  patient continued with symptoms and wanted definitive assessment, could proceed with coronary angiogram.  Patient wishes to proceed with angiogram at this time. Patient reports no chest pain, PND, orthopnea, presyncope, syncope, edema, heart racing, or palpitations.    Risks and benefits of left heart catheterization and coronary angiogram were discussed with the patient in detail. Including death, MI, stroke, hematoma, possible urgent bypass surgery for failed PCI, use of stents, possible peripheral vascular complications, use of FFR in clinical-decision making, arrhythmia, possible percutaneous coronary intervention, and alternative of medical therapy alone. The risks discussed include risk of heart attack or risk bleeding requiring surgery or transfusion of less than 1%. The risk of stroke or needing emergency surgery is less than 1 in 500. We discussed that contrast is used during the procedure which can potentially damage the kidney. Additionally we discussed the risk of contrast induced allergic reaction, renal dysfunction, and vascular complications. I have discussed the need for DAPT if stenting is required and there are no contraindications for this. No history of bleeding problems or current bleeding, and no scheduled surgeries or procedures in the next year. Patient understands and wishes to proceed with it.  Contrast allergy: no    Blood thinner: no   ASA: Yes  H/o CABG: no   DM: no   Creat/GFR: WNL 3/2023  Bleeding concerns: no   Upcoming surgeries/procedures: no     Labs:  LIPID RESULTS:  Lab Results   Component Value Date    CHOL 133 03/03/2023    CHOL 211 (H) 06/01/2021    HDL 69 03/03/2023    HDL 50 06/01/2021    LDL 52 03/03/2023     (H) 06/01/2021    TRIG 61 03/03/2023    TRIG 232 (H) 06/01/2021    CHOLHDLRATIO 4.8 03/27/2015       LIVER ENZYME RESULTS:  Lab Results   Component Value Date    AST 21 03/03/2023    AST 9 06/23/2021    ALT 15 03/03/2023    ALT 21 06/23/2021       CBC  RESULTS:  Lab Results   Component Value Date    WBC 7.9 03/21/2023    WBC 6.0 10/12/2020    RBC 3.88 03/21/2023    RBC 4.22 10/12/2020    HGB 11.5 (L) 03/21/2023    HGB 12.9 10/12/2020    HCT 34.5 (L) 03/21/2023    HCT 37.3 10/12/2020    MCV 89 03/21/2023    MCV 88 10/12/2020    MCH 29.6 03/21/2023    MCH 30.6 10/12/2020    MCHC 33.3 03/21/2023    MCHC 34.6 10/12/2020    RDW 12.8 03/21/2023    RDW 13.0 10/12/2020     03/21/2023     10/12/2020       BMP RESULTS:  Lab Results   Component Value Date     (L) 03/21/2023     (L) 07/03/2021    POTASSIUM 4.9 03/21/2023    POTASSIUM 4.2 08/06/2022    POTASSIUM 4.5 07/03/2021    CHLORIDE 94 (L) 03/21/2023    CHLORIDE 100 08/06/2022    CHLORIDE 94 07/03/2021    CO2 29 03/21/2023    CO2 28 08/06/2022    CO2 25 07/03/2021    ANIONGAP 8 03/21/2023    ANIONGAP 7 08/06/2022    ANIONGAP 9 07/03/2021    GLC 86 03/21/2023    GLC 82 08/06/2022    GLC 94 07/03/2021    BUN 9.7 03/21/2023    BUN 9 08/06/2022    BUN 10 07/03/2021    CR 0.62 03/21/2023    CR 0.63 07/03/2021    GFRESTIMATED >90 03/21/2023    GFRESTIMATED >90 07/03/2021    GFRESTBLACK >90 07/03/2021    MARI 9.4 03/21/2023    MARI 8.5 07/03/2021        A1C RESULTS:  No results found for: A1C    INR RESULTS:  No results found for: INR    Results reviewed today.       Current Cardiac Medications     Current Outpatient Medications   Medication Sig Dispense Refill    acetaminophen (TYLENOL) 325 MG tablet Take 2 tablets (650 mg) by mouth every 4 hours as needed for other (surgical pain) 100 tablet 0    albuterol (VENTOLIN HFA) 108 (90 Base) MCG/ACT inhaler Inhale 2 puffs into the lungs every 6 hours as needed for shortness of breath / dyspnea or wheezing 18 g 1    aspirin (ASA) 81 MG EC tablet Take 1 tablet (81 mg) by mouth daily 90 tablet 3    buPROPion (WELLBUTRIN XL) 300 MG 24 hr tablet TAKE 1 TABLET (300 MG) BY MOUTH EVERY MORNING 90 tablet 0    HYDROcodone-acetaminophen (NORCO) 5-325 MG tablet TAKE 1  TABLET BY MOUTH EVERY 6 HOURS AS NEEDED FOR PAIN MAX 4 PER  tablet 0    omeprazole (PRILOSEC) 20 MG DR capsule Take 1 capsule (20 mg) by mouth daily 90 capsule 3    rosuvastatin (CRESTOR) 40 MG tablet Take 1 tablet (40 mg) by mouth daily 90 tablet 3    senna-docusate (SENOKOT-S;PERICOLACE) 8.6-50 MG per tablet Take 2 tablets by mouth 2 times daily 50 tablet 0    SPIRIVA HANDIHALER 18 MCG inhaled capsule USING THE HANDIHALER, INHALE THE CONTENTS OF ONE CAPSULE BY MOUTH DAILY 90 capsule 0    tazarotene (TAZORAC) 0.05 % external cream User every night 60 g 3    zolpidem (AMBIEN) 10 MG tablet TAKE ONE TABLET BY MOUTH EVERY EVENING AT BEDTIME AS NEEDED FOR SLEEP 30 tablet 2                      Assessment and Plan:       Plan    Patient Instructions   Medication Changes:  None     Recommendations:  Check blood pressure at least 1 hour after medications. Call the clinic if your blood pressure is consistently greater than 130/80.     Follow-up:  Coronary angiogram at [facility]  See TONYA Snider for cardiology follow up at [facility]: 1 to 2 weeks after angiogram.   Call 6 months prior, to schedule.     Cardiology Scheduling~[phone]  Cardiology Clinic RN~[phone] (Usha RN, Seema RN, Fay RN)              CAD  LOVELL   Continue statin, aspirin        History of elevated BP  Controlled at time of stress test   Not on antihypertensives        Hyperlipidemia  Last LDL 52 on 3/2023  Continue rosuvastatin 40 mg daily        Carotid artery disease  Moderate  Continue statin, aspirin               Thank you for allowing me to participate in this delightful patient's care.      This note was completed in part using Dragon voice recognition software. Although reviewed after completion, some word and grammatical errors may occur.    Agatha De Oliveira, ALEJANDRO CNP, APRN, CNP           Data:   All laboratory data reviewed        General Appearance:  no distress, normal body habitus, upright.  ENT/Mouth:   membranes moist, no nasal discharge or bleeding gums. Normal head shape, no apparent injury or laceration.  Eyes:  no scleral icterus, normal conjunctivae.  No observed jaundice.  Neck:  no apparent thyromegaly.   Chest/Lungs:  No breathing difficulty while speaking.  No audible wheezing.  Equal chest wall expansion.  No cough.  Cardiovascular:  No obviously elevated jugular venous pressure. No apparent pitting edema bilaterally  Abdomen:  no evidence of abdominal distention.   Extremities:  no cyanosis noted.  Skin:  no xanthelasma, normal skin color. No facial lacerations or other trauma.  Neurologic:  Normal arm motion bilateral, no tremors.  No apparent focal defect.  Psychiatric:  Alert and oriented x3, calm demeanor        Thank you for allowing me to participate in the care of your patient.      Sincerely,     ALEJANDRO Caballero [facility]  cc:   No referring provider defined for this encounter.

## 2023-04-20 NOTE — PROGRESS NOTES
See AVS from visit today 4/20/23 with Agatha Oreilly CNP. All instructions and directions reviewed with pt and all questions answered. Pt verbalized an understanding.     Seema Wyatt RN

## 2023-04-28 NOTE — TELEPHONE ENCOUNTER
Chart reviewed for upcoming appt.  Pt called to verify knowledge of arrival time of 0630.  States understanding.

## 2023-05-01 PROBLEM — Z98.890 STATUS POST CORONARY ANGIOGRAM: Status: ACTIVE | Noted: 2023-01-01

## 2023-05-01 PROBLEM — R94.39 ABNORMAL CARDIOVASCULAR STRESS TEST: Status: ACTIVE | Noted: 2023-01-01

## 2023-05-01 PROBLEM — R06.09 DOE (DYSPNEA ON EXERTION): Status: ACTIVE | Noted: 2023-01-01

## 2023-05-01 NOTE — DISCHARGE INSTRUCTIONS
Cardiac Angiogram Discharge Instructions - Femoral    After you go home:    Have an adult stay with you until tomorrow.  Drink extra fluids for 2 days.  You may resume your normal diet.  No smoking       For 24 hours - due to the sedation you received:  Relax and take it easy.  Do NOT make any important or legal decisions.  Do NOT drive or operate machines at home or at work.  Do NOT drink alcohol.    Care of Groin Puncture Site:    For the first 24 hrs - check the puncture site every 1-2 hours while awake.  For 2 days, when you cough, sneeze, laugh or move your bowels, hold your hand over the puncture site and press firmly.  Remove the bandaid after 24 hours. If there is minor oozing, apply another bandaid and remove it after 12 hours.  It is normal to have a small bruise or pea size lump at the site.  You may shower tomorrow. Do NOT take a bath, or use a hot tub or pool for at least 3 days. Do NOT scrub the site. Do not use lotion or powder near the puncture site.    Activity:            For 2 days:  No stooping or squatting  Do NOT do any heavy activity such as exercise, lifting, or straining.   No housework, yard work or any activity that make you sweat  Do NOT lift more than 10 pounds    Bleeding:    If you start bleeding from the site in your groin, lie down flat and press firmly on/above the site for 10 minutes.   Once bleeding stops, lay flat for 2 hours.   Call [facility] as soon as you can.       Call 911 right away if you have heavy bleeding or bleeding that does not stop.      Medicines:    If you are taking an antiplatelet medication such as Plavix, Brilinta or Effient, do not stop taking it until you talk to your cardiologist.    If you are on Metformin (Glucophage), do not restart it until you have blood tests (within 2 to 3 days after discharge).  After you have your blood drawn, you may restart the Metformin.   Take your medications, including blood thinners, unless your provider tells you not to.     If you take Coumadin (Warfarin), have your INR checked by your provider in  3-5 days. Call your clinic to schedule this.  If you have stopped any medicines, check with your provider about when to restart them.    Follow Up Appointments:    Follow up with [facility] Practitioner at [facility] of patient preference in 7-10 days.    Call the clinic if:    You have increased pain or a large or growing hard lump around the site.  The site is red, swollen, hot or tender.  Blood or fluid is draining from the site.  You have chills or a fever greater than 101 F (38 C).  Your leg feels numb, cool or changes color.  You have hives, a rash or unusual itching.  New pain in the back or belly that you cannot control with Tylenol.  Any questions or concerns.          [facility] [place]:    [phone] [facility] (7 days a week)

## 2023-05-01 NOTE — PRE-PROCEDURE
GENERAL PRE-PROCEDURE:   Procedure:  Coronary angiogram  Date/Time:  5/1/2023 8:25 AM    Verbal consent obtained?: Yes    Written consent obtained?: Yes    Risks and benefits: Risks, benefits and alternatives were discussed    Consent given by:  Patient  Patient states understanding of procedure being performed: Yes    Patient's understanding of procedure matches consent: Yes    Procedure consent matches procedure scheduled: Yes    Expected level of sedation:  Moderate  Appropriately NPO:  Yes  ASA Class:  3  Mallampati  :  Grade 3- soft palate visible, posterior pharyngeal wall not visible  Lungs:  Lungs clear with good breath sounds bilaterally  Heart:  Normal heart sounds and rate  History & Physical reviewed:  History and physical reviewed and no updates needed  Statement of review:  I have reviewed the lab findings, diagnostic data, medications, and the plan for sedation

## 2023-05-01 NOTE — Clinical Note
Stent deployed in the left anterior descending. Max pressure = 14 kirt. Total duration = 21 seconds.

## 2023-05-01 NOTE — PROGRESS NOTES
Care Suites Discharge Nursing Note    Patient Information  Name: Beatriz Francis  Age: 72 year old    Discharge Education:  Discharge instructions reviewed: Yes  Additional education/resources provided: stent card  Patient/patient representative verbalizes understanding: Yes  Patient discharging on new medications: Yes  Medication education completed: Yes    Discharge Plans:   Discharge location: home  Discharge ride contacted: Yes  Approximate discharge time: 1135    Discharge Criteria:  Discharge criteria met and vital signs stable: Yes    Patient Belongs:  Patient belongings returned to patient: Yes    Lauren Soto RN

## 2023-05-01 NOTE — Clinical Note
The first balloon was inserted into the left anterior descending.Max pressure = 14 kirt. Total duration = 12 seconds.     Max pressure = 14 kirt. Total duration = 22 seconds.    Balloon reinflated a second time: Max pressure = 14 kirt. Total duration = 22 seconds.  Balloon reinflated a third time: Max pressure = 14 kirt. Total duration = 20 seconds.  Balloon reinflated a fourth time:

## 2023-05-01 NOTE — PROGRESS NOTES
Care Suites Admission Nursing Note    Patient Information  Name: Beatriz Francis  Age: 72 year old  Reason for admission: heart cath  Care Suites arrival time: 0630    Visitor Information  Name: vickie  Informed of visitor restrictions: Yes  2 visitor allowed per patient   Visitor must wear a mask    Patient Admission/Assessment   Pre-procedure assessment complete: Yes  If abnormal assessment/labs, provider notified: N/A  NPO: Yes  Medications held per instructions/orders: Yes  Consent: deferred  If applicable, pregnancy test status: deferred  Patient oriented to room: Yes  Education/questions answered: Yes  Plan/other: heart cath    Discharge Planning  Discharge name/phone number: vickie [phone]   Overnight post sedation caregiver: vickie  Discharge location: home    Mansi Pavon RN

## 2023-05-01 NOTE — Clinical Note
The first balloon was inserted into the left anterior descending.Max pressure = 12 kirt. Total duration = 22 seconds.      Balloon reinflated a second time:

## 2023-05-01 NOTE — PROGRESS NOTES
Care Suites Post Procedure Note    Patient Information  Name: Beatriz Francis  Age: 72 year old    Post Procedure  Time patient returned to Care Suites: 0915  Concerns/abnormal assessment: none  If abnormal assessment, provider notified: N/A  Plan/Other: discharge this afternoon.     Mansi Pavon RN

## 2023-05-02 NOTE — TELEPHONE ENCOUNTER
Patient was admitted to [facility] on 5/1/23 with elevated CAC and dyspnea on exertion here for coronary angiogram +/- PCI.    PMH: HTN, HLD, moderate carotid artery disease, ongoing tobacco use, COPD.    5/1/23: Coronary angiogram via RFA with successful PCI of 80% proximal LAD lesion with DESx1 (2.5x26mm Rajesh Wood).    Pt was started on ASA and Brilinta at time of discharge.     Called patient to discuss any post hospital d/c questions, review medication changes, and confirm f/u appts. Patient denied any questions regarding new medications or changes to PTA medications.     RN confirmed with patient that she was d/c with an adequate supply of the antiplatelet Brilinta, and reminded of importance of taking without interruption.     Pt WAS NOT discharged with PRN SL NTG. Per chart review, no known allergy to nitrates. Pt denies any use of Phosphodiesterase Type 5 Inhibitors. Writer instructed pt on PRN SL Nitroglycerin, including indications, storage and expiration period once bottle opened, administration, expected side effects and when to call the clinic vs 911. Pt verbalized understanding and RX escribed to pt's [facility] per her request.    Patient denied any SOB, chest pain, fever or light headedness.     RFA cardiac cath site is without bleeding, swelling, redness or signs of infection.     RN confirmed with patient that she is scheduled for an OV on 5/10/23 at 1240 with DANE Agatha Oreilly at our [facility].    Cardiac rehab is scheduled on 5/15/23 at 1245.    Patient advised to call clinic with any cardiac related questions or concerns prior to this dane't. Patient verbalized understanding and agreed with plan. DWIGHT Jim RN.

## 2023-05-24 NOTE — PROGRESS NOTES
Cardiology Clinic Progress Note  Beatriz Francis MRN# [id number]   YOB: 1950 Age: 72 year old      Primary Cardiologist:   Dr. Marquez          History of Presenting Illness:      Beatriz Francis is a pleasant 72 year old patient with a past cardiac history significant for   1. CAD    Prior angina LOVELL    1/2023 calcium score three-vessel disease, total score 359 along with diffuse thoracic aortic calcification.    LOVELL -> Stress echo 4/2023 no ischemia but reduced sensitivity    Angio 5/2023 KATIA x1 to the proximal LAD and residual 50% mid RCA and 20 to 30% throughout  2. History of elevated BP  3. Hyperlipidemia  4. Carotid artery disease    Moderate 1/2023 carotid ultrasound  Past medical history significant for ongoing tobacco use, COPD.      Patient presents today for angiogram follow-up. Most recent lipid profile, CBC reviewed today.  Coronary angiogram 5/1/2023 with KATIA x1 to the proximal LAD and residual 50% mid RCA and 20 to 30% throughout.   Results reviewed today.  She was started on metoprolol at discharge.    Blood pressure is controlled.  She unfortunately continues with dyspnea on exertion that has not changed.  She has upcoming lung CT and follow-up with pulmonology scheduled.  She has been taking dual antiplatelet therapy without any bleeding difficulty.  She is aware that she should not stop or hold these medications without checking with cardiology first.  She reports side effects of constipation, fatigue, nausea/vomiting after starting the metoprolol and Brilinta.  We will try discontinuing metoprolol first.  If she continues with symptoms can consider switching Brilinta to Plavix.  She was unable to attend her first cardiac rehab session as she was feeling poorly but is scheduled for upcoming appointments.  She denies any problems with her femoral angiogram site. Patient reports no chest pain, PND, orthopnea, presyncope, syncope, edema, heart racing, or  palpitations.        Current Cardiac Medications   Aspirin 81 mg daily  Metoprolol XL 25 mg daily  Nitroglycerin as needed  Rosuvastatin 40 mg daily  Brilinta 90 mg twice daily                   Assessment and Plan:       Plan  Patient Instructions   Medication Changes:  1. Stop metoprolol     Recommendations:  1. Call with questions    Follow-up:  1. Cardiology follow up at [facility]: Dr. Marquez in November 2023.   2. Cardiac rehab as planned  Call 6 months prior, to schedule.     Cardiology Scheduling~[phone]  Cardiology Clinic RN~[phone] (Usha RN, Seema RN, Fay RN)                 Coronary artery disease involving native coronary artery of native heart without angina pectoris  Hyperlipidemia LDL goal <70  Bilateral carotid artery stenosis  Abnormal cardiovascular stress test  LOVELL (dyspnea on exertion)             1. CAD    No angina     Continue statin, aspirin, beta-blocker    Continue DAPT, uninterrupted, for at least 1 year (5/2024)        2. History of elevated BP    Controlled     Continue metoprolol        3. Hyperlipidemia    Last LDL 52 on 3/2023    Continue rosuvastatin 40 mg daily        4. Carotid artery disease    Moderate    Continue statin, aspirin       5.  Dyspnea on exertion     Likely noncardiac, no improvement after PCI    History of COPD and ongoing tobacco use    Following up with lung CT and pulmonology         Respiratory:  clear to auscultation; normal symmetry        Cardiac: regular rate and rhythm     GI:  nondistended     Extremities and Muscular Skeletal:  no edema            Thank you for allowing me to participate in this delightful patient's care.      This note was completed in part using Dragon voice recognition software. Although reviewed after completion, some word and grammatical errors may occur.    Agatha De Oliveira, ALEJANDRO CNP

## 2023-05-24 NOTE — PATIENT INSTRUCTIONS
Medication Changes:  Stop metoprolol     Recommendations:  Call with questions    Follow-up:  Cardiology follow up at [facility]: Dr. Marquez in November 2023.   Cardiac rehab as planned  Call 6 months prior, to schedule.     Cardiology Scheduling~[phone]  Cardiology Clinic RN~[phone] (Usha RN, Seema RN, Fay RN)

## 2023-05-25 NOTE — LETTER
5/25/2023    Jesusita Talaveraan Joan, APRN CNP  [address] [facility] [address]    RE: Beatriz Francis       Dear Colleague,     I had the pleasure of seeing Beatriz Francis in the [facility].  Cardiology Clinic Progress Note  Beatriz Francis MRN# [id number]   YOB: 1950 Age: 72 year old      Primary Cardiologist:   Dr. 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  LOVELL -> Stress echo 4/2023 no ischemia but reduced sensitivity  Angio 5/2023 KATIA x1 to the proximal LAD and residual 50% mid RCA and 20 to 30% throughout  History of elevated BP  Hyperlipidemia  Carotid artery disease  Moderate 1/2023 carotid ultrasound  Past medical history significant for ongoing tobacco use, COPD.      Patient presents today for angiogram follow-up. Most recent lipid profile, CBC reviewed today.  Coronary angiogram 5/1/2023 with KATIA x1 to the proximal LAD and residual 50% mid RCA and 20 to 30% throughout.   Results reviewed today.  She was started on metoprolol at discharge.    Blood pressure is controlled.  She unfortunately continues with dyspnea on exertion that has not changed.  She has upcoming lung CT and follow-up with pulmonology scheduled.  She has been taking dual antiplatelet therapy without any bleeding difficulty.  She is aware that she should not stop or hold these medications without checking with cardiology first.  She reports side effects of constipation, fatigue, nausea/vomiting after starting the metoprolol and Brilinta.  We will try discontinuing metoprolol first.  If she continues with symptoms can consider switching Brilinta to Plavix.  She was unable to attend her first cardiac rehab session as she was feeling poorly but is scheduled for upcoming  appointments.  She denies any problems with her femoral angiogram site. Patient reports no chest pain, PND, orthopnea, presyncope, syncope, edema, heart racing, or palpitations.        Current Cardiac Medications   Aspirin 81 mg daily  Metoprolol XL 25 mg daily  Nitroglycerin as needed  Rosuvastatin 40 mg daily  Brilinta 90 mg twice daily                   Assessment and Plan:       Plan  Patient Instructions   Medication Changes:  Stop metoprolol     Recommendations:  Call with questions    Follow-up:  Cardiology follow up at [facility]: Dr. Marquez in November 2023.   Cardiac rehab as planned  Call 6 months prior, to schedule.     Cardiology Scheduling~[phone]  Cardiology Clinic RN~[phone] (Usha RN, Seema RN, Fay RN)                 Coronary artery disease involving native coronary artery of native heart without angina pectoris  Hyperlipidemia LDL goal <70  Bilateral carotid artery stenosis  Abnormal cardiovascular stress test  LOVELL (dyspnea on exertion)             CAD  No angina   Continue statin, aspirin, beta-blocker  Continue DAPT, uninterrupted, for at least 1 year (5/2024)        History of elevated BP  Controlled   Continue metoprolol        Hyperlipidemia  Last LDL 52 on 3/2023  Continue rosuvastatin 40 mg daily        Carotid artery disease  Moderate  Continue statin, aspirin       5.  Dyspnea on exertion   Likely noncardiac, no improvement after PCI  History of COPD and ongoing tobacco use  Following up with lung CT and pulmonology         Respiratory:  clear to auscultation; normal symmetry        Cardiac: regular rate and rhythm     GI:  nondistended     Extremities and Muscular Skeletal:  no edema            Thank you for allowing me to participate in this delightful patient's care.      This note was completed in part using Dragon voice recognition software. Although reviewed after completion, some word and grammatical errors may occur.    ALEJANDRO Caballero  CNP                    Thank you for allowing me to participate in the care of your patient.      Sincerely,     ALEJANDRO Caballero CNP     [facility]  cc:   ALEJANDRO Leal CNP  [address] [place] [address]

## 2023-06-08 NOTE — LETTER
Opioid / Opioid Plus Controlled Substance Agreement    This is an agreement between you and your provider about the safe and appropriate use of controlled substance/opioids prescribed by your care team. Controlled substances are medicines that can cause physical and mental dependence (abuse).    There are strict laws about having and using these medicines. We here at [facility] are committing to working with you in your efforts to get better. To support you in this work, we ll help you schedule regular office appointments for medicine refills. If we must cancel or change your appointment for any reason, we ll make sure you have enough medicine to last until your next appointment.     As a Provider, I will:  Listen carefully to your concerns and treat you with respect.   Recommend a treatment plan that I believe is in your best interest. This plan may involve therapies other than opioid pain medication.   Talk with you often about the possible benefits, and the risk of harm of any medicine that we prescribe for you.   Provide a plan on how to taper (discontinue or go off) using this medicine if the decision is made to stop its use.    As a Patient, I understand that opioid(s):   Are a controlled substance prescribed by my care team to help me function or work and manage my condition(s).   Are strong medicines and can cause serious side effects such as:  Drowsiness, which can seriously affect my driving ability  A lower breathing rate, enough to cause death  Harm to my thinking ability   Depression   Abuse of and addiction to this medicine  Need to be taken exactly as prescribed. Combining opioids with certain medicines or chemicals (such as illegal drugs, sedatives, sleeping pills, and benzodiazepines) can be dangerous or even fatal. If I stop opioids suddenly, I may have severe withdrawal symptoms.  Do not work for all types of pain nor for all patients. If they re not helpful, I may be asked to stop  them.        The risks, benefits and side effects of these medicine(s) were explained to me. I agree that:  I will take part in other treatments as advised by my care team. This may be psychiatry or counseling, physical therapy, behavioral therapy, group treatment or a referral to a specialist.     I will keep all my appointments. I understand that this is part of the monitoring of opioids. My care team may require an office visit for EVERY opioid/controlled substance refill. If I miss appointments or don t follow instructions, my care team may stop my medicine.    I will take my medicines as prescribed. I will not change the dose or schedule unless my care team tells me to. There will be no refills if I run out early.     I may be asked to come to the clinic and complete a urine drug test or complete a pill count at any time. If I don t give a urine sample or participate in a pill count, the care team may stop my medicine.    I will only receive prescriptions from this clinic for chronic pain. If I am treated by another provider for acute pain issues, I will tell them that I am taking opioid pain medication for chronic pain and that I have a treatment agreement with this provider. I will inform my [facility] care team within one business day if I am given a prescription for any pain medication by another healthcare provider. My [facility] care team can contact other providers and pharmacists about my use of any medicines.    It is up to me to make sure that I don t run out of my medicines on weekends or holidays. If my care team is willing to refill my opioid prescription without a visit, I must request refills only during office hours. Refills may take up to 3 business days to process. I will use one pharmacy to fill all my opioid and other controlled substance prescriptions. I will notify the clinic about any changes to my insurance or medication availability.    I am responsible for my  prescriptions. If the medicine/prescription is lost, stolen or destroyed, it will not be replaced. I also agree not to share controlled substance medicines with anyone.    I am aware I should not use any illegal or recreational drugs. I agree not to drink alcohol unless my care team says I can.       If I enroll in the [place] [facility] program, I will tell my care team prior to my next refill.     I will tell my care team right away if I become pregnant, have a new medical problem treated outside of my regular clinic, or have a change in my medications.    I understand that this medicine can affect my thinking, judgment and reaction time. Alcohol and drugs affect the brain and body, which can affect the safety of my driving. Being under the influence of alcohol or drugs can affect my decision-making, behaviors, personal safety, and the safety of others. Driving while impaired (DWI) can occur if a person is driving, operating, or in physical control of a car, motorcycle, boat, snowmobile, ATV, motorbike, off-road vehicle, or any other motor vehicle ([place] Statute 169A.20). I understand the risk if I choose to drive or operate any vehicle or machinery.    I understand that if I do not follow any of the conditions above, my prescriptions or treatment may be stopped or changed.          Opioids  What You Need to Know    What are opioids?   Opioids are pain medicines that must be prescribed by a doctor. They are also known as narcotics.     Examples are:   morphine (MS Contin, Jennifer)  oxycodone (Oxycontin)  oxycodone and acetaminophen (Percocet)  hydrocodone and acetaminophen (Vicodin, Norco)   fentanyl patch (Duragesic)   hydromorphone (Dilaudid)   methadone  codeine (Tylenol #3)     What do opioids do well?   Opioids are best for severe short-term pain such as after a surgery or injury. They may work well for cancer pain. They may help some people with long-lasting (chronic) pain.     What do opioids NOT do  well?   Opioids never get rid of pain entirely, and they don t work well for most patients with chronic pain. Opioids don t reduce swelling, one of the causes of pain.                                    Other ways to manage chronic pain and improve function include:     Treat the health problem that may be causing pain  Anti-inflammation medicines, which reduce swelling and tenderness, such as ibuprofen (Advil, Motrin) or naproxen (Aleve)  Acetaminophen (Tylenol)  Antidepressants and anti-seizure medicines, especially for nerve pain  Topical treatments such as patches or creams  Injections or nerve blocks  Chiropractic or osteopathic treatment  Acupuncture, massage, deep breathing, meditation, visual imagery, aromatherapy  Use heat or ice at the pain site  Physical therapy   Exercise  Stop smoking  Take part in therapy       Risks and side effects     Talk to your doctor before you start or decide to keep taking opioids. Possible side effects include:    Lowering your breathing rate enough to cause death  Overdose, including death, especially if taking higher than prescribed doses  Worse depression symptoms; less pleasure in things you usually enjoy  Feeling tired or sluggish  Slower thoughts or cloudy thinking  Being more sensitive to pain over time; pain is harder to control  Trouble sleeping or restless sleep  Changes in hormone levels (for example, less testosterone)  Changes in sex drive or ability to have sex  Constipation  Unsafe driving  Itching and sweating  Dizziness  Nausea, throwing up and dry mouth    What else should I know about opioids?    Opioids may lead to dependence, tolerance, or addiction.    Dependence means that if you stop or reduce the medicine too quickly, you will have withdrawal symptoms. These include loose poop (diarrhea), jitters, flu-like symptoms, nervousness and tremors. Dependence is not the same as addiction.                     Tolerance means needing higher doses over time to  get the same effect. This may increase the chance of serious side effects.    Addiction is when people improperly use a substance that harms their body, their mind or their relations with others. Use of opiates can cause a relapse of addiction if you have a history of drug or alcohol abuse.    People who have used opioids for a long time may have a lower quality of life, worse depression, higher levels of pain and more visits to doctors.    You can overdose on opioids. Take these steps to lower your risk of overdose:    Recognize the signs:  Signs of overdose include decrease or loss of consciousness (blackout), slowed breathing, trouble waking up and blue lips. If someone is worried about overdose, they should call 911.    Talk to your doctor about Narcan (naloxone).   If you are at risk for overdose, you may be given a prescription for Narcan. This medicine very quickly reverses the effects of opioids.   If you overdose, a friend or family member can give you Narcan while waiting for the ambulance. They need to know the signs of overdose and how to give Narcan.     Don't use alcohol or street drugs.   Taking them with opioids can cause death.    Do not take any of these medicines unless your doctor says it s OK. Taking these with opioids can cause death:  Benzodiazepines, such as lorazepam (Ativan), alprazolam (Xanax) or diazepam (Valium)  Muscle relaxers, such as cyclobenzaprine (Flexeril)  Sleeping pills like zolpidem (Ambien)   Other opioids      How to keep you and other people safe while taking opioids:    Never share your opioids with others.  Opioid medicines are regulated by the Drug Enforcement Agency (SEBASTIEN). Selling or sharing medications is a criminal act.    2. Be sure to store opioids in a secure place, locked up if possible. Young children can easily swallow them and overdose.    3. When you are traveling with your medicines, keep them in the original bottles. If you use a pill box, be sure you also  carry a copy of your medicine list from your clinic or pharmacy.    4. Safe disposal of opioids    Most pharmacies have places to get rid of medicine, called disposal kiosks. Medicine disposal options are also available in every [facility]. Search your county and  medication disposal  to find more options. You can find more details at:  https://www.pca.[facility].[email or web address]./living-green/managing-unwanted-medications     I agree that my provider, clinic care team, and pharmacy may work with any city, state or federal law enforcement agency that investigates the misuse, sale, or other diversion of my controlled medicine. I will allow my provider to discuss my care with, or share a copy of, this agreement with any other treating provider, pharmacy or emergency room where I receive care.    I have read this agreement and have asked questions about anything I did not understand.    _______________________________________________________  Patient Signature - Beatriz Francis _____________________                   Date     _______________________________________________________  Provider Signature - ALEJANDRO Odonnell CNP   _____________________                   Date     _______________________________________________________  Witness Signature (required if provider not present while patient signing)   _____________________                   Date

## 2023-06-08 NOTE — PROGRESS NOTES
Assessment & Plan     (M54.2,  G89.29) Chronic neck pain  (primary encounter diagnosis)  Comment:  Controlled no change in treatment plan  Plan: HYDROcodone-acetaminophen (NORCO) 5-325 MG         tablet, Drug Confirmation Panel Urine with         Creatinine      (F11.90) Chronic, continuous use of opioids  Comment:  Controlled no change in treatment plan  Plan: HYDROcodone-acetaminophen (NORCO) 5-325 MG         tablet      (J44.1) COPD exacerbation (H)  Comment: will treat with a course of prednisone   Plan: predniSONE (DELTASONE) 20 MG tablet      (F33.1) Moderate episode of recurrent major depressive disorder (H)  Comment:  Controlled no change in treatment plan    Plan:     (R00.2) Palpitations  Comment: noted to have some continued palpations limited to  Once a day no current symptoms. Will obtain Zio.   Plan: Adult Leadless EKG Monitor 3 to 7 Days, TSH         with free T4 reflex              ALEJANDRO Odonnell [facility]    Sheyla Henderson is a 72 year old, presenting for the following health issues:  Neck Pain (Medication refill), Extremity Weakness, and Tachycardia        6/8/2023     1:41 PM   Additional Questions   Roomed by SMA Shayy     History of Present Illness       Reason for visit:  Renew prescription    She eats 2-3 servings of fruits and vegetables daily.She consumes 1 sweetened beverage(s) daily.She exercises with enough effort to increase her heart rate 9 or less minutes per day.  She exercises with enough effort to increase her heart rate 3 or less days per week.   She is taking medications regularly.    Today's PHQ-9         PHQ-9 Total Score: 9    PHQ-9 Q9 Thoughts of better off dead/self-harm past 2 weeks :   Not at all    How difficult have these problems made it for you to do your work, take care of things at home, or get along with other people: Somewhat difficult  Today's HIRAL-7 Score: 3       Chronic/Recurring Pain Follow Up      Where is  "your pain located? (Select all that apply) low back , neck  and shoulders     How would you describe your pain?  dull ache and fullness    Where does your pain spread? nowhere    Since your last clinic visit for pain, how has your pain changed? always present, but gets better and worse    Does your back pain interfere with your job? Not applicable    Since your last visit, have you tried any new treatment? No    Fast heart rate and leg weakness  Onset/Duration: Second week of May  Accompanying Signs & Symptoms:  Shortness of breath: YES  Sweating: No  Nausea/vomiting: YES- Both  Lightheadedness: YES  Palpitations: YES- Sometimes  Fever/Chills: No  Cough: No           Heartburn: No  Precipitating factors:   Worse with exertion: YES  Alleviating factors: moderate  Therapies tried and outcome: None    She has upcoming lung CT and follow-up with pulmonology scheduled    Review of Systems   Constitutional, HEENT, cardiovascular, pulmonary, GI, , musculoskeletal, neuro, skin, endocrine and psych systems are negative, except as otherwise noted.      Objective    /70 (BP Location: Right arm, Patient Position: Sitting, Cuff Size: Adult Large)   Pulse 91   Temp 98.1  F (36.7  C) (Tympanic)   Resp 16   Ht 1.651 m (5' 5\")   Wt 54.9 kg (121 lb)   SpO2 99%   BMI 20.14 kg/m    Body mass index is 20.14 kg/m .  Physical Exam   GENERAL: healthy, alert and no distress  EYES: Eyes grossly normal to inspection, PERRL and conjunctivae and sclerae normal  HENT: ear canals and TM's normal, nose and mouth without ulcers or lesions  NECK: no adenopathy, no asymmetry, masses, or scars and thyroid normal to palpation  RESP: lungs clear to auscultation - no rales, rhonchi or wheezes  CV: regular rate and rhythm, normal S1 S2, no S3 or S4, no murmur, click or rub, no peripheral edema and peripheral pulses strong  MS: no gross musculoskeletal defects noted, no edema  SKIN: no suspicious lesions or rashes  NEURO: Normal strength and " tone, mentation intact and speech normal  PSYCH: mentation appears normal, affect normal/bright

## 2023-06-17 NOTE — DISCHARGE INSTRUCTIONS
[facility] will call to schedule follow up for possible endoscopy and colonoscopy as outpatient. Please follow up with your primary provider for recheck of your hemoglobin early next week.     If you develop worsening symptoms of anemia or experience significant blood in your stools or vomit, please return to ER.    Please increased your home omeprazole to twice daily dosing.

## 2023-06-17 NOTE — TELEPHONE ENCOUNTER
Was called by lab with critical Hgb result of 7.2. I called pt and recommended ED eval at [facility] today for GI consult and endoscopies and she has been having tarry stools and worsening fatigue since PCI. Patient will present today, wanted to call [place] first due to proximity.

## 2023-06-17 NOTE — ED PROVIDER NOTES
History     Chief Complaint:  Abnormal Labs       HPI   Beatriz Francis is a 72 year old female who presents with low hemoglobin. Patient reports that over the past few weeks she has been experiencing excessive fatigue, lightheadedness, shortness of breath, and intermittent nausea with two episodes of emesis in the last week. Also endorses ongoing constipation, last BM 5 days ago. Reports that her BM appeared more dark but not black, with no bright red blood. Emesis did not have notable bright red blood either, without coffee ground appearance. Patient had PCI with stent placement on 5/1, now taking daily Plavix.       Independent Historian:   None - Patient Only    Review of External Notes:   Reviewed telephone encounter from earlier today  Reviewed telephone encounters from 6/12/23  Reviewed progress note from 6/8/23    Medications:    acetaminophen (TYLENOL) 325 MG tablet  albuterol (VENTOLIN HFA) 108 (90 Base) MCG/ACT inhaler  aspirin (ASA) 81 MG EC tablet  buPROPion (WELLBUTRIN XL) 300 MG 24 hr tablet  clopidogrel (PLAVIX) 75 MG tablet  HYDROcodone-acetaminophen (NORCO) 5-325 MG tablet  metoprolol succinate ER (TOPROL XL) 25 MG 24 hr tablet  nitroGLYcerin (NITROSTAT) 0.4 MG sublingual tablet  omeprazole (PRILOSEC) 20 MG DR capsule  predniSONE (DELTASONE) 20 MG tablet  rosuvastatin (CRESTOR) 40 MG tablet  senna-docusate (SENOKOT-S;PERICOLACE) 8.6-50 MG per tablet  SPIRIVA HANDIHALER 18 MCG inhaled capsule  tazarotene (TAZORAC) 0.05 % external cream  zolpidem (AMBIEN) 10 MG tablet        Past Medical History:    Past Medical History:   Diagnosis Date     Abnormal platelets (H) 04/15/2015     Benign essential hypertension 04/19/2023     Chronic neck pain      COPD (chronic obstructive pulmonary disease) (H)      Coronary artery disease involving native coronary artery of native heart without angina pectoris 04/19/2023     Depression, major      Eczema      Herpes simplex, oral      Insomnia      Lupus (H)       "Pulmonary nodule      Seasonal allergic rhinitis        Past Surgical History:    Past Surgical History:   Procedure Laterality Date     ARTHROSCOPY SHLDR ROTATOR CUFF REPAIR, SUBACROMIAL DECOMP, DIST CLAVICLE RESECTION, BICEP TENODESIS Left 7/8/2016    Procedure: ARTHROSCOPY SHOULDER ROTATOR CUFF REPAIR, SUBACROMIAL DECOMPRESSION, DISTAL CLAVICLE RESECTION, OPEN BICEP TENODESIS REPAIR;  Surgeon: Freddie Espino MD;  Location: [facility] OR     ARTHROSCOPY SHOULDER Left 1/23/2017    Procedure: ARTHROSCOPY SHOULDER;  Surgeon: Ankit Morton MD;  Location: [facility] OR     BIOPSY       COLONOSCOPY  2002     COLONOSCOPY N/A 3/2/2022    Procedure: COLONOSCOPY, FLEXIBLE, WITH LESION REMOVAL USING SNARE;  Surgeon: Davi Sibley DO;  Location: [facility]     CV CORONARY ANGIOGRAM N/A 5/1/2023    Procedure: Coronary Angiogram;  Surgeon: Everette Bolivar MD;  Location: [facility] CARDIAC CATH LAB     CV PCI N/A 5/1/2023    Procedure: Percutaneous Coronary Intervention;  Surgeon: Everette Bolivar MD;  Location: [facility] CARDIAC CATH LAB     ESOPHAGOSCOPY, GASTROSCOPY, DUODENOSCOPY (EGD), COMBINED N/A 9/7/2022    Procedure: ESOPHAGOGASTRODUODENOSCOPY, WITH BIOPSY;  Surgeon: Davi Sibley DO;  Location: [facility]     EYE SURGERY  2004-[facility]ic     HYSTERECTOMY, PAP NO LONGER INDICATED  1990     REVERSE ARTHROPLASTY SHOULDER Left 2/15/2017    Procedure: REVERSE ARTHROPLASTY SHOULDER;  Surgeon: Ankit Morton MD;  Location: [facility] OR     ROTATOR CUFF REPAIR RT/LT  1994,1995    right     ROTATOR CUFF REPAIR RT/LT  3/5/04    left     ROTATOR CUFF REPAIR RT/LT  9/25/2009    Right     ZZC SHOULDER SURG PROC UNLISTED  7/8/2016        Physical Exam     Patient Vitals for the past 24 hrs:   BP Temp Temp src Pulse Resp SpO2 Height Weight   06/17/23 1655 -- -- -- -- -- 97 % -- --   06/17/23 1640 -- -- -- -- -- 97 % -- --   06/17/23 1616 (!) 158/79 98  F (36.7  C) Oral 100 16 98 % 1.651 m (5' 5\") 54.4 kg (120 " lb)        Physical Exam  General: Alert and cooperative with exam. Appears pale. Patient in no apparent distress. Normal mentation.  Head:  Scalp is NC/AT  Eyes:  No scleral icterus, PERRL  ENT:  The external nose and ears are normal.   Neck:  Normal range of motion without rigidity.  CV:  Regular rate and rhythm    No pathologic murmur   Resp:  Breath sounds are clear bilaterally    Non-labored, no retractions or accessory muscle use  GI:  Abdomen is soft, no distension, no tenderness. No peritoneal signs  :  Digital rectal completed, stool noted to be light brown in color  MS:  No lower extremity edema   Skin:  Warm and dry, No rash or lesions noted.  Neuro:  Oriented x 3. No gross motor deficits.      Emergency Department Course     Laboratory:  Labs Ordered and Resulted from Time of ED Arrival to Time of ED Departure   COMPREHENSIVE METABOLIC PANEL - Abnormal       Result Value    Sodium 129 (*)     Potassium 3.8      Chloride 91 (*)     Carbon Dioxide (CO2) 27      Anion Gap 11      Urea Nitrogen 10.3      Creatinine 0.63      Calcium 8.8      Glucose 96      Alkaline Phosphatase 117 (*)     AST 9      ALT 14      Protein Total 6.9      Albumin 4.1      Bilirubin Total 0.2      GFR Estimate >90     CBC WITH PLATELETS AND DIFFERENTIAL - Abnormal    WBC Count 10.1      RBC Count 2.76 (*)     Hemoglobin 7.6 (*)     Hematocrit 23.0 (*)     MCV 83      MCH 27.5      MCHC 33.0      RDW 13.8      Platelet Count 632 (*)     % Neutrophils 52      % Lymphocytes 27      % Monocytes 14      % Eosinophils 5      % Basophils 1      % Immature Granulocytes 1      NRBCs per 100 WBC 0      Absolute Neutrophils 5.4      Absolute Lymphocytes 2.7      Absolute Monocytes 1.4 (*)     Absolute Eosinophils 0.5      Absolute Basophils 0.1      Absolute Immature Granulocytes 0.1      Absolute NRBCs 0.0     TYPE AND SCREEN, ADULT    ABO/RH(D) A POS      Antibody Screen Negative      SPECIMEN EXPIRATION DATE [id number]     PREPARE  RED BLOOD CELLS (UNIT)    Blood Component Type Red Blood Cells      Product Code D2390A30      Unit Status Issued      Unit Number [id number]      CROSSMATCH Compatible      CODING SYSTEM [id number]      ISSUE DATE AND TIME [id number]      UNIT ABO/RH A+      UNIT TYPE ISBT 6200     PREPARE RED BLOOD CELLS (UNIT)   TRANSFUSE RED BLOOD CELLS (UNIT)   ABO/RH TYPE AND SCREEN        Procedures   None    Emergency Department Course & Assessments:    Interventions:  Medications   0.9% sodium chloride BOLUS (has no administration in time range)        Independent Interpretation (X-rays, CTs, rhythm strip):  None    Consultations/Discussion of Management or Tests:  ED Course as of 06/17/23 1815   Sat Jun 17, 2023   1648 Performed initial assessment   1752 Consulted with [facility]       Social Determinants of Health affecting care:   None    Disposition:  The patient will be discharged home after her transfusion.     Impression & Plan      Medical Decision Making:  Beatriz Francis is a 72 year old female who presents with low hemoglobin. Patient had follow up blood work completed as outpatient and was found to have hemoglobin of 7.2 and instructed to be seen in ER. Here, hemoglobin is 7.6. She is symptomatic, experiencing fatigue, shortness of breath, lightheadedness. Labs otherwise unremarkable. Stool occult study completed by myself and noted to be negative, however patient is significantly constipation and this may be false negative. Consulted with [facility] who recommends 1 unit packed RBCs and can discharge home. [facility] will follow up for outpatient endoscopy/colonoscopy. Recommend repeat blood work early next week for hemoglobin recheck. Patient is vitally stable here and able to ambulate independently. She was provided 1 unit PRBCs with NS fluids. No known source of bleed at this time. Patient is safe to discharge home with follow up plans in place. Instructed her to return to ER if she develops worsening symptoms of anemia  or notices significant bleeding in stool or emesis, or black tarry stools. Patient agreeable with this plan. Recommend she increase her home omeprazole to twice daily. Patient signed out to colleague as she was receiving her transfusion.      Diagnosis:    ICD-10-CM    1. Anemia  D64.9            Discharge Medications:  New Prescriptions    No medications on file        6/17/2023   SOY Cole Lauren R, PA-C  06/17/23 1820

## 2023-06-17 NOTE — ED TRIAGE NOTES
Pt had been told today that she had low hemoglobin at 7.2 by lab    Pt had a procedure stent in her heart on may 1 st   Pt since then has felt sob and weak

## 2023-06-18 NOTE — ED PROVIDER NOTES
Visit Date: 06/17/2023    This is a PA supervisory note.  Seen with PA, Victorina Rogers.    CHIEF COMPLAINT:  Low hemoglobin.    HISTORY:  This is a woman who about 6 weeks ago underwent coronary angiogram and had stent placement, was on aspirin and Brilinta, although this has since been switched to Plavix.  She was noted to have a hemoglobin prior to this of about 11.  Her labs were done and found to have a hemoglobin in the 7s, and she was sent here.  She denies any black or bloody stools.  She denies any bleeding or vomiting of blood.  She does experience some constipation.  She is on Prilosec and has been taking that.    PAST MEDICAL HISTORY:  Remarkable for chronic neck pain, COPD, reflux disease, hyperlipidemia, multiple orthopedic repairs and coronary artery disease.  She also has lupus listed.    MEDICATIONS:  Include:  Ventolin, aspirin, Wellbutrin, Plavix, Prilosec, Crestor, inhalers.    ALLERGIES:  NONE.    FAMILY AND SOCIAL HISTORY:  Here with significant other.  Is still smoking.  Does not drink.    REVIEW OF SYSTEMS:  Noted.  All other systems negative.    PHYSICAL EXAMINATION:  Reveals a middle-aged white female, supine, no respiratory distress.  Blood pressure 158/79, temp 98, pulse 100, respirations 16, pulse ox 98% on room air, weight 54 kilos.  HEAD AND NECK:  Pupils equal, reactive.  Extraocular movements intact.  Nares and oropharynx clear.  Neck veins flat.  LUNGS:  Some bilateral wheezing is present.  HEART:  Regular.  No murmurs.  ABDOMEN:  Soft, without tenderness or masses.  MUSCULOSKELETAL:  Some bruising, no swelling or tenderness.  NEUROLOGIC:  Awake, alert, appropriate.  Normal motor sensation, coordination.    COURSE:  The patient had labs obtained.  Her sodium 129, chloride 91, alk phos 117, otherwise chemistries are negative.  White count 10.1, hemoglobin 7.6 and platelet count 632.    Given the low hemoglobin and the fact that patient has additional comorbidities, it was decided to  give her 1 unit of cells.  She has noticed significant weakness with activity.  She received 1 unit of packed cells, and we will also increase her Prilosec 20 mg to twice a day.  She did have a rectal exam done by the PA, see her note, and a hemoccult, which were unremarkable.    The patient may have a slow GI bleed, and we have recommended that she see [facility] for upper GI endoscopy in the next week.  If she has increasing weakness or black or bloody stools, return to the ED.    IMPRESSION:  1.  Anemia.  2.  Recent coronary artery stenting.  3.  Concern for stable gastrointestinal (GI) bleed.    PLAN:  As noted.    Christiano Adamson MD        D: 2023   T: 2023   MT: [email or web address]    Name:     YESENIA FERGUSON  MRN:      [id number]        Account:    [id number]   :      1950           Visit Date: 2023     Document: [id number]

## 2023-06-18 NOTE — ED NOTES
Blood transfusion consumed with no reaction,vitally stable.  Ivf commenced to flush the line 50 ml

## 2023-06-18 NOTE — ED NOTES
Not distress,awake,mild headache and starving accordingly,1st unit blood  ongoing @ 150 ml/hr,no reaction noted.  Snacks served.

## 2023-06-19 NOTE — TELEPHONE ENCOUNTER
Patient called and RN schedule an ER Follow up office visit in the same day slot for tomorrow (6/20/23). Patient reports she was in the ER yesterday for a blood transfusion for anemia. Patient reports she need lab draw and to follow up with PCP within 2 days and pt was in ER on 6/17/23.    Patient reports she is feeling okay today but just a little tired. Patient knows the red flag symptoms of when she would need to go back to the ER.        Fidelina Villar RN on 6/19/2023 at 9:57 AM

## 2023-06-19 NOTE — TELEPHONE ENCOUNTER
Patient will need a follow-up in office appointment .  We will contact her to schedule.    Jesusita Franco CNP

## 2023-06-20 NOTE — PROGRESS NOTES
Assessment & Plan     (D50.0) Iron deficiency anemia due to chronic blood loss  Comment: We will obtain further labs and recheck in 1 week hemoglobin has improved to 8.8 post ER visit  Plan: CBC with platelets and differential, Basic         metabolic panel  (Ca, Cl, CO2, Creat, Gluc, K,         Na, BUN), sucralfate (CARAFATE) 1 GM tablet,         CBC with platelets, Iron and iron binding         capacity, Ferritin, ferrous gluconate (FERGON)         324 (38 Fe) MG tablet      (G47.00) Insomnia, unspecified type  Comment: Very sleepy tried not effective we will have her see sleep clinic  Plan: Adult Sleep Eval & Management          Referral      (R79.89) Low serum sodium  Comment: Noted to have low sodium we will do further work-up continue with fluid restrictions and will recheck next week  Plan: Basic metabolic panel  (Ca, Cl, CO2, Creat,         Gluc, K, Na, BUN), OSMOLALITY, SERUM,         Osmolality urine       MED REC REQUIRED  Post Medication Reconciliation Status:  Discharge medications reconciled, continue medications without change    Nicotine/Tobacco Cessation:  She reports that she has been smoking cigarettes and vaping device. She has a 22.50 pack-year smoking history. She has never been exposed to tobacco smoke. She has never used smokeless tobacco.  Nicotine/Tobacco Cessation Plan:   Information offered: Patient not interested at this time      See Patient Instructions    ALEJANDRO Odonnell CNP  [facility]    Sheyla Henderson is a 72 year old, presenting for the following health issues:  ER F/U        6/20/2023     3:18 PM   Additional Questions   Roomed by Tanya NIEVES     \[facility]\""     ED/UC Followup:    Facility:  [facility]  Date of visit: 6/17/23  Reason for visit: anemia  Current Status: Patient states that she feels fatigued and short of breath.      Review of Systems   Constitutional, HEENT, cardiovascular, pulmonary, gi and gu systems  "are negative, except as otherwise noted.      Objective    /70 (BP Location: Right arm, Cuff Size: Adult Regular)   Pulse 98   Temp 98.9  F (37.2  C) (Tympanic)   Resp 18   Ht 1.638 m (5' 4.5\")   Wt 54 kg (119 lb)   SpO2 97%   BMI 20.11 kg/m    Body mass index is 20.11 kg/m .  Physical Exam   GENERAL: healthy, alert and no distress  EYES: Eyes grossly normal to inspection, PERRL and conjunctivae and sclerae normal  HENT: ear canals and TM's normal, nose and mouth without ulcers or lesions  NECK: no adenopathy, no asymmetry, masses, or scars and thyroid normal to palpation  RESP: lungs clear to auscultation - no rales, rhonchi or wheezes  CV: regular rate and rhythm, normal S1 S2, no S3 or S4, no murmur, click or rub, no peripheral edema and peripheral pulses strong  ABDOMEN: soft, nontender, no hepatosplenomegaly, no masses and bowel sounds normal  MS: no gross musculoskeletal defects noted, no edema  SKIN: no suspicious lesions or rashes  NEURO: Normal strength and tone, mentation intact and speech normal  PSYCH: mentation appears normal, affect normal/bright    Results for orders placed or performed in visit on 06/20/23   CBC with platelets and differential     Status: Abnormal   Result Value Ref Range    WBC Count 9.8 4.0 - 11.0 10e3/uL    RBC Count 3.13 (L) 3.80 - 5.20 10e6/uL    Hemoglobin 8.8 (L) 11.7 - 15.7 g/dL    Hematocrit 26.5 (L) 35.0 - 47.0 %    MCV 85 78 - 100 fL    MCH 28.1 26.5 - 33.0 pg    MCHC 33.2 31.5 - 36.5 g/dL    RDW 14.4 10.0 - 15.0 %    Platelet Count 501 (H) 150 - 450 10e3/uL    % Neutrophils 58 %    % Lymphocytes 24 %    % Monocytes 13 %    % Eosinophils 4 %    % Basophils 1 %    % Immature Granulocytes 0 %    Absolute Neutrophils 5.6 1.6 - 8.3 10e3/uL    Absolute Lymphocytes 2.4 0.8 - 5.3 10e3/uL    Absolute Monocytes 1.3 0.0 - 1.3 10e3/uL    Absolute Eosinophils 0.4 0.0 - 0.7 10e3/uL    Absolute Basophils 0.1 0.0 - 0.2 10e3/uL    Absolute Immature Granulocytes 0.0 <=0.4 " 10e3/uL   CBC with platelets and differential     Status: Abnormal    Narrative    The following orders were created for panel order CBC with platelets and differential.  Procedure                               Abnormality         Status                     ---------                               -----------         ------                     CBC with platelets and d...[[id number]]  Abnormal            Final result                 Please view results for these tests on the individual orders.

## 2023-06-21 NOTE — TELEPHONE ENCOUNTER
Asking if sleep number bed would help her COPD.  Advised to try sleeping with pillows first.  Brigid Mckinley RN

## 2023-06-27 NOTE — LETTER
6/27/2023       RE: Beatriz Francis  [address] [place] [address]     Dear Colleague,    Thank you for referring your patient, Beatriz Francis, to the [facility] at [facility]. Please see a copy of my visit note below.    Not a clinic visit. Scheduled in error      Again, thank you for allowing me to participate in the care of your patient.      Sincerely,    Dale Cortez MD

## 2023-06-27 NOTE — NURSING NOTE
Is the patient currently in the state of [place]? YES    Visit mode:VIDEO    If the visit is dropped, the patient can be reconnected by: VIDEO VISIT: Text to cell phone: [phone]    Will anyone else be joining the visit? NO      How would you like to obtain your AVS? MyChart    Are changes needed to the allergy or medication list? YES: See changes below.  Patient is taking 75 mg of Plavix daily.   Patient started taking 40 mg of Prilosec 6/18/23.   Patient takes Senokot 2 tablets 8.5-50 mg once daily.      Reason for visit: JENNIFER Thomas, Virtual Facilitator

## 2023-06-27 NOTE — TELEPHONE ENCOUNTER
Pulmonary Nodule Conference - June 30th, 2023      Patient Name: Beatriz Francis    Reason for conference discussion (brief overview): 72 year old Female. Current 22.5 ppy smoker. PFTs from January show FEV 1.6L and  DLCO is normal. She is high risk for lung cancer. Was previously discussed in conference where consensus was short term follow up with 3 month CT scan. Dr. Cortez saw her in clinic on 6/27 and her CT showed interval growth of RUL nodule - now [id number], previously 9x15mm.    CT chest wo contrast from 6/27/2023 showed:  -- An irregular somewhat linear nodular opacity in the right upper lobe is similar to slightly increased in size to comparison, now measuring 8 x 23 x 10 mm in AP, transverse, and craniocaudal dimensions, respectively (3-87 and 6-40), previously 9 x 18 x 7 mm.  -- Previously described 4 mm pulmonary nodule in the right lower lobe is unchanged.  -- NO mediastinal adenopathy    Specific Question:  Dr. Cortez's question is if Thoracic surgery wants to see pt or if IP should do a antolin bronch first?    Pertinent Histology:  n/a    Referring Physician: Dr. Dale Cortez    The patient's case was presented at the multidisciplinary conference for the above noted reason.  There was a consensus recommendation for the following actions:     IR not on conference this am - message to be sent to them to see if they would be able to biopsy nodule (update: IR declined doing biopsy)  Thoracic surgery stated they would see pt if IR was unable/declined to biopsy (thoracic surgery referral placed)  RN[facility] notified pt of POC via [employer].     Case Lead:  Dr. Dale Cortez / EN Thornton [facility]    Interventional Radiology Staff Present: No IR provider on conference this am.

## 2023-06-28 NOTE — TELEPHONE ENCOUNTER
[facility] Call Center    Phone Message    May a detailed message be left on voicemail: yes     Reason for Call: Appointment Intake    Referring Provider Name: Jesusita Franco APRN CNP  Diagnosis and/or Symptoms: Ventricular Tachyardia, nothing in 1-2 weeks. Please review and call patient to schedule.     Action Taken: Other: Cardio    Travel Screening: Not Applicable

## 2023-06-28 NOTE — TELEPHONE ENCOUNTER
Reviewed pt chart with Agatha Oreilly and discussed pt ZP results completed by PCP for racing heart and showing SVT and pt Metoprolol XL that was stopped d/t concerns of side effects.  Those side effects also cause the Brilinta to be changed to Plavix. Discussed with Agatha of restarting Metoprolol for her SVT. Was decided that pt could restart at 12.5 mg daily at night. Did also see that Cardiac rehab had been canceled and per pt this was canceled at this time, because she was so tired and weak. Pt also is dealing with a low Hgb.  Pt agrees to start the Metoprolol XL 12.5 mg daily at night and was told if she has any problems with her heart to please call cardiology. Pt will see Dr Marquez in November as planned. Noemy

## 2023-06-28 NOTE — TELEPHONE ENCOUNTER
Spoke to pt about the MD request for pt to be seen and made pt aware that the schedule for our EP's is out there in days and I wanted to see how pt was feeling. Pt states that she has occasional racing heart ad that is why monitor was placed. Pt has SVT on her ZP with the longest episode of being 10 beats. Pt states that she is doing ok. Pt had a recent cath and stent placed and at last OV with Agatha Oreilly Metoprolol was stopped because pt felt very poorly after starting the Metoprolol and Brilinta. Brilinta has been changed to Plavix and pt is feeling better now.  Will discuss with Agatha if Metoprolol may be restarted. Noemy

## 2023-06-30 NOTE — PROGRESS NOTES
Outpatient IR Biopsy Pulmonary Nodule conference 6/30/23    Patient is a 73 y/o female with a PMH of tobacco use. IR has been asked to review for RUL lung nodule biopsy for concerns of malignancy.     IR was not present at Pul Nodule conference, case and images were reviewed after the conference 6/30/23.     Case and imaging CT 6/27/23 was reviewed with Dr. Meyers and Dr. Costa from IR and biopsy of the RUL lung nodule is declined.    Images and case 6/27/23 reviewed with Dr. Meyers and Dr. Costa from IR who decline percutaneous biopsy as there is not a safe window, there are arteries and large vessels triangulating the lesion, it is very central and apical making it very high risk for pneumo. IR recommends EBUS as there appears to be a bronchus adjacent to the lesion.    Series 3 Image 87.    Primary team Dr. Cortez IP made aware of IR recommendations via epic messaging.      April ALLEN  Interventional Radiology   IR on-call pager: [phone]

## 2023-06-30 NOTE — PROGRESS NOTES
New Patient Oncology Nurse Navigator Note     Referring provider: Dr. Dale Cortez    Referring Clinic/Organization: [facility]  Referred to: Thoracic Surgery  Requested provider (if applicable): First available - did not specify   Referral Received: 06/30/23       Evaluation for :   Diagnosis   R91.8 (ICD-10-CM) - Pulmonary nodules     Clinical History (per Nurse review of records provided):      01/19/2023 PFTs done (bookmarked)     03/27/2023 CT Chest w/o contrast (bookmarked) showed:   IMPRESSION:   1.  Mild interval increase in size of the previously seen irregular  nodular opacity in the right upper lobe. Although indeterminate, an  infectious or inflammatory process is favored. Consider follow-up  chest CT in three months to assess for stability versus resolution.  Alternatively, PET/CT and/or tissue sampling could be pursued.     2.  ACR Assessment Category:  Lung-RADS Category 4B. Suspicious.  Recommendation: Chest CT with contrast  (please use exam code )  or without contrast (please use exam code ), PET/CT and/or  tissue sampling depending on the probability of malignancy and  comorbidities, PET/CT may be used when there is a >/= 8 mm solid  component.     06/27/2023 CT Chest w/o contrast (bookmarked) showed:   IMPRESSION:   1.  Mild interval increase in size of irregular linear nodular opacity  in the right upper lobe. Consider follow-up PET/CT and/or tissue  sampling for further evaluation and exclude a possible neoplastic  process.  2.  Unchanged 4 mm right lower lobe pulmonary nodule.  3.  Mild lower lobe predominant bronchial wall thickening and  endobronchial debris, suggestive of bronchiolitis.  4.  Bibasilar atelectasis and or scarring.  5.  Pulmonary emphysema.  6.  Severe coronary artery atherosclerosis.     ACR Assessment Category:  Lung-RADS Category 4B. Suspicious.  Recommendation:  Chest CT with contrast  (please use exam code IMG  202) or without contrast (please use exam  code ), PET/CT and/or  tissue sampling depending on the probability of malignancy and  comorbidities, PET/CT may be used when there is a >/= 8 mm solid  component. .     06/30/2023 Multidisciplinary conference notes (bookmarked)  Clinical Assessment / Barriers to Care (Per Nurse):    Tobacco History    Smoking Status   Every Day Smoking Frequency   0.50 packs/day for 45.00 years (22.50 pk-yrs) Smoking Tobacco Type   Cigarettes, Vaping Device     Records Location: [facility]   Records Needed: None  Additional testing needed prior to consult: None    Anna Hernandez, MAGEDN, RN, OCN  [facility] Oncology Nurse Navigator  [phone] / [phone]

## 2023-07-06 NOTE — TELEPHONE ENCOUNTER
Referring Provider/Location:  Dale Cortez  Dx and Code:Pulmonary nodules [R91.8]  Appt Date:  7.13.23  Provider: Maggie Ng  July 6, 2023 3:54 PM TJ   Internal Referral, No outside records needed

## 2023-07-10 NOTE — TELEPHONE ENCOUNTER
Prescription approved per [facility] Refill Protocol.  Brigid Mckinley RN

## 2023-07-12 NOTE — TELEPHONE ENCOUNTER
[employer] message sent to patient with the below information.  Leg Cramps   A muscle cramp or spasm is a strong, involuntary contraction of the muscle fibers. It occurs suddenly. It's also called a charley horse. This may occur in the foot, calf, or thigh at night when the legs are elevated. Muscle cramps can occur after exercise. If the spasm is prolonged, it can become very painful. This may be caused by sleeping in an uncomfortable position, muscle fatigue, poor muscle tone from lack of exercise and stretching, dehydration, electrolyte imbalance, diabetes, alcohol use, and certain medicine.   Home care   Drink plenty of fluids during the day to prevent dehydration.   Stretch your legs before bedtime.   Eat a diet high in potassium. These foods include fresh fruit, such as bananas, oranges, cantaloupe, and honeydew melon. It also includes apple, prune, orange, grape and pineapple juices. Other foods high in potassium are white, red, and diaz beans, baked potatoes, raw spinach, cod, flounder, halibut, salmon, and scallops.   Talk with your healthcare provider about taking mineral and vitamin supplements that contain magnesium and vitamin B-12 if you are not already taking these. Other prescription medicines may also be used.   Stay away from stimulants such as caffeine, nicotine, and decongestants.  How to relieve an acute leg cramp   For mild pain, getting out of the bed and walking may help. Some people find relief with heat and massage. You can apply heat with a warm shower, bath, or compress. Some people feel better with a cold packs. You can make an ice pack by filling a plastic bag that seals at the top with ice cubes and then wrapping it with a thin towel. Try both and use the method that feels best for 15 to 20 minutes at a time.   For severe pain, stretching the muscle that is in spasm may quickly relieve the pain.   When the spasm is in your foot, your toes may curl up or down. To stretch the muscle in  spasm, bend your toes in the opposite direction. If the spasm pulls your toes up, bend them down. If the spasm pulls them down, bend them up.   When the spasm is in your calf, bend the ankle so the foot points upward toward your knee.   When the spasm is in your thigh, bend or straighten the knee and hip until you feel relief.  Follow-up care   Follow up with your healthcare provider, or as advised.   When to get medical advice   Call your healthcare provider right away if any of these occur:   Walking makes your pain worse and rest makes it better   You develop weakness in the affected leg   Pain or frequency of spasms increases and is not controlled by the above measures    Julie Behrendt RN

## 2023-07-13 NOTE — LETTER
7/13/2023         RE: Beatriz Francis  [address] [place] [id number]        Dear Colleague,    Thank you for referring your patient, Beatriz Francis, to the [facility]. Please see a copy of my visit note below.    THORACIC SURGERY - NEW PATIENT OFFICE VISIT          I saw Beatriz Francis at for the evaluation and treatment of a right upper lobe nodule.     HPI  Beatriz Francis is a 72 year old female 72 year old Female who presents to the Thoracic Surgery Clinic because of lung nodules. She is a current 22.5 ppy smoker.She is high risk for lung cancer. Was previously discussed in conference where consensus was short term follow up with 3 month CT scan. Dr. Cortez saw her in clinic on 6/27 and her CT showed interval growth of RUL nodule - now [id number], previously 9x15mm. IR has been asked to review for RUL lung nodule biopsy for concerns of malignancy. IR declined percutaneous biopsy as there was not a safe window, due to arteries and large vessels triangulating the lesion, it is very central and apical making it very high risk for pneumothorax. The patient will be here to discuss options.   Of note, she had an LAD stent put in in May 2023 and is on dual antiplatelet thepary. She also complains of shortness of breath on minimal exertion.    Previsit Tests   pulmonary function tests      Latest Ref Rng & Units 1/19/2023    12:15 PM   PFT   FVC L 2.51    FEV1 L 1.60    FVC% % 85    FEV1% % 69       CT Chest Without Contrast Impression:  1.  Mild interval increase in size of irregular linear nodular opacity  in the right upper lobe. Consider follow-up PET/CT and/or tissue  sampling for further evaluation and exclude a possible neoplastic  process.  2.  Unchanged 4 mm right lower lobe pulmonary nodule.  3.  Mild lower lobe predominant bronchial wall thickening and  endobronchial debris, suggestive of bronchiolitis.  4.  Bibasilar atelectasis and or scarring.  5.  Pulmonary  emphysema.  6.  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    Pulmonary nodule     long standing, likely scarring    Seasonal allergic rhinitis         PSH  Reviewed, as below    Past Surgical History:   Procedure Laterality Date    ARTHROSCOPY SHLDR ROTATOR CUFF REPAIR, SUBACROMIAL DECOMP, DIST CLAVICLE RESECTION, BICEP TENODESIS Left 7/8/2016    Procedure: ARTHROSCOPY SHOULDER ROTATOR CUFF REPAIR, SUBACROMIAL DECOMPRESSION, DISTAL CLAVICLE RESECTION, OPEN BICEP TENODESIS REPAIR;  Surgeon: Freddie Espino MD;  Location: [facility] OR    ARTHROSCOPY SHOULDER Left 1/23/2017    Procedure: ARTHROSCOPY SHOULDER;  Surgeon: Ankit Morton MD;  Location: [facility] OR    BIOPSY      COLONOSCOPY  2002    COLONOSCOPY N/A 3/2/2022    Procedure: COLONOSCOPY, FLEXIBLE, WITH LESION REMOVAL USING SNARE;  Surgeon: Davi Sibley DO;  Location: [facility]    CV CORONARY ANGIOGRAM N/A 5/1/2023    Procedure: Coronary Angiogram;  Surgeon: Everette Bolivar MD;  Location: [facility]ARD[facility] CATH LAB    CV PCI N/A 5/1/2023    Procedure: Percutaneous Coronary Intervention;  Surgeon: Everette Bolivar MD;  Location: [facility] CARDIAC CATH LAB    ESOPHAGOSCOPY, GASTROSCOPY, DUODENOSCOPY (EGD), COMBINED N/A 9/7/2022    Procedure: ESOPHAGOGASTRODUODENOSCOPY, WITH BIOPSY;  Surgeon: Davi Sibley DO;  Location: [facility]    EYE SURGERY  2004-[facility]    HYSTERECTOMY, PAP NO LONGER INDICATED  1990    REVERSE ARTHROPLASTY SHOULDER Left 2/15/2017    Procedure:  REVERSE ARTHROPLASTY SHOULDER;  Surgeon: Ankit Morton MD;  Location: [facility] OR    ROTATOR CUFF REPAIR RT/LT  1994,1995    right    ROTATOR CUFF REPAIR RT/LT  3/5/04    left    ROTATOR CUFF REPAIR RT/LT  9/25/2009    Right    ZZC SHOULDER SURG PROC UNLISTED  7/8/2016      Medications:    Current Outpatient Medications   Medication    acetaminophen (TYLENOL) 325 MG tablet    aspirin (ASA) 81 MG EC tablet    clopidogrel (PLAVIX) 75 MG tablet    ferrous gluconate (FERGON) 324 (38 Fe) MG tablet    HYDROcodone-acetaminophen (NORCO) 5-325 MG tablet    metoprolol succinate ER (TOPROL XL) 25 MG 24 hr tablet    nitroGLYcerin (NITROSTAT) 0.4 MG sublingual tablet    omeprazole (PRILOSEC) 20 MG DR capsule    rosuvastatin (CRESTOR) 40 MG tablet    senna-docusate (SENOKOT-S;PERICOLACE) 8.6-50 MG per tablet    SPIRIVA HANDIHALER 18 MCG inhaled capsule    tazarotene (TAZORAC) 0.05 % external cream    VENTOLIN  (90 Base) MCG/ACT inhaler    zolpidem (AMBIEN) 10 MG tablet     No current facility-administered medications for this visit.     Social History:  Social History     Tobacco Use    Smoking status: Every Day     Packs/day: 0.50     Years: 45.00     Pack years: 22.50     Types: Cigarettes, Vaping Device     Passive exposure: Never    Smokeless tobacco: Never    Tobacco comments:     Just under a pack. 52  yr. hx.   Vaping Use    Vaping Use: Former   Substance Use Topics    Alcohol use: No     Alcohol/week: 0.0 standard drinks of alcohol     Comment: 3-4x's/year.    Drug use: No         Physical examination  BMI: 20.11        IMPRESSION No diagnosis found.   This person is a 72 year old female with a suspicious RUL nodule      PLAN  I spent 45 min on the date of the encounter in chart review, patient visit, review of tests, documentation and/or discussion with other providers about the issues documented above. I reviewed the plan as follows:    We disucssed that the nodule was concerning given her smoking history  and the growth.  We discussed options of needle biopsy vs surgical biopsy/wedge/possible lobectomy    If she is cleared by her cardiologist to hold plavix in the next 6-8 weeks, we can plan for surgery. If not, we may have to pursue biopsy and possible SBRT    - cardiology follow up  - VO2 Max  -quit plan ( phoen follow up with nurse in 2-3 weeks)        I appreciate the opportunity to participate in the care of your patient and will keep you updated.    Again, thank you for allowing me to participate in the care of your patient.        Sincerely,        Maggie Ng MD

## 2023-07-13 NOTE — TELEPHONE ENCOUNTER
----- Message from Marquise Marquez MD sent at 7/13/2023  4:12 PM CDT -----  Regarding: RE: Surgical planning  We would need to see her for surgical clearance and evaluation. I will ask the team to see if they can get her in with me or GENNARO    ----- Message -----  From: Ana Luisa Costello, RN  Sent: 7/13/2023   2:21 PM CDT  To: Marquise Marquez MD; #  Subject: Surgical planning                                Hi Dr. Marquez,    Dr. Ng saw Beatriz today in clinic to discuss possible lobectomy for a suspicious RUL nodule. We were wondering if she would be able to hold her Plavix for 48 hours if we did surgery in about 6-8 weeks from now?    I noticed she is due for follow up with you in the fall. Would you be able to provide cardiology clearance prior to that without seeing her in clinic again?    Of note, Dr. Ng ordered a CPET for VO2 max and placed referral for QuitPlan.    Thank you,  Ana Luisa Costello, RN BSN  Thoracic Surgery RN Care Coordinator  [phone]

## 2023-07-13 NOTE — NURSING NOTE
"Oncology Rooming Note    July 13, 2023 10:20 AM   Beatriz Francis is a 72 year old female who presents for:    Chief Complaint   Patient presents with     Oncology Clinic Visit     Pulmonary nodules      Initial Vitals: /82 (BP Location: Left arm, Patient Position: Sitting, Cuff Size: Adult Regular)   Pulse 96   Temp 97.8  F (36.6  C) (Oral)   Resp 18   Ht 1.64 m (5' 4.57\")   Wt 52.4 kg (115 lb 9.6 oz)   SpO2 97%   BMI 19.50 kg/m   Estimated body mass index is 19.5 kg/m  as calculated from the following:    Height as of this encounter: 1.64 m (5' 4.57\").    Weight as of this encounter: 52.4 kg (115 lb 9.6 oz). Body surface area is 1.55 meters squared.  No Pain (0) Comment: Data Unavailable   No LMP recorded. Patient has had a hysterectomy.  Allergies reviewed: Yes  Medications reviewed: Yes    Medications: Medication refills not needed today.  Pharmacy name entered into [facility]:    [facility] - [place], [place] - [address]  WRITTEN PRESCRIPTION REQUESTED  [facility] [address] [facility] [place], [place] - [address] [place] [address]    Clinical concerns: Patient states there are no new concerns to discuss with provider.    Britton Moralez              "

## 2023-07-13 NOTE — TELEPHONE ENCOUNTER
Message sent to  to reach out to patient to schedule with either Dr Marquez or Agatha Oreilly NP for cardiac risk assessment prior to lung surgery. Usha Rahman RN Cardiology July 13, 2023, 4:29 PM

## 2023-07-13 NOTE — PROGRESS NOTES
THORACIC SURGERY - NEW PATIENT OFFICE VISIT          I saw Beatriz Francis at for the evaluation and treatment of a right upper lobe nodule.     HPI  Beatriz Francis is a 72 year old female 72 year old Female who presents to the Thoracic Surgery Clinic because of lung nodules. She is a current 22.5 ppy smoker.She is high risk for lung cancer. Was previously discussed in conference where consensus was short term follow up with 3 month CT scan. Dr. Cortez saw her in clinic on 6/27 and her CT showed interval growth of RUL nodule - now [id number], previously 9x15mm. IR has been asked to review for RUL lung nodule biopsy for concerns of malignancy. IR declined percutaneous biopsy as there was not a safe window, due to arteries and large vessels triangulating the lesion, it is very central and apical making it very high risk for pneumothorax. The patient will be here to discuss options.   Of note, she had an LAD stent put in in May 2023 and is on dual antiplatelet thepary. She also complains of shortness of breath on minimal exertion.    Previsit Tests   pulmonary function tests      Latest Ref Rng & Units 1/19/2023    12:15 PM   PFT   FVC L 2.51    FEV1 L 1.60    FVC% % 85    FEV1% % 69       CT Chest Without Contrast Impression:  1.  Mild interval increase in size of irregular linear nodular opacity  in the right upper lobe. Consider follow-up PET/CT and/or tissue  sampling for further evaluation and exclude a possible neoplastic  process.  2.  Unchanged 4 mm right lower lobe pulmonary nodule.  3.  Mild lower lobe predominant bronchial wall thickening and  endobronchial debris, suggestive of bronchiolitis.  4.  Bibasilar atelectasis and or scarring.  5.  Pulmonary emphysema.  6.  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     Pulmonary nodule     long standing, likely scarring     Seasonal allergic rhinitis         PSH  Reviewed, as below    Past Surgical History:   Procedure Laterality Date     ARTHROSCOPY SHLDR ROTATOR CUFF REPAIR, SUBACROMIAL DECOMP, DIST CLAVICLE RESECTION, BICEP TENODESIS Left 7/8/2016    Procedure: ARTHROSCOPY SHOULDER ROTATOR CUFF REPAIR, SUBACROMIAL DECOMPRESSION, DISTAL CLAVICLE RESECTION, OPEN BICEP TENODESIS REPAIR;  Surgeon: Freddie Espino MD;  Location: [facility]     ARTHROSCOPY SHOULDER Left 1/23/2017    Procedure: ARTHROSCOPY SHOULDER;  Surgeon: Ankit Morton MD;  [facility]     BIOPSY       COLONOSCOPY  2002     COLONOSCOPY N/A 3/2/2022    Procedure: COLONOSCOPY, FLEXIBLE, WITH LESION REMOVAL USING SNARE;  Surgeon: Davi Sibley DO;  Location: [facility]     CV CORONARY ANGIOGRAM N/A 5/1/2023    Procedure: Coronary Angiogram;  Surgeon: Everette Bolivar MD;  Location: [facility] CARDIAC CATH LAB     CV PCI N/A 5/1/2023    Procedure: Percutaneous Coronary Intervention;  Surgeon: Everette Bolivar MD;  Location: [facility] CARDIAC CATH LAB     ESOPHAGOSCOPY, GASTROSCOPY, DUODENOSCOPY (EGD), COMBINED N/A 9/7/2022    Procedure: ESOPHAGOGASTRODUODENOSCOPY, WITH BIOPSY;  Surgeon: Davi Sibley DO;  Location: [facility]     EYE SURGERY  2004-[facility]     HYSTERECTOMY, PAP NO LONGER INDICATED  1990     REVERSE ARTHROPLASTY SHOULDER Left 2/15/2017    Procedure: REVERSE ARTHROPLASTY SHOULDER;  Surgeon: Ankit Morton MD;  [facility]     ROTATOR CUFF REPAIR RT/LT  1994,1995    right     ROTATOR CUFF REPAIR RT/LT  3/5/04    left     ROTATOR CUFF REPAIR RT/LT  9/25/2009    Right      [facility] SHOULDER SURG PROC UNLISTED  7/8/2016      Medications:    Current Outpatient Medications   Medication     acetaminophen (TYLENOL) 325 MG tablet     aspirin (ASA) 81 MG EC tablet     clopidogrel (PLAVIX) 75 MG tablet     ferrous gluconate (FERGON) 324 (38 Fe) MG tablet     HYDROcodone-acetaminophen (NORCO) 5-325 MG tablet     metoprolol succinate ER (TOPROL XL) 25 MG 24 hr tablet     nitroGLYcerin (NITROSTAT) 0.4 MG sublingual tablet     omeprazole (PRILOSEC) 20 MG DR capsule     rosuvastatin (CRESTOR) 40 MG tablet     senna-docusate (SENOKOT-S;PERICOLACE) 8.6-50 MG per tablet     SPIRIVA HANDIHALER 18 MCG inhaled capsule     tazarotene (TAZORAC) 0.05 % external cream     VENTOLIN  (90 Base) MCG/ACT inhaler     zolpidem (AMBIEN) 10 MG tablet     No current facility-administered medications for this visit.     Social History:  Social History     Tobacco Use     Smoking status: Every Day     Packs/day: 0.50     Years: 45.00     Pack years: 22.50     Types: Cigarettes, Vaping Device     Passive exposure: Never     Smokeless tobacco: Never     Tobacco comments:     Just under a pack. 52  yr. hx.   Vaping Use     Vaping Use: Former   Substance Use Topics     Alcohol use: No     Alcohol/week: 0.0 standard drinks of alcohol     Comment: 3-4x's/year.     Drug use: No         Physical examination  BMI: 20.11        IMPRESSION No diagnosis found.   This person is a 72 year old female with a suspicious RUL nodule      PLAN  I spent 45 min on the date of the encounter in chart review, patient visit, review of tests, documentation and/or discussion with other providers about the issues documented above. I reviewed the plan as follows:    We disucssed that the nodule was concerning given her smoking history and the growth.  We discussed options of needle biopsy vs surgical biopsy/wedge/possible lobectomy    If she is cleared by her cardiologist to hold plavix in the next 6-8 weeks, we can plan for surgery. If not,  we may have to pursue biopsy and possible SBRT    - cardiology follow up  - VO2 Max  -quit plan ( phoen follow up with nurse in 2-3 weeks)        I appreciate the opportunity to participate in the care of your patient and will keep you updated.  Sincerely,

## 2023-07-14 NOTE — TELEPHONE ENCOUNTER
[facility] Call Center    Phone Message    May a detailed message be left on voicemail: yes     Reason for Call: Other:    Will from [facility] called wanting to schedule patient for Cardiopulmonary Stress Test. Please reach out to patient regarding scheduling in [place].     Action Taken: Other: Cardiology     Travel Screening: Not Applicable     Thank you!  [facility]

## 2023-07-19 NOTE — PROGRESS NOTES
New Patient Oncology Nurse Navigator Note     [facility]: [facility]      Referred to (specialty): Radiation Oncology    Requested provider (if applicable): [facility] location please per pt request     Date Referral Received: 2023     Evaluation for : Schedule appt to discuss SBRT for RUL lung nodule after pt has PET scan      Clinical History (per Nurse review of records provided):    **BOOK MARKED**   NOTES:  2023:  Thoracic Tumor Conference discussion  2023:  Dr. Ng, thoracic surgery consult  2023:  Pulmonary Nodule Conference  discussion  2023:  Dr. Cortez consult    IMAGIN23:  CT Chest w/o Contrast   IMPRESSION:   1.  Mild interval increase in size of irregular linear nodular opacity in the right upper lobe. Consider follow-up PET/CT and/or tissue sampling for further evaluation and exclude a possible neoplastic process.  2.  Unchanged 4 mm right lower lobe pulmonary nodule.  3.  Mild lower lobe predominant bronchial wall thickening and endobronchial debris, suggestive of bronchiolitis.  4.  Bibasilar atelectasis and or scarring.  5.  Pulmonary emphysema.  6.  Severe coronary artery atherosclerosis.     ACR Assessment Category:  Lung-RADS Category 4B. Suspicious.    PATHOLOGY:  ~none    Clinical Assessment / Barriers to Care (Per Nurse): none noted       Records Location (Care Everywhere, Media, etc.): [facility]     Records Needed: none    Additional testing needed prior to consult: PETCT

## 2023-07-24 NOTE — LETTER
2023         RE: Beatriz Francis  [address] [place] [id number]        Dear Colleague,    Thank you for referring your patient, Beatriz Francis, to the [facility] RADIATION THERAPY CLINIC. Please see a copy of my visit note below.       Department of Radiation Oncology  Radiation Therapy Center  [facility]  [address], [address]  [place] [address]  [phone]       Consultation Note    Name: Beatriz Francis MRN: [id number]   : 1950   Date of Service: 2023  Referring: Dr. Ng     Reason for consultation: Suspected lung cancer of the right upper lobe.  Evaluate role for radiation therapy.    History of Present Illness   Ms. Francis is a 72 year old female suspected lung cancer right upper lobe.    The patient has been undergoing lung cancer screening.  CT scan of the chest on 2023 demonstrated a new 10 mm lesion in the right upper lobe.  Follow-up CT scan of the chest on 327 demonstrated mild interval increase in size of the right upper lobe nodule measuring 18 mm in maximum dimension.  CT scan of the chest on 2023 demonstrated increase in the right upper lobe lesion up to 23 mm in size.  Patient was seen by Dr. Ng of surgical team.  Previously IR team had declined percutaneous biopsy due to ability not to obtain a safe window due to proximity of vessels as well as risk for pneumothorax.  Dr. Ng discussed possibility of surgical options but also discussed staging with PET scan.  She was referred to our clinic to discuss potential role of radiation therapy as a part of treatment strategy.  PET scan obtained on 2023 demonstrated hypermetabolic activity in the right upper lobe, right supraglottic region, mediastinal adenopathy, right hepatic lobe, and osseous structures concerning for metastatic disease.    Today, states she is overall doing okay.  Denies any focal neurologic change.  Appetite is okay.  No change in respiratory  status.  No hemoptysis.  No prior radiation.  No pacemaker.    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     Pulmonary nodule     long standing, likely scarring     Seasonal allergic rhinitis        Past Surgical History:   Past Surgical History:   Procedure Laterality Date     ARTHROSCOPY SHLDR ROTATOR CUFF REPAIR, SUBACROMIAL DECOMP, DIST CLAVICLE RESECTION, BICEP TENODESIS Left 7/8/2016    Procedure: ARTHROSCOPY SHOULDER ROTATOR CUFF REPAIR, SUBACROMIAL DECOMPRESSION, DISTAL CLAVICLE RESECTION, OPEN BICEP TENODESIS REPAIR;  Surgeon: Freddie Espino MD;  Location: [facility]     ARTHROSCOPY SHOULDER Left 1/23/2017    Procedure: ARTHROSCOPY SHOULDER;  Surgeon: Ankit Morton MD;  [facility]     BIOPSY       COLONOSCOPY  2002     COLONOSCOPY N/A 3/2/2022    Procedure: COLONOSCOPY, FLEXIBLE, WITH LESION REMOVAL USING SNARE;  Surgeon: Davi Sibley DO;  Location: [facility]     CV CORONARY ANGIOGRAM N/A 5/1/2023    Procedure: Coronary Angiogram;  Surgeon: Everette Bolivar MD;  Location: [facility] CARDIAC CATH LAB     CV PCI N/A 5/1/2023    Procedure: Percutaneous Coronary Intervention;  Surgeon: Everette Bolivar MD;  Location: [facility] CARDIAC CATH LAB     ESOPHAGOSCOPY, GASTROSCOPY, DUODENOSCOPY (EGD), COMBINED N/A 9/7/2022    Procedure: ESOPHAGOGASTRODUODENOSCOPY, WITH BIOPSY;  Surgeon: Davi Sibley DO;  Location: [facility]     EYE SURGERY  2004-[facility]     HYSTERECTOMY, PAP NO LONGER INDICATED  1990     REVERSE ARTHROPLASTY SHOULDER Left  2/15/2017    Procedure: REVERSE ARTHROPLASTY SHOULDER;  Surgeon: Ankit Morton MD;  [facility]     ROTATOR CUFF REPAIR RT/LT  1994,1995    right     ROTATOR CUFF REPAIR RT/LT  3/5/04    left     ROTATOR CUFF REPAIR RT/LT  9/25/2009    Right     [facility]C SHOULDER SURG PROC UNLISTED  7/8/2016       Chemotherapy History:  None    Radiation History:  None    Pregnant: Not Applicable  Implanted Cardiac Devices: No    Medications:  Current Outpatient Medications   Medication     acetaminophen (TYLENOL) 325 MG tablet     aspirin (ASA) 81 MG EC tablet     clopidogrel (PLAVIX) 75 MG tablet     ferrous gluconate (FERGON) 324 (38 Fe) MG tablet     HYDROcodone-acetaminophen (NORCO) 5-325 MG tablet     metoprolol succinate ER (TOPROL XL) 25 MG 24 hr tablet     nitroGLYcerin (NITROSTAT) 0.4 MG sublingual tablet     omeprazole (PRILOSEC) 20 MG DR capsule     rosuvastatin (CRESTOR) 40 MG tablet     senna-docusate (SENOKOT-S;PERICOLACE) 8.6-50 MG per tablet     SPIRIVA HANDIHALER 18 MCG inhaled capsule     tazarotene (TAZORAC) 0.05 % external cream     VENTOLIN  (90 Base) MCG/ACT inhaler     zolpidem (AMBIEN) 10 MG tablet     No current facility-administered medications for this visit.         Allergies:   No Known Allergies      Family History:  Family History   Problem Relation Age of Onset     Cancer Father         lung     Heart Disease Father      Alcohol/Drug Father      Other Cancer Father      Cancer Paternal Grandmother         lung     Hypertension Mother      Cerebrovascular Disease Mother      Depression Mother      Cancer Maternal Grandfather      Cancer Paternal Grandfather      Diabetes Brother      Hypertension Brother      Hyperlipidemia Brother      Diabetes Brother      Gastrointestinal Disease Brother         [place]     Other Cancer Brother      Diabetes Brother      Rheumatoid Arthritis Brother      Cardiovascular Brother        Review of Systems   A 10-point review of systems was performed.  Pertinent findings are noted in the HPI.    Physical Exam   ECOG Status: 1    Vitals:  /78 (BP Location: Left arm, Patient Position: Sitting, Cuff Size: Adult Regular)   Pulse 89   Resp 16   Wt 53.6 kg (118 lb 3.2 oz)   SpO2 95%   BMI 19.93 kg/m      Gen: Alert, in NAD  Head: NC/AT  Eyes: PERRL, EOMI, sclera anicteric  Ears: No external auricular lesions  Nose/sinus: No rhinorrhea or epistaxis  Oral cavity/oropharynx: MMM, no visible oral cavity lesions, FOM and BOT are soft to palpation  Neck: Full ROM, supple, no palpable adenopathy  Pulm: No wheezing, stridor or respiratory distress  CV: Extremities are warm and well-perfused, no cyanosis, no pedal edema  Abdominal: Normal bowel sounds, soft, nontender, no masses  Musculoskeletal: Normal bulk and tone  Skin: Normal color and turgor  Neuro: A/Ox3, CN II-XII intact, normal gait    Imaging/Path/Labs   Imagin23  PET  PET/CT FINDINGS: Spiculated bilobed nodule in the right upper lobe measuring 2.0 x 1.0 cm (max SUV of 11 7) with FDG avid right interlobar station 11 R (max SUV 4.2), right hilar station 10 R (max SUV 10.8), subcarinal station 7 (max SUV 4.2), left lower   paratracheal station 4R (max SUV 4.4), and right supraclavicular (max SUV 3.4) lymph nodes, lesions in the liver parenchyma (max SUV 12.5 in the right hepatic lobe), and scattered lesions throughout the osseous structures including examples in the left   mid humeral diaphysis (max SUV 5.7), and S1 sacral segment (Max SUV 9.8), and left supra-acetabular region (Max SUV 12.2) suspicious for right upper lobe primary lung neoplasm with thoracic/right supraclavicular lymph node, right hepatic lobe, and   osseous metastases.     Bilateral shoulder synovitis.     CT FINDINGS: Mild senescent intracranial changes. Mild to moderate upper lung predominant emphysema. Moderate coronary artery calcium. Colonic diverticulosis. Left shoulder arthroplasty. Multilevel degenerative changes of  spine.                                                                      IMPRESSION:     Findings suspicious for right upper lobe primary lung neoplasm with thoracic/right supraclavicular lymph node, right hepatic lobe, and osseous metastases.         Path:   None        Assessment    Ms. Francis is a 72 year old female with suspected lung cancer of the right upper lobe.  Evaluate role for radiation therapy.    Plan   1.  Today I discussed the results of her CT scans as well as PET scan.  CT scans demonstrated progressive increase in size of the right upper lobe nodule.  However tissue diagnosis has not been able to be obtained due to risk for percutaneous biopsy.  PET scan obtained to complete staging demonstrated hypermetabolic activity in the right upper lobe nodule as well as hypermetabolic activity in the liver as well as bone.  Overall these findings were concerning for metastatic spread.    2. Will order MRI brain to complete staging.    3.  In light of concern for metastatic spread, I recommend referral to our medical oncology colleagues for further work-up and consideration of systemic therapy options down the road.    4.  I tentatively discussed the potential role of radiation therapy in the palliative nature if needed in the future.     5. RTC after MRI brain.      Reed Shaw MD  Department of Radiation Oncology  [facility]

## 2023-07-24 NOTE — PROGRESS NOTES
Department of Radiation Oncology  Radiation Therapy Center  [facility]  [address], [address]  [place] [address]  [phone]       Consultation Note    Name: Beatriz Francis MRN: [id number]   : 1950   Date of Service: 2023  Referring: Dr. Ng     Reason for consultation: Suspected lung cancer of the right upper lobe.  Evaluate role for radiation therapy.    History of Present Illness   Ms. Francis is a 72 year old female suspected lung cancer right upper lobe.    The patient has been undergoing lung cancer screening.  CT scan of the chest on 2023 demonstrated a new 10 mm lesion in the right upper lobe.  Follow-up CT scan of the chest on  demonstrated mild interval increase in size of the right upper lobe nodule measuring 18 mm in maximum dimension.  CT scan of the chest on 2023 demonstrated increase in the right upper lobe lesion up to 23 mm in size.  Patient was seen by Dr. Ng of surgical team.  Previously IR team had declined percutaneous biopsy due to ability not to obtain a safe window due to proximity of vessels as well as risk for pneumothorax.  Dr. Ng discussed possibility of surgical options but also discussed staging with PET scan.  She was referred to our clinic to discuss potential role of radiation therapy as a part of treatment strategy.  PET scan obtained on 2023 demonstrated hypermetabolic activity in the right upper lobe, right supraglottic region, mediastinal adenopathy, right hepatic lobe, and osseous structures concerning for metastatic disease.    Today, states she is overall doing okay.  Denies any focal neurologic change.  Appetite is okay.  No change in respiratory status.  No hemoptysis.  No prior radiation.  No pacemaker.    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     Pulmonary nodule     long standing, likely scarring     Seasonal allergic rhinitis        Past Surgical History:   Past Surgical History:   Procedure Laterality Date     ARTHROSCOPY SHLDR ROTATOR CUFF REPAIR, SUBACROMIAL DECOMP, DIST CLAVICLE RESECTION, BICEP TENODESIS Left 7/8/2016    Procedure: ARTHROSCOPY SHOULDER ROTATOR CUFF REPAIR, SUBACROMIAL DECOMPRESSION, DISTAL CLAVICLE RESECTION, OPEN BICEP TENODESIS REPAIR;  Surgeon: Freddie Espino MD;  Location: [facility] OR     ARTHROSCOPY SHOULDER Left 1/23/2017    Procedure: ARTHROSCOPY SHOULDER;  Surgeon: Ankit Morton MD;  Location: [facility]     BIOPSY       COLONOSCOPY  2002     COLONOSCOPY N/A 3/2/2022    Procedure: COLONOSCOPY, FLEXIBLE, WITH LESION REMOVAL USING SNARE;  Surgeon: Davi Sibley DO;  Location: [facility]     CV CORONARY ANGIOGRAM N/A 5/1/2023    Procedure: Coronary Angiogram;  Surgeon: Everette Bolivar MD;  Location: [facility] CARDIAC CATH LAB     CV PCI N/A 5/1/2023    Procedure: Percutaneous Coronary Intervention;  Surgeon: Everette Bolivar MD;  Location: [facility] CARDIAC CATH LAB     ESOPHAGOSCOPY, GASTROSCOPY, DUODENOSCOPY (EGD), COMBINED N/A 9/7/2022    Procedure: ESOPHAGOGASTRODUODENOSCOPY, WITH BIOPSY;  Surgeon: Davi Sibley DO;  Location: [facility]     EYE SURGERY  2004-[facility]     HYSTERECTOMY, PAP NO LONGER INDICATED  1990     REVERSE ARTHROPLASTY SHOULDER Left 2/15/2017    Procedure: REVERSE ARTHROPLASTY SHOULDER;  Surgeon: Ankit Morton MD;  Location: [facility] OR     ROTATOR CUFF REPAIR RT/LT  1994,1995    right     ROTATOR CUFF REPAIR RT/LT  3/5/04    left     ROTATOR CUFF REPAIR RT/LT  9/25/2009    Right      [facility] SHOULDER SURG PROC UNLISTED  7/8/2016       Chemotherapy History:  None    Radiation History:  None    Pregnant: Not Applicable  Implanted Cardiac Devices: No    Medications:  Current Outpatient Medications   Medication     acetaminophen (TYLENOL) 325 MG tablet     aspirin (ASA) 81 MG EC tablet     clopidogrel (PLAVIX) 75 MG tablet     ferrous gluconate (FERGON) 324 (38 Fe) MG tablet     HYDROcodone-acetaminophen (NORCO) 5-325 MG tablet     metoprolol succinate ER (TOPROL XL) 25 MG 24 hr tablet     nitroGLYcerin (NITROSTAT) 0.4 MG sublingual tablet     omeprazole (PRILOSEC) 20 MG DR capsule     rosuvastatin (CRESTOR) 40 MG tablet     senna-docusate (SENOKOT-S;PERICOLACE) 8.6-50 MG per tablet     SPIRIVA HANDIHALER 18 MCG inhaled capsule     tazarotene (TAZORAC) 0.05 % external cream     VENTOLIN  (90 Base) MCG/ACT inhaler     zolpidem (AMBIEN) 10 MG tablet     No current facility-administered medications for this visit.         Allergies:   No Known Allergies      Family History:  Family History   Problem Relation Age of Onset     Cancer Father         lung     Heart Disease Father      Alcohol/Drug Father      Other Cancer Father      Cancer Paternal Grandmother         lung     Hypertension Mother      Cerebrovascular Disease Mother      Depression Mother      Cancer Maternal Grandfather      Cancer Paternal Grandfather      Diabetes Brother      Hypertension Brother      Hyperlipidemia Brother      Diabetes Brother      Gastrointestinal Disease Brother         [place]     Other Cancer Brother      Diabetes Brother      Rheumatoid Arthritis Brother      Cardiovascular Brother        Review of Systems   A 10-point review of systems was performed. Pertinent findings are noted in the HPI.    Physical Exam   ECOG Status: 1    Vitals:  /78 (BP Location: Left arm, Patient Position: Sitting, Cuff Size: Adult Regular)   Pulse 89   Resp 16   Wt 53.6 kg (118 lb 3.2 oz)   SpO2 95%   BMI 19.93 kg/m       Gen: Alert, in NAD  Head: NC/AT  Eyes: PERRL, EOMI, sclera anicteric  Ears: No external auricular lesions  Nose/sinus: No rhinorrhea or epistaxis  Oral cavity/oropharynx: MMM, no visible oral cavity lesions, FOM and BOT are soft to palpation  Neck: Full ROM, supple, no palpable adenopathy  Pulm: No wheezing, stridor or respiratory distress  CV: Extremities are warm and well-perfused, no cyanosis, no pedal edema  Abdominal: Normal bowel sounds, soft, nontender, no masses  Musculoskeletal: Normal bulk and tone  Skin: Normal color and turgor  Neuro: A/Ox3, CN II-XII intact, normal gait    Imaging/Path/Labs   Imagin23  PET  PET/CT FINDINGS: Spiculated bilobed nodule in the right upper lobe measuring 2.0 x 1.0 cm (max SUV of 11 7) with FDG avid right interlobar station 11 R (max SUV 4.2), right hilar station 10 R (max SUV 10.8), subcarinal station 7 (max SUV 4.2), left lower   paratracheal station 4R (max SUV 4.4), and right supraclavicular (max SUV 3.4) lymph nodes, lesions in the liver parenchyma (max SUV 12.5 in the right hepatic lobe), and scattered lesions throughout the osseous structures including examples in the left   mid humeral diaphysis (max SUV 5.7), and S1 sacral segment (Max SUV 9.8), and left supra-acetabular region (Max SUV 12.2) suspicious for right upper lobe primary lung neoplasm with thoracic/right supraclavicular lymph node, right hepatic lobe, and   osseous metastases.     Bilateral shoulder synovitis.     CT FINDINGS: Mild senescent intracranial changes. Mild to moderate upper lung predominant emphysema. Moderate coronary artery calcium. Colonic diverticulosis. Left shoulder arthroplasty. Multilevel degenerative changes of spine.                                                                      IMPRESSION:     Findings suspicious for right upper lobe primary lung neoplasm with thoracic/right supraclavicular lymph node, right hepatic lobe, and osseous metastases.         Path:    None        Assessment    Ms. Francis is a 72 year old female with suspected lung cancer of the right upper lobe.  Evaluate role for radiation therapy.    Plan   1.  Today I discussed the results of her CT scans as well as PET scan.  CT scans demonstrated progressive increase in size of the right upper lobe nodule.  However tissue diagnosis has not been able to be obtained due to risk for percutaneous biopsy.  PET scan obtained to complete staging demonstrated hypermetabolic activity in the right upper lobe nodule as well as hypermetabolic activity in the liver as well as bone.  Overall these findings were concerning for metastatic spread.    2. Will order MRI brain to complete staging.    3.  In light of concern for metastatic spread, I recommend referral to our medical oncology colleagues for further work-up and consideration of systemic therapy options down the road.    4.  I tentatively discussed the potential role of radiation therapy in the palliative nature if needed in the future.     5. RTC after MRI brain.      Reed Shaw MD  Department of Radiation Oncology  [facility]

## 2023-07-25 NOTE — TELEPHONE ENCOUNTER
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

## 2023-07-25 NOTE — PROGRESS NOTES
New Patient Oncology Nurse Navigator Note     Referring provider: Dr. Reed Shaw    Referring Clinic/Organization: [facility]  Referred to: Medical Oncology  Requested provider (if applicable): First available - did not specify   Referral Received: 07/24/23       Evaluation for :   Diagnosis   R91.8 (ICD-10-CM) - Pulmonary nodules   C79.51 (ICD-10-CM) - Malignant neoplasm metastatic to bone (H)     Clinical History (per Nurse review of records provided):      07/24/2023 PET (bookmarked) showed:   PET/CT FINDINGS: Spiculated bilobed nodule in the right upper lobe measuring 2.0 x 1.0 cm (max SUV of 11 7) with FDG avid right interlobar station 11 R (max SUV 4.2), right hilar station 10 R (max SUV 10.8), subcarinal station 7 (max SUV 4.2), left lower   paratracheal station 4R (max SUV 4.4), and right supraclavicular (max SUV 3.4) lymph nodes, lesions in the liver parenchyma (max SUV 12.5 in the right hepatic lobe), and scattered lesions throughout the osseous structures including examples in the left   mid humeral diaphysis (max SUV 5.7), and S1 sacral segment (Max SUV 9.8), and left supra-acetabular region (Max SUV 12.2) suspicious for right upper lobe primary lung neoplasm with thoracic/right supraclavicular lymph node, right hepatic lobe, and   osseous metastases.     Bilateral shoulder synovitis.     CT FINDINGS: Mild senescent intracranial changes. Mild to moderate upper lung predominant emphysema. Moderate coronary artery calcium. Colonic diverticulosis. Left shoulder arthroplasty. Multilevel degenerative changes of spine.                                                                      IMPRESSION:     Findings suspicious for right upper lobe primary lung neoplasm with thoracic/right supraclavicular lymph node, right hepatic lobe, and osseous metastases.    07/25/2023 OV note from referring provider (Dr. Shaw) bookmarked    07/25/2023 Brain MRI scheduled  Clinical Assessment / Barriers to Care (Per  Nurse):    Tobacco History    Smoking Status  Every Day Smoking Frequency  0.50 packs/day for 45.00 years (22.50 pk-yrs) Smoking Tobacco Type  Cigarettes, Vaping Device     Records Location: [facility]   Records Needed: None  Additional testing needed prior to consult: None    MAGED HongN, RN, OCN  [facility] Oncology Nurse Navigator  [phone] / [phone]

## 2023-07-25 NOTE — TELEPHONE ENCOUNTER
[facility] Call Center    Phone Message    May a detailed message be left on voicemail: yes     Reason for Call: Other: Please call the patient to schedule cardiopulmonary stress test in [place]. Thank you     Action Taken: Other: cardiology    Travel Screening: Not Applicable         Thank you!  [facility]

## 2023-07-27 NOTE — NURSING NOTE
Is the patient currently in the state of [place]? YES    Visit mode:VIDEO    If the visit is dropped, the patient can be reconnected by: VIDEO VISIT: Send to e-mail at: [email or web address]    Will anyone else be joining the visit? NO; How would they like to receive their invitation? Send to e-mail at: [email or web address]      How would you like to obtain your AVS? MyChart    Are changes needed to the allergy or medication list? NO    Reason for visit: No chief complaint on file.      Roz Simons
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
## 2023-07-27 NOTE — LETTER
7/27/2023         RE: Beatriz Francis  [address] [place], [place] [address]        Dear Colleague,    Thank you for referring your patient, Beatriz Francis, to the [facility]. Please see a copy of my visit note below.    I discussed MRI brain results with the patient. MRI brain did not show any clear evidence of metastatic spread.    Patient will meet with Dr. Mckeon of medical oncology today to discuss neck steps in management.  Anticipate biopsy to help tailor systemic therapy plans.     The patient will return to clinic as needed.    We conduct this visit over the telephone. Telephone call lasted 10 minutes.       Reed Shaw M.D.  Department of Radiation Oncology  [facility]       Again, thank you for allowing me to participate in the care of your patient.        Sincerely,        Reed Shaw MD

## 2023-07-27 NOTE — PROGRESS NOTES
"Virtual Visit Details    Type of service:  Video Visit   Video Start Time: {video visit start/end time for provider to select:276943}  Video End Time:{video visit start/end time for provider to select:856149}    Originating Location (pt. Location): {video visit patient location:563595::\"Home\"}  {PROVIDER LOCATION On-site should be selected for visits conducted from your clinic location or adjoining [facility] hospital, academic office, or other nearby [facility] building. Off-site should be selected for all other provider locations, including home:027489}  Distant Location (provider location):  {virtual location provider:517474}  Platform used for Video Visit: {Virtual Visit Platforms:785507::\"[employer]\"}  "
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
## 2023-07-27 NOTE — NURSING NOTE
"Beatriz is a 72 year old who is being evaluated via a billable telephone visit.      What phone number would you like to be contacted at? cell  How would you like to obtain your AVS? Yonihart    Distant Location (provider location):  On-site  Phone call duration: 3 minutes RN time    Oncology Phone Note    July 27, 2023 12:00 PM   eBatriz Francis is a 72 year old female who presents for:    Chief Complaint   Patient presents with    Radiation Therapy     Follow up      Initial Vitals: There were no vitals taken for this visit. Estimated body mass index is 19.93 kg/m  as calculated from the following:    Height as of 7/13/23: 1.64 m (5' 4.57\").    Weight as of 7/24/23: 53.6 kg (118 lb 3.2 oz). There is no height or weight on file to calculate BSA.  Data Unavailable Comment: Data Unavailable   No LMP recorded. Patient has had a hysterectomy.  Allergies reviewed: Yes  Medications reviewed: Yes    Medications: Medication refills not needed today.  Pharmacy name entered into [facility]:    [facility] [place], [place] - [address]  WRITTEN PRESCRIPTION REQUESTED  [facility], [place] - [address] [facility] [place], [place] - [address] [place] [address]    Clinical concerns: patient has phone check in with MD. Consult on [place] 7/24/23 was to review PET scan and discuss lung cancer. Pt found to have mets. Visit today to review results of brain MRI.  Patient will then meet with medical oncology to discuss treatment plan for metastatic cancer.  She states \" I am feeling pretty good overall, I am just ready to get a plan of action put together\"  Offered SW again today. Pt will consider  MD was notified.      Hina Victor RN             "

## 2023-07-28 NOTE — TELEPHONE ENCOUNTER
[facility] Call Center    Phone Message    May a detailed message be left on voicemail: yes     Reason for Call: Other: Pt is requesting a call back to schedule Cardiopulmonary Stress Test - Adult     Action Taken: Other: cardio    Travel Screening: Not Applicable    Thank you!  [facility]

## 2023-07-31 NOTE — TELEPHONE ENCOUNTER
Patient is calling inquiring if she needs to hold her ASA or plavix for her biopsy this week.  LIVAN FISHER called and no she does not need to hold her plavix or ASA for the procedure.  Patient aware.

## 2023-08-04 NOTE — PROGRESS NOTES
Cardiology Clinic Progress Note  Beatriz Francis MRN# [id number]   YOB: 1950 Age: 72 year old      Primary Cardiologist:   Dr. 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  LOVELL -> Stress echo 4/2023 no ischemia but reduced sensitivity  Angio 5/2023 KATIA x1 to the proximal LAD and residual 50% mid RCA and 20 to 30% throughout  History of elevated BP  Hyperlipidemia  Carotid artery disease  Moderate 1/2023 carotid ultrasound  Past medical history significant for ongoing tobacco use, COPD, metastatic lung cancer.      Patient underwent coronary angiogram in May 2023 but continued to have significant shortness of breath despite stenting of her proximal LAD.  She had lung CT which unfortunately showed metastatic lung cancer involving the liver and bone.     We received a message from the surgery team noting that she would need possible lobectomy and asked if we would be okay with holding Plavix for 48 hours.  This was reviewed by Dr. Marquez given that she had KATIA x1 to the proximal LAD on 5/1/2023.  He noted that there would be risks with holding Plavix and it would not be ideal to hold this for 2 days but not many good choices if she is needing to have surgery.  He did not recommend holding aspirin, only Plavix for 48 hours.    Patient presents today for preop cardiac clearance.  Recent blood pressures have been controlled at office visits.  She denies any anginal symptoms and is able to complete more than 4 METS of activity.  She continues with shortness of breath related to her lung disease.  Patient reports no chest pain, PND, orthopnea, presyncope, syncope, edema, heart racing, or palpitations.     She is not yet scheduled for lobectomy.  We discussed her cardiac risk  and clearance should she need to undergo the surgery.  This is considered a thoracic surgery which puts her in the high risk category with risk of cardiac death or MI greater than 5%.  With that being said, we have recently had an angiogram and placed coronary stent.  She does not have any current anginal symptoms.  We have optimized her cardiac medications to reduce her risk is much as possible. There is added risk, given we would be holding her Plavix, which we discussed today. Most recent echocardiogram was a  stress echo April 2023 which showed normal EF and no significant valvular disease.                           Assessment and Plan:       Plan  Patient Instructions   Medication Changes:  Okay to hold Plavix for 48 hours preoperatively.  Do not stop aspirin    Recommendations:  Check blood pressure at least 1 hour after medications. Call the clinic if your blood pressure is consistently greater than 130/80.   Check with oncology team to see if they are still recommending cardiopulmonary stress test.  We do not need this from a cardiac standpoint.    Follow-up:  Cardiology follow up at [facility]: Dr. Marquez in November, as scheduled.   Call 6 months prior, to schedule.     Cardiology Scheduling~[phone]  Cardiology Clinic RN~[phone] (Usha RN, Seema RN)               Coronary artery disease involving native coronary artery of native heart without angina pectoris  Hyperlipidemia LDL goal <70  Pre-op evaluation      CAD  No angina   Continue GDMT  Continue DAPT, uninterrupted, for at least 1 year (5/2024)        History of elevated BP  Controlled  at last OV   Continue metoprolol        Hyperlipidemia  Last LDL 52 on 3/2023  Continue statin        Carotid artery disease  Moderate  Continue statin, aspirin        Dyspnea on exertion   Likely noncardiac, no improvement after PCI  History of COPD and ongoing tobacco use  Requiring lung surgery           Thank you for allowing me to participate in this  delightful patient's care.      This note was completed in part using Dragon voice recognition software. Although reviewed after completion, some word and grammatical errors may occur.    ALEJANDRO Caballero CNP

## 2023-08-04 NOTE — PATIENT INSTRUCTIONS
Medication Changes:  Okay to hold Plavix for 48 hours preoperatively.  Do not stop aspirin    Recommendations:  Check blood pressure at least 1 hour after medications. Call the clinic if your blood pressure is consistently greater than 130/80.   Check with oncology team to see if they are still recommending cardiopulmonary stress test.  We do not need this from a cardiac standpoint.    Follow-up:  Cardiology follow up at [facility]: Dr. Marquez in November, as scheduled.   Call 6 months prior, to schedule.     Cardiology Scheduling~[phone]  Cardiology Clinic RN~[phone] (Usha RN, Seema RN)

## 2023-08-04 NOTE — PROGRESS NOTES
Beatriz is a 72 year old who is being evaluated via a billable video visit.      How would you like to obtain your AVS? MyChart  If the video visit is dropped, the invitation should be resent by: Text to cell phone: [phone]  Will anyone else be joining your video visit? No        Video-Visit Details    Type of service:  Video Visit   Video Start Time: 3:06 PM  Video End Time:3:21 PM    Originating Location (pt. Location): Home    Distant Location (provider location):  On-site  Platform used for Video Visit: [email or web address]

## 2023-08-04 NOTE — LETTER
8/4/2023    Jesusita Abad Joan, APRN CNP  [address] [place] [id number]    RE: Beatriz Francis       Dear Colleague,     I had the pleasure of seeing Beatriz Francis in the [facility].  Beatriz is a 72 year old who is being evaluated via a billable video visit.      How would you like to obtain your AVS? MyChart  If the video visit is dropped, the invitation should be resent by: Text to cell phone: [phone]  Will anyone else be joining your video visit? No        Video-Visit Details    Type of service:  Video Visit   Video Start Time: 3:06 PM  Video End Time:3:21 PM    Originating Location (pt. Location): Home    Distant Location (provider location):  On-site  Platform used for Video Visit: [facility]      Cardiology Clinic Progress Note  Beatriz Francis MRN# [id number]   YOB: 1950 Age: 72 year old      Primary Cardiologist:   Dr. 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  LOVELL -> Stress echo 4/2023 no ischemia but reduced sensitivity  Angio 5/2023 KATIA x1 to the proximal LAD and residual 50% mid RCA and 20 to 30% throughout  History of elevated BP  Hyperlipidemia  Carotid artery disease  Moderate 1/2023 carotid ultrasound  Past medical history significant for ongoing tobacco use, COPD, metastatic lung cancer.      Patient underwent coronary angiogram in May 2023 but continued to have significant shortness of breath despite stenting of her proximal LAD.  She had lung CT which unfortunately showed metastatic lung cancer involving the liver and bone.     We received a message from the surgery team noting that she would need possible lobectomy and asked if we would be okay with holding Plavix for 48 hours.  This was reviewed by Dr. Marquez  given that she had KATIA x1 to the proximal LAD on 5/1/2023.  He noted that there would be risks with holding Plavix and it would not be ideal to hold this for 2 days but not many good choices if she is needing to have surgery.  He did not recommend holding aspirin, only Plavix for 48 hours.    Patient presents today for preop cardiac clearance.  Recent blood pressures have been controlled at office visits.  She denies any anginal symptoms and is able to complete more than 4 METS of activity.  She continues with shortness of breath related to her lung disease.  Patient reports no chest pain, PND, orthopnea, presyncope, syncope, edema, heart racing, or palpitations.     She is not yet scheduled for lobectomy.  We discussed her cardiac risk and clearance should she need to undergo the surgery.  This is considered a thoracic surgery which puts her in the high risk category with risk of cardiac death or MI greater than 5%.  With that being said, we have recently had an angiogram and placed coronary stent.  She does not have any current anginal symptoms.  We have optimized her cardiac medications to reduce her risk is much as possible. There is added risk, given we would be holding her Plavix, which we discussed today. Most recent echocardiogram was a  stress echo April 2023 which showed normal EF and no significant valvular disease.                           Assessment and Plan:       Plan  Patient Instructions   Medication Changes:  Okay to hold Plavix for 48 hours preoperatively.  Do not stop aspirin    Recommendations:  Check blood pressure at least 1 hour after medications. Call the clinic if your blood pressure is consistently greater than 130/80.   Check with oncology team to see if they are still recommending cardiopulmonary stress test.  We do not need this from a cardiac standpoint.    Follow-up:  Cardiology follow up at [facility]: Dr. Marquez in November, as scheduled.   Call 6 months prior, to schedule.      Cardiology Scheduling~[phone]  Cardiology Clinic RN~[phone] (Usha RN, Seema RN)               Coronary artery disease involving native coronary artery of native heart without angina pectoris  Hyperlipidemia LDL goal <70  Pre-op evaluation      CAD  No angina   Continue GDMT  Continue DAPT, uninterrupted, for at least 1 year (5/2024)        History of elevated BP  Controlled  at last OV   Continue metoprolol        Hyperlipidemia  Last LDL 52 on 3/2023  Continue statin        Carotid artery disease  Moderate  Continue statin, aspirin        Dyspnea on exertion   Likely noncardiac, no improvement after PCI  History of COPD and ongoing tobacco use  Requiring lung surgery           Thank you for allowing me to participate in this delightful patient's care.      This note was completed in part using Dragon voice recognition software. Although reviewed after completion, some word and grammatical errors may occur.    ALEJANDRO Caballero CNP      Thank you for allowing me to participate in the care of your patient.      Sincerely,     ALEJANDRO Caballero CNP     [facility]  cc:   Marquise Marquez MD  [address]  [place] [id number]

## 2023-08-09 NOTE — PROGRESS NOTES
Carlos msg out to Dr. Ng to update on recent visits and determine if a stress test is needed prior to lung surgery for suspicious RUL nodule. Cardiology did not feel that this was needed and has optimized her for a surgical procedure (not yet scheduled, pathology pending).    Ana Luisa Costello, RN BSN  Thoracic Surgery RN Care Coordinator  [phone]

## 2023-08-11 NOTE — TELEPHONE ENCOUNTER
"Beatriz calls clinic wanting to know what her lymph node biopsy results indicate. I reviewed this with Dr. Mckeon, who indicates the following:     \"it was negative for ca...may need another bx, probably of the liver lesions    she may have sarcoidosis... bx showed some nonspecific inflammation\"    Beatriz verbalized understanding and appreciation of our conversation. I will keep her on my radar for further coordination needs.     Natalie Pizarro RN Care Coordinator  [facility]    "

## 2023-08-16 NOTE — TELEPHONE ENCOUNTER
FYI - Status Update    Who is Calling: patient    Update: Recently got a letter stating that they would need a mammogram but pt doesn't think it's necessary. They just wanted to say that. Had PET scan already. Please let them know if necessary.     Does caller want a call/response back: Yes     Could we send this information to you in [employer] or would you prefer to receive a phone call?:   Patient would like to be contacted via [employer]

## 2023-08-16 NOTE — CONFIDENTIAL NOTE
Called patient back with plan as Dr. Mckeon would like her to have a Bone biopsy. She is now scheduled for this at [facility] [facility] 8/23 and is aware of the date and time.Anila Caruso RN on 8/16/2023 at 1:23 PM

## 2023-08-16 NOTE — TELEPHONE ENCOUNTER
Patient calls in today and leaves message on the nurse triage line wanting to know what is going on with her case and what the next steps are.  She is getting very anxious and would like to get this process going.  She would like a call back at [phone].    This is a [facility] patient so I will get this routed over to Dr Mckeon as well as the RN care coordinator at that location.    Sasha Mina, EMILY
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
## 2023-08-18 NOTE — TELEPHONE ENCOUNTER
Please inform Beatriz she will still need a mammogram screening is in a different and the mammogram is gold standard for screening for breast cancer.    Jesusita Franco CNP

## 2023-08-18 NOTE — ED TRIAGE NOTES
Patient reports on Monday or Tuesday she injured her left shoulder and aggravated her lower back (pain goes down to her upper thighs) while making the bed. She lifted a heavy mattress. Hx of chronic back pain, CA of lung with mets to bone and liver. To have bone biopsy on Wednesday. Hydrocodone and Tylenol for pain (last dose 1400). On blood thinners, so no NSAIDS taken. Denies numbness or tingling to lower extremities.      Triage Assessment       Row Name 08/18/23 [id number]       Triage Assessment (Adult)    Airway WDL WDL       Respiratory WDL    Respiratory WDL WDL       Skin Circulation/Temperature WDL    Skin Circulation/Temperature WDL WDL       Cardiac WDL    Cardiac WDL WDL       Peripheral/Neurovascular WDL    Peripheral Neurovascular WDL WDL       Cognitive/Neuro/Behavioral WDL    Cognitive/Neuro/Behavioral WDL WDL

## 2023-08-19 NOTE — DISCHARGE INSTRUCTIONS
1) Your evaluation revealed that you suffered a compression fracture of L1 which is likely contribute to your pain and discomfort based on CT imaging today.  We discussed the importance of follow-up with your care team especially with the upcoming bone marrow biopsy.    2) For pain reviewed pain management plan including continue to use hydrocodone as needed and adding Dilaudid we can do 1 to 2 tablets every 4 hours if needed for additional pain management needs.  Further medication management and refills and follow-up you will need to obtain medications from your care team including a primary care provider.     3) You stable for discharge to home at this time however if you develop leg weakness or new numbness fever chills or new concerns you should return to be evaluated

## 2023-08-21 NOTE — PROGRESS NOTES
SUBJECTIVE:   Beatriz Francis is a 72 year old female presenting with a chief complaint of   Chief Complaint   Patient presents with    Back Pain     Friday went to ER, has compression fracture, over 1 wk ago lifted a mattress. Says the pain is going down the right side, worse then the left. Says lots of pain.    Wondering what else can be done for back pain. States the nerve pain down her legs has been the worst, it has kept her from sleeping at night. Has been using ice, and heat, and taking her pain meds as directed. States the pain medications take the edge off.      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    Pulmonary nodule     long standing, likely scarring    Seasonal allergic rhinitis      Family History   Problem Relation Age of Onset    Cancer Father         lung    Heart Disease Father     Alcohol/Drug Father     Other Cancer Father     Cancer Paternal Grandmother         lung    Hypertension Mother     Cerebrovascular Disease Mother     Depression Mother     Cancer Maternal Grandfather     Cancer Paternal Grandfather     Diabetes Brother     Hypertension Brother     Hyperlipidemia Brother     Diabetes Brother     Gastrointestinal Disease Brother         Whippo    Other Cancer Brother     Diabetes Brother     Rheumatoid Arthritis Brother     Cardiovascular Brother      Current Outpatient Medications   Medication Sig Dispense Refill    acetaminophen (TYLENOL) 325 MG tablet Take 2 tablets (650 mg) by mouth every 4 hours as needed for other (surgical pain) 100 tablet 0    aspirin (ASA) 81 MG EC tablet  Take 1 tablet (81 mg) by mouth daily Start tomorrow. 30 tablet 3    clopidogrel (PLAVIX) 75 MG tablet 300mg (4 tablets) on day 1, then 75mg 1 tablet a day thereafter 90 tablet 3    ferrous gluconate (FERGON) 324 (38 Fe) MG tablet Take 1 tablet (324 mg) by mouth daily (with breakfast) 90 tablet 3    HYDROcodone-acetaminophen (NORCO) 5-325 MG tablet TAKE 1 TABLET BY MOUTH EVERY 6 HOURS AS NEEDED FOR SEVERE PAIN MAX DOSE IS 4 TABLETS PER  tablet 0    HYDROmorphone (DILAUDID) 2 MG tablet Take 2 tablets (4 mg) by mouth every 6 hours as needed for pain 10 tablet 0    metoprolol succinate ER (TOPROL XL) 25 MG 24 hr tablet Take 0.5 tablets (12.5 mg) by mouth every evening 15 tablet 3    nitroGLYcerin (NITROSTAT) 0.4 MG sublingual tablet For chest pain place 1 tablet under the tongue every 5 minutes for 3 doses. If symptoms persist 5 minutes after 2nd dose call 911. 30 tablet 0    omeprazole (PRILOSEC) 20 MG DR capsule Take 1 capsule (20 mg) by mouth daily 90 capsule 3    rosuvastatin (CRESTOR) 40 MG tablet Take 1 tablet (40 mg) by mouth daily 90 tablet 3    senna-docusate (SENOKOT-S;PERICOLACE) 8.6-50 MG per tablet Take 2 tablets by mouth 2 times daily 50 tablet 0    SPIRIVA HANDIHALER 18 MCG inhaled capsule USING THE HANDIHALER, INHALE THE CONTENTS OF ONE CAPSULE BY MOUTH DAILY 90 capsule 0    tazarotene (TAZORAC) 0.05 % external cream User every night 60 g 3    VENTOLIN  (90 Base) MCG/ACT inhaler INHALE TWO PUFFS BY MOUTH INTO THE LUNGS EVERY 6 HOURS AS NEEDED FOR SHORTNESS OF BREATH, DIFFICULTY BREATHING, OR WHEEZING 18 g 1    zolpidem (AMBIEN) 10 MG tablet TAKE ONE TABLET BY MOUTH EVERY EVENING AT BEDTIME AS NEEDED FOR SLEEP 30 tablet 2     Social History     Tobacco Use    Smoking status: Every Day     Packs/day: 0.50     Years: 45.00     Pack years: 22.50     Types: Cigarettes, Vaping Device     Passive exposure: Current    Smokeless tobacco: Never    Tobacco comments:     Just under a pack. 52  yr. hx.    Substance Use Topics    Alcohol use: No     Alcohol/week: 0.0 standard drinks of alcohol     Comment: 3-4x's/year.       OBJECTIVE  /70   Pulse 103   Temp 98.1  F (36.7  C) (Tympanic)   Wt 53.1 kg (117 lb)   SpO2 98%   BMI 19.77 kg/m      Physical Exam  Constitutional:       Appearance: Normal appearance.   Eyes:      Extraocular Movements: Extraocular movements intact.      Conjunctiva/sclera: Conjunctivae normal.   Musculoskeletal:      Comments: No tenderness to palpation of lumbar spine, no Paraspinous muscle pain with palpation. Pt reports pain down right buttock to knee.    Skin:     General: Skin is warm and dry.      Capillary Refill: Capillary refill takes less than 2 seconds.   Neurological:      Mental Status: She is alert and oriented to person, place, and time.   Psychiatric:         Mood and Affect: Mood normal.         Thought Content: Thought content normal.       Toradol 30mg IM injection given in clinic.    Offered PT referral, and spinal consult. Pt states she has a lot going on right now and isn't sure she would have time to follow up with them. Has recently been diagnosed with cancer and has more appointments to deal with that coming up.  Encouraged continued use of ice and heat. Could try topical medications like Bengay to low back and leg. Pt has tried gabapentin in the past but it made her nauseated.     ASSESSMENT:    1. Acute midline low back pain with right-sided sciatica    - cyclobenzaprine (FLEXERIL) 5 MG tablet; Take 1 tablet (5 mg) by mouth 3 times daily as needed for muscle spasms  Dispense: 30 tablet; Refill: 0  - ketorolac (TORADOL) injection 30 mg      PLAN:  -See handout on Lumbar Radiculopathy  Flexeril three times a day as needed for muscle stiffness.  Ice for pain, heat for stiffness. Gentle stretching as able. Walking and swimming may be helpful.  Referral for spine specialist for further evaluation of your pain.  Follow up with your primary in the next 7-10 days  if not improving as expected.

## 2023-08-21 NOTE — TELEPHONE ENCOUNTER
Patient was scheduled for an appointment and said she should be good on pain medication until then   Rosalinda Powell MA on 8/21/2023 at 3:00 PM

## 2023-08-21 NOTE — PATIENT INSTRUCTIONS
-See handout on Lumbar Radiculopathy  Flexeril three times a day as needed for muscle stiffness.  Ice for pain, heat for stiffness. Gentle stretching as able. Walking and swimming may be helpful.  Referral for spine specialist for further evaluation of your pain.  Follow up with your primary in the next 7-10 days if not improving as expected.

## 2023-08-21 NOTE — TELEPHONE ENCOUNTER
Symptoms    Describe your symptoms: Pt is having Lower back pain and down Rt side.  Pt was in the ER 8/18/23. Pt is asking what she can do next. [facility] does not have appts this week.  Pt is wondering what else she can do for this. Pt is wondering if she can get the Hydromorphone refilled again. Pt said it just took the edge off.  Please Advise.         Preferred Pharmacy:     [facility] [address] [address]  Phone: [phone] Fax: [phone]      Could we send this information to you in [facility] or would you prefer to receive a phone call?:   Patient would prefer a phone call   Okay to leave a detailed message?: Yes at Home number on file [phone] (home)  [facility]

## 2023-08-21 NOTE — PATIENT INSTRUCTIONS
Thank you for choosing us for your care. Based on the information provided, I believe you need to be seen   Please go urgent care as soon as possible.     You will not be charged for this eVisit.

## 2023-08-21 NOTE — PROGRESS NOTES
Plan for the following:  Bone Bx - Sched 08.23    Port placement  Guardant 360 Liquid  Follow up    Called to review the following plan with pt.     LOGAN for a return call.     Sasha Albrecht RN on 8/21/2023 at 2:24 PM

## 2023-08-21 NOTE — TELEPHONE ENCOUNTER
Please work patient in my schedule on Thursday ok to double book. Let me know if she will be out of pain mediation before then.    Jesusita Franco CNP

## 2023-08-23 NOTE — TELEPHONE ENCOUNTER
Please see my staff message that I forwarded about Dr. Marquez's response about holding DAPT for surgery.     May need to recheck with him about holding DAPT for biopsy.       ALEJANDRO Caballero CNP

## 2023-08-23 NOTE — TELEPHONE ENCOUNTER
Nurse from IR at [facility] called as pt is having a bone biopsy for a CA diagnosis and pt was given ok by Agatha Oreilly NP for a 2 day hold of Plavix prior to procedure. Pt had KATIA to LAD on 5/1/23. Pt is also on ASA 81 mg every day. Nurse states IR doc will not do procedure unless pt has minimum hold of 3 days of ASA and 5 days of Plavix. Will discuss with Agatha Oreilly NP for recommendations. If Agatha would like to talk with IR doc, doc on today is Dr Henderson at [phone]. Usha Rahman RN Cardiology August 23, 2023, 10:02 AM

## 2023-08-24 PROBLEM — I47.20 VENTRICULAR TACHYCARDIA (H): Status: ACTIVE | Noted: 2023-01-01

## 2023-08-24 NOTE — TELEPHONE ENCOUNTER
----- Message from ALEJANDRO Leal CNP sent at 8/23/2023  4:38 PM CDT -----  Regarding: FW: Surgical planning  Pt has a separate telephone encounter regarding holding DAPT for IR for bone biopsy. See below information about holding plavix from Dr. Marquez.     FYI: This message thread and my preop clearance note with recommendation for holding plavix 2 days was in regard to lobectomy and not biopsy.     You could recheck with Dr. Marquez, if needed.       ALEJANDRO Caballero CNP        ----- Message -----  From: Marquise Marquez MD  Sent: 7/14/2023   8:55 AM CDT  To: ALEJANDRO Leal CNP  Subject: RE: Surgical planning                            Would not hold Aspirin.    Discuss risks and hold plavix only for 2 days as requested, still this is not ideal in my opinion, but there are not too many good choices.    Marquise    ----- Message -----  From: Agatha Oreilly APRN CNP  Sent: 7/13/2023   5:19 PM CDT  To: Marquise Marquez MD  Subject: RE: Surgical planning                            Dr. Marquez,    The patient is on my schedule in early August for cardiac clearance.      She had Angio 5/1/2023 with KATIA x1 to the proximal LAD and residual 50% mid RCA and 20 to 30% throughout.  Are you okay with me holding DAPT since she would be more than 3 months post stenting, or would you recommend waiting a full 6 months given it was the proximal LAD?    ThanksMiriam      ----- Message -----  From: Marquise Marquez MD  Sent: 7/13/2023   4:12 PM CDT  To: Ana Luisa Costello RN; Theresa Rahman RN; #  Subject: RE: Surgical planning                            We would need to see her for surgical clearance and evaluation. I will ask the team to see if they can get her in with me or GENNARO    ----- Message -----  From: Ana Luisa Costello RN  Sent: 7/13/2023   2:21 PM CDT  To: Marquise Marquez MD; #  Subject: Surgical planning                                Hi Dr. Marquez,    Dr. Ng saw Beatriz today in  clinic to discuss possible lobectomy for a suspicious RUL nodule. We were wondering if she would be able to hold her Plavix for 48 hours if we did surgery in about 6-8 weeks from now?    I noticed she is due for follow up with you in the fall. Would you be able to provide cardiology clearance prior to that without seeing her in clinic again?    Of note, Dr. Ng ordered a CPET for VO2 max and placed referral for QuitPlan.    Thank you,  Ana Luisa Costello, RN BSN  Thoracic Surgery RN Care Coordinator  [phone]

## 2023-08-24 NOTE — TELEPHONE ENCOUNTER
Called [place]'s IR department to find out who the IR doc is so Dr Marquez can discuss case with him today. They referred me to [facility] [phone]. Will discuss with Dr Marquez and ask that he call an IR doc at [facility] to come up with a periprocedure Plavix plan. Usha Rahman RN Cardiology August 24, 2023, 9:26 AM

## 2023-08-24 NOTE — TELEPHONE ENCOUNTER
Called [facility] to find out who Dr Marquez should talk with. A body radiologist is who will do the biopsy. Dr Henderson and Dr Freddie Bray are working at [facility] today and could discuss with Dr Marquez. He should call [facility] at [phone] and ask for either one of those doctors and he will be connected to them. Message sent to Dr Marquez with this information. Usha Rahman RN Cardiology August 24, 2023, 11:44 AM

## 2023-08-24 NOTE — TELEPHONE ENCOUNTER
Please see 8/24/23 TE for Dr Marquez's recommendation. See Also 8/23/23 TE from Radiology for their decision to cancel bx and refer pt back to cardiology so she can reschedule bone bx. Usha Rahman RN Cardiology August 24, 2023, 9:17 AM

## 2023-08-24 NOTE — PROGRESS NOTES
Assessment & Plan     Compression fracture of L1 vertebra with routine healing, subsequent encounter  Pain management contract started as well as referral to spinal clinic will increase pain medications to 7.5 mg recommend DEXA scan based on nature of fracture  - Spine  Referral; Future  - HYDROcodone-acetaminophen (NORCO) 7.5-325 MG per tablet; Take 1 tablet by mouth every 4 hours as needed for severe pain Max 5 tablets per day  - DX Hip/Pelvis/Spine; Future    Asymptomatic menopausal state  - DX Hip/Pelvis/Spine; Future    Ventricular tachycardia (H)   Controlled no change in treatment plan     Jesusita Franco, ALEJANDRO CNP  [facility]    Sheyla Henderson is a 72 year old, presenting for the following health issues:  Back Pain        8/24/2023     2:40 PM   Additional Questions   Roomed by Agatha MURPHY   Accompanied by Self       History of Present Illness       Back Pain:  She presents for follow up of back pain. Patient's back pain is a new problem.    Original cause of back pain: lifting  First noticed back pain: in the last week  Patient feels back pain: constantlyLocation of back pain:  Right lower back, left lower back, right buttock, left buttock, right hip and left hip  Description of back pain: cramping, dull ache, sharp and shooting  Back pain spreads: right thigh and left thigh    Since patient first noticed back pain, pain is: gradually worsening  Does back pain interfere with her job:  Not applicable  On a scale of 1-10 (10 being the worst), patient describes pain as:  8  What makes back pain worse: bending, coughing, certain positions and sitting   Acupuncture: not tried  Acetaminophen: not helpful  Activity or exercise: helpful  Chiropractor:  Not tried  Cold: helpful  Heat: helpful  Massage: not helpful  Muscle relaxants: not helpful  NSAIDS: not tried  Opioids: helpful  Physical Therapy: not tried  Rest: not helpful  Steroid Injection: not tried  Stretching:  "helpful  Surgery: not tried  TENS unit: not tried  Topical pain relievers: not tried  Other healthcare providers patient is seeing for back pain: None    She eats 2-3 servings of fruits and vegetables daily.She consumes 1 sweetened beverage(s) daily.She exercises with enough effort to increase her heart rate 10 to 19 minutes per day.  She exercises with enough effort to increase her heart rate 3 or less days per week.   She is taking medications regularly.           Review of Systems   Constitutional, HEENT, cardiovascular, pulmonary, gi and gu systems are negative, except as otherwise noted.      Objective    /72 (BP Location: Right arm, Patient Position: Sitting, Cuff Size: Adult Small)   Pulse 102   Resp 20   Ht 1.638 m (5' 4.5\")   Wt 53.5 kg (118 lb)   SpO2 95%   BMI 19.94 kg/m    Body mass index is 19.94 kg/m .  Physical Exam   GENERAL: healthy, alert and no distress  EYES: Eyes grossly normal to inspection, PERRL and conjunctivae and sclerae normal  HENT: ear canals and TM's normal, nose and mouth without ulcers or lesions  NECK: no adenopathy, no asymmetry, masses, or scars and thyroid normal to palpation  RESP: lungs clear to auscultation - no rales, rhonchi or wheezes  CV: regular rate and rhythm, normal S1 S2, no S3 or S4, no murmur, click or rub, no peripheral edema and peripheral pulses strong  ABDOMEN: soft, nontender, no hepatosplenomegaly, no masses and bowel sounds normal  Tender:  lumbar spinous processes, lumbar facet joints, left para lumbar muscles, right para lumbar muscles  Non-tender:  thoracic spinous processes, thoracic facet joints  Range of Motion:  left lateral thoracic bending   decreased, right lateral thoracic bending  decreased, left thoracic rotation  decreased, right thoracic rotation  decreased, lumbar flexion  decreased, painful, lumbar extension  decreased, painful, left lateral lumbar bending  decreased, painful, right lateral lumbar bending  decreased, painful, left " lateral lumbar rotation  decreased, painful, right lateral lumbar rotation  decreased, painful  SKIN: no suspicious lesions or rashes  NEURO: Normal strength and tone, mentation intact and speech normal  PSYCH: mentation appears normal, affect normal/bright    No results found for any visits on 08/24/23.

## 2023-08-25 NOTE — TELEPHONE ENCOUNTER
[facility]    Phone Message    May a detailed message be left on voicemail: yes     Reason for Call: Other: Patient has a referral for: Compression fracture of L1 vertebra with routine healing  I found an appointment in  but patient refused as she wants to go to [place] as it is closer.     Action Taken: Other: message sent to team    Travel Screening: Not Applicable

## 2023-08-25 NOTE — TELEPHONE ENCOUNTER
[facility]    Phone Message    May a detailed message be left on voicemail: yes     Reason for Call: Other: RED FLAG FRACTURE TRIAGE NEEDED     Action Taken: Other: ted martinez    Travel Screening: Not Applicable

## 2023-08-28 NOTE — TELEPHONE ENCOUNTER
Spoke with patient regarding ok to hold plavix for 5 days prior to bone bx however she will need to continue ASA per Dr Marquez. Notified Sasha Albrecht RN from [facility] via Secure Chat that Plavix plan has been worked out, patient has been notified and they can contact her to reschedule biopsy. Usha Rahman RN Cardiology August 28, 2023, 10:55 AM

## 2023-08-28 NOTE — TELEPHONE ENCOUNTER
I spoke with Dr. Bray and they would like to stop DAPT for liver lesion biopsy. She has been over 3 months from the LAD PCI. Although not without risks, given concerns for metastatic disease, OK to hold plavix for 5 days. Ideally best to hold aspirin and not plavix, but given cancer risk, OK to stop plavix accepting a small risk. I would NOT stop both aspirin and plavix.     Usha, please discuss with the patient.

## 2023-08-29 NOTE — TELEPHONE ENCOUNTER
Notified Beatriz. She states she just went to ER on 08/18/23 and got some dilaudid which was not effective. She followed up in Urgent care on 08/28/23 and got a muscle relaxer. The addition of cyclobenzaprine has not helped much either. She is taking additional Tylenol. She has been in the hot tub, is doing her stretches, alternating ice and heat. Saw chiropractic a couple of times. The only thing that longterm helps her pain is lying down with a heating pad.   Scheduled follow up with PCP for 09/05/23.  Leilani LOZANO RN

## 2023-08-29 NOTE — TELEPHONE ENCOUNTER
That is way too much norco  If this is not touching her pain she should be seen in ER for alternative options

## 2023-08-29 NOTE — TELEPHONE ENCOUNTER
08-24-23 OV with Jesusita, compression fx L1. Referred to spine- appt 9/12.  I meantime pt reports taking Norco 7.5- 325 4 tabs every 6 hrs without pain relief.  Rates pain 8-9/10 bilat buttocks down leg fairly consistently. Denies numbness, tingling  Does get some pain relief by lying, applying heat. Difficult transfer and amb due to pain.  Does not have walker or cane to relieve pressure with walking.  Has seen chiro x2 (no manual adjusting).  Questioned bowel status, states having some constipation issues but managing with senna, Miralax, milk of mag.  Advised not to exceed prescribed Norco.  Forwarded to Dr. Whittaker for review in PCP absence.  YOLIS John RN

## 2023-08-31 NOTE — PROGRESS NOTES
Patient scheduled for liver biopsy now on Wed 9/6 at [facility]. Patient instructed to hold plavix starting on Friday. Patient will take the plavix today. Patient verbalized understanding of instructions. Anila Caruso RN on 8/31/2023 at 8:36 AM

## 2023-09-02 NOTE — ED NOTES
"         Emergency Department Patient Sign-out       Brief HPI:  This is a 72 year old female signed out to me by Dr. Valle.  See initial ED Provider note for details of the presentation.     New lung cancer diagnosis, seen on the 18th here for back pain. Back pain after lifting.  CT scan at the time showed an endplate compression fracture.    There was e/o mets on the imaging at that time.    Was discharged. Still pending biopsy because of stents placed in last year, is on DAPT.     Patient was discharged with dilaudid, not providing relief.     Patient now back with bad pain. Patient has some numbness and tingling in feet. No weakness or saddle anesthesia.     Pending an MRI.         Exam:   Patient Vitals for the past 24 hrs:   BP Temp Temp src Pulse Resp SpO2 Height Weight   09/02/23 1249 129/68 99.9  F (37.7  C) Tympanic 81 18 99 % 1.638 m (5' 4.5\") 53.5 kg (118 lb)           ED RESULTS:   No results found for this visit on 09/02/23 (from the past 24 hour(s)).    ED MEDICATIONS:   Medications   HYDROmorphone (PF) (DILAUDID) injection 0.5 mg (0.5 mg Intravenous $Given 9/2/23 1459)   0.9% sodium chloride BOLUS (500 mLs Intravenous $New Bag 9/2/23 1349)         Impression:  No diagnosis found.    Plan:      Pending MRI.     1701 - MRI shows:    IMPRESSION:  1.  Osseous metastatic disease involving the visualized thoracic spine, lumbar spine, and sacrum/pelvis. Edema involving the sacrum probably relates to the diffuse metastatic involvement. Superimposed acute/subacute pathologic sacral fractures are also   possible.      2.  When compared to 08/18/2023, there is overall unchanged appearance of the pathologic L1 superior endplate compression fracture with 25% height loss. No significant retropulsion. There is ongoing marrow edema.     3.  There has been interval development of a subtle pathologic T12 superior endplate compression fracture with 5% height loss. There is ongoing marrow edema.     4.  There is " extraosseous extension of malignancy involving the ventral aspect of S1 with extension into the presacral space. Malignant soft tissue also extends into the right S1 neural foramen with narrowing of the foramen and abutment of the ventral   aspect of the exiting right S1 nerve root.     5.  Extraosseous extension of malignancy also contacts the dorsal aspect of the extra foraminal portion of the exiting right L5 nerve root.      Spent a large amount of time talking with the patient.  Her pain was not controlled well with Dilaudid.  She is very frustrated and wants to go home.  Her  also wants to go home.  I asked him to please wait and try some oxycodone and see if that helps.  They were agreeable to this.  She does have follow-up planned.  I informed her of the results of his MRI.    1750 -patient reported some improvement with her oxycodone dose.  She feels safe with going home with this.  I had sent some to the [email or web address] machine for home.  I also sent 60 tablets of oxycodone to her preferred pharmacy.  She has follow-up planned.  She is walking normally.  She does not have any weakness at this time.  I think it is reasonable for her to go home with follow-up as planned.  I gave her ER precautions.  She is very appreciative.  I will discharge her at this time.        MD Mirta Mejia, Sylvester Ambrocio MD  09/02/23 8315

## 2023-09-02 NOTE — ED TRIAGE NOTES
Patient here for back pain. Known compression fx that was diagnosed on 8/18. Patient unable to tolerate pain. Using heat, cold, stretching without relief. Reports last night was on of the worst night she has had. Reports bilateral feet bottoms are going numb. Reports numbness started yesterday. Denies any incontinence.      Triage Assessment       Row Name 09/02/23 1248       Triage Assessment (Adult)    Airway WDL WDL       Respiratory WDL    Respiratory WDL WDL       Skin Circulation/Temperature WDL    Skin Circulation/Temperature WDL WDL       Cardiac WDL    Cardiac WDL WDL       Peripheral/Neurovascular WDL    Peripheral Neurovascular WDL WDL       Cognitive/Neuro/Behavioral WDL    Cognitive/Neuro/Behavioral WDL WDL

## 2023-09-02 NOTE — DISCHARGE INSTRUCTIONS
You were seen today for back pain.  Unfortunately, it looks like the cancer has spread to the bones in your back.    Please follow up with all your doctors as planned.      I prescribed pain medicine for home.  Take 1, and if your pain is not improving, take another.  You can take up to 3 at a time for your pain.    If you are having a hard time with controlling your pain, please come back.    Thank you for your patience and I hope you feel better soon.

## 2023-09-02 NOTE — ED NOTES
Pt discharge instructions reviewed and received. There are no unanswered questions at the time of discharge. Pt has a  to escort them home and pt understands hazards of driving after receiving mood altering medications. Escorted to lobby for discharge.
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
## 2023-09-02 NOTE — TELEPHONE ENCOUNTER
Patient had a compression fracture on lumbar spine and is not getting worse.  Patient can hardly walk.  Patient is having lower back pain and the biggest part is the sciatica.  Pain goes down back both of her legs.  Patient is taking vicodin.  Patient states that she will have her  drive her to ED.        Reason for Disposition   [1] SEVERE back pain (e.g., excruciating) AND [2] sudden onset AND [3] age > 60 years    Additional Information   Negative: Passed out (i.e., lost consciousness, collapsed and was not responding)   Negative: Shock suspected (e.g., cold/pale/clammy skin, too weak to stand, low BP, rapid pulse)   Negative: Sounds like a life-threatening emergency to the triager    Protocols used: Back Pain-A-AH

## 2023-09-05 NOTE — TELEPHONE ENCOUNTER
Patient calling. States her  is at the pharmacy and pharmacy will not fill RX due to it being to soon. Pharmacy is needing Jesusita NICHOLE to fill early.

## 2023-09-05 NOTE — PROGRESS NOTES
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  Pain referral also placed in the event that they need to do some intervention with injections on top of the palliative care  - Pain Management  Referral; Future  - oxyCODONE (ROXICODONE) 5 MG tablet; Take 1-2 tablets (5-10 mg) by mouth every 6 hours as needed for pain  - naloxone (NARCAN) 4 MG/0.1ML nasal spray; Spray 1 spray (4 mg) into one nostril alternating nostrils as needed for opioid reversal every 2-3 minutes until assistance arrives  - Palliative Care Referral; Future    Compression fracture of L1 vertebra with routine healing, subsequent encounter  - oxyCODONE (ROXICODONE) 5 MG tablet; Take 1-2 tablets (5-10 mg) by mouth every 6 hours as needed for pain    Insomnia, unspecified type   Controlled no change in treatment plan reviewed at length with patient of the dangers of taking medications of oxycodone and Ambien together patient is aware will be seen by palliative care for pain management and working with further.  Medications monitor closely in the interim until she can be seen  - zolpidem (AMBIEN) 10 MG tablet; TAKE ONE TABLET BY MOUTH EVERY EVENING AT BEDTIME AS NEEDED FOR SLEEP      50 minutes spent by me on the date of the encounter doing chart review, review of outside records, review of test results, interpretation of tests, patient visit, documentation, discussion with other provider(s), and discussion with family            ALEJANDRO Odonnell [facility]    Sheyla Henderson is a 72 year old, presenting for the following health issues:  Pain (Back pain)        9/5/2023     9:05 AM  "  Additional Questions   Roomed by Agatha MURPHY   Accompanied by  Luis A       History of Present Illness       Back Pain:  She presents for follow up of back pain. Patient's back pain is a new problem.    Original cause of back pain: lifting  First noticed back pain: in the last week  Patient feels back pain: constantlyLocation of back pain:  Right lower back, left lower back, right buttock, left buttock, right hip and left hip  Description of back pain: cramping, dull ache, sharp and shooting  Back pain spreads: right thigh and left thigh    Since patient first noticed back pain, pain is: gradually worsening  Does back pain interfere with her job:  Not applicable  On a scale of 1-10 (10 being the worst), patient describes pain as:  8  What makes back pain worse: bending, coughing, certain positions and sitting   Acupuncture: not tried  Acetaminophen: not helpful  Activity or exercise: helpful  Chiropractor:  Not tried  Cold: helpful  Heat: helpful  Massage: not helpful  Muscle relaxants: not helpful  NSAIDS: not tried  Opioids: helpful  Physical Therapy: not tried  Rest: not helpful  Steroid Injection: not tried  Stretching: helpful  Surgery: not tried  TENS unit: not tried  Topical pain relievers: not tried  Other healthcare providers patient is seeing for back pain: None    She eats 2-3 servings of fruits and vegetables daily.She consumes 1 sweetened beverage(s) daily.She exercises with enough effort to increase her heart rate 10 to 19 minutes per day.  She exercises with enough effort to increase her heart rate 3 or less days per week.   She is taking medications regularly.               Review of Systems   Constitutional, HEENT, cardiovascular, pulmonary, gi and gu systems are negative, except as otherwise noted.      Objective    /76 (BP Location: Right arm, Patient Position: Sitting, Cuff Size: Adult Regular)   Pulse (!) 126   Resp 18   Ht 1.638 m (5' 4.5\")   Wt 51.7 kg (114 lb)   SpO2 97%   " BMI 19.27 kg/m    Body mass index is 19.27 kg/m .  Physical Exam   GENERAL: healthy, alert and no distress  EYES: Eyes grossly normal to inspection, PERRL and conjunctivae and sclerae normal  HENT: ear canals and TM's normal, nose and mouth without ulcers or lesions  NECK: no adenopathy, no asymmetry, masses, or scars and thyroid normal to palpation  RESP: lungs clear to auscultation - no rales, rhonchi or wheezes  CV: regular rate and rhythm, normal S1 S2, no S3 or S4, no murmur, click or rub, no peripheral edema and peripheral pulses strong  MS: no gross musculoskeletal defects noted, no edema tenderness through out spine with limited ROM   SKIN: no suspicious lesions or rashes  NEURO: Normal strength and tone, mentation intact and speech normal  PSYCH: mentation appears normal, affect normal/bright

## 2023-09-05 NOTE — TELEPHONE ENCOUNTER
[facility] Call Center    Phone Message    May a detailed message be left on voicemail: yes     Reason for Call: Other: Patient has 'urgent' referral for Pain Management but is currently scheduled on 9/12/23 with Dr. Tom as a new med spine visit type. Writer is unsure if this covers the pain management referral as Dr. Tom is not listed as a Med Spine provider on the [facility] protocols.      Action Taken: Other: Pain Nurse    Travel Screening: Not Applicable

## 2023-09-06 NOTE — PROCEDURES
[facility]    Procedure: Ultrasound-guided liver biopsy with moderate sedation    Date/Time: 9/6/2023 10:12 AM    Performed by: Fahrner, Lester, MD  Authorized by: Fahrner, Lester, MD      UNIVERSAL PROTOCOL   Site Marked: Yes  Prior Images Obtained and Reviewed:  Yes  Required items: Required blood products, implants, devices and special equipment available    Patient identity confirmed:  Verbally with patient  Patient was reevaluated immediately before administering moderate or deep sedation or anesthesia  Confirmation Checklist:  Patient's identity using two indicators, relevant allergies, procedure was appropriate and matched the consent or emergent situation and correct equipment/implants were available  Time out: Immediately prior to the procedure a time out was called    Universal Protocol: the Joint Commission Universal Protocol was followed    Preparation: Patient was prepped and draped in usual sterile fashion    ESBL (mL):  0     ANESTHESIA    Anesthesia:  Local infiltration  Local Anesthetic:  Lidocaine 1% without epinephrine  Anesthetic Total (mL):  6      SEDATION  Patient Sedated: Yes    Sedation Type:  Moderate (conscious) sedation  Sedation:  Fentanyl, midazolam and see MAR for details  Vital signs: Vital signs monitored during sedation    See dictated procedure note for full details.    PROCEDURE    Patient Tolerance:  Patient tolerated the procedure well with no immediate complications  Length of time physician/provider present for 1:1 monitoring during sedation: 25

## 2023-09-06 NOTE — PROGRESS NOTES
"Palliative Care Outpatient Clinic      Patient ID:  Medical - 71 yo female with hx of recently diagnosed (7/2023) lung cancer with metastases to liver and bone, compression fracture of L1 vertebra, CAD s/p PCI (5/2023).     Social - lives with , Luis A and their boxer, Shantelle. Has son (Akin) who lives in [place] and 3 grandchildren. Her sister who lives in [place] also provides support. Worked for [employer]. Enjoys fishing and swimming.     Care Planning - not discussed today      History:  History gathered today from: patient    Beatriz is hopeful that her pain can be better managed. Since her compression fracture about a month ago, she has had constant back pain. It is sharp and shoots down the backs of both her legs. It has been very difficult to sit. She tries to stand and change positions frequently. Sitting on a heat pad has been helpful. She had her liver biopsy yesterday and has had soreness at the site.     She has been on Norco for over 20 years for chronic neck and shoulder pain. Took about 4 tabs per day, prescribed by her primary care provider. She stopped taking the Norco when she received oxycodone after her ED visit on 9/2. She was prescribed 60 pills and took her last two this morning. Has been taking mostly 10 mg every 2-3 hours, about 15-20 pills per day. This \"takes the edge off\". The pain has greatly affected her quality of life - she understands that we probably cannot eliminate the pain completely but hopes that it can be improved. She is also taking Tylenol 1g two times a day. Has been trying not to take it too frequently as her liver is affected by the cancer. She is not on any medications for nerve pain. Has tried gabapentin in the past and it gave her an upset stomach.     She has noticed numbness and tingling in her lower legs and feet along with swelling, which started about 3 weeks ago. No leg weakness or trouble walking. No loss of bowel or bladder " function. She takes Senna two times a day and metamucil. Drinks lots of water. This regimen keeps her bowel movements regular.     Her recent diagnosis of cancer has been shocking and she continues to work to process this. Feels that she gets good support from her loved ones.       PE: There were no vitals taken for this visit.   Wt Readings from Last 3 Encounters:   09/05/23 51.7 kg (114 lb)   09/02/23 53.5 kg (118 lb)   08/24/23 53.5 kg (118 lb)     Gen: in no acute distress, awake and alert  HEENT: NC/AT, MMM  Pulm: normal work of breathing, speaking in complete sentences comfortably  Skin: no rashes visualized on exposed skin      Data reviewed:  I reviewed recent labs and imaging, my comments:  Plt 811  Hgb 8.1  Cr 0.58     9/2 MR Lumbar spine  IMPRESSION:  1.  Osseous metastatic disease involving the visualized thoracic spine, lumbar spine, and sacrum/pelvis. Edema involving the sacrum probably relates to the diffuse metastatic involvement. Superimposed acute/subacute pathologic sacral fractures are also   possible.      2.  When compared to 08/18/2023, there is overall unchanged appearance of the pathologic L1 superior endplate compression fracture with 25% height loss. No significant retropulsion. There is ongoing marrow edema.     3.  There has been interval development of a subtle pathologic T12 superior endplate compression fracture with 5% height loss. There is ongoing marrow edema.     4.  There is extraosseous extension of malignancy involving the ventral aspect of S1 with extension into the presacral space. Malignant soft tissue also extends into the right S1 neural foramen with narrowing of the foramen and abutment of the ventral   aspect of the exiting right S1 nerve root.     5.  Extraosseous extension of malignancy also contacts the dorsal aspect of the extra foraminal portion of the exiting right L5 nerve root.     database reviewed: yes      Impression & Recommendations:  Beatriz is a 71 yo  female with hx of metastatic lung cancer and CAD who presents for cancer pain management in setting of bony metastases and recent pathologic compression fracture.     We discussed the role of opioids in cancer pain and starting a long acting option. Counseled on how we expect her opioid needs to decrease as we explore other interventional pain strategies (saw Dr. Currie today with plan for referral to interventional radiology). Reviewed red flag symptoms for spinal compression and importance of labeling as to not confuse her long acting and short acting pain medications.     Plan:   - start MS Contin 30 mg BID  - oxycodone 10-20 mg every 3 hours as needed for pain  - provided clinic number to call if having issues with pain medications or worry about pain control   - continue Tylenol as needed  - if persistent or worsening neuropathic pain, will consider adding additional agent such as duloxetine (patient has had GI upset with gabapentin in the past)     Follow up in 2 weeks.     Patient staffed with Dr. Lake.     Thank you for involving us in the patient's care.   Jessica Kruse MD  Palliative Care Fellow    I, Melissa Lake MD personally examined and evaluated this patient on 9/7/23. I discussed the patient with Dr Kruse and care team, and agree with the assessment and plan of care as documented in the fellow s note of 9/7/23.  I personally reviewed medications, labs, imaging, vital signs as indicated.  Key findings: Patient seen for recently diagnosed lung cancer metastatic to liver, spine with significant back pain. Made adjustments to pain medications based on history, chart review, visual exam.     Melissa Lake MD  Palliative Medicine  Securely message with the Vocera Web Console (learn more here)

## 2023-09-06 NOTE — PROGRESS NOTES
Got pt up to the bathroom around 11am. Noted and reddened area on left hip with a fluid filled blister. Informed pt and  about finding. Educated both about using heating pad on area and lying in one place to long. Reviewed discharge instructions. Verbalized understanding. No questions at this time. Accompanied to front door via W/C.

## 2023-09-06 NOTE — TELEPHONE ENCOUNTER
Pt calling back in regards to medications that were sent in yesterday. Pt stated the pharmacy needs an okay from Jesusita Glassien and the Oxycodone refill.    Marielena Blackwell Patient

## 2023-09-07 NOTE — TELEPHONE ENCOUNTER
Reviewed with patient will refill but she is not to take more the 1 tablet.  Patient Understands.    Jesusita Franco CNP

## 2023-09-07 NOTE — PROGRESS NOTES
Virtual Visit Details    Type of service:  Video Visit   Video Start Time: 9:43 AM  Video End Time:10:03 AM    Originating Location (pt. Location): Home    Distant Location (provider location):  On-site  Platform used for Video Visit: [facility]      Pain [facility] New Patient Consult Note:    Referring Provider: Self   Primary care provider: Jesusita Franco.    Beatriz Francis is a 72 year old y.o. old female who presents to the pain clinic with cancer related pain. She was evaluated in the palliative care clinic. She is with her  Luis A.     HPI:  Patient Supplied Answers To the  Pain Questionnaire       No data to display              Beatriz is a 72 year old female who was diagnosed with lung cancer just 1 month ago. She has stage 4 with metastatic disease in the liver and spine.   Liver biopsy completed 9/6/2023  Kyphoplasty pending with IR  Pain started August 18th when she was evaluated in the ED. She thought she injured her back.   Lives with her  Luis A in [place]. She usually gets care to [place] and [facility].   Location: Liver biopsy, lower lumbar, into buttocks  She is prescribed morphine and oxycodone by the palliative service.   She finds it hard to sit up and does not want to commit coming to the [employer] [facility]. She is currently smoking.     Tests/Imaging reviewed with the patient:    MRI Lumbar spine 9/2/2023  T12-L1: Normal disc height and signal. No herniation. Normal facets. No spinal canal or neural foraminal stenosis.      L1-L2: Normal disc height and signal. No herniation. Normal facets. No spinal canal or neural foraminal stenosis.     L2-L3: Mild loss of disc height and moderate loss of T2-weighted signal in the disc. Mild bilateral facet arthropathy no spinal canal stenosis. No neural foraminal stenosis.      L3-L4: Moderate loss of disc height and T2-weighted signal in the disc. Mild to moderate bilateral facet arthropathy. No spinal canal stenosis. No neural foraminal  stenosis.     L4-L5: Mild loss of disc height and moderate loss of T2-weighted signal in the disc. Severe bilateral facet arthropathy. No spinal canal or neural foraminal stenosis.     L5-S1: Mild loss of disc height and T2-weighted signal in the disc. Mild bilateral facet arthropathy. No spinal canal or neural foraminal stenosis.                                                                       IMPRESSION:  1.  Osseous metastatic disease involving the visualized thoracic spine, lumbar spine, and sacrum/pelvis. Edema involving the sacrum probably relates to the diffuse metastatic involvement. Superimposed acute/subacute pathologic sacral fractures are also   possible.      2.  When compared to 08/18/2023, there is overall unchanged appearance of the pathologic L1 superior endplate compression fracture with 25% height loss. No significant retropulsion. There is ongoing marrow edema.     3.  There has been interval development of a subtle pathologic T12 superior endplate compression fracture with 5% height loss. There is ongoing marrow edema.     4.  There is extraosseous extension of malignancy involving the ventral aspect of S1 with extension into the presacral space. Malignant soft tissue also extends into the right S1 neural foramen with narrowing of the foramen and abutment of the ventral   aspect of the exiting right S1 nerve root.     5.  Extraosseous extension of malignancy also contacts the dorsal aspect of the extra foraminal portion of the exiting right L5 nerve root.    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    Pulmonary nodule     long standing, likely scarring    Seasonal allergic rhinitis           Past Surgical History:  Past Surgical History:   Procedure Laterality Date    ARTHROSCOPY SHLDR ROTATOR CUFF REPAIR, SUBACROMIAL DECOMP, DIST CLAVICLE RESECTION, BICEP TENODESIS Left 7/8/2016    Procedure: ARTHROSCOPY SHOULDER ROTATOR CUFF REPAIR, SUBACROMIAL DECOMPRESSION, DISTAL CLAVICLE RESECTION, OPEN BICEP TENODESIS REPAIR;  Surgeon: Freddie Espino MD;  Location: [facility] OR    ARTHROSCOPY SHOULDER Left 1/23/2017    Procedure: ARTHROSCOPY SHOULDER;  Surgeon: Ankit Morton MD;  Location: [facility] OR    BIOPSY      COLONOSCOPY  2002    COLONOSCOPY N/A 3/2/2022    Procedure: COLONOSCOPY, FLEXIBLE, WITH LESION REMOVAL USING SNARE;  Surgeon: Davi Sibley DO;  Location: [facility]    CV CORONARY ANGIOGRAM N/A 5/1/2023    Procedure: Coronary Angiogram;  Surgeon: Everette Bolivar MD;  Location: [facility] CARDIAC CATH LAB    CV PCI N/A 5/1/2023    Procedure: Percutaneous Coronary Intervention;  Surgeon: Everette Bolivar MD;  Location: [facility] CARDIAC CATH LAB    ESOPHAGOSCOPY, GASTROSCOPY, DUODENOSCOPY (EGD), COMBINED N/A 9/7/2022    Procedure: ESOPHAGOGASTRODUODENOSCOPY, WITH BIOPSY;  Surgeon: Davi Sibley DO;  Location: [facility]    EYE SURGERY  2004-[facility]    HYSTERECTOMY, PAP NO LONGER INDICATED  1990    REVERSE ARTHROPLASTY SHOULDER Left 2/15/2017    Procedure: REVERSE ARTHROPLASTY SHOULDER;  Surgeon: Ankit Morton MD;  Location: [facility] OR    ROTATOR CUFF REPAIR RT/LT  1994,1995    right    ROTATOR CUFF REPAIR RT/LT  3/5/04    left    ROTATOR CUFF REPAIR RT/LT  9/25/2009    Right    ZZC SHOULDER SURG PROC UNLISTED  7/8/2016          Family History:  Family History   Problem Relation Age of Onset    Cancer Father         lung    Heart Disease Father     Alcohol/Drug Father     Other Cancer Father     Cancer Paternal  Grandmother         lung    Hypertension Mother     Cerebrovascular Disease Mother     Depression Mother     Cancer Maternal Grandfather     Cancer Paternal Grandfather     Diabetes Brother     Hypertension Brother     Hyperlipidemia Brother     Diabetes Brother     Gastrointestinal Disease Brother         [place]    Other Cancer Brother     Diabetes Brother     Rheumatoid Arthritis Brother     Cardiovascular Brother           Social History:  Social History     Socioeconomic History    Marital status:      Spouse name: Not on file    Number of children: Not on file    Years of education: Not on file    Highest education level: Not on file   Occupational History    Not on file   Tobacco Use    Smoking status: Every Day     Packs/day: 0.50     Years: 45.00     Pack years: 22.50     Types: Cigarettes, Vaping Device     Passive exposure: Current    Smokeless tobacco: Never    Tobacco comments:     Under half a pack per pt.  52  yr. hx.   Vaping Use    Vaping Use: Former   Substance and Sexual Activity    Alcohol use: No     Alcohol/week: 0.0 standard drinks of alcohol     Comment: 3-4x's/year.    Drug use: No    Sexual activity: Not Currently     Partners: Male   Other Topics Concern     Service No    Blood Transfusions No    Caffeine Concern Yes    Occupational Exposure Not Asked     Comment: [employer]    Hobby Hazards No    Sleep Concern No    Stress Concern Yes    Weight Concern Yes    Special Diet No    Back Care No    Exercise Yes     Comment: little    Bike Helmet Not Asked    Seat Belt Yes    Self-Exams Yes    Parent/sibling w/ CABG, MI or angioplasty before 65F 55M? No   Social History Narrative    .  Live with spouse.  Also has a friend living with them.    Helping take care of grandkids while her son is trying for full custody.     Social Determinants of Health     Financial Resource Strain: Not on file   Food Insecurity: Not on file   Transportation Needs: Not on file   Physical  Activity: Not on file   Stress: Not on file   Social Connections: Not on file   Intimate Partner Violence: Not on file   Housing Stability: Not on file     Social History     Social History Narrative    .  Live with spouse.  Also has a friend living with them.    Helping take care of grandkids while her son is trying for full custody.          Allergies:  No Known Allergies    Current Medications:   Current Outpatient Medications   Medication Sig Dispense Refill    acetaminophen (TYLENOL) 325 MG tablet Take 2 tablets (650 mg) by mouth every 4 hours as needed for other (surgical pain) 100 tablet 0    aspirin (ASA) 81 MG EC tablet Take 1 tablet (81 mg) by mouth daily Start tomorrow. 30 tablet 3    clopidogrel (PLAVIX) 75 MG tablet 300mg (4 tablets) on day 1, then 75mg 1 tablet a day thereafter 90 tablet 3    metoprolol succinate ER (TOPROL XL) 25 MG 24 hr tablet Take 0.5 tablets (12.5 mg) by mouth every evening 15 tablet 3    naloxone (NARCAN) 4 MG/0.1ML nasal spray Spray 1 spray (4 mg) into one nostril alternating nostrils as needed for opioid reversal every 2-3 minutes until assistance arrives 0.2 mL 0    nitroGLYcerin (NITROSTAT) 0.4 MG sublingual tablet For chest pain place 1 tablet under the tongue every 5 minutes for 3 doses. If symptoms persist 5 minutes after 2nd dose call 911. 30 tablet 0    omeprazole (PRILOSEC) 20 MG DR capsule Take 1 capsule (20 mg) by mouth daily 90 capsule 3    rosuvastatin (CRESTOR) 40 MG tablet Take 1 tablet (40 mg) by mouth daily 90 tablet 3    senna-docusate (SENOKOT-S;PERICOLACE) 8.6-50 MG per tablet Take 2 tablets by mouth 2 times daily 50 tablet 0    SPIRIVA HANDIHALER 18 MCG inhaled capsule USING THE HANDIHALER, INHALE THE CONTENTS OF ONE CAPSULE BY MOUTH DAILY 90 capsule 0    tazarotene (TAZORAC) 0.05 % external cream User every night 60 g 3    VENTOLIN  (90 Base) MCG/ACT inhaler INHALE TWO PUFFS BY MOUTH INTO THE LUNGS EVERY 6 HOURS AS NEEDED FOR SHORTNESS OF  "BREATH, DIFFICULTY BREATHING, OR WHEEZING 18 g 1    zolpidem (AMBIEN) 10 MG tablet TAKE ONE TABLET BY MOUTH EVERY EVENING AT BEDTIME AS NEEDED FOR SLEEP 30 tablet 2    ferrous gluconate (FERGON) 324 (38 Fe) MG tablet Take 1 tablet (324 mg) by mouth daily (with breakfast) (Patient not taking: Reported on 9/6/2023) 90 tablet 3    morphine (MS CONTIN) 30 MG CR tablet Take 1 tablet (30 mg) by mouth every 12 hours maximum 2 tablet(s) per day 28 tablet 0    oxyCODONE (ROXICODONE) 10 MG tablet Take 1-2 tablets (10-20 mg) by mouth every 3 hours as needed for pain 120 tablet 0          Current Pain Medications:  Medications related to Pain Management (From now, onward)      None             Review of Systems:  Review of Systems   All other systems reviewed and are negative.    Physical Exam:     Vitals:    09/07/23 0932   Weight: 53.1 kg (117 lb)   Height: 1.638 m (5' 4.5\")       General Appearance: No distress, lying comfortably  Mood: Euthymic  HE ENT: Non constricted pupils  Respiratory: Non labored breathing    Laboratory results:  Recent Labs   Lab Test 07/24/23  0831 06/27/23  1059 06/20/23  1527   NA  --  135* 131*   POTASSIUM  --  4.6 4.4   CHLORIDE  --  99 96*   CO2  --  25 27   ANIONGAP  --  11 8   GLC 85 112* 90   BUN  --  7.9* 9.8   CR  --  0.58 0.64   MARI  --  8.7* 8.8       CBC RESULTS:   Recent Labs   Lab Test 09/06/23  0844 06/27/23  1059   WBC  --  6.9   RBC  --  3.11*   HGB 8.1* 8.5*   HCT  --  26.2*   MCV  --  84   MCH  --  27.3   MCHC  --  32.4   RDW  --  14.4   * 500*         Imaging:  No images are attached to the encounter.     ASSESSMENT AND PLAN:     Encounter Diagnosis:    Cancer related pain  Metastatic lung cancer  Opioid escalation  Nicotine dependence  Vertebral compression fractures    Beatriz Francis is a 72 year old y.o. old female who presents to the pain clinic with cancer related pain with her     I have summarized the patient s past medical history, discussed their " clinical findings and the potential differential diagnosis with the patient. Significant past medical history pertinent to the patient s current condition includes recent cancer diagnosis and opioid escalation.  She is reluctant to have continuity of care at the [facility] in person due to pain with sitting. The differential diagnosis discussed with the patient are listed above. I have discussed anatomy and possible sources of the pain using models and/or pictures (diagrams). I have discussed multi- disciplinary pain management options withthe patient as pertaining to their case as detailed above. The pain management options we discussed included, but were not limited to the recommendations below.  I also discussed with patient the risks, benefits and alternatives to each pain management option.  All of the patient s questions and concerns were answered to the best of my ability.    RECOMMENDATIONS:     1. Medications: Continue medications per our palliative care colleagues.     2. Follow up liver biopsy results.     3. I will reach out to Dr. Abel Manley to consider kyphoplasty. Referral placed.   4. Consider cancer rehab after kyphoplasty.   5. She is an IDDS candidate if she can follow up at the [facility]. She is also continue to smoke and will require to stop smoking for 30 days minimum.       Follow up: as needed

## 2023-09-07 NOTE — TELEPHONE ENCOUNTER
New Medication Request    Contacts         Type Contact Phone/Fax    09/07/2023 10:19 AM CDT Phone (Incoming) Beatriz Francis (Self) [phone] (H)            What medication are you requesting?: Ambien     Reason for medication request: Pt is requesting Early Refill due to taking more then one a night. Pt takes between 1 1/2 -2 tabs of her Ambien. Pt said she is having issues sleeping. Pt has Metastatic CA due to the pain she is having unable to sleep.   Requesting approval for early refill on her Ambien   Per [facility] Ambien last filled 8/30/23 # 30      Controlled Substance Agreement on file:   CSA -- Patient Level:     [Media Unavailable] Controlled Substance Agreement - Opioid - Scan on 6/8/2023  2:59 PM   [Media Unavailable] Controlled Substance Agreement - Opioid - Scan on 7/25/2022  2:02 PM   [Media Unavailable] Controlled Substance Agreement - Opioid - Scan on 6/1/2021 12:37 PM   [Media Unavailable] Controlled Substance Agreement - Opioid - Scan on 1/13/2020  4:04 PM   [Media Unavailable] Controlled Substance Agreement - Opioid - Scan on 3/4/2019  2:18 PM         Preferred Pharmacy:     [address] [address]  Phone: [phone] Fax: [phone]        [facility] or would you prefer to receive a phone call?:   Patient would prefer a phone call   Okay to leave a detailed message?: Yes at Home number on file [phone] (home)  [facility]

## 2023-09-07 NOTE — LETTER
"9/7/2023       RE: Beatriz Francis  [facility] [address] [place], [place] [address]     Dear Colleague,    Thank you for referring your patient, Beatriz Francis[facility]. Please see a copy of my visit note below.    Palliative Care Outpatient Clinic      Patient ID:  Medical - 71 yo female with hx of recently diagnosed (7/2023) lung cancer with metastases to liver and bone, compression fracture of L1 vertebra, CAD s/p PCI (5/2023).     Social - lives with , Luis A and their boxerShantelle. Has son (Akin) who lives in [place] and 3 grandchildren. Her sister who lives in [place] also provides support. Worked for [employer]. Enjoys fishing and swimming.     Care Planning - not discussed today      History:  History gathered today from: patient    Beartiz is hopeful that her pain can be better managed. Since her compression fracture about a month ago, she has had constant back pain. It is sharp and shoots down the backs of both her legs. It has been very difficult to sit. She tries to stand and change positions frequently. Sitting on a heat pad has been helpful. She had her liver biopsy yesterday and has had soreness at the site.     She has been on Norco for over 20 years for chronic neck and shoulder pain. Took about 4 tabs per day, prescribed by her primary care provider. She stopped taking the Norco when she received oxycodone after her ED visit on 9/2. She was prescribed 60 pills and took her last two this morning. Has been taking mostly 10 mg every 2-3 hours, about 15-20 pills per day. This \"takes the edge off\". The pain has greatly affected her quality of life - she understands that we probably cannot eliminate the pain completely but hopes that it can be improved. She is also taking Tylenol 1g two times a day. Has been trying not to take it too frequently as her liver is affected by the " cancer. She is not on any medications for nerve pain. Has tried gabapentin in the past and it gave her an upset stomach.     She has noticed numbness and tingling in her lower legs and feet along with swelling, which started about 3 weeks ago. No leg weakness or trouble walking. No loss of bowel or bladder function. She takes Senna two times a day and metamucil. Drinks lots of water. This regimen keeps her bowel movements regular.     Her recent diagnosis of cancer has been shocking and she continues to work to process this. Feels that she gets good support from her loved ones.       PE: There were no vitals taken for this visit.   Wt Readings from Last 3 Encounters:   09/05/23 51.7 kg (114 lb)   09/02/23 53.5 kg (118 lb)   08/24/23 53.5 kg (118 lb)     Gen: in no acute distress, awake and alert  HEENT: NC/AT, MMM  Pulm: normal work of breathing, speaking in complete sentences comfortably  Skin: no rashes visualized on exposed skin      Data reviewed:  I reviewed recent labs and imaging, my comments:  Plt 811  Hgb 8.1  Cr 0.58     9/2 MR Lumbar spine  IMPRESSION:  1.  Osseous metastatic disease involving the visualized thoracic spine, lumbar spine, and sacrum/pelvis. Edema involving the sacrum probably relates to the diffuse metastatic involvement. Superimposed acute/subacute pathologic sacral fractures are also   possible.      2.  When compared to 08/18/2023, there is overall unchanged appearance of the pathologic L1 superior endplate compression fracture with 25% height loss. No significant retropulsion. There is ongoing marrow edema.     3.  There has been interval development of a subtle pathologic T12 superior endplate compression fracture with 5% height loss. There is ongoing marrow edema.     4.  There is extraosseous extension of malignancy involving the ventral aspect of S1 with extension into the presacral space. Malignant soft tissue also extends into the right S1 neural foramen with narrowing of the  foramen and abutment of the ventral   aspect of the exiting right S1 nerve root.     5.  Extraosseous extension of malignancy also contacts the dorsal aspect of the extra foraminal portion of the exiting right L5 nerve root.    [facility] database reviewed: yes      Impression & Recommendations:  Beatriz is a 71 yo female with hx of metastatic lung cancer and CAD who presents for cancer pain management in setting of bony metastases and recent pathologic compression fracture.     We discussed the role of opioids in cancer pain and starting a long acting option. Counseled on how we expect her opioid needs to decrease as we explore other interventional pain strategies (saw Dr. Currie today with plan for referral to interventional radiology). Reviewed red flag symptoms for spinal compression and importance of labeling as to not confuse her long acting and short acting pain medications.     Plan:   - start MS Contin 30 mg BID  - oxycodone 10-20 mg every 3 hours as needed for pain  - provided clinic number to call if having issues with pain medications or worry about pain control   - continue Tylenol as needed  - if persistent or worsening neuropathic pain, will consider adding additional agent such as duloxetine (patient has had GI upset with gabapentin in the past)     Follow up in 2 weeks.     Patient staffed with Dr. Lake.     Thank you for involving us in the patient's care.   Jessica Kruse MD  Palliative Care Fellow    I, Melissa Lake MD personally examined and evaluated this patient on 9/7/23. I discussed the patient with Dr Kruse and care team, and agree with the assessment and plan of care as documented in the fellow s note of 9/7/23.  I personally reviewed medications, labs, imaging, vital signs as indicated.  Key findings: Patient seen for recently diagnosed lung cancer metastatic to liver, spine with significant back pain. Made adjustments to pain medications based on history, chart review, visual exam.      Melissa Lake MD  Palliative Medicine  Securely message with the Vocera Web Console (learn more here)          Again, thank you for allowing me to participate in the care of your patient.      Sincerely,    Jessica Kruse MD

## 2023-09-07 NOTE — LETTER
9/7/2023       RE: Beatriz Francis  [facility] [address] [place] [id number]     Dear Colleague,    [facility] you [facility] your [facility]riz Francis[facility]. Please see a copy of my visit note below.      Pain Clinic New Patient Consult Note:    Referring Provider: Self   Primary care provider: Jesusita Franco.    Beatriz Francis is a 72 year old y.o. old female who presents to the pain clinic with cancer related pain. She was evaluated in the palliative care clinic. She is with her  Luis A.     HPI:  Patient Supplied Answers To the  Pain Questionnaire       No data to display              Beatriz is a 72 year old female who was diagnosed with lung cancer just 1 month ago. She has stage 4 with metastatic disease in the liver and spine.   Liver biopsy completed 9/6/2023  Kyphoplasty pending with IR  Pain started August 18th when she was evaluated in the ED. She thought she injured her back.   Lives with her  Luis A in [place]. She usually gets care to [facility] [place] [place].   Location: Liver biopsy, lower lumbar, into buttocks  She is prescribed morphine and oxycodone by the palliative service.   She finds it hard to sit up and does not want to commit coming to the [place]. She is currently smoking.     Tests/Imaging reviewed with the patient:    MRI Lumbar spine 9/2/2023  T12-L1: Normal disc height and signal. No herniation. Normal facets. No spinal canal or neural foraminal stenosis.      L1-L2: Normal disc height and signal. No herniation. Normal facets. No spinal canal or neural foraminal stenosis.     L2-L3: Mild loss of disc height and moderate loss of T2-weighted signal in the disc. Mild bilateral facet arthropathy no spinal canal stenosis. No neural foraminal stenosis.      L3-L4: Moderate loss of disc height and T2-weighted signal in the disc. Mild to moderate bilateral  facet arthropathy. No spinal canal stenosis. No neural foraminal stenosis.     L4-L5: Mild loss of disc height and moderate loss of T2-weighted signal in the disc. Severe bilateral facet arthropathy. No spinal canal or neural foraminal stenosis.     L5-S1: Mild loss of disc height and T2-weighted signal in the disc. Mild bilateral facet arthropathy. No spinal canal or neural foraminal stenosis.                                                                       IMPRESSION:  1.  Osseous metastatic disease involving the visualized thoracic spine, lumbar spine, and sacrum/pelvis. Edema involving the sacrum probably relates to the diffuse metastatic involvement. Superimposed acute/subacute pathologic sacral fractures are also   possible.      2.  When compared to 08/18/2023, there is overall unchanged appearance of the pathologic L1 superior endplate compression fracture with 25% height loss. No significant retropulsion. There is ongoing marrow edema.     3.  There has been interval development of a subtle pathologic T12 superior endplate compression fracture with 5% height loss. There is ongoing marrow edema.     4.  There is extraosseous extension of malignancy involving the ventral aspect of S1 with extension into the presacral space. Malignant soft tissue also extends into the right S1 neural foramen with narrowing of the foramen and abutment of the ventral   aspect of the exiting right S1 nerve root.     5.  Extraosseous extension of malignancy also contacts the dorsal aspect of the extra foraminal portion of the exiting right L5 nerve root.    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    Pulmonary nodule     long standing, likely scarring    Seasonal allergic rhinitis           Past Surgical History:  Past Surgical History:   Procedure Laterality Date    ARTHROSCOPY SHLDR ROTATOR CUFF REPAIR, SUBACROMIAL DECOMP, DIST CLAVICLE RESECTION, BICEP TENODESIS Left 7/8/2016    Procedure: ARTHROSCOPY SHOULDER ROTATOR CUFF REPAIR, SUBACROMIAL DECOMPRESSION, DISTAL CLAVICLE RESECTION, OPEN BICEP TENODESIS REPAIR;  Surgeon: Freddie Espino MD;  Location: [facility] OR    ARTHROSCOPY SHOULDER Left 1/23/2017    Procedure: ARTHROSCOPY SHOULDER;  Surgeon: Ankit Morton MD;  Location: [facility] OR    BIOPSY      COLONOSCOPY  2002    COLONOSCOPY N/A 3/2/2022    Procedure: COLONOSCOPY, FLEXIBLE, WITH LESION REMOVAL USING SNARE;  Surgeon: Davi Sibley DO;  Location: [facility]    CV CORONARY ANGIOGRAM N/A 5/1/2023    Procedure: Coronary Angiogram;  Surgeon: Everette Bolivar MD;  Location: [facility] CARDIAC CATH LAB    CV PCI N/A 5/1/2023    Procedure: Percutaneous Coronary Intervention;  Surgeon: Everette Bolivar MD;  Location: [facility] CARDIAC CATH LAB    ESOPHAGOSCOPY, GASTROSCOPY, DUODENOSCOPY (EGD), COMBINED N/A 9/7/2022    Procedure: ESOPHAGOGASTRODUODENOSCOPY, WITH BIOPSY;  Surgeon: Davi Sibley DO;  Location: [facility]    EYE SURGERY  2004-[facility]    HYSTERECTOMY, PAP NO LONGER INDICATED  1990    REVERSE ARTHROPLASTY SHOULDER Left 2/15/2017    Procedure: REVERSE ARTHROPLASTY SHOULDER;  Surgeon: Ankit Morton MD;  Location: [facility] OR    ROTATOR CUFF REPAIR RT/LT  1994,1995    right    ROTATOR CUFF REPAIR RT/LT  3/5/04    left    ROTATOR CUFF REPAIR RT/LT  9/25/2009    Right    ZZC SHOULDER SURG PROC UNLISTED  7/8/2016          Family History:  Family History   Problem Relation Age of Onset    Cancer Father         lung    Heart Disease Father      Alcohol/Drug Father     Other Cancer Father     Cancer Paternal Grandmother         lung    Hypertension Mother     Cerebrovascular Disease Mother     Depression Mother     Cancer Maternal Grandfather     Cancer Paternal Grandfather     Diabetes Brother     Hypertension Brother     Hyperlipidemia Brother     Diabetes Brother     Gastrointestinal Disease Brother         [place]    Other Cancer Brother     Diabetes Brother     Rheumatoid Arthritis Brother     Cardiovascular Brother           Social History:  Social History     Socioeconomic History    Marital status:      Spouse name: Not on file    Number of children: Not on file    Years of education: Not on file    Highest education level: Not on file   Occupational History    Not on file   Tobacco Use    Smoking status: Every Day     Packs/day: 0.50     Years: 45.00     Pack years: 22.50     Types: Cigarettes, Vaping Device     Passive exposure: Current    Smokeless tobacco: Never    Tobacco comments:     Under half a pack per pt.  52  yr. hx.   Vaping Use    Vaping Use: Former   Substance and Sexual Activity    Alcohol use: No     Alcohol/week: 0.0 standard drinks of alcohol     Comment: 3-4x's/year.    Drug use: No    Sexual activity: Not Currently     Partners: Male   Other Topics Concern     Service No    Blood Transfusions No    Caffeine Concern Yes    Occupational Exposure Not Asked     Comment: [employer]    Hobby Hazards No    Sleep Concern No    Stress Concern Yes    Weight Concern Yes    Special Diet No    Back Care No    Exercise Yes     Comment: little    Bike Helmet Not Asked    Seat Belt Yes    Self-Exams Yes    Parent/sibling w/ CABG, MI or angioplasty before 65F 55M? No   Social History Narrative    .  Live with spouse.  Also has a friend living with them.    Helping take care of grandkids while her son is trying for full custody.     Social Determinants of Health     Financial Resource Strain: Not on file   Food  Insecurity: Not on file   Transportation Needs: Not on file   Physical Activity: Not on file   Stress: Not on file   Social Connections: Not on file   Intimate Partner Violence: Not on file   Housing Stability: Not on file     Social History     Social History Narrative    .  Live with spouse.  Also has a friend living with them.    Helping take care of grandkids while her son is trying for full custody.          Allergies:  No Known Allergies    Current Medications:   Current Outpatient Medications   Medication Sig Dispense Refill    acetaminophen (TYLENOL) 325 MG tablet Take 2 tablets (650 mg) by mouth every 4 hours as needed for other (surgical pain) 100 tablet 0    aspirin (ASA) 81 MG EC tablet Take 1 tablet (81 mg) by mouth daily Start tomorrow. 30 tablet 3    clopidogrel (PLAVIX) 75 MG tablet 300mg (4 tablets) on day 1, then 75mg 1 tablet a day thereafter 90 tablet 3    metoprolol succinate ER (TOPROL XL) 25 MG 24 hr tablet Take 0.5 tablets (12.5 mg) by mouth every evening 15 tablet 3    naloxone (NARCAN) 4 MG/0.1ML nasal spray Spray 1 spray (4 mg) into one nostril alternating nostrils as needed for opioid reversal every 2-3 minutes until assistance arrives 0.2 mL 0    nitroGLYcerin (NITROSTAT) 0.4 MG sublingual tablet For chest pain place 1 tablet under the tongue every 5 minutes for 3 doses. If symptoms persist 5 minutes after 2nd dose call 911. 30 tablet 0    omeprazole (PRILOSEC) 20 MG DR capsule Take 1 capsule (20 mg) by mouth daily 90 capsule 3    rosuvastatin (CRESTOR) 40 MG tablet Take 1 tablet (40 mg) by mouth daily 90 tablet 3    senna-docusate (SENOKOT-S;PERICOLACE) 8.6-50 MG per tablet Take 2 tablets by mouth 2 times daily 50 tablet 0    SPIRIVA HANDIHALER 18 MCG inhaled capsule USING THE HANDIHALER, INHALE THE CONTENTS OF ONE CAPSULE BY MOUTH DAILY 90 capsule 0    tazarotene (TAZORAC) 0.05 % external cream User every night 60 g 3    VENTOLIN  (90 Base) MCG/ACT inhaler INHALE TWO  "PUFFS BY MOUTH INTO THE LUNGS EVERY 6 HOURS AS NEEDED FOR SHORTNESS OF BREATH, DIFFICULTY BREATHING, OR WHEEZING 18 g 1    zolpidem (AMBIEN) 10 MG tablet TAKE ONE TABLET BY MOUTH EVERY EVENING AT BEDTIME AS NEEDED FOR SLEEP 30 tablet 2    ferrous gluconate (FERGON) 324 (38 Fe) MG tablet Take 1 tablet (324 mg) by mouth daily (with breakfast) (Patient not taking: Reported on 9/6/2023) 90 tablet 3    morphine (MS CONTIN) 30 MG CR tablet Take 1 tablet (30 mg) by mouth every 12 hours maximum 2 tablet(s) per day 28 tablet 0    oxyCODONE (ROXICODONE) 10 MG tablet Take 1-2 tablets (10-20 mg) by mouth every 3 hours as needed for pain 120 tablet 0          Current Pain Medications:  Medications related to Pain Management (From now, onward)      None             Review of Systems:  Review of Systems   All other systems reviewed and are negative.    Physical Exam:     Vitals:    09/07/23 0932   Weight: 53.1 kg (117 lb)   Height: 1.638 m (5' 4.5\")       General Appearance: No distress, lying comfortably  Mood: Euthymic  HE ENT: Non constricted pupils  Respiratory: Non labored breathing    Laboratory results:  Recent Labs   Lab Test 07/24/23  0831 06/27/23  1059 06/20/23  1527   NA  --  135* 131*   POTASSIUM  --  4.6 4.4   CHLORIDE  --  99 96*   CO2  --  25 27   ANIONGAP  --  11 8   GLC 85 112* 90   BUN  --  7.9* 9.8   CR  --  0.58 0.64   MARI  --  8.7* 8.8       CBC RESULTS:   Recent Labs   Lab Test 09/06/23  0844 06/27/23  1059   WBC  --  6.9   RBC  --  3.11*   HGB 8.1* 8.5*   HCT  --  26.2*   MCV  --  84   MCH  --  27.3   MCHC  --  32.4   RDW  --  14.4   * 500*         Imaging:  No images are attached to the encounter.     ASSESSMENT AND PLAN:     Encounter Diagnosis:    Cancer related pain  Metastatic lung cancer  Opioid escalation  Nicotine dependence  Vertebral compression fractures    Beatriz Francis is a 72 year old y.o. old female who presents to the pain clinic with cancer related pain with her     I have " summarized the patient s past medical history, discussed their clinical findings and the potential differential diagnosis with the patient. Significant past medical history pertinent to the patient s current condition includes recent cancer diagnosis and opioid escalation.  She is reluctant to have continuity of care at the [facility] in person due to pain with sitting. The differential diagnosis discussed with the patient are listed above. I have discussed anatomy and possible sources of the pain using models and/or pictures (diagrams). I have discussed multi- disciplinary pain management options withthe patient as pertaining to their case as detailed above. The pain management options we discussed included, but were not limited to the recommendations below.  I also discussed with patient the risks, benefits and alternatives to each pain management option.  All of the patient s questions and concerns were answered to the best of my ability.    RECOMMENDATIONS:     1. Medications: Continue medications per our palliative care colleagues.     2. Follow up liver biopsy results.     3. I will reach out to Dr. Abel Manley to consider kyphoplasty. Referral placed.   4. Consider cancer rehab after kyphoplasty.   5. She is an IDDS candidate if she can follow up at the [facility]. She is also continue to smoke and will require to stop smoking for 30 days minimum.       Follow up: as needed          Again, thank you for allowing me to participate in the care of your patient.      Sincerely,    Anastasia Currie MD

## 2023-09-07 NOTE — NURSING NOTE
Is the patient currently in the state of [place]? YES    Visit mode:VIDEO    If the visit is dropped, the patient can be reconnected by: VIDEO VISIT: Text to cell phone:   Telephone Information:   Mobile [phone]       Will anyone else be joining the visit? Pts    (If patient encounters technical issues they should call [phone])    How would you like to obtain your AVS? MyChart    Are changes needed to the allergy or medication list? Pt declined allergy review and Pt declined med review    Patient declined individual allergy and medication review by support staff because patient denies any changes since echeck-in completion and states all information entered during echeck-in remains accurate.    Reason for visit: Consult    Fred Rojas MA VVF

## 2023-09-07 NOTE — NURSING NOTE
Is the patient currently in the state of [place]? YES    Visit mode:VIDEO    If the visit is dropped, the patient can be reconnected by: VIDEO VISIT: Text to cell phone:   Telephone Information:   Mobile [phone]       Will anyone else be joining the visit? NO  (If patient encounters technical issues they should call [phone])    How would you like to obtain your AVS? MyChart    Are changes needed to the allergy or medication list? No    Reason for visit: Consult    Sunita SHANNON

## 2023-09-07 NOTE — PROGRESS NOTES
Virtual Visit Details    Type of service:  Video Visit   Video Start Time:  8:30 am  Video End Time: 9:38 am    Originating Location (pt. Location): Home    Distant Location (provider location):  On-site  Platform used for Video Visit: Tate

## 2023-09-07 NOTE — TELEPHONE ENCOUNTER
Prior Authorization Approval    Medication: MORPHINE SULFATE 30 MG PO TABS  Authorization Effective Date: 6/9/2023  Authorization Expiration Date: 9/7/2024  Approved Dose/Quantity:   Reference #: [id number]   Insurance Company: BCBS Platinum Blue - Phone [phone] Fax [phone]  Expected CoPay:       CoPay Card Available:      Financial Assistance Needed:   Which Pharmacy is filling the prescription: [facility], [place] - [address] [place] [address]  Pharmacy Notified: Yes  Patient Notified: No

## 2023-09-07 NOTE — PROGRESS NOTES
"Virtual Visit Details    Type of service:  Video Visit   Video Start Time: {video visit start/end time for provider to select:215321}  Video End Time:{video visit start/end time for provider to select:298839}    Originating Location (pt. Location): {video visit patient location:999573::\"Home\"}  {PROVIDER LOCATION On-site should be selected for visits conducted from your clinic location or adjoining [facility] hospital, academic office, or other nearby [facility] building. Off-site should be selected for all other provider locations, including home:186381}  Distant Location (provider location):  {virtual location provider:863750}  Platform used for Video Visit: {Virtual Visit Platforms:280231::\"Flareo\"}  "

## 2023-09-07 NOTE — PATIENT INSTRUCTIONS
Start taking MS Contin (long acting opioid medication) 30 mg two times per day.     You can take oxycodone 10-20 mg every 3 hours as needed for breakthrough pain.     Make sure you label which medication is the long acting one so that you don't confuse the two. See below for how to reach us if you have questions.     Thank you for meeting with us in the [facility].    How to get a hold of us:  For non-urgent matters, MyChart works best.    For more urgent matters, or if you prefer not to use MyChart, call our clinic nurse Allison Huang at [phone].     We have an on-call number for evenings and weekends. Please call this only if you are having uncontrolled symptoms or serious side effects from your medicines: [phone].     For refills, please give us a week (5 working days) notice. We don't always have providers available everyday to do refills. If you call the day you run out of your medicine, we may not be able to refill it in time, so call 5 days in advance!

## 2023-09-07 NOTE — TELEPHONE ENCOUNTER
Central Prior Authorization Team   Phone: [phone]    PA Initiation    Medication: MORPHINE SULFATE 30 MG PO TABS  Insurance Company: [employer]BS Platinum Blue - Phone [phone] Fax [phone]  Pharmacy Filling the Rx: [place], [place] - [address] [place] [address]  Filling Pharmacy Phone: [phone]  Filling Pharmacy Fax:    Start Date: 9/7/2023

## 2023-09-07 NOTE — PATIENT INSTRUCTIONS
Referrals:    Interventional Radiology referral placed.      Recommended Follow up:      Continue to follow up with Palliative Care.       To speak with a nurse, schedule/reschedule/cancel a clinic appointment, or request a medication refill call: [phone].    You can also reach us by [employer]: [email or web address]

## 2023-09-08 NOTE — TELEPHONE ENCOUNTER
Spoke with: Patient  Procedure done: 9/6 liver biopsy  Any pain: Yes, some soreness but tolerating with pain meds.  Any fever: No  Any redness/swelling/abnormal drainage around puncture site: No  Were you instructed well enough to take care of yourself at home: Yes  Are you satisfied with the care you received: Yes  Any additional concerns or questions: No      Post call completed.   September 8, 2023 11:02 AM  Luiza Oscar RN  Interventional Radiology  [phone]

## 2023-09-08 NOTE — TELEPHONE ENCOUNTER
Pt calling back in regards to medication refill. Stated refill still has not been sent to pharmacy. Wondering if [facility] can send in early refill.    Marielena Blackwell Patient

## 2023-09-08 NOTE — TELEPHONE ENCOUNTER
Spoke with Saroj at [facility], verified has RX for Ambien 10 mg dated 9/5/23 and will refill it. Patient notified.    Julie Behrendt RN

## 2023-09-11 NOTE — PROGRESS NOTES
[facility]: Cancer Care                                                                                          Pt called to report she is having problems with her pain. She said she saw palliative care and is on Oxycodone and morphine and this is not helping her. She also has edema up to her hips and said she has been trying to reach someone today and is having trouble. She is not sure what to do next.     She reports she is able to move around and walk but feels the best when she is in bed.     Will update Dr. Mckeon and reach out to the palliative care team. Pt aware we are waiting for her path results from 9/6.       Signature:  Sasha Albrecht RN    Reviewed symptoms and recent MRI with Dr. Mckeon and plan for a radiation consult and for pt to start on Dex 2mg BID x 5 days.     Will call with path results as soon as they are back as well.     Pt in agreement with plan.     Sasha Albrecht RN on 9/11/2023 at 4:24 PM

## 2023-09-12 NOTE — PROGRESS NOTES
New Patient Oncology Nurse Navigator Note     Referring provider: Dr. Uri Mckeon    Referring Clinic/Organization: [facility]  Referred to: Radiation Oncology  Requested provider (if applicable): First available - did not specify   Referral Received: 09/12/23       Evaluation for :   Diagnosis   C79.51 (ICD-10-CM) - Malignant neoplasm metastatic to bone (H)     Per chart review, pt is an established pt of Dr. Shaw's. High priority message sent to [facility] Onc RN pool.     MAGED HongN, RN, OCN  [facility] Oncology Nurse Navigator  [phone] / [phone]

## 2023-09-13 NOTE — TELEPHONE ENCOUNTER
"At the request of Dr. Lake, patient called to check on lower extremity edema, leg and back pain.  Patient reports the steroids have not helped with swelling or pain in her lower extremities.  Reports she is elevating her legs but is not wrapping them.  She is interested in lymphedema therapy.  Reports the pain in her lower extremities and low back is always there and rates it a 7 or 8.  She has pain flares that come and go and rates those at 8 or 9.  She is taking MS Contin 30 mg one tab only in the evening and oxycodone 10 mg 2 tabs every 2-3 hours,for a total of 8-12 tabs per day.  Reports she is not getting much relief, but lying down helps with low back pain.  Reports she is having incontinence of urine and now needs to wear a depends at night and a pad during the day.  Reports this started last week.  Reports she needs to cough to get urine flow to start and thinks this is from the pain, but feels she has been able to \"have a normal pee and not cough to start flow\" the last couple days.  Feels like she is \"mostly emptying bladder\" but sometimes stands up and feels like she still has to urinate.  Denies constipation.  Is taking Dulcolax as needed, senna-S 2 tabs twice daily and MiraLAX 1 scoop daily.    Writer reviewed with patient the goal of MS Contin and to get maximum benefit she needs to take it every 12 hours.  The goal of taking MS Contin every 12 hours is to decrease the number of peaks and valleys and decrease need for oxycodone.    Patient would like a referral for lymphedema therapy.    Will update Dr. Lake    Patient verbalized understanding and had no further questions.     Writer will call patient on Friday, September 15 to check in again.    Allison Huang RN  Palliative Care Nurse Clinician    [phone] (Direct)  [phone] (Main)  [phone] (Appointment Scheduling)      "

## 2023-09-13 NOTE — ED TRIAGE NOTES
"Pt here with spouse. Pt reports increased lower back pain, leg pain and swelling, changes in bowel and bladder function for past several days. Pt reports \"cancer has spread to my back, they are worried about cord compression\". Pt reports a fall last week, bruise to left forehead.      Triage Assessment       Row Name 09/13/23 1107       Triage Assessment (Adult)    Airway WDL WDL       Respiratory WDL    Respiratory WDL WDL       Skin Circulation/Temperature WDL    Skin Circulation/Temperature WDL WDL       Cardiac WDL    Cardiac WDL WDL       Peripheral/Neurovascular WDL    Peripheral Neurovascular WDL WDL       Cognitive/Neuro/Behavioral WDL    Cognitive/Neuro/Behavioral WDL WDL                    "

## 2023-09-13 NOTE — ED PROVIDER NOTES
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 consulted Dr. Mckeon, Oncology at [facility] and her Radiation Oncologist. We reviewed her case and eval, and treatment and disposition plan.  We do not feel that emergent MRI imaging evaluation is currently indicated or necessary given her history and exam and would be unlikely to show acute change as she just had an MRI evaluation 11 days ago. Doubt cauda equina syndrome. ? Urinary retention from opiate analgesic use. Will increase Dexamethasone from 2 mg bid to 4 mg bid and she will follow-up with Radiation Oncology as scheduled tomorrow. Labs also remarkable for sodium 127 and she and her  report that her hyponatremia is chronic in nature and she has an appt in [facility] scheduled in 2 days and can have sodium rechecked at that time. She will increase hydration and sodium in her diet and drink electrolyte drinks. Stable bilateral distal LE pitting edema, doubt PE/DVT. She and her  were provided instructions for supportive care and will return as needed for worsened condition or worsening symptoms, or new problems or concerns.    I have reviewed the nursing notes.    I have reviewed the findings, diagnosis, plan and need for follow up  with the patient and her .    Medical Decision Making: High complexity  Hospitalization for IV fluids, pain control and observation was considered and deferred, she wants to be discharged home and has follow-up tomorrow and in 2 days. Currently appears stable and appropriate for outpatient management with close f/u.        Discharge Medication List as of 9/13/2023  4:57 PM          Final diagnoses:   Acute urinary retention - ? medication induced   Malignant neoplasm metastatic to bone (H)   Minor closed head injury - In the setting of chronic anticoagulation on Plavix and a baby aspirin daily   Hyponatremia - Sodium 127 today       9/13/2023   [facility] EMERGENCY DEPT       Ean Salgado MD  09/14/23 0012

## 2023-09-13 NOTE — ED NOTES
pATIENT REPORTS DIAGNOSIS OF LIVER CANCER ABOUT 2 WEEKS AGO, SET TO START RADIATION TOMORROW patient spoke with oncology care today about increased back pain and swelling in BLE,  patient has been experiencing bowel and bladder incontinence  for the p ast 3-4 days concerns about cord compression

## 2023-09-13 NOTE — TELEPHONE ENCOUNTER
Per Dr. Lake, patient informed she needs to go to the emergency room to be evaluated for cord compression.  Patient is agreeable and will go to [facility] in [place] now.    Will update Dr. Lake and oncology team    Patient verbalized understanding and had no further questions.    Allison Huang RN  Palliative Care Nurse Clinician    [phone] (Direct)  [phone] (Main)  [phone] (Appointment Scheduling)

## 2023-09-13 NOTE — TELEPHONE ENCOUNTER
Dr. Bryant Salgado, emergency room doctor at [facility] is requesting a call back from provider to discuss patient.  Dr. Salgado can be reached at [phone]    Will route to Dr. Lake and oncology team.    Allison Huang, RN  Palliative Care Nurse Clinician    [phone] (Direct)  [phone] (Main)  [phone] (Appointment Scheduling)

## 2023-09-14 NOTE — PROGRESS NOTES
The patient underwent CT simulation.    Plan will be to start palliative radiation therapy to the pelvis/sacrum today.  Plan for 30 Gray in 10 fractions.    Reed Shaw M.D.  Department of Radiation Oncology  [facility]

## 2023-09-14 NOTE — PROGRESS NOTES
Clinic Care Coordination Contact  Community Health Worker Initial Outreach    CHW Initial Information Gathering:  Referral Source: ED Follow-Up  Preferred Hospital: [place], [place]  [phone]  Preferred Urgent Care: [facility], [phone]  No PCP office visit in Past Year: No       Patient accepts CC: No, due to the patient stating that at this time there are no concerns for CCC to assist with. Patient will be sent Care Coordination introduction letter for future reference.     CHAD Cruz  [phone]  Connected Care Resource Center  [facility]

## 2023-09-14 NOTE — LETTER
[facility] CARE COORDINATION  [address] [place] [id number]    September 14, 2023    Beatriz Francis  [address] [place] [id number]      Dear Beatriz,        I am a  clinic community health worker who works with ALEJANDRO Odonnell CNP with the [facility]. I wanted to thank you for spending the time to talk with me.  Below is a description of clinic care coordination and how we can further assist you.       The clinic care coordination team is made up of a registered nurse, , financial resource worker and community health worker who understand the health care system. The goal of clinic care coordination is to help you manage your health and improve access to the health care system. Our team works alongside your provider to assist you in determining your health and social needs. We can help you obtain health care and community resources, providing you with necessary information and education. We can work with you through any barriers and develop a care plan that helps coordinate and strengthen the communication between you and your care team.  Our services are voluntary and are offered without charge to you personally.    If you wish to connect with the Clinic Care Coordination Team, please let your [facility] Primary Care Provider or Clinic Care Team know and they can place a referral. The Clinic Care Coordination team will then reach out by phone to further support you.    We are focused on providing you with the highest-quality healthcare experience possible.    Sincerely,   Your care team with [facility]

## 2023-09-14 NOTE — PROGRESS NOTES
Department of Radiation Oncology  Radiation Therapy Center  [facility]  [address], [address]  [place] [address]  [phone]       Consultation Note    Name: Beatriz Francis MRN: [id number]   : 1950   Date of Service: 2023  Referring: Dr. 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  Evaluate potential role for palliative radiation.    History of Present Illness   Ms. 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    The patient has been undergoing lung cancer screening.  CT scan of the chest on 2023 demonstrated a new 10 mm lesion in the right upper lobe.  Follow-up CT scan of the chest on  demonstrated mild interval increase in size of the right upper lobe nodule measuring 18 mm in maximum dimension.  CT scan of the chest on 2023 demonstrated increase in the right upper lobe lesion up to 23 mm in size.  Patient was seen by Dr. Ng of surgical team.  Previously IR team had declined percutaneous biopsy due to ability not to obtain a safe window due to proximity of vessels as well as risk for pneumothorax.  Dr. Ng discussed possibility of surgical options but also discussed staging with PET scan.  She was referred to our clinic to discuss potential role of radiation therapy as a part of treatment strategy.  PET scan obtained on 2023 demonstrated hypermetabolic activity in the right upper lobe, right supraglottic region, mediastinal adenopathy, right hepatic lobe, and osseous structures concerning for metastatic disease.  Follow-up MRI of the brain on 2023 was negative.  On 2023 the patient with biopsy of the liver which demonstrated poorly differentiated carcinoma, favoring non-small cell lung cancer with neuroendocrine  differentiation.  The patient in the interim has been seen in the emergency department for worsening lower back/tailbone pain with associated radiculopathy.  MRI of the lumbar spine has demonstrated widespread osseous disease, most notable in the sacral region with impingement on nerve roots.  Patient describes severe pain.  Current pain regimen includes morphine 30 mg twice daily as well as oxycodone instant release as needed.  She is also been started on steroid.  She presents today discuss potential role of palliative radiation therapy.    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    Pulmonary nodule     long standing, likely scarring    Seasonal allergic rhinitis        Past Surgical History:   Past Surgical History:   Procedure Laterality Date    ARTHROSCOPY SHLDR ROTATOR CUFF REPAIR, SUBACROMIAL DECOMP, DIST CLAVICLE RESECTION, BICEP TENODESIS Left 7/8/2016    Procedure: ARTHROSCOPY SHOULDER ROTATOR CUFF REPAIR, SUBACROMIAL DECOMPRESSION, DISTAL CLAVICLE RESECTION, OPEN BICEP TENODESIS REPAIR;  Surgeon: Freddie Espino MD;  Location: [facility] OR    ARTHROSCOPY SHOULDER Left 1/23/2017    Procedure: ARTHROSCOPY SHOULDER;  Surgeon: Ankit Morton MD;  Location: [facility] OR    BIOPSY      COLONOSCOPY  2002    COLONOSCOPY N/A 3/2/2022    Procedure: COLONOSCOPY, FLEXIBLE, WITH LESION REMOVAL USING SNARE;  Surgeon: Davi Sibley DO;  Location: [facility]    CV CORONARY ANGIOGRAM N/A 5/1/2023    Procedure: Coronary Angiogram;  Surgeon: Everette Bolivar MD;   Location:  HE[facility] CARDIAC CATH LAB    CV PCI N/A 5/1/2023    Procedure: Percutaneous Coronary Intervention;  Surgeon: Everette Bolivar MD;  Location:  HEART CARDIAC CATH LAB    ESOPHAGOSCOPY, GASTROSCOPY, DUODENOSCOPY (EGD), COMBINED N/A 9/7/2022    Procedure: ESOPHAGOGASTRODUODENOSCOPY, WITH BIOPSY;  Surgeon: Davi Sibley DO;  Location: [facility]    EYE SURGERY  2004-[facility]    HYSTERECTOMY, PAP NO LONGER INDICATED  1990    REVERSE ARTHROPLASTY SHOULDER Left 2/15/2017    Procedure: REVERSE ARTHROPLASTY SHOULDER;  Surgeon: Ankit Morton MD;  Location: [facility] OR    ROTATOR CUFF REPAIR RT/LT  1994,1995    right    ROTATOR CUFF REPAIR RT/LT  3/5/04    left    ROTATOR CUFF REPAIR RT/LT  9/25/2009    Right    ZZC SHOULDER SURG PROC UNLISTED  7/8/2016       Chemotherapy History:  None    Radiation History:  None    Pregnant: Not Applicable  Implanted Cardiac Devices: No    Medications:  Current Outpatient Medications   Medication    polyethylene glycol (MIRALAX) 17 GM/Dose powder    acetaminophen (TYLENOL) 325 MG tablet    aspirin (ASA) 81 MG EC tablet    clopidogrel (PLAVIX) 75 MG tablet    dexAMETHasone (DECADRON) 4 MG tablet    ferrous gluconate (FERGON) 324 (38 Fe) MG tablet    metoprolol succinate ER (TOPROL XL) 25 MG 24 hr tablet    morphine (MS CONTIN) 30 MG CR tablet    naloxone (NARCAN) 4 MG/0.1ML nasal spray    nitroGLYcerin (NITROSTAT) 0.4 MG sublingual tablet    omeprazole (PRILOSEC) 20 MG DR capsule    oxyCODONE (ROXICODONE) 10 MG tablet    rosuvastatin (CRESTOR) 40 MG tablet    senna-docusate (SENOKOT-S;PERICOLACE) 8.6-50 MG per tablet    SPIRIVA HANDIHALER 18 MCG inhaled capsule    tazarotene (TAZORAC) 0.05 % external cream    VENTOLIN  (90 Base) MCG/ACT inhaler    zolpidem (AMBIEN) 10 MG tablet     No current facility-administered medications for this visit.         Allergies:   No Known Allergies        Family History:  Family History   Problem Relation Age of Onset     Cancer Father         lung    Heart Disease Father     Alcohol/Drug Father     Other Cancer Father     Cancer Paternal Grandmother         lung    Hypertension Mother     Cerebrovascular Disease Mother     Depression Mother     Cancer Maternal Grandfather     Cancer Paternal Grandfather     Diabetes Brother     Hypertension Brother     Hyperlipidemia Brother     Diabetes Brother     Gastrointestinal Disease Brother         [place]    Other Cancer Brother     Diabetes Brother     Rheumatoid Arthritis Brother     Cardiovascular Brother        Review of Systems   A 10-point review of systems was performed. Pertinent findings are noted in the HPI.    Physical Exam   ECOG Status: 2/3    Vitals:  BP (!) 145/85   Pulse 107   Resp 18   SpO2 95%     Gen: Alert, in NAD  Head: NC/AT  Eyes: PERRL, EOMI, sclera anicteric  Ears: No external auricular lesions  Nose/sinus: No rhinorrhea or epistaxis  Oral cavity/oropharynx: MMM, no visible oral cavity lesions, FOM and BOT are soft to palpation  Neck: Full ROM, supple, no palpable adenopathy  Pulm: No wheezing, stridor or respiratory distress  CV: Extremities are warm and well-perfused, no cyanosis, no pedal edema  Abdominal: Normal bowel sounds, soft, nontender, no masses  Musculoskeletal: + TTP in sacrum region, describes bilateral radiculopathy  Skin: Normal color and turgor  Neuro: A/Ox3, CN II-XII intact, normal gait    Imaging/Path/Labs   Imagin/25/23  MRI brain    IMPRESSION:  1.  No acute intracranial process.      2.  Generalized brain atrophy and presumed microvascular ischemic changes as detailed above.     3.  No intracranial mass, mass effect, or pathologic enhancement. No acute or chronic hemorrhage.     4.  No specific evidence for CNS metastatic disease with no abnormal intraparenchymal leptomeningeal or dural enhancement abnormality.     5.  Nothing for acute or subacute infarction.     6.  No convincing evidence for a marrow infiltrative  process/metastatic involvement of the calvarium, skull base/clivus or upper cervical marrow. There is an area of differential diminished T1 signal without enhancement involving the paramedian left   clivus series 5 image 13. No hypermetabolic uptake corresponds to this region on PET study. This is suspected benign/nonmalignant.     7.  Mild to moderate chronic small vessel ischemic/degenerative changes of aging deep white matter both cerebral hemispheres.     8.  Additional details and description provided above.        9/2/23  MRI L spine    IMPRESSION:  1.  Osseous metastatic disease involving the visualized thoracic spine, lumbar spine, and sacrum/pelvis. Edema involving the sacrum probably relates to the diffuse metastatic involvement. Superimposed acute/subacute pathologic sacral fractures are also   possible.      2.  When compared to 08/18/2023, there is overall unchanged appearance of the pathologic L1 superior endplate compression fracture with 25% height loss. No significant retropulsion. There is ongoing marrow edema.     3.  There has been interval development of a subtle pathologic T12 superior endplate compression fracture with 5% height loss. There is ongoing marrow edema.     4.  There is extraosseous extension of malignancy involving the ventral aspect of S1 with extension into the presacral space. Malignant soft tissue also extends into the right S1 neural foramen with narrowing of the foramen and abutment of the ventral   aspect of the exiting right S1 nerve root.     5.  Extraosseous extension of malignancy also contacts the dorsal aspect of the extra foraminal portion of the exiting right L5 nerve root.       Path:   9/6/23    LIVER, MASS, ULTRASOUND GUIDED NEEDLE CORE BIOPSY:  -POORLY DIFFERENTIATED MALIGNANT NEOPLASM, SEE COMMENT AND MICROSCOPIC DESCRIPTION  -NECROSIS PRESENT  -NEGATIVE PD-L1 EXPRESSION (TPS <1%)   Electronically signed by Elvis Clemente MD on 9/13/2023 at  4:51 PM  "  Comment     The liver mass biopsies demonstrate a poorly differentiated malignant neoplasm, favoring a diagnosis of non-small cell carcinoma with neuroendocrine differentiation as there is moderate staining for AMANDA keratin, CKAE1/AE3 and patchy, weak staining for synaptophysin. The malignant neoplasm was negative for numerous other stains including CK7, TTF-1, p40, CK20, CD31, GATA3. The malignant neoplasm could be compatible with a metastasis in the appropriate clinical setting.  The patient's clinical history of \"lung cancer\" is noted, however, the malignant neoplasm in this sample is negative for CK7, p40 and TTF-1. Overall these findings are nonspecific.  Clinical and radiologic correlation recommended. A separate block of tissue (A2) with tumor is preserved and available for additional testing upon clinical request.      Of note, the patient also had a previous supraclavicular lymph node biopsy ([id number], 08/03/2023) which reportedly showed an \"Atypical lymphoid infiltrate (14% of cells showing deletion 7q) and necrotizing granulomatous inflammation\".     Slides were reviewed by Dr. Noe Moss and he concurs with final diagnosis.       Assessment    Ms. 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  Evaluate potential role for palliative radiation.    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    The patient has painful osseous metastasis involving the pelvis/sacrum region.  She also has bilateral lower extremity radiculopathy and possible urinary retention possibly related to nerve root impingement.    We recommend palliative radiation therapy with goal of pain response and local control.  Tentatively plan to treat to 30 [place] in 10 fraction.  Side " effects discussed with the patient in great detail.  Consent obtained today.  CT simulation will be performed today with plan to begin soon thereafter.    Patient is currently taking steroids.  Current dose is Decadron 4 mg twice daily.  Discussed consideration of increasing to Decadron 4 mg 3 times daily with plan to taper after radiation therapy is complete.    Reed Shaw MD  Department of Radiation Oncology  [facility]

## 2023-09-14 NOTE — PROGRESS NOTES
Pt currently meeting with RT.     Ordered NGS lung panel and cancer type ID on path.     Will set up follow up after the above.     Sasha Albrecht RN on 9/14/2023 at 10:52 AM

## 2023-09-14 NOTE — NURSING NOTE
"REASON FOR APPOINTMENT   Type of Cancer: metastatic lung cancer  Location: referral to radiation therapy to discuss bone mets to spine  Date of Symptom Onset: initialliy diagnosed 7/23, found to have metastatic disease at time of diagnosis. Progressive pain despite infusion therapy and pain meds    TREATMENT TO-DATE FOR THIS CANCER  Surgery ? none   Chemotherapy ? Dr. Mckeon  medical oncology   Other Treatments for this Cancer ? Discussion today for palliative RT to L spine/pelvis    PERSONAL HISTORY OF CANCER   Previous Cancer ? no   Prior Radiation ? no   Prior Chemotherapy ? no   Prior Hormonal Therapy ? no     RECENT IMAGING STUDIES  PET 7/24/23  MR Lumbar spine 9/2/23    REFERRALS NEEDED  None at this time  -Handicap parking paperwork filled out and given to family    VITALS  BP (!) 145/85   Pulse 107   Resp 18   SpO2 95%     PACEMAKER/IMPLANTED CARDIAC DEVICE no    PAIN  Current history of pain associated with this visit:   Intensity: 9/10  Current: stabbing and shooting  Location: low back, B buttocks and down into both legs  Treatment: MS contin 30 mg 2x/day, prn oxycodone, dexamethasone 4 mg 2x/day    PSYCHOSOCIAL  Marital Status:   Patient lives in [place] with spouse Luis A.  Number of children:   Working status: retired  Do you feel safe in your home? Yes    REVIEW OF SYSTEMS  Skin: negative  Eyes: glasses  Ears/Nose/Throat: negative  Respiratory: Dyspnea on exertion  Cardiovascular: BLE edema, hx of Stent  Gastrointestinal: negative and using miralax daily to manage bowels  Genitourinary: intermittent incontinence and possible retention  and intermittent difficulty starting stream  Musculoskeletal: severe low back/buttock pain  Neurologic: negative  Psychiatric: negative  Hematologic/Lymphatic/Immunologic: fatigue, no appetite  Endocrine: negative    WOMEN ONLY  Any chance you may be pregnant: No      Radiation Oncology Patient Teaching    Current Concern: \"this pain is so bad, will I get a " "radiation treatment today?\"    Person involved with teaching: Patient and , Luis A  Patient asked Questions: Yes  Patient was cooperative: Yes  Patient was receptive (willing to accept information given): Yes    Education Assessment  Comprehension ability: Medium  Knowledge level: Medium  Factors affecting teaching: pain and fatigue    Education Materials Given  Radiation Therapy and You  Radiation to bone mets    Educational Topics Discussed  Side effects, Medications, Activity, Nutrition, and When to call MD/RN    Response To Teaching  More review necessary    Do you have an advanced directive or living will? Yes  Are you DNR/DNI? No      "

## 2023-09-14 NOTE — LETTER
2023         RE: Beatriz Francis  [address] [place] [id number]        Dear Colleague,    Thank you for referring your patient, Beatriz Francis, to the [facility] RADIATION THERAPY [facility]INIC. Please see a copy of my visit note below.       Department of Radiation Oncology  Radiation Therapy Center  [facility]  [address], [address]  [place] [address]  [phone]       Consultation Note    Name: Beatriz Francis MRN: [id number]   : 1950   Date of Service: 2023  Referring: Dr. 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  Evaluate potential role for palliative radiation.    History of Present Illness   Ms. 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    The patient has been undergoing lung cancer screening.  CT scan of the chest on 2023 demonstrated a new 10 mm lesion in the right upper lobe.  Follow-up CT scan of the chest on  demonstrated mild interval increase in size of the right upper lobe nodule measuring 18 mm in maximum dimension.  CT scan of the chest on 2023 demonstrated increase in the right upper lobe lesion up to 23 mm in size.  Patient was seen by Dr. Ng of surgical team.  Previously IR team had declined percutaneous biopsy due to ability not to obtain a safe window due to proximity of vessels as well as risk for pneumothorax.  Dr. Ng discussed possibility of surgical options but also discussed staging with PET scan.  She was referred to our clinic to discuss potential role of radiation therapy as a part of treatment strategy.  PET scan obtained on 2023 demonstrated hypermetabolic activity in the right upper lobe, right supraglottic region, mediastinal adenopathy, right hepatic lobe, and osseous  structures concerning for metastatic disease.  Follow-up MRI of the brain on 7/25/2023 was negative.  On 9/6/2023 the patient with biopsy of the liver which demonstrated poorly differentiated carcinoma, favoring non-small cell lung cancer with neuroendocrine differentiation.  The patient in the interim has been seen in the emergency department for worsening lower back/tailbone pain with associated radiculopathy.  MRI of the lumbar spine has demonstrated widespread osseous disease, most notable in the sacral region with impingement on nerve roots.  Patient describes severe pain.  Current pain regimen includes morphine 30 mg twice daily as well as oxycodone instant release as needed.  She is also been started on steroid.  She presents today discuss potential role of palliative radiation therapy.    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    Pulmonary nodule     long standing, likely scarring    Seasonal allergic rhinitis        Past Surgical History:   Past Surgical History:   Procedure Laterality Date    ARTHROSCOPY SHLDR ROTATOR CUFF REPAIR, SUBACROMIAL DECOMP, DIST CLAVICLE RESECTION, BICEP TENODESIS Left 7/8/2016    Procedure: ARTHROSCOPY SHOULDER ROTATOR CUFF REPAIR, SUBACROMIAL DECOMPRESSION, DISTAL CLAVICLE RESECTION, OPEN BICEP TENODESIS REPAIR;  Surgeon: Freddie Epsino MD;  Location: [facility] OR    ARTHROSCOPY SHOULDER Left 1/23/2017    Procedure: ARTHROSCOPY SHOULDER;  Surgeon: Ankit Morton MD;  Location: [facility] OR    BIOPSY      COLONOSCOPY  2002     COLONOSCOPY N/A 3/2/2022    Procedure: COLONOSCOPY, FLEXIBLE, WITH LESION REMOVAL USING SNARE;  Surgeon: Davi Sibley DO;  Location: [facility]    CV CORONARY ANGIOGRAM N/A 5/1/2023    Procedure: Coronary Angiogram;  Surgeon: Everette Bolivar MD;  Location:  [facility] CARDIAC CATH LAB    CV PCI N/A 5/1/2023    Procedure: Percutaneous Coronary Intervention;  Surgeon: Everette Bolivar MD;  Location:  HEART CARDIAC CATH LAB    ESOPHAGOSCOPY, GASTROSCOPY, DUODENOSCOPY (EGD), COMBINED N/A 9/7/2022    Procedure: ESOPHAGOGASTRODUODENOSCOPY, WITH BIOPSY;  Surgeon: Davi Sibley DO;  Location: [facility]    EYE SURGERY  2004-[facility]    HYSTERECTOMY, PAP NO LONGER INDICATED  1990    REVERSE ARTHROPLASTY SHOULDER Left 2/15/2017    Procedure: REVERSE ARTHROPLASTY SHOULDER;  Surgeon: Ankit Morton MD;  Location: [facility] OR    ROTATOR CUFF REPAIR RT/LT  1994,1995    right    ROTATOR CUFF REPAIR RT/LT  3/5/04    left    ROTATOR CUFF REPAIR RT/LT  9/25/2009    Right    ZZC SHOULDER SURG PROC UNLISTED  7/8/2016       Chemotherapy History:  None    Radiation History:  None    Pregnant: Not Applicable  Implanted Cardiac Devices: No    Medications:  Current Outpatient Medications   Medication    polyethylene glycol (MIRALAX) 17 GM/Dose powder    acetaminophen (TYLENOL) 325 MG tablet    aspirin (ASA) 81 MG EC tablet    clopidogrel (PLAVIX) 75 MG tablet    dexAMETHasone (DECADRON) 4 MG tablet    ferrous gluconate (FERGON) 324 (38 Fe) MG tablet    metoprolol succinate ER (TOPROL XL) 25 MG 24 hr tablet    morphine (MS CONTIN) 30 MG CR tablet    naloxone (NARCAN) 4 MG/0.1ML nasal spray    nitroGLYcerin (NITROSTAT) 0.4 MG sublingual tablet    omeprazole (PRILOSEC) 20 MG DR capsule    oxyCODONE (ROXICODONE) 10 MG tablet    rosuvastatin (CRESTOR) 40 MG tablet    senna-docusate (SENOKOT-S;PERICOLACE) 8.6-50 MG per tablet    SPIRIVA HANDIHALER 18 MCG inhaled capsule    tazarotene (TAZORAC) 0.05 % external cream     VENTOLIN  (90 Base) MCG/ACT inhaler    zolpidem (AMBIEN) 10 MG tablet     No current facility-administered medications for this visit.         Allergies:   No Known Allergies        Family History:  Family History   Problem Relation Age of Onset    Cancer Father         lung    Heart Disease Father     Alcohol/Drug Father     Other Cancer Father     Cancer Paternal Grandmother         lung    Hypertension Mother     Cerebrovascular Disease Mother     Depression Mother     Cancer Maternal Grandfather     Cancer Paternal Grandfather     Diabetes Brother     Hypertension Brother     Hyperlipidemia Brother     Diabetes Brother     Gastrointestinal Disease Brother         [place]    Other Cancer Brother     Diabetes Brother     Rheumatoid Arthritis Brother     Cardiovascular Brother        Review of Systems   A 10-point review of systems was performed. Pertinent findings are noted in the HPI.    Physical Exam   ECOG Status: 2/3    Vitals:  BP (!) 145/85   Pulse 107   Resp 18   SpO2 95%     Gen: Alert, in NAD  Head: NC/AT  Eyes: PERRL, EOMI, sclera anicteric  Ears: No external auricular lesions  Nose/sinus: No rhinorrhea or epistaxis  Oral cavity/oropharynx: MMM, no visible oral cavity lesions, FOM and BOT are soft to palpation  Neck: Full ROM, supple, no palpable adenopathy  Pulm: No wheezing, stridor or respiratory distress  CV: Extremities are warm and well-perfused, no cyanosis, no pedal edema  Abdominal: Normal bowel sounds, soft, nontender, no masses  Musculoskeletal: + TTP in sacrum region, describes bilateral radiculopathy  Skin: Normal color and turgor  Neuro: A/Ox3, CN II-XII intact, normal gait    Imaging/Path/Labs   Imagin/25/23  MRI brain    IMPRESSION:  1.  No acute intracranial process.      2.  Generalized brain atrophy and presumed microvascular ischemic changes as detailed above.     3.  No intracranial mass, mass effect, or pathologic enhancement. No acute or chronic hemorrhage.      4.  No specific evidence for CNS metastatic disease with no abnormal intraparenchymal leptomeningeal or dural enhancement abnormality.     5.  Nothing for acute or subacute infarction.     6.  No convincing evidence for a marrow infiltrative process/metastatic involvement of the calvarium, skull base/clivus or upper cervical marrow. There is an area of differential diminished T1 signal without enhancement involving the paramedian left   clivus series 5 image 13. No hypermetabolic uptake corresponds to this region on PET study. This is suspected benign/nonmalignant.     7.  Mild to moderate chronic small vessel ischemic/degenerative changes of aging deep white matter both cerebral hemispheres.     8.  Additional details and description provided above.        9/2/23  MRI L spine    IMPRESSION:  1.  Osseous metastatic disease involving the visualized thoracic spine, lumbar spine, and sacrum/pelvis. Edema involving the sacrum probably relates to the diffuse metastatic involvement. Superimposed acute/subacute pathologic sacral fractures are also   possible.      2.  When compared to 08/18/2023, there is overall unchanged appearance of the pathologic L1 superior endplate compression fracture with 25% height loss. No significant retropulsion. There is ongoing marrow edema.     3.  There has been interval development of a subtle pathologic T12 superior endplate compression fracture with 5% height loss. There is ongoing marrow edema.     4.  There is extraosseous extension of malignancy involving the ventral aspect of S1 with extension into the presacral space. Malignant soft tissue also extends into the right S1 neural foramen with narrowing of the foramen and abutment of the ventral   aspect of the exiting right S1 nerve root.     5.  Extraosseous extension of malignancy also contacts the dorsal aspect of the extra foraminal portion of the exiting right L5 nerve root.       Path:   9/6/23    LIVER, MASS, ULTRASOUND  "GUIDED NEEDLE CORE BIOPSY:  -POORLY DIFFERENTIATED MALIGNANT NEOPLASM, SEE COMMENT AND MICROSCOPIC DESCRIPTION  -NECROSIS PRESENT  -NEGATIVE PD-L1 EXPRESSION (TPS <1%)   Electronically signed by Elvis Clemente MD on 9/13/2023 at  4:51 PM   Comment     The liver mass biopsies demonstrate a poorly differentiated malignant neoplasm, favoring a diagnosis of non-small cell carcinoma with neuroendocrine differentiation as there is moderate staining for AMANDA keratin, CKAE1/AE3 and patchy, weak staining for synaptophysin. The malignant neoplasm was negative for numerous other stains including CK7, TTF-1, p40, CK20, CD31, GATA3. The malignant neoplasm could be compatible with a metastasis in the appropriate clinical setting.  The patient's clinical history of \"lung cancer\" is noted, however, the malignant neoplasm in this sample is negative for CK7, p40 and TTF-1. Overall these findings are nonspecific.  Clinical and radiologic correlation recommended. A separate block of tissue (A2) with tumor is preserved and available for additional testing upon clinical request.      Of note, the patient also had a previous supraclavicular lymph node biopsy ([id number], 08/03/2023) which reportedly showed an \"Atypical lymphoid infiltrate (14% of cells showing deletion 7q) and necrotizing granulomatous inflammation\".     Slides were reviewed by Dr. Noe Moss and he concurs with final diagnosis.       Assessment    Ms. 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  Evaluate potential role for palliative radiation.    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    The patient has painful osseous metastasis involving the pelvis/sacrum region.  She also has bilateral lower extremity " radiculopathy and possible urinary retention possibly related to nerve root impingement.    We recommend palliative radiation therapy with goal of pain response and local control.  Tentatively plan to treat to 30 Ponce in 10 fraction.  Side effects discussed with the patient in great detail.  Consent obtained today.  CT simulation will be performed today with plan to begin soon thereafter.    Patient is currently taking steroids.  Current dose is Decadron 4 mg twice daily.  Discussed consideration of increasing to Decadron 4 mg 3 times daily with plan to taper after radiation therapy is complete.    Reed Shaw MD  Department of Radiation Oncology  [facility]

## 2023-09-14 NOTE — LETTER
9/14/2023         RE: Beatriz Francis  [address] [place] [address]        Dear Colleague,    Thank you for referring your patient, Beatriz Francis, to the [facility] RADIATION THERAPY CLINIC. Please see a copy of my visit note below.    The patient underwent CT simulation.    Plan will be to start palliative radiation therapy to the pelvis/sacrum today.  Plan for 30 Gray in 10 fractions.    Reed Shaw M.D.  Department of Radiation Oncology  [facility]       Again, thank you for allowing me to participate in the care of your patient.        Sincerely,        Reed Shaw MD

## 2023-09-17 NOTE — TELEPHONE ENCOUNTER
The patient called for oxycodone refill.  She told me that she does not have enough oxycodone for today.  I confirmed that it was filled on 9/15/2023. She said it was not filled early by pharmacy. I told her that I can't over ride that.  She has a left over hydrocodone prescription from her primary care provider before.  I advised that she can take 1 tablet as needed for breakthrough pain.  She asked me whether she could take MS Contin 3 times a day instead of 2.  I told her not to change the MS Contin prescription without any direction from Dr. Mckeon.  I advised her to call back to the clinic tomorrow for further direction of her pain medication management.    Harry Garza MD

## 2023-09-20 NOTE — TELEPHONE ENCOUNTER
"Writer received message from [facility] radiation oncology requesting palliative contact patient to discuss pain.  Patient reports her pain is terrible and is \"getting no relief from the long-acting morphine or oxycodone.\"   Pain is in her stomach/liver area and tailbone going down into her leg.  States it is \"God awful.\"  Patient is currently taking MS Contin 30 mg twice daily and oxycodone 20 mg every 3 hours. Rates pain 9 out of 10 even after taking oxycodone 2 tabs. Radiation oncology has patient on dexamethasone 4 mg 3 times daily.    Writer will route message to Dr. Lake.    Patient verbalized understanding and had no further questions.    Allison Huang RN  Palliative Care Nurse Clinician    [phone] (Direct)  [phone] (Main)  [phone] (Appointment Scheduling)      "

## 2023-09-20 NOTE — TELEPHONE ENCOUNTER
Patient  Luis A reports patient is taking oxycodone 2 tablets  (20 mg) every 2 to 2-1/2 hours around-the-clock.  This amount of oxycodone barely takes the edge off.      Per Dr. Lake, methadone will be prescribed and dose will be calculated on amount of oxycodone she takes.  Patient should stop MS Contin when she has the methadone in her possession to start.  Patient should continue to take oxycodone 10 to 20 mg every 3 hours as needed and continue steroids.  Informed Luis A that it will take up to 5 days for methadone to show full effect.    Will route message to Dr. Lake to address.  If Dr. Lake has different instructions for patient, writer will call patient's  Luis A back with those instructions.      Writer will call patient on Monday to check in on pain.      Luis A verbalized understanding and had no further questions.    Allison Huang RN  Palliative Care Nurse Clinician    [phone] (Direct)  [phone] (Main)  [phone] (Appointment Scheduling)

## 2023-09-20 NOTE — LETTER
2023         RE: Beatriz Francis  [address] [place], [place] [id number]        Dear Colleague,    Thank you for referring your patient, Beatriz Francis, to the  [facility]. Please see a copy of my visit note below.    [facility]  SPECIALIZING IN BREAKTHROUGHS  Radiation Oncology    On Treatment Visit Note      Beatriz Francis      Date: 2023   MRN: [id number]   : 1950  Diagnosis: metastatic lung cancer to bones      Reason for Visit:  On Radiation Treatment Visit     Treatment Summary to Date  Treatment Site: sacrum/pelvis Current Dose: 1500/3000 cGy Fractions: 5/10      Chemotherapy  Chemo concurrent with radx?: No    Subjective:    Patient has not noticed improvement in pain thus yet.  Currently remains on Decadron 4 mg 3 times daily.  Current pain regimen includes morphine 30 mg twice daily as well as oxycodone interlaced as needed.  Pain level remains high.  Working with palliative care team.    Nursing ROS:      Skin  Skin Reaction: 0 - No changes        Cardiovascular  Respiratory effort: 2 - Mild dyspnea on exertion  Gastrointestinal  Diarrhea: 0 - None  Genitourinary  Urinary Status: 0 - Normal     Pain Assessment  0-10 Pain Scale: 8  Pain Note: 8-10 - varies throughout day and night. not responding yet to radiation treatments. on dex 4mg 3x/day - not offering much relief yet either      Objective:   /80   Pulse 115   Resp 18   SpO2 94%   Fatigued and in pain    Labs:  CBC RESULTS:   Recent Labs   Lab Test 23  1450   WBC 17.2*   RBC 3.05*   HGB 7.9*   HCT 23.4*   MCV 77*   MCH 25.9*   MCHC 33.8   RDW 16.1*   *     ELECTROLYTES:  Recent Labs   Lab Test 23  1450   *   POTASSIUM 4.3   CHLORIDE 87*   MARI 9.2   CO2 29   BUN 13.1   CR 0.41*   *       Assessment:  Ms. Francis is a 72 year old female with a diagnosis of metastatic non-small cell lung cancer with neuroendocrine differentiation with osseous  metastasis in the pelvis/sacrum with sacral nerve impingement.  She is undergoing palliative radiation     Tolerating radiation therapy well.  All questions and concerns addressed.    Plan:   Continue current therapy.    Continue Decadron 4 mg 3 times daily.   We will plan to taper after radiation therapy is complete.  Continue pain management per palliative care team      Mosaiq chart and setup information reviewed  Ports checked    Medication Review  Med list reviewed with patient?: Yes    Educational Topic Discussed  Education Instructions: MD and RN reviewed with pt radiation treatments can take some time before pain relief starts. Pt and spouse open to learning and appreciate of treatmetn and visit.      Reed Shaw MD

## 2023-09-20 NOTE — PROGRESS NOTES
[facility]  SPECIALIZING IN BREAKTHROUGHS  Radiation Oncology    On Treatment Visit Note      Beatriz Francis      Date: 2023   MRN: [id number]   : 1950  Diagnosis: metastatic lung cancer to bones      Reason for Visit:  On Radiation Treatment Visit     Treatment Summary to Date  Treatment Site: sacrum/pelvis Current Dose: 1500/3000 cGy Fractions: 5/10      Chemotherapy  Chemo concurrent with radx?: No    Subjective:    Patient has not noticed improvement in pain thus yet.  Currently remains on Decadron 4 mg 3 times daily.  Current pain regimen includes morphine 30 mg twice daily as well as oxycodone interlaced as needed.  Pain level remains high.  Working with palliative care team.    Nursing ROS:      Skin  Skin Reaction: 0 - No changes        Cardiovascular  Respiratory effort: 2 - Mild dyspnea on exertion  Gastrointestinal  Diarrhea: 0 - None  Genitourinary  Urinary Status: 0 - Normal     Pain Assessment  0-10 Pain Scale: 8  Pain Note: 8-10 - varies throughout day and night. not responding yet to radiation treatments. on dex 4mg 3x/day - not offering much relief yet either      Objective:   /80   Pulse 115   Resp 18   SpO2 94%   Fatigued and in pain    Labs:  CBC RESULTS:   Recent Labs   Lab Test 23  1450   WBC 17.2*   RBC 3.05*   HGB 7.9*   HCT 23.4*   MCV 77*   MCH 25.9*   MCHC 33.8   RDW 16.1*   *     ELECTROLYTES:  Recent Labs   Lab Test 23  1450   *   POTASSIUM 4.3   CHLORIDE 87*   MARI 9.2   CO2 29   BUN 13.1   CR 0.41*   *       Assessment:  Ms. 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  She is undergoing palliative radiation     Tolerating radiation therapy well.  All questions and concerns addressed.    Plan:   Continue current therapy.    Continue Decadron 4 mg 3 times daily.   We  will plan to taper after radiation therapy is complete.  Continue pain management per palliative care team      Mosaiq chart and setup information reviewed  Ports checked    Medication Review  Med list reviewed with patient?: Yes    Educational Topic Discussed  Education Instructions: MD and RN reviewed with pt radiation treatments can take some time before pain relief starts. Pt and spouse open to learning and appreciate of treatmetn and visit.      Reed Shaw MD

## 2023-09-21 PROBLEM — E87.1 HYPONATREMIA: Status: ACTIVE | Noted: 2023-01-01

## 2023-09-21 PROBLEM — K59.03 DRUG-INDUCED CONSTIPATION: Status: ACTIVE | Noted: 2023-01-01

## 2023-09-21 PROBLEM — C34.90 PRIMARY MALIGNANT NEOPLASM OF LUNG METASTATIC TO OTHER SITE, UNSPECIFIED LATERALITY (H): Status: ACTIVE | Noted: 2023-01-01

## 2023-09-21 NOTE — ED TRIAGE NOTES
Sent to ED from clinic with concerns of abnormal liver lab results     Triage Assessment       Row Name 09/21/23 6013       Triage Assessment (Adult)    Airway WDL WDL       Cardiac WDL    Cardiac WDL WDL       Cognitive/Neuro/Behavioral WDL    Cognitive/Neuro/Behavioral WDL WDL

## 2023-09-21 NOTE — PROGRESS NOTES
Patient has history of lung cancer with mets to the lumbar and a liver lesion.  Recently went underwent liver biopsy on 914.  Presently is on radiation therapy will have a treatment today and is being followed by oncology.  Patient has noticed lower left quadrant pain and is on extensive pain medications for the bone mets.  Has noticed that the area has become increasingly firm and concerning for some bloating.  Also has had limited stools out with the increase in pain medication has recently been started on methadone due to uncontrolled pain.  Last stool was today x-ray obtained today does show moderate amount of stool with no obstruction.  Patient is chronically low sodium last sodium was 120 7 repeat sodium today was 123.  Hemoglobin remains at 8.  Labs resulted this afternoon with a critical lab value of AST of 531 recent change from 8 days ago of 140 and alk phos was 813 8 days ago now is 1160.  Based on increased abdominal pain liver enzymes and patient's history recommend patient be seen in the emergency room for further evaluation of her left lower quadrant pain and increasing elevated liver enzymes.  Reviewed with patient's  she will bring patient to [place] ER.    Post ER visit I will work with oncology to determine when next treatment plan will be based on findings as well as continue to have peripheral edema she was to have a stress test done unable to perform a walking stress test ordered by cardiology I do recommend based on the edema and patient status that we do obtain an echo we will follow-up with cardiology to see if we should just do a  Lexiscan.  We will await findings from the emergency room and terminate plan with oncology and cardiology.  Jesusita Franco CNP       ICD-10-CM    1. Sinus tachycardia  R00.0       2. Bilateral leg edema  R60.0 Basic metabolic panel  (Ca, Cl, CO2, Creat, Gluc, K, Na, BUN)     Echocardiogram Complete     BNP-N terminal pro     CBC with platelets     EKG 12-lead  complete w/read - Clinics     Hepatic panel (Albumin, ALT, AST, Bili, Alk Phos, TP)     Hepatic panel (Albumin, ALT, AST, Bili, Alk Phos, TP)     CBC with platelets     BNP-N terminal pro     Basic metabolic panel  (Ca, Cl, CO2, Creat, Gluc, K, Na, BUN)      3. Encounter for Medicare annual wellness exam  Z00.00       4. Coronary artery disease involving native coronary artery of native heart without angina pectoris  I25.10 metoprolol succinate ER (TOPROL XL) 25 MG 24 hr tablet      5. Drug-induced constipation  K59.03 CANCELED: XR Abdomen 2 Views            Assessment & Plan   Encounter for Medicare annual wellness exam    Sinus tachycardia      Bilateral leg edema  - Basic metabolic panel  (Ca, Cl, CO2, Creat, Gluc, K, Na, BUN)  - Echocardiogram Complete  - BNP-N terminal pro  - CBC with platelets  - EKG 12-lead complete w/read - Clinics  - Hepatic panel (Albumin, ALT, AST, Bili, Alk Phos, TP)  - Hepatic panel (Albumin, ALT, AST, Bili, Alk Phos, TP)  - CBC with platelets  - BNP-N terminal pro  - Basic metabolic panel  (Ca, Cl, CO2, Creat, Gluc, K, Na, BUN)    Encounter for Medicare annual wellness exam      Coronary artery disease involving native coronary artery of native heart without angina pectoris  Patient continues to have sinus tach on EKG patient is only taking half a dose of metoprolol at 12.5 recommend increasing to 25 mg  - metoprolol succinate ER (TOPROL XL) 25 MG 24 hr tablet  Dispense: 15 tablet; Refill: 3    Drug-induced constipation  Constipation noted on x-ray will work over-the-counter MiraLAX     MED REC REQUIRED  Post Medication Reconciliation Status: discharge medications reconciled, continue medications without change  See Patient Instructions          In addition to wellness visit 55 minutes spent by me on the date of the encounter doing chart review, history and exam, documentation and further activities per the note       ALEJANDRO Odonnell [facility]  "JESSICA Henderson is a 72 year old, presenting for the following health issues:  ER F/U      9/21/2023     8:40 AM   Additional Questions   Roomed by Tanya HARVEY       ED/UC Followup:    Facility:  [facility] Emergency Department  Date of visit: 9/13/23  Reason for visit: acute urinary retention  Current Status:     Annual Wellness Visit    Patient has been advised of split billing requirements and indicates understanding: Yes     Are you in the first 12 months of your Medicare Part B coverage?  No    Physical Health:  In general, how would you rate your overall physical health? poor  Outside of work, how many days during the week do you exercise?6-7 days/week  Outside of work, approximately how many minutes a day do you exercise?15-30 minutes  If you drink alcohol do you typically have >3 drinks per day or >7 drinks per week? No  Do you usually eat at least 4 servings of fruit and vegetables a day, include whole grains & fiber and avoid regularly eating high fat or \"junk\" foods? No  Do you have any problems taking medications regularly? No  Do you have any side effects from medications? not applicable  Needs assistance for the following daily activities: no assistance needed  Which of the following safety concerns are present in your home?  throw rugs in the hallway and lack of grab bars in the bathroom   Hearing impairment: Yes, echoing in her ears  In the past 6 months, have you been bothered by leaking of urine? yes    Mental Health:  In general, how would you rate your overall mental or emotional health? poor      Today's PHQ-9 Score:       8/24/2023    10:34 AM   PHQ-9 SCORE   PHQ-9 Total Score MyChart 7 (Mild depression)   PHQ-9 Total Score 7         Do you feel safe in your environment? Yes    Have you ever done Advance Care Planning? (For example, a Health Directive, POLST, or a discussion with a medical provider or your loved ones about your wishes)? No, advance care " planning information given to patient to review.  Patient declined advance care planning discussion at this time.    Fall risk:  Fallen 2 or more times in the past year?: Screen not completed for medical reason(s)  Any fall with injury in the past year?: Screen not completed for medical reason(s)    Cognitive Screenin) Repeat 3 items (Leader, Season, Table)    2) Clock draw: NORMAL  3) 3 item recall: Recalls 1 object   Results: NORMAL clock, 1-2 items recalled: COGNITIVE IMPAIRMENT LESS LIKELY    Mini-CogTM Copyright S Marcello. Licensed by the author for use in [facility]; reprinted with permission (clau@[facility]). All rights reserved.      Do you have sleep apnea, excessive snoring or daytime drowsiness? : no    Social History     Tobacco Use    Smoking status: Every Day     Packs/day: 0.50     Years: 53.00     Pack years: 26.50     Types: Cigarettes     Start date: 1966     Passive exposure: Current    Smokeless tobacco: Never    Tobacco comments:     Have tried many times to quit. I have cut down and I use e-cigarettes   Substance Use Topics    Alcohol use: No     Comment: 3-4x's/year.             2022     2:39 PM   Alcohol Use   Prescreen: >3 drinks/day or >7 drinks/week? Not Applicable     Do you have a current opioid prescription? Yes   How severe is your pain on a scale from 1-10? 9/10         Do you use any other controlled substances or medications that are not prescribed by a provider? None    Current providers sharing in care for this patient include:   Patient Care Team:  Jesusita Franco APRN CNP as PCP - General (Family Medicine)  Freddie Espino MD as MD (Orthopedics)  Ankit Morton MD as MD (Orthopedics)  Lolly Magana PA-C as Physician Assistant (Dermatology)  Jesusita Franco APRN CNP as Assigned PCP  Jesusita Franco APRN CNP as Assigned Pain Medication Provider  Dale Cortez MD as MD (Critical Care)  Marquise Marquez MD as MD (Cardiovascular  Disease)  Dale Cortez MD as Assigned Pulmonology Provider  Agatha Oreilly APRN CNP as Nurse Practitioner (Cardiovascular Disease)  Agatha Oreilly APRN CNP as Assigned Heart and Vascular Provider  Maggie Ng MD as MD (Cardiovascular & Thoracic Surgery)  Dale Cortez MD as MD (Critical Care)  Reed Shaw MD as MD (Radiation Oncology)  Maggie Ng MD as Assigned Surgical Provider  Uri Mckeon MD as MD (Medical Oncology)  Reed Shaw MD as Assigned Cancer Care Provider    The following health maintenance items are reviewed in Epic and correct as of today:  Health Maintenance   Topic Date Due    COVID-19 Vaccine (3 - Moderna risk series) 04/30/2021    MEDICARE ANNUAL WELLNESS VISIT  07/25/2023    ANNUAL REVIEW OF HM ORDERS  08/04/2023    INFLUENZA VACCINE (1) 09/01/2023    LIPID  03/03/2024    URINE DRUG SCREEN  06/08/2024    TREATMENT AGREEMENT FOR CHRONIC PAIN MANAGEMENT  06/08/2024    NICOTINE/TOBACCO CESSATION COUNSELING Q 1 YR  06/20/2024    PHQ-9  08/24/2024    MAMMO SCREENING  08/29/2024    CBC  09/13/2024    HIRAL ASSESSMENT  09/21/2024    FALL RISK ASSESSMENT  09/21/2024    COLORECTAL CANCER SCREENING  03/02/2027    ADVANCE CARE PLANNING  08/04/2027    DTAP/TDAP/TD IMMUNIZATION (3 - Td or Tdap) 10/24/2028    DEXA  09/26/2033    SPIROMETRY  Completed    HEPATITIS C SCREENING  Completed    COPD ACTION PLAN  Completed    DEPRESSION ACTION PLAN  Completed    Pneumococcal Vaccine: 65+ Years  Completed    ZOSTER IMMUNIZATION  Completed    IPV IMMUNIZATION  Aged Out    HPV IMMUNIZATION  Aged Out    MENINGITIS IMMUNIZATION  Aged Out    LUNG CANCER SCREENING  Discontinued       Patient has been advised of split billing requirements and indicates understanding: Yes    Appropriate preventive services were discussed with this patient, including applicable screening as appropriate for fall prevention, nutrition, physical activity, Tobacco-use cessation, weight loss  "and cognition.  Checklist reviewing preventive services available has been given to the patient.      Review of Systems   Constitutional, HEENT, cardiovascular, pulmonary, gi and gu systems are negative, except as otherwise noted.      Objective    BP (!) 150/72 (BP Location: Right arm, Cuff Size: Adult Regular)   Pulse 115   Temp 98.4  F (36.9  C) (Tympanic)   Resp 16   Ht 1.638 m (5' 4.49\")   Wt 49.4 kg (109 lb)   SpO2 96%   BMI 18.43 kg/m    Body mass index is 18.43 kg/m .  Physical Exam   GENERAL: healthy, alert and no distress  EYES: Eyes grossly normal to inspection, PERRL and conjunctivae and sclerae normal  NECK: no adenopathy, no asymmetry, masses, or scars and thyroid normal to palpation  RESP: lungs clear to auscultation - no rales, rhonchi or wheezes  CV: regular rate and rhythm, normal S1 S2, no S3 or S4, no murmur, click or rub, no peripheral edema and peripheral pulses strong  ABDOMEN: soft, nontender, no hepatosplenomegaly, no masses and bowel sounds normal  ABDOMEN: Mild tenderness to upper quadrant and lower right bloating and tenderness noted  MS: no gross musculoskeletal defects noted, no edema  SKIN: no suspicious lesions or rashes  NEURO: Normal strength and tone, mentation intact and speech normal  PSYCH: mentation appears normal, affect normal/bright  Results for orders placed or performed in visit on 09/21/23   XR Abdomen 1 View     Status: None    Narrative    ABDOMEN ONE VIEW  9/21/2023 10:06 AM     HISTORY: Drug-induced constipation    COMPARISON: None.      Impression    IMPRESSION: Nonobstructive bowel gas pattern. Moderate to severe  retained colonic stool. No evidence for pneumoperitoneum.    Moderate left hip osteoarthrosis.    JAZIEL LEE MD         SYSTEM ID:  [id number]   Results for orders placed or performed in visit on 09/21/23   Hepatic panel (Albumin, ALT, AST, Bili, Alk Phos, TP)     Status: Abnormal   Result Value Ref Range    Protein Total 6.2 (L) 6.4 - 8.3 g/dL "    Albumin 3.4 (L) 3.5 - 5.2 g/dL    Bilirubin Total 0.7 <=1.2 mg/dL    Alkaline Phosphatase 1,116 (H) 35 - 104 U/L     (HH) 0 - 45 U/L     (H) 0 - 50 U/L    Bilirubin Direct 0.33 (H) 0.00 - 0.30 mg/dL   CBC with platelets     Status: Abnormal   Result Value Ref Range    WBC Count 19.4 (H) 4.0 - 11.0 10e3/uL    RBC Count 3.13 (L) 3.80 - 5.20 10e6/uL    Hemoglobin 8.0 (L) 11.7 - 15.7 g/dL    Hematocrit 24.4 (L) 35.0 - 47.0 %    MCV 78 78 - 100 fL    MCH 25.6 (L) 26.5 - 33.0 pg    MCHC 32.8 31.5 - 36.5 g/dL    RDW 18.4 (H) 10.0 - 15.0 %    Platelet Count 299 150 - 450 10e3/uL   BNP-N terminal pro     Status: Abnormal   Result Value Ref Range    N Terminal Pro BNP Outpatient 991 (H) 0 - 900 pg/mL   Basic metabolic panel  (Ca, Cl, CO2, Creat, Gluc, K, Na, BUN)     Status: Abnormal   Result Value Ref Range    Sodium 122 (L) 136 - 145 mmol/L    Potassium 5.3 3.4 - 5.3 mmol/L    Chloride 82 (L) 98 - 107 mmol/L    Carbon Dioxide (CO2) 29 22 - 29 mmol/L    Anion Gap 11 7 - 15 mmol/L    Urea Nitrogen 20.2 8.0 - 23.0 mg/dL    Creatinine 0.41 (L) 0.51 - 0.95 mg/dL    Calcium 8.8 8.8 - 10.2 mg/dL    Glucose 94 70 - 99 mg/dL    GFR Estimate >90 >60 mL/min/1.73m2

## 2023-09-21 NOTE — ED NOTES
Pt ambulated to bathroom with assistance from ERT. Pt stated okay for ERT to stand outside the door-pt was instructed to pull the call light when ready to stand up. Pt dropped tissue and attempted to reach tissue and stated she felt weak and lowered herself to the ground. Pt denies fall backwards, dizziness, or LOC. Pt did not hit head, RN and ERT immediately in bathroom upon pt alerting that she had fallen to the ground. Pt expressed pain in shoulder. Provider notified. Vitals WDL.

## 2023-09-21 NOTE — PATIENT INSTRUCTIONS
Preventive Health Recommendations    See your health care provider every year to  Review health changes.   Discuss preventive care.    Review your medicines if your doctor has prescribed any.  You no longer need a yearly Pap test unless you've had an abnormal Pap test in the past 10 years. If you have vaginal symptoms, such as bleeding or discharge, be sure to talk with your provider about a Pap test.  Every 1 to 2 years, have a mammogram.  If you are over 69, talk with your health care provider about whether or not you want to continue having screening mammograms.  Every 10 years, have a colonoscopy. Or, have a yearly FIT test (stool test). These exams will check for colon cancer.   Have a cholesterol test every 5 years, or more often if your doctor advises it.   Have a diabetes test (fasting glucose) every three years. If you are at risk for diabetes, you should have this test more often.   At age 65, have a bone density scan (DEXA) to check for osteoporosis (brittle bone disease).    Shots:  Get a flu shot each year.  Get a tetanus shot every 10 years.  Talk to your doctor about your pneumonia vaccines. There are now two you should receive - Pneumovax (PPSV 23) and Prevnar (PCV 13).  Talk to your pharmacist about the shingles vaccine.  Talk to your doctor about the hepatitis B vaccine.    Nutrition:   Eat at least 5 servings of fruits and vegetables each day.  Eat whole-grain bread, whole-wheat pasta and brown rice instead of white grains and rice.  Get adequate Calcium and Vitamin D.     Lifestyle  Exercise at least 150 minutes a week (30 minutes a day, 5 days a week). This will help you control your weight and prevent disease.  Limit alcohol to one drink per day.  No smoking.   Wear sunscreen to prevent skin cancer.   See your dentist twice a year for an exam and cleaning.  See your eye doctor every 1 to 2 years to screen for conditions such as glaucoma, macular degeneration and cataracts.    Personalized  Prevention Plan  You are due for the preventive services outlined below.  Your care team is available to assist you in scheduling these services.  If you have already completed any of these items, please share that information with your care team to update in your medical record.  Health Maintenance   Topic Date Due     COVID-19 Vaccine (3 - Moderna risk series) 04/30/2021     MEDICARE ANNUAL WELLNESS VISIT  07/25/2023     ANNUAL REVIEW OF HM ORDERS  08/04/2023     INFLUENZA VACCINE (1) 09/01/2023     LIPID  03/03/2024     URINE DRUG SCREEN  06/08/2024     TREATMENT AGREEMENT FOR CHRONIC PAIN MANAGEMENT  06/08/2024     NICOTINE/TOBACCO CESSATION COUNSELING Q 1 YR  06/20/2024     PHQ-9  08/24/2024     MAMMO SCREENING  08/29/2024     CBC  09/13/2024     HIRAL ASSESSMENT  09/21/2024     FALL RISK ASSESSMENT  09/21/2024     COLORECTAL CANCER SCREENING  03/02/2027     ADVANCE CARE PLANNING  09/21/2028     DTAP/TDAP/TD IMMUNIZATION (3 - Td or Tdap) 10/24/2028     DEXA  09/26/2033     SPIROMETRY  Completed     HEPATITIS C SCREENING  Completed     COPD ACTION PLAN  Completed     DEPRESSION ACTION PLAN  Completed     Pneumococcal Vaccine: 65+ Years  Completed     ZOSTER IMMUNIZATION  Completed     IPV IMMUNIZATION  Aged Out     HPV IMMUNIZATION  Aged Out     MENINGITIS IMMUNIZATION  Aged Out     LUNG CANCER SCREENING  Discontinued      Patient Education   Personalized Prevention Plan  You are due for the preventive services outlined below.  Your care team is available to assist you in scheduling these services.  If you have already completed any of these items, please share that information with your care team to update in your medical record.  Health Maintenance Due   Topic Date Due     COVID-19 Vaccine (3 - Moderna risk series) 04/30/2021     Annual Wellness Visit  07/25/2023     ANNUAL REVIEW OF HM ORDERS  08/04/2023     Flu Vaccine (1) 09/01/2023     Your Health Risk Assessment indicates you feel you are not in good  health    A healthy lifestyle helps keep the body fit and the mind alert. It helps protect you from disease, helps you fight disease, and helps prevent chronic disease (disease that doesn't go away) from getting worse. This is important as you get older and begin to notice twinges in muscles and joints and a decline in the strength and stamina you once took for granted. A healthy lifestyle includes good healthcare, good nutrition, weight control, recreation, and regular exercise. Avoid harmful substances and do what you can to keep safe. Another part of a healthy lifestyle is stay mentally active and socially involved.    Good healthcare   Have a wellness visit every year.   If you have new symptoms, let us know right away. Don't wait until the next checkup.   Take medicines exactly as prescribed and keep your medicines in a safe place. Tell us if your medicine causes problems.   Healthy diet and weight control   Eat 3 or 4 small, nutritious, low-fat, high-fiber meals a day. Include a variety of fruits, vegetables, and whole-grain foods.   Make sure you get enough calcium in your diet. Calcium, vitamin D, and exercise help prevent osteoporosis (bone thinning).   If you live alone, try eating with others when you can. That way you get a good meal and have company while you eat it.   Try to keep a healthy weight. If you eat more calories than your body uses for energy, it will be stored as fat and you will gain weight.     Recreation   Recreation is not limited to sports and team events. It includes any activity that provides relaxation, interest, enjoyment, and exercise. Recreation provides an outlet for physical, mental, and social energy. It can give a sense of worth and achievement. It can help you stay healthy.    Mental Exercise and Social Involvement  Mental and emotional health is as important as physical health. Keep in touch with friends and family. Stay as active as possible. Continue to learn and challenge  yourself.   Things you can do to stay mentally active are:  Learn something new, like a foreign language or musical instrument.   Play SCRABBLE or do crossword puzzles. If you cannot find people to play these games with you at home, you can play them with others on your computer through the Internet.   Join a games club--anything from card games to chess or checkers or lawn bowling.   Start a new hobby.   Go back to school.   Volunteer.   Read.   Keep up with world events.  Learning About Dietary Guidelines  What are the Dietary Guidelines for Americans?     Dietary Guidelines for Americans provide tips for eating well and staying healthy. This helps reduce the risk for long-term (chronic) diseases.  These guidelines recommend that you:  Eat and drink the right amount for you. The [place] government's food guide is called MyPlate. It can help you make your own well-balanced eating plan.  Try to balance your eating with your activity. This helps you stay at a healthy weight.  Drink alcohol in moderation, if at all.  Limit foods high in salt, saturated fat, trans fat, and added sugar.  These guidelines are from the [place] Department of Agriculture and the [place] Department of Health and Human Services. They are updated every 5 years.  What is MyPlate?  MyPlate is the [place] government's food guide. It can help you make your own well-balanced eating plan. A balanced eating plan means that you eat enough, but not too much, and that your food gives you the nutrients you need to stay healthy.  MyPlate focuses on eating plenty of whole grains, fruits, and vegetables, and on limiting fat and sugar. It is available online at www.ChooseMyPlate.gov.  How can you get started?  If you're trying to eat healthier, you can slowly change your eating habits over time. You don't have to make big changes all at once. Start by adding one or two healthy foods to your meals each day.  Grains  Choose whole-grain breads and cereals and whole-wheat  "pasta and whole-grain crackers.  Vegetables  Eat a variety of vegetables every day. They have lots of nutrients and are part of a heart-healthy diet.  Fruits  Eat a variety of fruits every day. Fruits contain lots of nutrients. Choose fresh fruit instead of fruit juice.  Protein foods  Choose fish and lean poultry more often. Eat red meat and fried meats less often. Dried beans, tofu, and nuts are also good sources of protein.  Dairy  Choose low-fat or fat-free products from this food group. If you have problems digesting milk, try eating cheese or yogurt instead.  Fats and oils  Limit fats and oils if you're trying to cut calories. Choose healthy fats when you cook. These include canola oil and olive oil.  Where can you learn more?  Go to https://www.[employer].net/patiented  Enter D676 in the search box to learn more about \"Learning About Dietary Guidelines.\"  Current as of: March 1, 2023               Content Version: 13.7    [id number] [employer].   Care instructions adapted under license by your healthcare professional. If you have questions about a medical condition or this instruction, always ask your healthcare professional. [employer] disclaims any warranty or liability for your use of this information.      Bladder Training: Care Instructions  Your Care Instructions     Bladder training is used to treat urge incontinence and stress incontinence. Urge incontinence means that the need to urinate comes on so fast that you can't get to a toilet in time. Stress incontinence means that you leak urine because of pressure on your bladder. For example, it may happen when you laugh, cough, or lift something heavy.  Bladder training can increase how long you can wait before you have to urinate. It can also help your bladder hold more urine. And it can give you better control over the urge to urinate.  It is important to remember that bladder training takes a few weeks to a few months to " make a difference. You may not see results right away, but don't give up.  Follow-up care is a key part of your treatment and safety. Be sure to make and go to all appointments, and call your doctor if you are having problems. It's also a good idea to know your test results and keep a list of the medicines you take.  How can you care for yourself at home?  Work with your doctor to come up with a bladder training program that is right for you. You may use one or more of the following methods.  Delayed urination  In the beginning, try to keep from urinating for 5 minutes after you first feel the need to go.  While you wait, take deep, slow breaths to relax. Kegel exercises can also help you delay the need to go to the bathroom.  After some practice, when you can easily wait 5 minutes to urinate, try to wait 10 minutes before you urinate.  Slowly increase the waiting period until you are able to control when you have to urinate.  Scheduled urination  Empty your bladder when you first wake up in the morning.  Schedule times throughout the day when you will urinate.  Start by going to the bathroom every hour, even if you don't need to go.  Slowly increase the time between trips to the bathroom.  When you have found a schedule that works well for you, keep doing it.  If you wake up during the night and have to urinate, do it. Apply your schedule to waking hours only.  Kegel exercises  These tighten and strengthen pelvic muscles, which can help you control the flow of urine. (If doing these exercises causes pain, stop doing them and talk with your doctor.) To do Kegel exercises:  Squeeze your muscles as if you were trying not to pass gas. Or squeeze your muscles as if you were stopping the flow of urine. Your belly, legs, and buttocks shouldn't move.  Hold the squeeze for 3 seconds, then relax for 5 to 10 seconds.  Start with 3 seconds, then add 1 second each week until you are able to squeeze for 10 seconds.  Repeat the  "exercise 10 times a session. Do 3 to 8 sessions a day.  When should you call for help?  Watch closely for changes in your health, and be sure to contact your doctor if:    Your incontinence is getting worse.     You do not get better as expected.   Where can you learn more?  Go to https://www.[email or web address].net/patiented  Enter V684 in the search box to learn more about \"Bladder Training: Care Instructions.\"  Current as of: March 1, 2023               Content Version: 13.7    [id number] [employer].   Care instructions adapted under license by your healthcare professional. If you have questions about a medical condition or this instruction, always ask your healthcare professional. [employer] disclaims any warranty or liability for your use of this information.      Your Health Risk Assessment indicates you feel you are not in good emotional health.    Recreation   Recreation is not limited to sports and team events. It includes any activity that provides relaxation, interest, enjoyment, and exercise. Recreation provides an outlet for physical, mental, and social energy. It can give a sense of worth and achievement. It can help you stay healthy.    Mental Exercise and Social Involvement  Mental and emotional health is as important as physical health. Keep in touch with friends and family. Stay as active as possible. Continue to learn and challenge yourself.   Things you can do to stay mentally active are:  Learn something new, like a foreign language or musical instrument.   Play SCRABBLE or do crossword puzzles. If you cannot find people to play these games with you at home, you can play them with others on your computer through the Internet.   Join a games club--anything from card games to chess or checkers or lawn bowling.   Start a new hobby.   Go back to school.   Volunteer.   Read.   Keep up with world events.     "
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
## 2023-09-21 NOTE — PROGRESS NOTES
The patient was provided with suggestions to help her develop a healthy physical lifestyle.  The patient was counseled and encouraged to consider modifying their diet and eating habits. She was provided with information on recommended healthy diet options.  Information on urinary incontinence and treatment options given to patient.  The patient was provided with suggestions to help her develop a healthy emotional lifestyle.Answers submitted by the patient for this visit:  HIRAL-7 (Submitted on 9/21/2023)  HIRAL 7 TOTAL SCORE: 16

## 2023-09-22 NOTE — MEDICATION SCRIBE - ADMISSION MEDICATION HISTORY
Medication Scribe Admission Medication History    Admission medication history is complete. The information provided in this note is only as accurate as the sources available at the time of the update.    Medication reconciliation/reorder completed by provider prior to medication history? No    Information Source(s): Patient via in-person    Pertinent Information:     Changes made to PTA medication list:  Added: None  Deleted: None  Changed: None    Medication Affordability:  Not including over the counter (OTC) medications, was there a time in the past 3 months when you did not take your medications as prescribed because of cost?: No    Allergies reviewed with patient and updates made in EHR: yes    Medication History Completed By: Felipa Diaz 9/21/2023 8:27 PM    Prior to Admission medications    Medication Sig Last Dose Taking? Auth Provider Long Term End Date   acetaminophen (TYLENOL) 325 MG tablet Take 2 tablets (650 mg) by mouth every 4 hours as needed for other (surgical pain) Past Week at unknown Yes Lauren Young APRN CNP     aspirin (ASA) 81 MG EC tablet Take 1 tablet (81 mg) by mouth daily Start tomorrow. 9/21/2023 at am Yes Kristina Figueroa MD     clopidogrel (PLAVIX) 75 MG tablet 300mg (4 tablets) on day 1, then 75mg 1 tablet a day thereafter 9/21/2023 at am Yes Mayur Dumont MD Yes    dexAMETHasone (DECADRON) 4 MG tablet Take 1 tablet (4 mg) by mouth 3 times daily for 20 days 9/21/2023 at am Yes Reed Shaw MD Yes 10/8/23   ferrous gluconate (FERGON) 324 (38 Fe) MG tablet Take 1 tablet (324 mg) by mouth daily (with breakfast) 9/21/2023 at am Yes Jesusita Franco APRN CNP     methadone (DOLOPHINE) 5 MG tablet Take 1 tablet (5 mg) by mouth every 12 hours 9/21/2023 at am Yes Melissa Lake MD     metoprolol succinate ER (TOPROL XL) 25 MG 24 hr tablet Take 1 tablet (25 mg) by mouth every evening 9/20/2023 at pm Yes Jesusita Franco APRN CNP Yes     omeprazole (PRILOSEC) 20 MG DR capsule Take 1 capsule (20 mg) by mouth daily 9/21/2023 at am Yes Freddie Mesa MD     oxyCODONE IR (ROXICODONE) 10 MG tablet Take 1-2 tablets (10-20 mg) by mouth every 3 hours as needed for pain 9/21/2023 at 1500 Yes Uri Mckeon MD     polyethylene glycol (MIRALAX) 17 GM/Dose powder Take 1 Capful by mouth daily 9/20/2023 at pm Yes Reported, Patient     rosuvastatin (CRESTOR) 40 MG tablet Take 1 tablet (40 mg) by mouth daily 9/20/2023 at pm Yes Jesusita Franco APRN CNP Yes    senna-docusate (SENOKOT-S;PERICOLACE) 8.6-50 MG per tablet Take 2 tablets by mouth 2 times daily 9/21/2023 at am Yes Lauren Young APRN CNP     SPIRIVA HANDIHALER 18 MCG inhaled capsule USING THE HANDIHALER, INHALE THE CONTENTS OF ONE CAPSULE BY MOUTH DAILY 9/21/2023 at am Yes Jesusita Franco APRN CNP Yes    tazarotene (TAZORAC) 0.05 % external cream User every night Past Week at pm Yes Lolly Magaan PA-C     VENTOLIN  (90 Base) MCG/ACT inhaler INHALE TWO PUFFS BY MOUTH INTO THE LUNGS EVERY 6 HOURS AS NEEDED FOR SHORTNESS OF BREATH, DIFFICULTY BREATHING, OR WHEEZING 9/21/2023 at am Yes Jesusita Franco APRN CNP Yes    zolpidem (AMBIEN) 10 MG tablet TAKE ONE TABLET BY MOUTH EVERY EVENING AT BEDTIME AS NEEDED FOR SLEEP 9/20/2023 at hs Yes Jesusita Franco APRN CNP     naloxone (NARCAN) 4 MG/0.1ML nasal spray Spray 1 spray (4 mg) into one nostril alternating nostrils as needed for opioid reversal every 2-3 minutes until assistance arrives  Patient not taking: Reported on 9/21/2023   Jesusita Franco APRN CNP Yes    nitroGLYcerin (NITROSTAT) 0.4 MG sublingual tablet For chest pain place 1 tablet under the tongue every 5 minutes for 3 doses. If symptoms persist 5 minutes after 2nd dose call 911.  Patient not taking: Reported on 9/21/2023   Marquise Marquez MD Yes

## 2023-09-22 NOTE — PROGRESS NOTES
"Reason for Admission: Lung Cancer    Activity:  A1, patient had radiation at 1340    Neuro: A&O x 4    Resp: Bilateral coarse lung sounds     GI/: Urinary retention, bladder scanned for 238 mL. Patient urinated on the commode, 375 mL out. Palliative requested that patient receive suppository due to patient continuing to experience constipation.     Skin: Scattered bruising, preventative Mepilex foam dressing to bilateral elbows, scab noted to left hip/trochanter.    Diet: Regular, 1200 mL fluid restriction in place. 440 mL total fluid intake. Patient has refused to eat, drank a small amount of Ensure.     IV Access/Drains: Saline lock, IV Zosyn given every 6 hours.     Vitals: /76 (BP Location: Left arm)   Pulse 114   Temp 97.6  F (36.4  C) (Oral)   Resp 12   Ht 1.638 m (5' 4.5\")   Wt 50.9 kg (112 lb 3.4 oz)   SpO2 92%   BMI 18.96 kg/m       Pain:  Chronic pain, scheduled medication given only, patient does not complain of additional pain.     Plan:  Patient working with Palliative care to be sent home with possible hospice.   "

## 2023-09-22 NOTE — PLAN OF CARE
The patient is a 87y Female complaining of arm pain/injury. Updated Oleg ORTIZ on heart rate 133 at 2210. Received order to give toprol xl 25 mg oral, routine dose as she had not taken it yet.  Updated Dr. Garcia: , /82 at 0220. Patient was given 5 mg metoprolol, per Dr. Garcia order.   Updated Dr. Garcia: , /72 after metoprolol. Pt is having severe stomach and back pain 10/10 before dilaudid and 8/10 after dilaudid 0.5 mg IV. She does not want anything else for pain but she is really restless. She is up in the chair moaning. Can she have a dose of ativan to help her relax? Her upper abdomen is distended and hard. She had tap water enema last evening with medium results.     When she came in her pain was in lower abdomen. Pain is all over abdomen now. Upper abdomen is irregular. It appears obstructed but she has bowel sounds. Hypo upper quads and hyper lower quads   Received order for Ativan. Patient received 0.5 mg ativan IV. She is resting quietly in bed. 02 sats 89% on room air. Applied 02 at  2L via n/c.
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
## 2023-09-22 NOTE — PLAN OF CARE
Patient had not voided since 2200. Bladder scan: 771 ml. Updated hospitalist, Dr. Younger. Received order for straight cath. Patient was straight cathed for 1050 ml clear gracy urine. She moaned and cryed during procedure. Consoled and tried to help her relax as much as possible.

## 2023-09-22 NOTE — PROGRESS NOTES
SPIRITUAL [facility] Progress Note  [facility]  2313    Saw pt Beatriz Francis per chart review.  Her , Luis A was in the room.  He and I did most of the talking because Beatriz was not very alert though she seemed to follow the conversation.    Illness Narrative - Beatriz has metastatic cancer and was just admitted last night. Luis A told me she was just diagnosed 2 and a half months ago.  She has had some radiation treatments and is likely going to do chemo.    Distress - Luis A is her caretaker and several times during our conversation, he was teary.  He thought he would have longer with her but he understands her diagnosis is not looking good.      Coping - Luis A said they have good family and friend support.      Meaning-Making - He was able to talk about some good times with Beatriz over the years and find some solace from that.    Plan - I let him know that [facility] remains available for support during Beatriz's stay in the hospital.    Amy Mcdonald  Associate   Pager number:  [phone]  [facility] remains available 24/7 for emergent requests/referrals, either by having the on-call  paged or by entering an ASAP/STAT consult in Epic (this will also page the on-call ). Routine Epic consults receive an initial response within 24 hours.

## 2023-09-22 NOTE — CONSULTS
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  She is admitted from primary care office with severe hyponatremia and abnormal LFTs.  Suspected infection without identified source.    She has been seen by outpatient palliative care with ongoing difficulty managing pain.  Per her  she would take MS Contin 30 mg every 12 hours with little effect on pain, and oxycodone IR 20 mg approximately 8-10 times per day.  He says that she has been in unrelenting pain for months.  On exam today she reports 8 out of 10 pain.  Pain is in her back and runs down her buttocks to behind her knees.  She reports burning, pins-and-needles, and electrical sensations.  She also has throbbing pain in her back.  Previously she did not respond well to gabapentin or Lyrica.  She was recently transitioned to methadone 5 mg every 12 hours but had only taken 2 doses prior to admission.  He also has a history of chronic pain and reports taking Norco 5/325 approximately 4 times per day for around 2 decades.  There was concern for drug abuse when she was in her late teens and 20s.  She had a history with higher alcohol consumption that stopped approximately 2 years ago.  Her  reports that he found her in the bathroom once fairly recently crushing and Ambien and snorting  it.        Symptoms/recommendations/discussion:  Advanced Care Planning: Patient is decisional.  If needed her  Luis A will make decisions.  Luis A is supported by her sister.  Patient previously changed resuscitation status to NO CPR and DO NOT INTUBATE, confirmed with patient today.  She also does not want transfer to the ICU for any intensive measures like pressors should she begin to worsen.  With her  and sister present, on 2 separate occasions she tells Luis A that she does not want to do chemotherapy.  She wants to go home, not go to medical appointments, wants to enroll in hospice, and spend her time calmly at home with her family.  She understood that chemotherapy would likely be an option.  Due to strong neuropathic pain component, start methadone 5 mg every 8 hours.  Discontinue MS Contin.  As needed opioids, oxycodone IR 10 to 20 mg every 1 hour as needed and Dilaudid 0.5 to 1 mg every 1 hour as needed.  Avoid Lyrica, gabapentin, and Cymbalta.  She reports negative response to Lyrica and gabapentin before with nausea and vomiting.  Cymbalta can worsen hyponatremia and would be relatively contraindicated with elevated LFTs.  She remains constipated, continue laxatives.  Bisacodyl suppository x1  She has 3 additional RTX treatments, finishing these for symptom control will be ideal.  Epic in basket message sent to radiation oncology      Thank you for the opportunity to participate in the care of this patient and family. Our team: will continue to follow.   ALEJANDRO Pickering CNP  Securely message with [employer] (more info)  Text page via [employer] 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  She is admitted from primary care office with severe hyponatremia and abnormal LFTs.  Suspected infection without identified source.    She has been seen by outpatient palliative care with ongoing difficulty managing pain.  Per her  she would take MS Contin 30 mg every 12 hours with little effect on pain, and oxycodone IR 20 mg approximately 8-10 times per day.  He says that she has been in unrelenting pain for months.  On exam today she reports 8 out of 10 pain.  Pain is in her back and runs down her buttocks to behind her knees.  She reports burning, pins-and-needles, and electrical sensations.  She also has throbbing pain in her back.  Previously she did not respond well to gabapentin or Lyrica.  She was recently transitioned to methadone 5 mg every 12 hours but had only taken 2 doses prior to admission.  He also has a history of chronic pain and reports taking Norco 5/325 approximately 4 times per day for around 2 decades.  There was concern for drug abuse when she was in her late teens and 20s.  She had a history with higher alcohol consumption that stopped approximately 2 years ago.  Her  reports that he found her in the bathroom once fairly recently crushing and Ambien and snorting it.      Prognosis, Goals, & Planning:    Functional Status just prior to hospitalization: 3 (Capable of only limited self-care; needs help with ADLs; in bed/chair >50% of waking hours)    Patient's decision making preferences: independently        Patient has decision-making capacity today for complex decisions: Yes          I have concerns about the patient/family's health literacy today: No         Patient has a completed Health Care Directive: No.     Code status: No CPR / No Intubation    Coping, Meaning, & Spirituality:    Mood, coping, and/or meaning in the context of serious illness were addressed today: Yes  Summary/Comments:      Palliative Symptom Data:  # Pain severity the last 12 hours: severe  # Dyspnea severity the last 12 hours: none  # Nausea severity the last 12 hours: none  # Anxiety severity the last 12 hours: low    Patient is on opioids: assessed and I made recommendations about bowel care as above.    ROS:  Comprehensive ROS is reviewed and is negative except as here & per HPI:   Constitutional: Persistent decline in health status, reports debilitating pain  Eyes: No reported issue  ENT: No reported issue  Cardiovascular: No reported issue  Respiratory: Denies shortness of breath  GI: Poor appetite  : No reported history  MSK: Constant back pain  Integumentary: Denies issue  Neurological: Neuropathic pain as described above  Psychiatric: Anxiety and emotional fatigue related to symptom burden     Past Medical History:  Past Medical History:   Diagnosis Date    Abnormal platelets (H) 04/15/2015    Arthritis     Hands, foot, knees, shoulders    Benign essential hypertension 04/19/2023    Cancer (H)     Chronic neck pain     on chronic pain meds    COPD (chronic obstructive pulmonary disease) (H)     Coronary artery disease involving native coronary artery of native heart without angina pectoris 04/19/2023    Depression, major     Eczema     Herpes simplex, oral     Insomnia     Lupus (H)     lupus tumidus, 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    Pulmonary nodule     long standing, likely scarring    Seasonal allergic rhinitis         Past Surgical History:  Past Surgical History:   Procedure Laterality Date    ARTHROSCOPY SHLDR ROTATOR CUFF REPAIR, SUBACROMIAL DECOMP, DIST CLAVICLE RESECTION, BICEP TENODESIS Left 7/8/2016    Procedure: ARTHROSCOPY SHOULDER ROTATOR CUFF REPAIR, SUBACROMIAL DECOMPRESSION, DISTAL CLAVICLE RESECTION, OPEN BICEP TENODESIS REPAIR;  Surgeon: Freddie Espino MD;  Location:  OR    ARTHROSCOPY SHOULDER  Left 2017    Procedure: ARTHROSCOPY SHOULDER;  Surgeon: Ankit Morton MD;  Location: [facility] OR    BIOPSY      COLONOSCOPY      COLONOSCOPY N/A 3/2/2022    Procedure: COLONOSCOPY, FLEXIBLE, WITH LESION REMOVAL USING SNARE;  Surgeon: Davi Sibley DO;  Location: [facility]    CV CORONARY ANGIOGRAM N/A 2023    Procedure: Coronary Angiogram;  Surgeon: Everette Bolivar MD;  Location:  HEART CARDIAC CATH LAB    CV PCI N/A 2023    Procedure: Percutaneous Coronary Intervention;  Surgeon: Everette Bolivar MD;  Location: [facility] CARDIAC CATH LAB    ESOPHAGOSCOPY, GASTROSCOPY, DUODENOSCOPY (EGD), COMBINED N/A 2022    Procedure: ESOPHAGOGASTRODUODENOSCOPY, WITH BIOPSY;  Surgeon: Davi Sibley DO;  Location: [facility]    EYE SURGERY  -lasic    HYSTERECTOMY, PAP NO LONGER INDICATED      REVERSE ARTHROPLASTY SHOULDER Left 2/15/2017    Procedure: REVERSE ARTHROPLASTY SHOULDER;  Surgeon: Ankit Morton MD;  Location: [facility] OR    ROTATOR CUFF REPAIR RT/LT  ,    right    ROTATOR CUFF REPAIR RT/LT  3/5/04    left    ROTATOR CUFF REPAIR RT/LT  2009    Right    ZZC SHOULDER SURG PROC UNLISTED  2016         Family History:  Family History   Problem Relation Age of Onset    Cancer Father         lung    Heart Disease Father     Alcohol/Drug Father     Other Cancer Father     Cancer Paternal Grandmother         lung    Hypertension Mother             Cerebrovascular Disease Mother     Depression Mother     Cancer Maternal Grandfather     Cancer Paternal Grandfather     Diabetes Brother     Hypertension Brother     Hyperlipidemia Brother     Obesity Brother     Diabetes Brother     Gastrointestinal Disease Brother         [place]    Other Cancer Brother     Diabetes Brother     Rheumatoid Arthritis Brother     Cardiovascular Brother     Cerebrovascular Disease Brother         20        Social History:   Social History     Socioeconomic  History    Marital status:    Tobacco Use    Smoking status: Every Day     Packs/day: 0.50     Years: 53.00     Pack years: 26.50     Types: Cigarettes     Start date: 1/1/1966     Passive exposure: Current    Smokeless tobacco: Never    Tobacco comments:     Have tried many times to quit. I have cut down and I use e-cigarettes   Vaping Use    Vaping Use: Former   Substance and Sexual Activity    Alcohol use: No     Comment: 3-4x's/year.    Drug use: No    Sexual activity: Not Currently     Partners: Male   Other Topics Concern     Service No    Blood Transfusions No    Caffeine Concern Yes    Hobby Hazards No    Sleep Concern No    Stress Concern Yes    Weight Concern Yes    Special Diet No    Back Care No    Exercise Yes     Comment: little    Seat Belt Yes    Self-Exams Yes    Parent/sibling w/ CABG, MI or angioplasty before 65F 55M? No   Social History Narrative    .  Live with spouse.  Also has a friend living with them.    Helping take care of grandkids while her son is trying for full custody.     Social Determinants of Health     Financial Resource Strain: Low Risk  (9/20/2023)    Financial Resource Strain     Within the past 12 months, have you or your family members you live with been unable to get utilities (heat, electricity) when it was really needed?: No   Food Insecurity: Low Risk  (9/20/2023)    Food Insecurity     Within the past 12 months, did you worry that your food would run out before you got money to buy more?: No     Within the past 12 months, did the food you bought just not last and you didn t have money to get more?: No   Transportation Needs: Low Risk  (9/20/2023)    Transportation Needs     Within the past 12 months, has lack of transportation kept you from medical appointments, getting your medicines, non-medical meetings or appointments, work, or from getting things that you need?: No   Housing Stability: Low Risk  (9/20/2023)    Housing Stability     Do you have  housing? : Yes     Are you worried about losing your housing?: No        Allergies:  No Known Allergies     Medications:  I have reviewed this patient's medication profile and medications from this hospitalization.         Lab Results: personally reviewed.   Urea Nitrogen   Date Value Ref Range Status   09/22/2023 22.1 8.0 - 23.0 mg/dL Final   08/06/2022 9 7 - 30 mg/dL Final   07/03/2021 10 7 - 30 mg/dL Final     Creatinine   Date Value Ref Range Status   09/22/2023 0.36 (L) 0.51 - 0.95 mg/dL Final   07/03/2021 0.63 0.52 - 1.04 mg/dL Final     AST   Date Value Ref Range Status   09/22/2023 793 (HH) 0 - 45 U/L Final     Comment:     Reference intervals for this test were updated on 6/12/2023 to more accurately reflect our healthy population. There may be differences in the flagging of prior results with similar values performed with this method. Interpretation of those prior results can be made in the context of the updated reference intervals.   06/23/2021 9 0 - 45 U/L Final     ALT   Date Value Ref Range Status   09/22/2023 143 (H) 0 - 50 U/L Final     Comment:     Reference intervals for this test were updated on 6/12/2023 to more accurately reflect our healthy population. There may be differences in the flagging of prior results with similar values performed with this method. Interpretation of those prior results can be made in the context of the updated reference intervals.     06/23/2021 21 0 - 50 U/L Final     Alkaline Phosphatase   Date Value Ref Range Status   09/22/2023 1,046 (H) 35 - 104 U/L Final   06/23/2021 117 40 - 150 U/L Final     Albumin   Date Value Ref Range Status   09/22/2023 3.0 (L) 3.5 - 5.2 g/dL Final   01/12/2022 3.9 3.4 - 5.0 g/dL Final   06/23/2021 4.2 3.4 - 5.0 g/dL Final       RADIOLOGY:  Echocardiogram Complete    Result Date: 9/22/2023  [id number]^ANYA^LAYNE^L  [facility] [address], [place] [address]   Name: YESENIA FERGUSON MRN: [id number] : 1950 Study Date: 2023 10:23 AM Age: 72 yrs Gender: Female Patient Location: [facility] Reason For Study: CHF Ordering Physician: LAYNE JOYNER Referring Physician: Jesusita Franco Performed By: Cristiana Del Cid RDCS  BSA: 1.5 m2 Height: 64 in Weight: 112 lb HR: 112 BP: 128/72 mmHg ______________________________________________________________________________ Procedure Complete Portable Echo Adult. Optison (NDC #[id number]) given intravenously. Complete Echo Adult. ______________________________________________________________________________ Interpretation Summary  The left ventricular cavity is small. There is mild concentric left ventricular hypertrophy. Echo findings are not consistent with left ventricular outflow obstruction. Hyperdynamic left ventricular function The visual ejection fraction is >70%. There is mild (1+) mitral regurgitation. There is mild to moderate (1-2+) tricuspid regurgitation. Right ventricular systolic pressure is elevated, consistent with mild pulmonary hypertension. The inferior vena cava was normal in size with preserved respiratory variability. The rhythm was sinus tachycardia. The study was technically difficult. There is no comparison study available. ______________________________________________________________________________ Left Ventricle The left ventricular cavity is small. There is mild concentric left ventricular hypertrophy. Echo findings are not consistent with left ventricular outflow obstruction. Hyperdynamic left ventricular function. The visual ejection fraction is >70%. Normal left ventricular wall motion.  Right Ventricle The right ventricle is normal size. The right ventricular systolic function is normal.  Atria Normal left atrial size. Right atrial size is normal. Intact atrial septum.  Mitral Valve There is mild (1+) mitral regurgitation.  Tricuspid Valve There is mild to moderate (1-2+) tricuspid regurgitation. The  right ventricular systolic pressure is approximated at 32.5 mmHg plus the right atrial pressure. Right ventricular systolic pressure is elevated, consistent with mild pulmonary hypertension. IVC diameter <2.1 cm collapsing >50% with sniff suggests a normal RA pressure of 3 mmHg.  Aortic Valve The aortic valve is normal in structure and function. No aortic regurgitation is present. No aortic stenosis is present.  Pulmonic Valve The pulmonic valve is not well seen, but is grossly normal. There is trace pulmonic valvular regurgitation.  Vessels The aortic root is normal size. The inferior vena cava was normal in size with preserved respiratory variability.  Pericardium There is no pericardial effusion.  Rhythm The rhythm was sinus tachycardia. ______________________________________________________________________________ MMode/2D Measurements & Calculations IVSd: 1.1 cm  LVIDd: 2.4 cm LVIDs: 1.9 cm LVPWd: 1.2 cm FS: 20.6 % LV mass(C)d: 77.7 grams LV mass(C)dI: 50.8 grams/m2 Ao root diam: 3.3 cm LA dimension: 2.8 cm LA/Ao: 0.85 Ao root diam index Ht(cm/m): 2.0 Ao root diam index BSA (cm/m2): 2.2 LA Volume (BP): 19.0 ml LA Volume Index (BP): 12.4 ml/m2 RWT: 1.1  Doppler Measurements & Calculations MV E max larissa: 83.3 cm/sec MV A max larissa: 108.0 cm/sec MV E/A: 0.77 TR max larissa: 285.2 cm/sec TR max P.5 mmHg E/E' av.1 Lateral E/e': 9.0 Medial E/e': 13.2  ______________________________________________________________________________ Report approved by: Shreya Vance 2023 11:46 AM       XR Chest Port 1 View    Result Date: 2023  XR CHEST PORT 1 VIEW 2023 9:15 AM HISTORY: leukocytosis, unclear infectious source, ? volume overload  - rule out pneumonia or pulmonary edema COMPARISON: None. FINDINGS: 2014     IMPRESSION: Heart is normal in size. Lungs are clear. NIKIA HOOVER MD   SYSTEM ID:  [id number]    CT Abdomen Pelvis w Contrast    Result Date: 2023  EXAM: CT ABDOMEN PELVIS W CONTRAST  LOCATION: [facility] DATE: 9/21/2023 INDICATION: lung ca with hepatic mets, new bilateral lower abd pain, constipation COMPARISON: PET 07/24/2023 TECHNIQUE: CT scan of the abdomen and pelvis was performed following injection of IV contrast. Multiplanar reformats were obtained. Dose reduction techniques were used. CONTRAST: 53 ml Isovue 370 FINDINGS: LOWER CHEST: Multiple new/enlarging nodules in the lung bases with the largest being a new nodule measuring 1.8 cm in the right lower lobe. Coronary artery calcification. HEPATOBILIARY: Multiple new/enlarging hepatic metastases. The largest involves the majority of the right lobe and portions of the left lobe measuring 21.0 x 9.7 cm, previously 2.5 x 2.4 cm, image 50:5. Periportal edema. Cholelithiasis. PANCREAS: No mass. SPLEEN: A few new small splenic masses which most likely represent metastatic disease. ADRENAL GLANDS: Stable bilateral adrenal nodules. KIDNEYS/BLADDER: Bilateral pelviectasis without hydronephrosis. A few subcentimeter renal hypodensities which are too small to characterize. BOWEL: No obstruction or acute inflammation. Large stool burden in the colon. Normal appendix. LYMPH NODES: Increasing periportal lymphadenopathy with the largest node measuring 3.1 cm in short axis, image 70:5. Increasing bilateral external iliac adenopathy with the largest on the right measuring 1.4 cm in short axis, image 133:5. VASCULATURE: No abdominal aortic aneurysm. PELVIC ORGANS: Hysterectomy. MUSCULOSKELETAL: Degenerative changes. Osseous demineralization. Interval development of an L1 superior endplate compression fracture with approximately 30% loss of height which is most likely subacute.     IMPRESSION: 1.  Disease progression with increasing/enlarging pulmonary metastases, hepatic metastases, and abdominal/pelvic lymphadenopathy. 2.  Interval development of a subacute L1 compression fracture.    XR Shoulder Left 2 Views    Result Date:  9/21/2023  EXAM: XR SHOULDER LEFT 2 VIEWS LOCATION: [facility] DATE: 9/21/2023 INDICATION: Unwitnessed fall, strain shoulder COMPARISON: None.     IMPRESSION: Reverse total shoulder arthroplasty with longstem humeral component. Components are well seated. Osteopenia. No fractures are evident.    XR Abdomen 1 View    Result Date: 9/21/2023  ABDOMEN ONE VIEW  9/21/2023 10:06 AM HISTORY: Drug-induced constipation COMPARISON: None.     IMPRESSION: Nonobstructive bowel gas pattern. Moderate to severe retained colonic stool. No evidence for pneumoperitoneum. Moderate left hip osteoarthrosis. JAZIEL LEE MD   SYSTEM ID:  [id number]    CT Head w/o Contrast    Result Date: 9/13/2023  CT SCAN OF THE HEAD WITHOUT CONTRAST   9/13/2023 4:15 PM HISTORY: Headache; Bleeding risk; New or worsening HA; Currently taking an anticoagulant. TECHNIQUE:  Axial images of the head and coronal reformations without IV contrast material. Radiation dose for this scan was reduced using automated exposure control, adjustment of the mA and/or kV according to patient size, or iterative reconstruction technique. COMPARISON: MRI of the brain 7/25/2023. FINDINGS: There is no evidence of intracranial hemorrhage, mass, acute infarct or anomaly. The ventricles are normal in size, shape and configuration. Mild diffuse parenchymal volume loss. Mild patchy periventricular white matter hypodensities which are nonspecific, but likely related to chronic microvascular ischemic disease. Atherosclerotic calcifications of the carotid siphons. The visualized portions of the sinuses and mastoids appear normal. The bony calvarium and bones of the skull base appear intact. There appears to be a tiny osteoma along the outer table of the left parietal calvarium (series 5 image 27).     IMPRESSION: 1. No CT findings of acute intracranial process. 2. Brain atrophy and presumed chronic small vessel ischemic changes, as described. MICHELLE LU  MD NANNETTE   SYSTEM ID:  [id number]    US Biopsy Liver    Result Date: 9/6/2023  EXAM: 1. PERCUTANEOUS BIOPSY LIVER 2. ULTRASOUND GUIDANCE 3. CONSCIOUS SEDATION LOCATION: [facility] DATE: 9/6/2023 INDICATION: Lung mass. Liver lesion and bone lesions. PROCEDURE: Informed consent obtained. Site marked. Prior images reviewed. Required items made available. Patient identity was confirmed verbally and with arm band. Patient reevaluated immediately before administering sedation. Universal protocol was followed. Time out performed. The site was prepped and draped in sterile fashion. 6  mL of 1 percent lidocaine was infused into the local soft tissues. Using standard technique and under direct ultrasound guidance, a 18 gauge biopsy needle was used to obtain 5 core biopsies. Tissue was submitted to Pathology. The patient tolerated the procedure well. No complications. The mass appears to be larger than on the previous PET/CT study. SEDATION: Versed 1  mg. Fentanyl 100  mcg. The procedure was performed with administration intravenous conscious sedation with appropriate preoperative, intraoperative, and postoperative evaluation. 23  minutes of supervised face to face conscious sedation time was provided by a radiology nurse under my direct supervision.     IMPRESSION: 1.  Status post US-guided biopsy of the liver with moderate sedation . 2.  The liver mass appears to be larger than on the previous PET/CT study.    MR Lumbar Spine w/o & w Contrast    Result Date: 9/2/2023  EXAM: MR LUMBAR SPINE W/O and W CONTRAST LOCATION: [facility] DATE: 9/2/2023 INDICATION: Severe low back pain, radiating to bilateral and lateral thighs and buttocks, lung cancer with mets to bone including lumbar spine, CT showed compression fracture at L1 at site of met, 08 18. COMPARISON: CT lumbar spine 08/18/2023 CONTRAST: 5 mL Gadavist TECHNIQUE: Routine Lumbar Spine MRI without and with IV contrast.  FINDINGS: Nomenclature is based on 5 lumbar type vertebral bodies. Overall unchanged appearance of the pathologic L1 superior endplate compression fracture with 25% height loss. No significant retropulsion. There is ongoing marrow edema. Interval development of a subtle pathologic fracture of the T12 superior endplate with 5% height loss centrally. There is marrow edema. There are areas of osseous metastatic disease involving the visualized portions of T11, T12, L1, L2, L3, L5, sacrum and iliac bones, most notably S1. There is extraosseous extension of malignancy along the ventral aspect of the S1 vertebral body into the presacral space and into the right S1 neural foramen with narrowing of the foramen and abutment of the ventral aspect of the exiting right S1 nerve root. Extraosseous extension of malignancy also contacts the dorsal aspect of the extra foraminal portion of the exiting right L5 nerve root.  Normal distal spinal cord and cauda equina with conus medullaris at L1-L2. No extraspinal abnormality. T12-L1: Normal disc height and signal. No herniation. Normal facets. No spinal canal or neural foraminal stenosis. L1-L2: Normal disc height and signal. No herniation. Normal facets. No spinal canal or neural foraminal stenosis. L2-L3: Mild loss of disc height and moderate loss of T2-weighted signal in the disc. Mild bilateral facet arthropathy no spinal canal stenosis. No neural foraminal stenosis. L3-L4: Moderate loss of disc height and T2-weighted signal in the disc. Mild to moderate bilateral facet arthropathy. No spinal canal stenosis. No neural foraminal stenosis. L4-L5: Mild loss of disc height and moderate loss of T2-weighted signal in the disc. Severe bilateral facet arthropathy. No spinal canal or neural foraminal stenosis. L5-S1: Mild loss of disc height and T2-weighted signal in the disc. Mild bilateral facet arthropathy. No spinal canal or neural foraminal stenosis.     IMPRESSION: 1.  Osseous  metastatic disease involving the visualized thoracic spine, lumbar spine, and sacrum/pelvis. Edema involving the sacrum probably relates to the diffuse metastatic involvement. Superimposed acute/subacute pathologic sacral fractures are also possible. 2.  When compared to 08/18/2023, there is overall unchanged appearance of the pathologic L1 superior endplate compression fracture with 25% height loss. No significant retropulsion. There is ongoing marrow edema. 3.  There has been interval development of a subtle pathologic T12 superior endplate compression fracture with 5% height loss. There is ongoing marrow edema. 4.  There is extraosseous extension of malignancy involving the ventral aspect of S1 with extension into the presacral space. Malignant soft tissue also extends into the right S1 neural foramen with narrowing of the foramen and abutment of the ventral aspect of the exiting right S1 nerve root. 5.  Extraosseous extension of malignancy also contacts the dorsal aspect of the extra foraminal portion of the exiting right L5 nerve root.        Physical Exam:  Temp:  [97.6  F (36.4  C)-98.4  F (36.9  C)] 97.6  F (36.4  C)  Pulse:  [102-133] 116  Resp:  [12-20] 12  BP: (124-138)/(71-81) 135/77  SpO2:  [92 %-97 %] 92 %  Wt Readings from Last 3 Encounters:   09/21/23 50.9 kg (112 lb 3.4 oz)   09/21/23 49.4 kg (109 lb)   09/13/23 53.5 kg (118 lb)      Sleepy but wakes easily, chronically ill-appearing  Head NC, AT  Eyes anicteric without injection  Face symmetric, eyes conjugate  Mouth grossly normal, no drainage, speech fluent.  Neck grossly normal, no significant limits in ROM  Lungs unlabored, no respiratory distress  CV RR, normal rate, no edema  Abd soft, ntnd, benign  Extrems no deformities, no edema  Skin warm, dry  MSK thin overall, joints of hand normal; muscle bulk and tone in the bilat UE, LE proximally & distally  Neuro face symmetric, AO, no tremor  Neuropsych exam normal including affect, sensorium,  gross memory, thought processes, and fund of knowledge.       Medical decision making: High  Management discussed worsening symptom burden, advanced disease state, opioid management, quality of life issues  Hospitalist and nursing notes reviewed  Laboratory/imaging results reviewed  Outcomes of above conversation include: Hospice consultation, multiple medication adjustments, decision to not seek chemotherapy      Advance Care Planning Discussion: Thaddeus MOSLEY APRN met with patient and family today to discuss advance care planning.  Patient and family understand advance care planning discussion is voluntary and wished to proceed.  Discussion included: Medical management options, recommendation for hospice care, patient values and goals.  Total ACP time was 60, beginning at 2 PM and ending at 3 PM.            ALEJANDRO Pickering, [facility]  Palliative Care

## 2023-09-22 NOTE — PROGRESS NOTES
[facility]           Assessment & Plan        Beatriz Francis is a 72 year old female who presents on 9/21/2023 with abnormal liver tests and hyponatremia who was sent to the emergency department by primary care for further evaluation. Admitted for significant hyponatremia and workup for infection of unknown source.     Goals of care  Per discussion with palliative care 9/22 - patient and family want to treat her hyponatremia for now, but then want comfort focused care on discharge with plan to discharge home with hospice once ready.  Palliative care note pending as of pm 9/22 - see for details.    Of note, patient does have upcoming radiation appointments that she may want to keep for therapeutic benefit.  Palliative care discussing with rad onc.    Hyponatremia    Presenting sodium 122 and repeat 119 after given nearly 2 liters fluid. 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   - Fluid restriction of 1,200 mL daily  However, urine sodium <20. Osmolality 247, urine osmolality 316    - per discussion with oncology, still suspicious this is SIADH, but certainly not clear-cut with low serum sodium.  Continued on fluid restriction AM 9/22 and following sodium levels.  If improving will continue this.  If not likely would try some maintenance NS again as next step and if still not improving with that would consider diuresis and/or hypertonic saline as likely next step.     - goal sodium level 120-127 by AM 9/23      SIRS without obvious infectious source     Presented tachycardic (115) and leukocytotic (WBC 19.4) with left shift. Afebrile. Lactate 1.9. CRP is elevated at 130.83 and procalcitonin elevated at 0.69. CT did not show evidence for infectious source. Urinalysis negative for infection. Currently going through radiation for non small cell carcinoma. Low tolerance for starting antibiotics while  further work up for infectious source.  - Started piperacillin-tazobactam q6hrs on admission, continued for now  - reassess 9/22  - chest x-ray negative including no evidence of pulmonary edema   - Bcx x 2 pending       Widely metastatic cancer - suspect Non small cell carcinoma with metastasis but not clear-cut    History of lung cancer with metastasis to lumbar and liver.  Recently had liver biopsy on 09/14 which was consistent with non-small cell carcinoma with neuroendocrine differentiation.  Presently on radiation therapy and had treatment on 09/19.  CT abdomen obtained 09/21 showing multiple new/enlarging nodules in the lung bases with the largest being a new nodule measuring 1.8 cm in the right lower lobe.  It appears the disease is not responding to current therapy.  Did discuss with patient that her cancer does seem to be progressing. Started on dexamethasone 4 mg TID on 09/18 by oncology. Steroid course is to end 10/08.  - Continue dexamethasone 4 mg TID  - discussed with patient's oncologist Dr. Dr. Mckeon 9/22 - prognosis here is poor especially given undifferentiated cancer without clear-cut primary.  If patient wants to pursue treatment, he recommended transfer to evaluate for starting chemo as an inpatient likely on carbotaxol.  If unable to transfer but if improving over the weekend then could discharge home and follow-up with him early this coming week with plan to start chemo rapidly as an outpatient, but doing as an inpatient would be preferable.    - patient and spouse discussing goals/plans 9/22 to determine if they would like to pursue this.  Palliative care to see if able as well to assist.  UPDATE: as above, patient does not want chemotherapy but rather wants to treat current hyponatremia and then discharge home with hospice      Liver metastasis  Elevated LFTs  Current labs show ,  , alkaline phosphatase 1116 , and bilirubin 0.7.  Compared to labs on 09/13 there has been  significant worsening.  At that time , ALT 35, and alkaline phosphatase 813.  Bilirubin is stable and nonelevated which is reassuring if there is no obstruction.  CT abdomen pelvis 09/21 showing multiple new/enlarging hepatic metastasis.  The largest involves the majority of the right lobe and portions of the left lobe measuring 21.0 x 9.7 cm which was previously 2.5 x 2.4 cm compared to PET scan from 07/24/23.  There has been significant worsening of masses in the liver.  It is likely that this is the cause of worsening liver function.      Tachycardia    Presented tachycardic at 115 and has been fairly persistent since admission.  She does take metoprolol PTA and was given this dose late in the evening.  EKG 09/21 showing sinus rhythm.  Was evaluated by primary care the morning of admission for tachycardia.  Her dose of metoprolol XL was increased from 12.5 to 25 mg.   - Continue PTA metoprolol  - stable, fluids as per hyponatremia as above      Possible Heart failure      There does not appear to be prior diagnosis of heart failure.  Patient does endorse orthopnea and worsening bilateral lower extremity edema over the past week or so.  JVP does not seem distended.  On exam there is 1+ bilateral pitting edema extending up to the knees.  BNP is elevated at 991.  Review of most recent BMP from 09/13 shows elevation into the 1000's.  Most recent echocardiogram was a stress echo 04/2023 which showed normal EF and no significant valvular disease.  Considered gentle diuresis with Lasix however given significant hyponatremia instead tried urea.    - Urea 15 g 4 times daily started on admission - reassess 9/23  - following daily weights and strict I/O's.  Weight stable do down on admission, but complicated by underlying decline.  Does have some lower extremity edema.  Chest x-ray pending as above.    - echo pending    - just on room air to 1LNC  - holding off on lasix for now, but pending chest x-ray results and  response of hyponatremia may need to try some diuresis.       Constipation     CT showing large stool burden in colon. There is no evidence for obstruction or acute inflammation. Enema done in the ED x 2. No significant bowel movement yet.  - Senna daily   - MiraLAX daily   - dulcolax suppository prn      COPD without exacerbation   - continued home incruse ellipta and prn albuterol (inhaler replaced with nebs prn)  - appears stable so far        CAD   Bilateral carotid artery stenosis - moderate  Hyperlipidemia   - had Angio 5/2023 with KATIA x1 to the proximal LAD and residual 50% mid RCA and 20 to 30% throughout   - asymptomatic and appearing stable here so far   - continued home plavix and aspirin - reassess if hemoglobin continues to drop   - held crestor on admission given acutely elevated liver enzymes      Chronic microcytic anemia    Presented with hemoglobin 7.7. She has been chronically anemic since 06/2023. Recent baseline hemoglobin near 8.0.  - down to 7.3 9/22 but no apparent blood loss   - following       Chronic pain secondary to cancer    Pain is worsening. Was managed PTA with oxycodone 10-20 mg q3hrs PRN and was just switched to methadone 5 mg q12 hours the day physical therapy     On admission resumed her previous Morphine 30 mg q12 hrs  - Continued PTA oxycodone PRN and acetaminophen prn   - added IV Dilaudid PRN   - patient a bit sleepy with this but pain well-controlled AM 9/22  - palliative care consulted as above.        GERD  Continue omeprazole or equivalent        Clinically Significant Risk Factors Present on Admission      # Drug Induced Platelet Defect: home medication list includes an antiplatelet medication      # Hypertension: Noted on problem list          # COPD: noted on problem list            Diet: Fluid restriction 1200 ML FLUID - reassess if not responding like SIADH as above   Regular Diet Adult    DVT Prophylaxis: mechanical given known malignancy but with anemia as above  "and already on DAPT.      Engel Catheter: Not present  Code Status: No CPR- Do NOT Intubate  Lines: PIV        Diet  Orders Placed This Encounter      Regular Diet Adult                      Disposition Plan      Possible transfer pending patient and 's discussion today.   At least 2-3 more days inpatient regardless.                  Mendoza Fay MD, MD  Hospitalist Service  [facility]  Securely message with the Vocera Web Console (learn more here)  Text page via [employer] Paging/Directory             Interval History:   No new concerns. Patient sleepy but awakens easily to voice and answering questions appropriately.  No dyspnea, on 1LNC with NC mostly out and satting 96% on my evaluation.  Says she has minimal pain.  No fever or chills. No other changes or new concerns  no black or bloody stools overnighg.                 Review of Systems:    ROS: 10 point ROS neg other than the symptoms noted above in the HPI.           Medications:   Current active medications and PTA medications reviewed, see medication list for details.            Physical Exam:   Vitals were reviewed  Patient Vitals for the past 24 hrs:   BP Temp Temp src Pulse Resp SpO2 Height Weight   23 0310 128/72 -- -- 110 -- -- -- --   23 0305 127/71 -- -- 102 -- -- -- --   23 0300 126/74 -- -- 112 -- -- -- --   23 2201 138/80 98.4  F (36.9  C) Oral (!) 133 20 97 % -- --   23 2055 138/81 97.7  F (36.5  C) Oral (!) 132 18 95 % -- 50.9 kg (112 lb 3.4 oz)   23 1702 (!) 145/88 -- -- 118 -- 100 % -- --   23 1553 124/78 97.7  F (36.5  C) Tympanic (!) 122 18 94 % 1.638 m (5' 4.5\") 49.4 kg (109 lb)       Temperatures:  Current - Temp: 98.4  F (36.9  C); Max - Temp  Av.1  F (36.7  C)  Min: 97.7  F (36.5  C)  Max: 98.4  F (36.9  C)  Respiration range: Resp  Av  Min: 16  Max: 20  Pulse range: Pulse  Av  Min: 102  Max: 133  Blood pressure range: Systolic (24hrs), Av , " Min:124 , Max:150   ; Diastolic (24hrs), Av, Min:71, Max:88    Pulse oximetry range: SpO2  Av.4 %  Min: 94 %  Max: 100 %  I/O last 3 completed shifts:  In: 250 [P.O.:250]  Out: -     Intake/Output Summary (Last 24 hours) at 2023 0745  Last data filed at 2023 0724  Gross per 24 hour   Intake 250 ml   Output 1050 ml   Net -800 ml     EXAM:  General: awake and alert, NAD, mental status as above   Head: normocephalic  Neck: unremarkable, no lymphadenopathy   HEENT: oropharynx pink and moist    Heart: Regular rate and rhythm, no murmurs, rubs, or gallops  Lungs: clear to auscultation bilaterally with good air movement throughout  Abdomen: soft, mildly tender throughout, no guarding or rebound  Extremities: 1+ edema in lower extremities to below knees  Skin unremarkable as visualized.               Data:     Reviewed data:  Results for orders placed or performed during the hospital encounter of 23 (from the past 24 hour(s))   UA Macroscopic with reflex to Microscopic and Culture    Specimen: Urine, Clean Catch   Result Value Ref Range    Color Urine Yellow Colorless, Straw, Light Yellow, Yellow    Appearance Urine Clear Clear    Glucose Urine Negative Negative mg/dL    Bilirubin Urine Negative Negative    Ketones Urine Negative Negative mg/dL    Specific Gravity Urine 1.010 1.003 - 1.035    Blood Urine Moderate (A) Negative    pH Urine 6.0 5.0 - 7.0    Protein Albumin Urine 100 (A) Negative mg/dL    Urobilinogen Urine Normal Normal, 2.0 mg/dL    Nitrite Urine Negative Negative    Leukocyte Esterase Urine Negative Negative    Mucus Urine Present (A) None Seen /LPF    RBC Urine 2 <=2 /HPF    WBC Urine 3 <=5 /HPF    Squamous Epithelials Urine <1 <=1 /HPF    Narrative    Urine Culture not indicated   Sodium random urine   Result Value Ref Range    Sodium Urine mmol/L <20 mmol/L   Osmolality urine   Result Value Ref Range    Osmolality Urine 316 100 - 1,200 mmol/kg    Narrative    Reference Ranges  depend on patient's hydration status and renal function.   Neonates:  mmol/kg   2 years and older, random specimens: 100-1200 mmol/kg; Greater than 850 mmol/kg after 12 hour fluid restriction  Urine/serum osmolality ratio: 2 years and older: 1.0-3.0; 3.0-4.7 after 12 hour fluid restriction   CBC with platelets, differential    Narrative    The following orders were created for panel order CBC with platelets, differential.  Procedure                               Abnormality         Status                     ---------                               -----------         ------                     CBC with platelets and d...[[id number]]  Abnormal            Final result               Manual Differential[[id number]]          Abnormal            Final result                 Please view results for these tests on the individual orders.   Comprehensive metabolic panel   Result Value Ref Range    Sodium 119 (LL) 136 - 145 mmol/L    Potassium 4.6 3.4 - 5.3 mmol/L    Chloride 81 (L) 98 - 107 mmol/L    Carbon Dioxide (CO2) 25 22 - 29 mmol/L    Anion Gap 13 7 - 15 mmol/L    Urea Nitrogen 18.8 8.0 - 23.0 mg/dL    Creatinine 0.36 (L) 0.51 - 0.95 mg/dL    Calcium 9.1 8.8 - 10.2 mg/dL    Glucose 84 70 - 99 mg/dL    Alkaline Phosphatase 1,050 (H) 35 - 104 U/L     (HH) 0 - 45 U/L     (H) 0 - 50 U/L    Protein Total 6.2 (L) 6.4 - 8.3 g/dL    Albumin 3.4 (L) 3.5 - 5.2 g/dL    Bilirubin Total 0.7 <=1.2 mg/dL    GFR Estimate >90 >60 mL/min/1.73m2   Lipase   Result Value Ref Range    Lipase 21 13 - 60 U/L   CBC with platelets and differential   Result Value Ref Range    WBC Count 18.8 (H) 4.0 - 11.0 10e3/uL    RBC Count 2.96 (L) 3.80 - 5.20 10e6/uL    Hemoglobin 7.7 (L) 11.7 - 15.7 g/dL    Hematocrit 22.8 (L) 35.0 - 47.0 %    MCV 77 (L) 78 - 100 fL    MCH 26.0 (L) 26.5 - 33.0 pg    MCHC 33.8 31.5 - 36.5 g/dL    RDW 18.5 (H) 10.0 - 15.0 %    Platelet Count 253 150 - 450 10e3/uL   Manual Differential   Result Value Ref  Range    % Neutrophils 85 %    % Lymphocytes 7 %    % Monocytes 7 %    % Eosinophils 1 %    % Basophils 0 %    Absolute Neutrophils 16.0 (H) 1.6 - 8.3 10e3/uL    Absolute Lymphocytes 1.3 0.8 - 5.3 10e3/uL    Absolute Monocytes 1.3 0.0 - 1.3 10e3/uL    Absolute Eosinophils 0.2 0.0 - 0.7 10e3/uL    Absolute Basophils 0.0 0.0 - 0.2 10e3/uL    RBC Morphology Confirmed RBC Indices     Platelet Assessment  Automated Count Confirmed. Platelet morphology is normal.     Automated Count Confirmed. Platelet morphology is normal.    Polychromasia Slight (A) None Seen   CRP inflammation   Result Value Ref Range    CRP Inflammation 130.83 (H) <5.00 mg/L   Procalcitonin   Result Value Ref Range    Procalcitonin 0.69 (H) <0.05 ng/mL   Osmolality   Result Value Ref Range    Osmolality Blood 247 (LL) 280 - 301 mmol/kg    Narrative    Greater than 385 mmol/kg relates to stupor in hyperglycemia   Greater than 400 mmol/kg can relate to seizures   Greater than 420 mmol/kg can be lethal    Serum Osmalar Gap:   Normal <10   Larger suggest unmeasured substances present in serum (ethanol, methanol, isopropanol, mannitol, ethylene glycol).   CT Abdomen Pelvis w Contrast    Narrative    EXAM: CT ABDOMEN PELVIS W CONTRAST  LOCATION: [facility]  DATE: 9/21/2023    INDICATION: lung ca with hepatic mets, new bilateral lower abd pain, constipation  COMPARISON: PET 07/24/2023  TECHNIQUE: CT scan of the abdomen and pelvis was performed following injection of IV contrast. Multiplanar reformats were obtained. Dose reduction techniques were used.  CONTRAST: 53 ml Isovue 370    FINDINGS:   LOWER CHEST: Multiple new/enlarging nodules in the lung bases with the largest being a new nodule measuring 1.8 cm in the right lower lobe. Coronary artery calcification.    HEPATOBILIARY: Multiple new/enlarging hepatic metastases. The largest involves the majority of the right lobe and portions of the left lobe measuring 21.0 x 9.7 cm,  previously 2.5 x 2.4 cm, image 50:5. Periportal edema. Cholelithiasis.    PANCREAS: No mass.    SPLEEN: A few new small splenic masses which most likely represent metastatic disease.    ADRENAL GLANDS: Stable bilateral adrenal nodules.    KIDNEYS/BLADDER: Bilateral pelviectasis without hydronephrosis. A few subcentimeter renal hypodensities which are too small to characterize.    BOWEL: No obstruction or acute inflammation. Large stool burden in the colon. Normal appendix.    LYMPH NODES: Increasing periportal lymphadenopathy with the largest node measuring 3.1 cm in short axis, image 70:5. Increasing bilateral external iliac adenopathy with the largest on the right measuring 1.4 cm in short axis, image 133:5.    VASCULATURE: No abdominal aortic aneurysm.    PELVIC ORGANS: Hysterectomy.    MUSCULOSKELETAL: Degenerative changes. Osseous demineralization. Interval development of an L1 superior endplate compression fracture with approximately 30% loss of height which is most likely subacute.      Impression    IMPRESSION:   1.  Disease progression with increasing/enlarging pulmonary metastases, hepatic metastases, and abdominal/pelvic lymphadenopathy.  2.  Interval development of a subacute L1 compression fracture.   XR Shoulder Left 2 Views    Narrative    EXAM: XR SHOULDER LEFT 2 VIEWS  LOCATION: [facility]  DATE: 9/21/2023    INDICATION: Unwitnessed fall, strain shoulder  COMPARISON: None.      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 Components are well seated. Osteopenia. No fractures are evident.   Sodium   Result Value Ref Range    Sodium 120 (L) 136 - 145 mmol/L   Lactic Acid STAT   Result Value Ref Range    Lactic Acid 1.9 0.7 - 2.0 mmol/L   Sodium   Result Value Ref Range    Sodium 120 (L) 136 - 145 mmol/L   Comprehensive metabolic panel   Result Value Ref Range    Sodium 119 (LL) 136 - 145 mmol/L    Potassium 4.3 3.4 - 5.3  mmol/L    Chloride 81 (L) 98 - 107 mmol/L    Carbon Dioxide (CO2) 24 22 - 29 mmol/L    Anion Gap 14 7 - 15 mmol/L    Urea Nitrogen 22.1 8.0 - 23.0 mg/dL    Creatinine 0.36 (L) 0.51 - 0.95 mg/dL    Calcium 8.6 (L) 8.8 - 10.2 mg/dL    Glucose 74 70 - 99 mg/dL    Alkaline Phosphatase 1,046 (H) 35 - 104 U/L     (HH) 0 - 45 U/L     (H) 0 - 50 U/L    Protein Total 5.7 (L) 6.4 - 8.3 g/dL    Albumin 3.0 (L) 3.5 - 5.2 g/dL    Bilirubin Total 1.0 <=1.2 mg/dL    GFR Estimate >90 >60 mL/min/1.73m2   CBC with platelets   Result Value Ref Range    WBC Count 17.7 (H) 4.0 - 11.0 10e3/uL    RBC Count 2.86 (L) 3.80 - 5.20 10e6/uL    Hemoglobin 7.3 (L) 11.7 - 15.7 g/dL    Hematocrit 22.2 (L) 35.0 - 47.0 %    MCV 78 78 - 100 fL    MCH 25.5 (L) 26.5 - 33.0 pg    MCHC 32.9 31.5 - 36.5 g/dL    RDW 18.3 (H) 10.0 - 15.0 %    Platelet Count 194 150 - 450 10e3/uL           Attestation:  I have reviewed today's vital signs, notes, medications, labs and imaging.  Amount of time spent managing this patient today:  90 minutes.     Mendoza Fay MD

## 2023-09-22 NOTE — CONSULTS
CLINICAL NUTRITION SERVICES - ASSESSMENT NOTE     Nutrition Prescription    RECOMMENDATIONS FOR MDs/PROVIDERS TO ORDER:  None at this time    Malnutrition Status:    Severe malnutrition in the context of acute on chronic illness      Recommendations already ordered by Registered Dietitian (RD):  Please send chocolate ensure with meals TID.     Future/Additional Recommendations:  Monitor patient weight, intakes, labs and GI/BM  Monitor patient tolerance to supplement     REASON FOR ASSESSMENT  Beatriz Francis is a/an 72 year old female assessed by the dietitian for Admission Nutrition Risk Screen for positive and RN consult for weight loss.    NUTRITION HISTORY  Beatriz Francis is a 72 year old female who presents on 9/21/2023 with abnormal liver tests and hyponatremia. Patient admitted with hyponatremia, SIRS, non small cell carcinoma with metastasis, liver metastasis with elevated LFT's, tachycardia, HF, constipation, chronic macrocytic anemia, chronic pain secondary to cancer.     Patient scored positive on nutrition risk screen for 14-23 lb weight loss and decreased appetite.     Per patient interview  Patient very lethargic; intermittently responds to questions. Pt reports a decreased appetite for 2-3 weeks. She endorses weight loss and stated a typical weight to be around 120 lbs. Patient denied any chewing or swallowing difficulties. She notes that she has been experiencing GI symptoms in relation to her acute illness. Patient denied using supplements in the past. RD went over the importance on calorie and protein intakes for preservation of LBM. Patient was agreeable to chocolate supplements. Patient unable to stay awake for rest of visit so nutrition interview concluded.    CURRENT NUTRITION ORDERS  Diet: Orders Placed This Encounter      Regular Diet Adult      Intake/Tolerance: No recorded intakes in patient chart.    LABS  Labs reviewed  Sodium decreased-119 mg/dL  Alkaline phosphatase-1,046 U/L  ALT  "elevated-143 U/L  AST elevated-793 U/L    MEDICATIONS  Medications reviewed  Scheduled: Plavix, Fergon, Decadron, Ativan, Lopressor, MS contin, Protonix, Zosyn, Miralax, Crestor, Senna, URE-NA  Continuous: None pertinent  PRN: Dilaudid    ANTHROPOMETRICS  Height: 163.8 cm (5' 4.5\")  Most Recent Weight: 50.9 kg (112 lb 3.4 oz)    IBW: 55.7 kg  BMI: Normal BMI  Weight History:   Wt Readings from Last 35 Encounters:   09/21/23 50.9 kg (112 lb 3.4 oz)   09/21/23 49.4 kg (109 lb)   09/13/23 53.5 kg (118 lb)   09/07/23 53.1 kg (117 lb)   09/07/23 53.1 kg (117 lb)   09/05/23 51.7 kg (114 lb)   09/02/23 53.5 kg (118 lb)   08/24/23 53.5 kg (118 lb)   08/21/23 53.1 kg (117 lb)   08/18/23 51.7 kg (114 lb)   07/27/23 52.6 kg (116 lb)   07/24/23 53.6 kg (118 lb 3.2 oz)   07/13/23 52.4 kg (115 lb 9.6 oz)   06/20/23 54 kg (119 lb)   06/17/23 54.4 kg (120 lb)   06/08/23 54.9 kg (121 lb)   05/25/23 54.7 kg (120 lb 9.6 oz)   05/01/23 55.3 kg (122 lb)   03/03/23 56.2 kg (124 lb)   09/07/22 52.2 kg (115 lb)   07/25/22 53.1 kg (117 lb)   06/16/22 52.6 kg (116 lb)   03/02/22 49.9 kg (110 lb)   01/12/22 53.5 kg (118 lb)   08/09/21 56.7 kg (125 lb)   06/01/21 59.4 kg (131 lb)   04/21/21 60.3 kg (133 lb)   03/24/21 60.3 kg (133 lb)   03/03/21 60.3 kg (133 lb)   02/25/21 60.3 kg (133 lb)   02/24/21 60.8 kg (134 lb)   10/12/20 59 kg (130 lb 0.5 oz)   06/11/20 57.6 kg (127 lb)   01/13/20 56.7 kg (125 lb)   09/05/19 55.3 kg (122 lb)   9.7% non-significant weight loss noted within 6 months.  7.4% almost significant weight loss in 3 months  5% significant weight loss in one month.    Dosing Weight: 50.9 kg-actual BW used.    ASSESSED NUTRITION NEEDS  Estimated Energy Needs: 1,527-1,781 kcals/day (30 - 35 kcals/kg )  Justification: Increased needs  Estimated Protein Needs: 61-76 grams protein/day (1.2 - 1.5 grams of pro/kg)  Justification: Increased needs  Estimated Fluid Needs: 1,527-1,781 mL/day (1 mL/kcal)   Justification: Per provider pending " fluid status    PHYSICAL FINDINGS  See malnutrition section below.    MALNUTRITION  % Intake: < 75% for > 7 days (moderate)  % Weight Loss: 5% in 1 month (moderate)  Subcutaneous Fat Loss: Facial region:  mild to moderate and Lower arm:  mpderate  Muscle Loss: Thoracic region (clavicle, acromium bone, deltoid, trapezius, pectoral):  moderate and Upper arm (bicep, tricep):  moderate  Fluid Accumulation/Edema: Moderate  Malnutrition Diagnosis: Severe malnutrition in the context of acute on chronic illness    NUTRITION DIAGNOSIS  Malnutrition related to poor appetite as evidenced by decreased intakes for 2-3 weeks PTA, 5% weight loss noted in one month, moderate muscle and fat loss.       INTERVENTIONS  Implementation  Nutrition education for nutrition relationship to health/disease   Collaboration with other providers-IDT rounds  Medical food supplement therapy-Please send ensure chocolate TID     Goals  Patient to consume % of nutritionally adequate meal trays TID, or the equivalent with supplements/snacks.     Monitoring/Evaluation  Progress toward goals will be monitored and evaluated per protocol.  Madhuri Orozco RDN, SAWYER  Clinical Dietitian  Office: [phone]  Weekend pager: [phone]

## 2023-09-22 NOTE — PROGRESS NOTES
[facility] TRANSPORT NOTE  Data:   Reason for Transport:  Radiation Therapy    Beatriz Francis was transported from [facility] to Radiation Clinic via wheel chair at 1330.  Patient was accompanied by Registered Nurse. Equipment used for transport: None. Family was aware of reason for transport: yes    Action:  Report: given to EMILY Santamaria    Response:  Patient's condition when transferred off unit was stable.    Valarie Duran RN

## 2023-09-22 NOTE — PLAN OF CARE
"[place] [facility] ADMISSION NOTE    Patient admitted to room 2312 at approximately 2100 via cart from emergency room. Patient was accompanied by transport tech.     Verbal SBAR report received from Liat prior to patient arrival.     Patient ambulated to bed with stand-by assist. Patient alert and oriented X 3. Pain is controlled with current analgesics.  Medication(s) being used: Methadone at home.  . Admission vital signs: Blood pressure 138/80, pulse (!) 133, temperature 98.4  F (36.9  C), temperature source Oral, resp. rate 20, height 1.638 m (5' 4.5\"), weight 50.9 kg (112 lb 3.4 oz), SpO2 97 %, not currently breastfeeding. Patient was oriented to plan of care, call light, bed controls, tv, telephone, bathroom, and visiting hours.     Risk Assessment    The following safety risks were identified during admission: fall. Yellow risk band applied: YES.     Skin Initial Assessment    This writer admitted this patient and completed a full skin assessment and Juan Carlos score in the Adult PCS flowsheet. Appropriate interventions initiated as needed.   Scattered bruises: left forehead, hands, elbows, arms and legs.  Blanchable redness: Elbows, sacrum/coccyx, feet and toes. Mepilex applied to elbows and sacrum/coccyx.  Left hip with scab/burn from heating pad per patient report.   Secondary skin check completed by Bronwyn DIAZ.         Education    Patient has a [place] to Observation order: No  Observation education completed and documented: N/A      Ling Finnegan RN                            "

## 2023-09-23 NOTE — PROGRESS NOTES
"Patient having intermittent confusion bouts, however, re-orients quickly.  \"I am so tired of being here.\"  Lungs remain diminished and coarse at times.  Patient using BEDSIDE COMMODE frequently.  "

## 2023-09-23 NOTE — CONSULTS
Care Management Initial Consult    General Information  Assessment completed with: Patient, Spouse or significant other, Family,    Type of CM/SW Visit: Initial Assessment    Primary Care Provider verified and updated as needed: Yes   Readmission within the last 30 days:        Reason for Consult: discharge planning  Advance Care Planning: Advance Care Planning Reviewed: no concerns identified        Communication Assessment  Patient's communication style: spoken language (English or Bilingual)    Hearing Difficulty or Deaf: no   Wear Glasses or Blind: yes    Cognitive  Cognitive/Neuro/Behavioral: WDL                      Living Environment:   People in home: spouse     Current living Arrangements: house      Able to return to prior arrangements: yes     Family/Social Support:  Care provided by: spouse/significant other  Provides care for: no one, unable/limited ability to care for self  Marital Status:   , Sibling(s)  Luis A       Description of Support System: Supportive, Involved    Support Assessment: Adequate family and caregiver support, Adequate social supports    Current Resources:   Patient receiving home care services: No     Community Resources: None  Equipment currently used at home: none  Supplies currently used at home:      Employment/Financial:  Employment Status: retired        Financial Concerns: No concerns identified     Does the patient's insurance plan have a 3 day qualifying hospital stay waiver?  Yes   Will the waiver be used for post-acute placement? No    Lifestyle & Psychosocial Needs:  Social Determinants of Health     Food Insecurity: Low Risk  (9/20/2023)    Food Insecurity     Within the past 12 months, did you worry that your food would run out before you got money to buy more?: No     Within the past 12 months, did the food you bought just not last and you didn t have money to get more?: No   Depression: Not at risk (8/24/2023)    PHQ-2     PHQ-2 Score: 2   Housing  Stability: Low Risk  (9/20/2023)    Housing Stability     Do you have housing? : Yes     Are you worried about losing your housing?: No   Tobacco Use: High Risk (9/21/2023)    Patient History     Smoking Tobacco Use: Every Day     Smokeless Tobacco Use: Never     Passive Exposure: Current   Financial Resource Strain: Low Risk  (9/20/2023)    Financial Resource Strain     Within the past 12 months, have you or your family members you live with been unable to get utilities (heat, electricity) when it was really needed?: No   Alcohol Use: Not on file   Transportation Needs: Low Risk  (9/20/2023)    Transportation Needs     Within the past 12 months, has lack of transportation kept you from medical appointments, getting your medicines, non-medical meetings or appointments, work, or from getting things that you need?: No   Physical Activity: Not on file   Interpersonal Safety: Not on file   Stress: Not on file   Social Connections: Not on file     Functional Status:  Prior to admission patient needed assistance:   Dependent ADLs:: Ambulation-walker, Bathing, Dressing, Grooming, Transfers, Wheelchair-with assist  Dependent IADLs:: Cleaning, Cooking, Laundry, Shopping, Meal Preparation, Medication Management, Money Management, Transportation     Mental Health Status:  Mental Health Status: No Current Concerns       Chemical Dependency Status:  Chemical Dependency Status: No Current Concerns           Values/Beliefs:  Spiritual, Cultural Beliefs, Taoism Practices, Values that affect care: no             Additional Information:    CM received referral to assist with hospice services at discharge. Met with patient, sister, and spouse at bedside and introduced self and role. Patient resides with spouse, Luis A, in a private home in the community. Patient is hoping to discharge home with hospice services tomorrow. CM discussed hospice agencies and answered any questions patient/family had regarding hospice services. Patient  has plenty of family support for 24/7 supervision.     Family interested in hiring private pay home care services for additional support. CM provided brochures of agencies to call.     Patient and family are determined to discharge tomorrow and are ok with any hospice agency that is able to accommodate a hospice sign on tomorrow.     Patient has been accepted by [facility] (Phone: [phone] Fax: [phone]) for hospice sign on tomorrow at 3:15pm.     Spouse request that CM arrange a w/c transport at discharge and is aware of private pay costs.     PLAN: Home with [facility] hospice 9/24 via [place] w/c between 2:10-2:50PM    Shefali Price RN  Care Transitions  [phone]

## 2023-09-23 NOTE — PROGRESS NOTES
[facility]           Assessment & Plan        Beatriz Francis is a 72 year old female who presents on 9/21/2023 with abnormal liver tests and hyponatremia who was sent to the emergency department by primary care for further evaluation. Admitted for significant hyponatremia and workup for infection of unknown source.     Goals of care  DNR- DNI  home hospice  Of note, patient does have upcoming radiation appointments that she may want to keep for therapeutic benefit.  Palliative care discussing with rad onc.    Hyponatremia LIKELY SIADH  Fluid restrict  Na 122 this am      SIRS without obvious infectious source     Presented tachycardic (115) and leukocytotic (WBC 19.4) with left shift. Afebrile. Lactate 1.9. CRP is elevated at 130.83 and procalcitonin elevated at 0.69. CT did not show evidence for infectious source. Urinalysis negative for infection. Currently going through radiation for non small cell carcinoma. Low tolerance for starting antibiotics while further work up for infectious source.  - Started piperacillin-tazobactam q6hrs on admission, continued for now  - reassess 9/24.  - chest x-ray negative including no evidence of pulmonary edema   - Bcx x 2 NGTD       Widely metastatic cancer - suspect Non small cell carcinoma with metastasis but not clear-cut    History of lung cancer with metastasis to lumbar and liver.  Recently had liver biopsy on 09/14 which was consistent with non-small cell carcinoma with neuroendocrine differentiation.  Presently on radiation therapy and had treatment on 09/19.  CT abdomen obtained 09/21 showing multiple new/enlarging nodules in the lung bases with the largest being a new nodule measuring 1.8 cm in the right lower lobe.  It appears the disease is not responding to current therapy.  Did discuss with patient that her cancer does seem to be progressing. Started on dexamethasone 4 mg TID on 09/18 by oncology. Steroid course is to end 10/08.  -  Continue dexamethasone 4 mg TID  - discussed with patient's oncologist Dr. Dr. Mckeon 9/22 - prognosis here is poor especially given undifferentiated cancer without clear-cut primary.  If patient wants to pursue treatment, he recommended transfer to evaluate for starting chemo as an inpatient likely on carbotaxol.  If unable to transfer but if improving over the weekend then could discharge home and follow-up with him early this coming week with plan to start chemo rapidly as an outpatient, but doing as an inpatient would be preferable.    - patient and spouse discussing goals/plans 9/22 to determine if they would like to pursue this.  Palliative care to see if able as well to assist.  UPDATE: as above, patient does not want chemotherapy but rather wants to treat current hyponatremia and then discharge home with hospice      Liver metastasis  Elevated LFTs  Current labs show ,  , alkaline phosphatase 1116 , and bilirubin 0.7.  Compared to labs on 09/13 there has been significant worsening.  At that time , ALT 35, and alkaline phosphatase 813.  Bilirubin is stable and nonelevated which is reassuring if there is no obstruction.  CT abdomen pelvis 09/21 showing multiple new/enlarging hepatic metastasis.  The largest involves the majority of the right lobe and portions of the left lobe measuring 21.0 x 9.7 cm which was previously 2.5 x 2.4 cm compared to PET scan from 07/24/23.  There has been significant worsening of masses in the liver.  It is likely that this is the cause of worsening liver function.      Tachycardia    Presented tachycardic at 115 and has been fairly persistent since admission.  She does take metoprolol PTA and was given this dose late in the evening.  EKG 09/21 showing sinus rhythm.  Was evaluated by primary care the morning of admission for tachycardia.  Her dose of metoprolol XL was increased from 12.5 to 25 mg.   - Continue PTA metoprolol  - stable, fluids as per  hyponatremia as above      Possible Heart failure      There does not appear to be prior diagnosis of heart failure.  Patient does endorse orthopnea and worsening bilateral lower extremity edema over the past week or so.  JVP does not seem distended.  On exam there is 1+ bilateral pitting edema extending up to the knees.  BNP is elevated at 991.  Review of most recent BMP from 09/13 shows elevation into the 1000's.  Most recent echocardiogram was a stress echo 04/2023 which showed normal EF and no significant valvular disease.  Considered gentle diuresis with Lasix however given significant hyponatremia instead tried urea.    - Urea 15 g 4 times daily started on admission - reassess 9/23  - following daily weights and strict I/O's.  Weight stable do down on admission, but complicated by underlying decline.  Does have some lower extremity edema.  Chest x-ray pending as above.    - echo pending    - just on room air to 1LNC  - holding off on lasix for now, but pending chest x-ray results and response of hyponatremia may need to try some diuresis.       Constipation     CT showing large stool burden in colon. There is no evidence for obstruction or acute inflammation. Enema done in the ED x 2. No significant bowel movement yet.  - Senna daily   - MiraLAX daily   - dulcolax suppository prn      COPD without exacerbation   - continued home incruse ellipta and prn albuterol (inhaler replaced with nebs prn)  - appears stable so far        CAD   Bilateral carotid artery stenosis - moderate  Hyperlipidemia   - had Angio 5/2023 with KATIA x1 to the proximal LAD and residual 50% mid RCA and 20 to 30% throughout   - asymptomatic and appearing stable here so far   - continued home plavix and aspirin - reassess if hemoglobin continues to drop   - held crestor on admission given acutely elevated liver enzymes      Chronic microcytic anemia    Presented with hemoglobin 7.7. She has been chronically anemic since 06/2023. Recent  baseline hemoglobin near 8.0.  - down to 7.3  but no apparent blood loss   - following       Chronic pain secondary to cancer    Pain is worsening. Was managed PTA with oxycodone 10-20 mg q3hrs PRN and was just switched to methadone 5 mg q12 hours the day physical therapy     On admission resumed her previous Morphine 30 mg q12 hrs  - Continued PTA oxycodone PRN and acetaminophen prn   - added IV Dilaudid PRN   - patient a bit sleepy with this but pain well-controlled AM   - palliative care consulted as above.        GERD  Continue omeprazole or equivalent        Clinically Significant Risk Factors Present on Admission      # Drug Induced Platelet Defect: home medication list includes an antiplatelet medication      # Hypertension: Noted on problem list          # COPD: noted on problem list            Diet: Fluid restriction 1200 ML FLUID - reassess if not responding like SIADH as above   Regular Diet Adult    DVT Prophylaxis: mechanical given known malignancy but with anemia as above and already on DAPT.      Engel Catheter: Not present  Code Status: No CPR- Do NOT Intubate  Lines: PIV        Diet  Orders Placed This Encounter      Regular Diet Adult                      Disposition Plan      Possible transfer pending patient and 's discussion today.   At least 2-3 more days inpatient regardless.                  Martin Beltran MD                     Physical Exam:   Vitals were reviewed  Patient Vitals for the past 24 hrs:   BP Temp Temp src Pulse Resp SpO2   23 0700 139/80 -- Oral (!) 125 16 96 %   23 0456 134/87 97.6  F (36.4  C) -- -- 18 95 %   23 2247 (!) 141/82 98  F (36.7  C) Oral 109 12 96 %   23 1950 (!) 143/89 97.5  F (36.4  C) Oral 115 12 94 %   23 1643 135/77 -- -- 116 -- --   23 1308 124/76 97.6  F (36.4  C) Oral 114 12 92 %         Temperatures:  Current - Temp: 98.4  F (36.9  C); Max - Temp  Av.1  F (36.7  C)  Min: 97.7  F (36.5  C)  Max: 98.4  F  (36.9  C)  Respiration range: Resp  Av  Min: 16  Max: 20  Pulse range: Pulse  Av  Min: 102  Max: 133  Blood pressure range: Systolic (24hrs), Av , Min:124 , Max:150   ; Diastolic (24hrs), Av, Min:71, Max:88    Pulse oximetry range: SpO2  Av.4 %  Min: 94 %  Max: 100 %  I/O last 3 completed shifts:  In: 790 [P.O.:790]  Out: 3295 [Urine:3295]    Intake/Output Summary (Last 24 hours) at 2023 0705  Last data filed at 2023 0724  Gross per 24 hour   Intake 250 ml   Output 1050 ml   Net -800 ml     EXAM:  General: awake and alert, NAD, mental status as above   Head: normocephalic  Neck: unremarkable, no lymphadenopathy   HEENT: oropharynx pink and moist    Heart: Regular rate and rhythm, no murmurs, rubs, or gallops  Lungs: clear to auscultation bilaterally with good air movement throughout  Abdomen: soft, mildly tender throughout, no guarding or rebound  Extremities: 1+ edema in lower extremities to below knees  Skin unremarkable as visualized.               Data:     Reviewed data:  Results for orders placed or performed during the hospital encounter of 23 (from the past 24 hour(s))   Sodium   Result Value Ref Range    Sodium 121 (L) 136 - 145 mmol/L   Sodium   Result Value Ref Range    Sodium 120 (L) 136 - 145 mmol/L   Sodium   Result Value Ref Range    Sodium 120 (L) 136 - 145 mmol/L   Magnesium   Result Value Ref Range    Magnesium 1.9 1.7 - 2.3 mg/dL   Phosphorus   Result Value Ref Range    Phosphorus 3.0 2.5 - 4.5 mg/dL   CBC with platelets   Result Value Ref Range    WBC Count 16.1 (H) 4.0 - 11.0 10e3/uL    RBC Count 2.96 (L) 3.80 - 5.20 10e6/uL    Hemoglobin 7.4 (L) 11.7 - 15.7 g/dL    Hematocrit 23.1 (L) 35.0 - 47.0 %    MCV 78 78 - 100 fL    MCH 25.0 (L) 26.5 - 33.0 pg    MCHC 32.0 31.5 - 36.5 g/dL    RDW 18.6 (H) 10.0 - 15.0 %    Platelet Count 148 (L) 150 - 450 10e3/uL   Comprehensive metabolic panel   Result Value Ref Range    Sodium 122 (L) 136 - 145 mmol/L     Potassium 4.4 3.4 - 5.3 mmol/L    Chloride 84 (L) 98 - 107 mmol/L    Carbon Dioxide (CO2) 23 22 - 29 mmol/L    Anion Gap 15 7 - 15 mmol/L    Urea Nitrogen 38.0 (H) 8.0 - 23.0 mg/dL    Creatinine 0.38 (L) 0.51 - 0.95 mg/dL    Calcium 9.3 8.8 - 10.2 mg/dL    Glucose 87 70 - 99 mg/dL    Alkaline Phosphatase 1,066 (H) 35 - 104 U/L     (HH) 0 - 45 U/L     (H) 0 - 50 U/L    Protein Total 5.6 (L) 6.4 - 8.3 g/dL    Albumin 2.8 (L) 3.5 - 5.2 g/dL    Bilirubin Total 1.0 <=1.2 mg/dL    GFR Estimate >90 >60 mL/min/1.73m2              Martin Beltran MD

## 2023-09-23 NOTE — PROGRESS NOTES
"Reason for Admission:  Primary malignant neoplasm of lung    Activity:  A1    Neuro: Needs to be reoriented at times.     Resp: Posterior expiratory wheezes.    GI/: Up to the commode.      Diet: Patient not eating food, but is drinking fluids.    IV Access/Drains: Saline lock    Vitals: BP (!) 145/87 (BP Location: Right arm, Patient Position: Supine, Cuff Size: Adult Regular)   Pulse (!) 127   Temp 97.3  F (36.3  C) (Oral)   Resp 16   Ht 1.638 m (5' 4.5\")   Wt 50.9 kg (112 lb 3.4 oz)   SpO2 96%   BMI 18.96 kg/m       Pain: PRN oxycodone 10mg given for breakthrough pain.     Plan:  Possible discharge tomorrow with hospice care.    "

## 2023-09-23 NOTE — PROGRESS NOTES
Reason for Admission: Lung Cancer     Activity:  A1, patient had radiation at 1340     Neuro: A&O x 4     Resp: Bilateral coarse lung sounds      GI/: Urinary retention, patient has 450 mL output     Skin: Scattered bruising, preventative Mepilex foam dressing to bilateral elbows. Patient declined full body skin assessment.      Diet: Regular, 1200 mL fluid restriction in place. Has used 1,040 as of 1900.     IV Access/Drains: Saline lock, IV Zosyn given every 6 hours.      Pain:  Chronic pain, scheduled medication given only, patient does not complain of additional pain.      Plan:  Patient working with Palliative care to be sent home with possible hospice.

## 2023-09-24 NOTE — PROGRESS NOTES
"Pt A & O x 3, however forgetful at times, pain not well controlled 6-7/10 as pt does not want to take more pain medication, she stated that she does not want to be loopy. Able to make needs known, strict I/O, fluid restriction 1200 ml, pt did not eat lunch nor dinner, did not drink supplement, up to bed side commode with assist of 1 and gait belt, mepilex to coccyx and bilateral elbows, BLE edema, PCD's off currently at pt request, bed bath given, bed alarm on, PIV in L lower forearm, saline locked and flushed, floating heels as able, using pillows to shift weight, /84   Pulse (!) 130   Temp 97.3  F (36.3  C) (Oral)   Resp 16   Ht 1.638 m (5' 4.5\")   Wt 50.9 kg (112 lb 3.4 oz)   SpO2 96%   BMI 18.96 kg/m       Pt was tearful at times, asking questions about dying, she was hoping to hang on longer for her family, Pt stated that she does not want to be loopy and drugged up and declined pain medications when asked, spoke with pt that she has the right to decline to be present with her loved ones.      had a very difficult time with this during his visit, he request x 2 for pain medication to be given, Writer went to bedside with medication and pt again declined, medication returned.       "

## 2023-09-24 NOTE — PROGRESS NOTES
Care Management Discharge Note    Discharge Date: 09/24/2023  Discharge Disposition: Home, Hospice  Discharge Services: Transportation Services  Discharge DME: None  Discharge Transportation: agency  Private pay costs discussed: transportation costs  Does the patient's insurance plan have a 3 day qualifying hospital stay waiver?  Yes   Will the waiver be used for post-acute placement? No  Patient/family educated on Medicare website which has current facility and service quality ratings: yes  Education Provided on the Discharge Plan: Yes  Persons Notified of Discharge Plans: patient, spouse  Patient/Family in Agreement with the Plan: yes  Handoff Referral Completed: Yes    Additional Information:    Patient is medically ready for discharge today. Patient will discharge home with [facility] (Phone: [phone] Fax: [phone]) services. Per EMILY Serra with [facility]- they will meet patient at her home around 3:15PM for hospice sign on. Maryse spoke with spouse yesterday to arrange any equipment needed in the home prior to sign on.     Patient will be sent with 2 days of comfort medications.     PLAN: Home with [facility] via [employer] [facility]/[employer] between [phone]    SUZETTE MENDEZ RN

## 2023-09-24 NOTE — PLAN OF CARE
Vitally stable, PRN meds given for pain. Family present at discharge with ordered medications. [facility] called, left message with Ben, no return call received.     Goal Outcome Evaluation:      Plan of Care Reviewed With: spouse, patient    Overall Patient Progress: decliningOverall Patient Progress: declining    Outcome Evaluation: Going on home on hospice care.

## 2023-09-24 NOTE — PROGRESS NOTES
"Pt alert, oriented, forgetful. Pt denied need for prn pain medication. On RA. Has frequent coarse cough. IV saline locked. Assist 1 to commode. Mepilex in place to coccyx. 1200 ml fluid restriction.     Pt states she wants to go home.     /86   Pulse 118   Temp 97.2  F (36.2  C) (Axillary)   Resp 16   Ht 1.638 m (5' 4.5\")   Wt 50.9 kg (112 lb 3.4 oz)   SpO2 96%   BMI 18.96 kg/m      "

## 2023-09-24 NOTE — PLAN OF CARE
[place] [facility] DISCHARGE NOTE    Patient discharged to home at 2:30 pm via wheel chair. Accompanied by spouse and staff. Discharge instructions reviewed with patient and spouse, opportunity offered to ask questions. Prescriptions sent to patients preferred pharmacy. All belongings sent with patient.    Valarie Duran RN    Goal Outcome Evaluation:      Plan of Care Reviewed With: spouse, patient    Overall Patient Progress: declining Overall Patient Progress: declining    Outcome Evaluation: Going on home on hospice care.

## 2023-09-25 NOTE — TELEPHONE ENCOUNTER
The pts appointment for 9/28/23 had been cancelled without a listed reason.  I called to reschedule the appointment and the pts spouse stated that no further appointment are needed the pt is now in hospice.  Bernabe Sal on 9/25/2023 at 6:38 PM

## 2023-09-25 NOTE — PROGRESS NOTES
Clinic Care Coordination Contact    Situation: Patient chart reviewed by care coordinator.    Background: 9/21/2023 - 9/24/2023 (3 days)  [facility]     Martin Beltran MD  Last attending  Treatment team Primary malignant neoplasm of lung metastatic to other site, unspecified laterality (H)  Principal problem       Assessment: Patient was sent home on Hospice care     Plan/Recommendations: No outreach made     [facility]   Uzma Torres RN, Care Coordinator   [facility]'s   E-mail [email or web address]   [phone]

## 2023-09-27 LAB
BACTERIA BLD CULT: NO GROWTH
BACTERIA BLD CULT: NO GROWTH

## 2023-09-29 ENCOUNTER — TELEPHONE (OUTPATIENT)
Dept: FAMILY MEDICINE | Facility: CLINIC | Age: 73
End: 2023-09-29

## 2023-09-29 NOTE — TELEPHONE ENCOUNTER
Pt's spouse calling and wanted to let Jesusita know that pt passed away earlier this week. Also wondering if Jeussita is able to complete the death cert when she is able to.    Marielena Blackwell Patient

## 2023-10-10 LAB
BKR LAB AP ADD'L TEST STATUS: NORMAL
BKR LAB LINKED CASE OR SPECIMEN ID: NORMAL
BKR LAB LINKED CASE OR TEST ORDER DATE: NORMAL
BKR PATH ADDL TEST FINAL COMMENTS: NORMAL

## 2023-10-31 ENCOUNTER — DOCUMENTATION ONLY (OUTPATIENT)
Dept: RADIATION THERAPY | Facility: OUTPATIENT CENTER | Age: 73
End: 2023-10-31

## 2023-10-31 NOTE — PROGRESS NOTES
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   INTENT OF RADIOTHERAPY: Palliative  PATHOLOGY:  LIVER, MASS, ULTRASOUND GUIDED NEEDLE CORE BIOPSY:  -POORLY DIFFERENTIATED MALIGNANT NEOPLASM, SEE COMMENT AND MICROSCOPIC DESCRIPTION  -NECROSIS PRESENT  -NEGATIVE PD-L1 EXPRESSION (TPS <1%)                                 STAGE: IV  CONCURRENT SYSTEMIC THERAPY:    No     ONCOLOGIC HISTORY:          Ms. 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     The patient has been undergoing lung cancer screening.  CT scan of the chest on 2023 demonstrated a new 10 mm lesion in the right upper lobe.  Follow-up CT scan of the chest on  demonstrated mild interval increase in size of the right upper lobe nodule measuring 18 mm in maximum dimension.  CT scan of the chest on 2023 demonstrated increase in the right upper lobe lesion up to 23 mm in size.  Patient was seen by Dr. Ng of surgical team.  Previously IR team had declined percutaneous biopsy due to ability not to obtain a safe window due to proximity of vessels as well as risk for pneumothorax.  Dr. Ng discussed possibility of surgical options but also discussed staging with PET scan.  She was referred to our clinic to discuss potential role of radiation therapy as a part of treatment strategy.  PET scan obtained on 2023 demonstrated hypermetabolic activity in the right upper lobe, right supraglottic region, mediastinal adenopathy, right hepatic lobe, and osseous structures concerning for metastatic disease.  Follow-up MRI of the brain on 2023 was negative.  On 2023 the patient with biopsy of the liver which demonstrated poorly  differentiated carcinoma, favoring non-small cell lung cancer with neuroendocrine differentiation.  The patient in the interim has been seen in the emergency department for worsening lower back/tailbone pain with associated radiculopathy.  MRI of the lumbar spine has demonstrated widespread osseous disease, most notable in the sacral region with impingement on nerve roots.  Patient describes severe pain.  Current pain regimen includes morphine 30 mg twice daily as well as oxycodone instant release as needed.  She is also been started on steroid.  She presented to discuss potential role of palliative radiation therapy. She did not complete the plan for 30 Gray in 10 fractions.     SITE OF TREATMENT: Sacrum    DATES  OF TREATMENT: 9/14/23 to 9/22/23    TOTAL DOSE OF TREATMENT: 2,100 cGy    DOSE PER FRACTION OF TREATMENT: 300 cGy x 7 fractions       COMMENT/TOXICITY:                    PAIN MANAGEMENT:  morphine 30 mg twice daily as well as oxycodone interlaced as needed.  Pain level remains high.  Working with palliative care team.                           FOLLOW UP PLAN: prn    CC  Patient Care Team:  Jesusita Franco APRN CNP as PCP - General (Family Medicine)  Freddie Espino MD as MD (Orthopedics)  Ankit Morton MD as MD (Orthopedics)  Lolly Magana PA-C as Physician Assistant (Dermatology)  Jesusita Franco APRN CNP as Assigned PCP  Dlae Cortez MD as MD (Critical Care)  Marquise Marquez MD as MD (Cardiovascular Disease)  Dale Cortez MD as Assigned Pulmonology Provider  Agatha Oreilly APRN CNP as Nurse Practitioner (Cardiovascular Disease)  Agatha Oreilly APRN CNP as Assigned Heart and Vascular Provider  Maggie Ng MD as MD (Cardiovascular & Thoracic Surgery)  Dale Cortez MD as MD (Critical Care)  Reed Shaw MD as MD (Radiation Oncology)  Maggie Ng MD as Assigned Surgical Provider  Uri Mckeon MD as MD (Medical Oncology)  Reed Shaw  MD Evert as Assigned Cancer Care Provider       DIANA Ellsworth  Department of Radiation Oncology  [facility]
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
## 2024-11-06 ASSESSMENT — ANXIETY QUESTIONNAIRES: GAD7 TOTAL SCORE: 0

## 2025-02-12 NOTE — NURSING NOTE
"Chief Complaint   Patient presents with     Wellness Visit     /84   Pulse 84   Resp 12   Ht 1.651 m (5' 5\")   Wt 59.4 kg (131 lb)   BMI 21.80 kg/m   Estimated body mass index is 21.8 kg/m  as calculated from the following:    Height as of this encounter: 1.651 m (5' 5\").    Weight as of this encounter: 59.4 kg (131 lb).  Patient presents to the clinic using No DME      Health Maintenance that is potentially due pending provider review:    Health Maintenance Due   Topic Date Due     ANNUAL REVIEW OF HM ORDERS  Never done     LIPID  10/24/2019     FALL RISK ASSESSMENT  10/24/2019     ADVANCE CARE PLANNING  02/19/2020     COLORECTAL CANCER SCREENING  11/09/2020     TREATMENT AGREEMENT FOR CHRONIC PAIN MANAGEMENT  01/13/2021     ZOSTER IMMUNIZATION (3 of 3) 02/16/2021     HIRAL ASSESSMENT  06/11/2021        Possibly completing today per provider review.        " no

## 2025-03-04 NOTE — LETTER
[facility]  01/13/20    Patient: Beatriz Francis  YOB: 1950  Medical Record Number: [id number]                                                                  Opioid / Opioid Plus Controlled Substance Agreement    I understand that my care provider has prescribed an opioid (narcotic) controlled substance to help manage my condition(s). I am taking this medicine to help me function or work. I know this is strong medicine, and that it can cause serious side effects. Opioid medicine can be sedating, addicting and may cause a dependency on the drug. They can affect my ability to drive or think, and cause depression. They need to be taken exactly as prescribed. Combining opioids with certain medicines or chemicals (such as cocaine, sedatives and tranquilizers, sleeping pills, meth) can be dangerous or even fatal. Also, if I stop opioids suddenly, I may have severe withdrawal symptoms. Last, I understand that opioids do not work for all types of pain nor for all patients. If not helpful, I may be asked to stop them.        The risks, benefits, and side effects of these medicine(s) were explained to me. I agree that:    1. I will take part in other treatments as advised by my care team. This may be psychiatry or counseling, physical therapy, behavioral therapy, group treatment or a referral to a pain clinic. I will reduce or stop my medicine when my care team tells me to do so.  2. I will take my medicines as prescribed. I will not change the dose or schedule unless my care team tells me to. There will be no refills if I  run out early.   I may be contactedwithout warning and asked to complete a urine drug test or pill count at any time.   3. I will keep all my appointments, and understand this is part of the monitoring of opioids. My care team may require an office visit for EVERY opioid/controlled substance refill. If I miss appointments or don t follow instructions, my care team  To    may stop my medicine.  4. I will not ask other providers to prescribe controlled substances, and I will not accept controlled substances from other people. If I need another prescribed controlled substance for a new reason, I will tell my care team within 1 business day.  5. I will use one pharmacy to fill all of my controlled substance prescriptions, and it is up to me to make sure that I do not run out of my medicines on weekends or holidays. If my care team is willing to refill my opioid prescription without a visit, I must request refills only during office hours, refills may take up to 3 days to process, and it may take up to 5 to 7 days for my medicine to be mailed and ready at my pharmacy. Prescriptions will not be mailed anywhere except my pharmacy.        [id number]  Rev 12/18         Registration to scan to EHR                             Page 1 of 2               Controlled Substance Agreement Opioid        [facility]  01/13/20  Patient: Beatriz Francis  YOB: 1950  Medical Record Number: [id number]                                                                  6. I am responsible for my prescriptions. If the medicine/prescription is lost or stolen, it will not be replaced. I also agree not to share controlled substance medicines with anyone.  7. I agree to not use ANY illegal or recreational drugs. This includes marijuana, cocaine, bath salts or other drugs. I agree not to use alcohol unless my care team says I may.          I agree to give urine samples whenever asked. If I don t give a urine sample, the care team may stop my medicine.    8. If I enroll in the [place] Medical Marijuana program, I will tell my care team. I will also sign an agreement to share my medical records with my care team.   9. I will bring in my list of medicines (or my medicine bottles) each time I come to the clinic.   10. I will tell my care team right away if I become pregnant or have a  new medical problem treated outside of my regular clinic.  11. I understand that this medicine can affect my thinking and judgment. It may be unsafe for me to drive, use machinery and do dangerous tasks. I will not do any of these things until I know how the medicine affects me. If my dose changes, I will wait to see how it affects me. I will contact my care team if I have concerns about medicine side effects.    I understand that if I do not follow any of the conditions above, my prescriptions or treatment may be stopped.      I agree that my provider, clinic care team, and pharmacy may work with any city, state or federal law enforcement agency that investigates the misuse, sale, or other diversion of my controlled medicine. I will allow my provider to discuss my care with or share a copy of this agreement with any other treating provider, pharmacy or emergency room where I receive care. I agree to give up (waive) any right of privacy or confidentiality with respect to these consents.     I have read this agreement and have asked questions about anything I did not understand.      ________________________________________________________________________  Patient signature - Date/Time -  Beatriz Francis                                      ________________________________________________________________________  Witness signature                                                            ________________________________________________________________________  Provider signature - Freddie Mesa MD      [id number]  Rev 12/18         Registration to scan to EHR                         Page 2 of 2                   Controlled Substance Agreement Opioid           Page 1 of 2  Opioid Pain Medicines (also known as Narcotics)  What You Need to Know    What are opioids?   Opioids are pain medicines that must be prescribed by a doctor.  They are also known as narcotics.    Examples are:     morphine (MS Contin,  Jennifer)    oxycodone (Oxycontin)    oxycodone and acetaminophen (Percocet)    hydrocodone and acetaminophen (Vicodin, Norco)     fentanyl patch (Duragesic)     hydromorphone (Dilaudid)     methadone     What do opioids do well?   Opioids are best for short-term pain after a surgery or injury. They also work well for cancer pain. Unlike other pain medicines, they do not cause liver or kidney failure or ulcers. They may help some people with long-lasting (chronic) pain.     What do opioids NOT do well?   Opioids never get rid of pain entirely, and they do not work well for most patients with chronic pain. Opioids do not reduce swelling, one of the causes of pain. They also don t work well for nerve pain.                           For informational purposes only.  Not to replace the advice of your care provider.  Copyright 201 [facility]. All right reserved. [employer] 383194-Ywd 02/18.      Page 2 of 2    Risks and side effects   Talk to your doctor before you start or decide to keep taking one of these medicines. Side effects include:    Lowering your breathing rate enough to cause death    Overdose, including death, especially if taking higher than prescribed doses    Long-term opioid use    Worse depression symptoms; less pleasure in things you usually enjoy    Feeling tired or sluggish    Slower thoughts or cloudy thinking    Being more sensitive to pain over time; pain is harder to control    Trouble sleeping or restless sleep    Changes in hormone levels (for example, less testosterone)    Changes in sex drive or ability to have sex    Constipation    Unsafe driving    Itching and sweating    Feeling dizzy    Nausea, vomiting and dry mouth    What else should I know about opioids?  When someone takes opioids for too long or too often, they become dependent. This means that if you stop or reduce the medicine too quickly, you will have withdrawal symptoms.    Dependence is not the same as addiction.  Addiction is when people keep using a substance that harms their body, their mind or their relations with others. If you have a history of drug or alcohol abuse, taking opioids can cause a relapse.    Over time, opioids don t work as well. Most people will need higher and higher doses. The higher the dose, the more serious the side effects. We don t know the long-term effects of opioids.      Prescribed opioids aren't the best way to manage chronic pain    Other ways to manage pain include:      Ibuprofen or acetaminophen.  You should always try this first.      Treat health problems that may be causing pain.      acupuncture or massage, deep breathing, meditation, visual imagery, aromatherapy.      Use heat or ice at the pain site      Physical therapy and exercise      Stop smoking      See a counselor or therapist                                                  People who have used opioids for a long time may have a lower quality of life, worse depression, higher levels of pain and more visits to doctors.    Never share your opioids with others. Be sure to store opioids in a secure place, locked if possible.Young children can easily swallow them and overdose.     You can overdose on opioids.  Signs of overdose include decrease or loss of consciousness, slowed breathing, trouble waking and blue lips.  If someone is worried about overdose, they should call 911.    If you are at risk for overdose, you may get naloxone (Narcan, a medicine that reverses the effects of opioids.  If you overdose, a friend or family member can give you Narcan while waiting for the ambulance.  They need to know the signs of overdose and how to give Narcan.    While you're taking opioids:    Don't use alcohol or street drugs. Taking them together can cause death.    Don't take any of these medicines unless your doctor says its okay.  Taking these with opioids can cause death.    Benzodiazepines (such as lorazepam         or  diazepam)    Muscle relaxers (such as cyclobenzaprine)    sleeping pills    other opioids    Safe disposal of opioids  Find your area drug take-back program, your pharmacy mail-back program, buy a special disposal bag (such as Deterra) from your pharmacy or flush them down the toilet.  Use the guidelines at:  www.fda.gov/drugs/resourcesforyou

## 2025-06-12 NOTE — PROGRESS NOTES
Orders:    clotrimazole-betamethasone (LOTRISONE) 1-0.05 % cream; Apply topically 2 (two) times a day for 10 days  topical provided for yeast    Skin affirmation note    Admitting nurse completed full skin assessment, Juan Carlos score and Juan Carlos interventions. This writer agrees with the initial skin assessment findings.

## (undated) DEVICE — DRAPE STERI U 1015

## (undated) DEVICE — GLOVE PROTEXIS POWDER FREE 8.5 ORTHOPEDIC 2D73ET85

## (undated) DEVICE — DRSG AQUACEL AG 3.5X9.75" HYDROFIBER 412011

## (undated) DEVICE — LIGHT HANDLE X2

## (undated) DEVICE — ARTHROSCOPIC CANNULA TWIST-IN PURPLE 7MMX7CM AR-6570

## (undated) DEVICE — DEVICE PASSER LASSO 90DEG AR-6068-90

## (undated) DEVICE — DRAPE U-POUCH 34X29" 1067

## (undated) DEVICE — LINEN ORTHO PACK 5446

## (undated) DEVICE — ESU GROUND PAD ADULT W/CORD E7507

## (undated) DEVICE — BASIN SET MAJOR

## (undated) DEVICE — SYR ANGIOGRAPHY MULTIUSE KIT ACIST 014612

## (undated) DEVICE — GLOVE PROTEXIS POWDER FREE SMT 8.5 2D72PT85X

## (undated) DEVICE — BRUSH SURGICAL SCRUB W/4% CHLORHEXIDINE GLUCONATE SOL 4458A

## (undated) DEVICE — DEVICE ENDO CLIP INSTINCT PLUS INSC-P-7-230-S  G58010

## (undated) DEVICE — ESU ELEC NDL 1" E1552

## (undated) DEVICE — Device

## (undated) DEVICE — SU MONOCRYL 3-0 PS-1 27" Y936H

## (undated) DEVICE — DRSG STERI STRIP 1/2X4" R1547

## (undated) DEVICE — GLOVE PROTEXIS BLUE W/NEU-THERA 7.0  2D73EB70

## (undated) DEVICE — PACK OPEN SHOULDER CUSTOM ASC

## (undated) DEVICE — SU VICRYL 2-0 CT-1 27" UND J259H

## (undated) DEVICE — RESTRAINT LIMB HOLDER ANKLE/WRIST FOAM W/QUICK RELEASE 2533

## (undated) DEVICE — DRSG TEGADERM 4X4 3/4" 1626W

## (undated) DEVICE — DRSG DRAIN 4X4" 7086

## (undated) DEVICE — DRAPE MAYO STAND 23X54 8337

## (undated) DEVICE — SOL WATER IRRIG 1000ML BOTTLE 2F7114

## (undated) DEVICE — PAD ARMBOARD FOAM EGGCRATE COVIDEN 3114367

## (undated) DEVICE — KIT HAND CONTROL ANGIOTOUCH ACIST 65CM AT-P65

## (undated) DEVICE — DRAIN HEMOVAC RESERVOIR KIT 10FR 1/8" MED 00-2550-002-10

## (undated) DEVICE — SOL NACL 0.9% IRRIG 1000ML BOTTLE 2F7124

## (undated) DEVICE — STRAP KNEE/BODY 31143004

## (undated) DEVICE — SPONGE LAP 18X18" X8435

## (undated) DEVICE — CATH ANGIO JUDKINS JL4 6FRX100CM INFINITI 534620T

## (undated) DEVICE — ENDO SNARE EXACTO COLD 9MM LOOP 2.4MMX230CM 00711115

## (undated) DEVICE — CATH DIAG 4FR JL 4.5 538417

## (undated) DEVICE — SYR 10ML FINGER CONTROL W/O NDL 309695

## (undated) DEVICE — ESU ELEC VAPR PREMIER RF 90DEG 3.7MM 227204

## (undated) DEVICE — IMM KIT SHOULDER TMAX MASK FACE 7210559

## (undated) DEVICE — CATH TRAY FOLEY SURESTEP 16FR WDRAIN BAG STLK LATEX A300316A

## (undated) DEVICE — BONE CLEANING TIP INTERPULSE  0210-010-000

## (undated) DEVICE — DRSG ADAPTIC 3X8" 6113

## (undated) DEVICE — GUIDEWIRE VASC 0.014INX180CM RUNTHROUGH 25-1011

## (undated) DEVICE — WIPES FOLEY CARE SURESTEP PROVON DFC100

## (undated) DEVICE — CATH ANGIO INFINITI 3DRC 4FRX100CM 538476

## (undated) DEVICE — INTRO SHEATH 4FRX10CM PINNACLE RSS402

## (undated) DEVICE — CATH GUIDING  6F MACH 1 GUIDING CLS3.5

## (undated) DEVICE — DRAPE U-DRAPE 1015NSD NON-STERILE

## (undated) DEVICE — DRAPE IOBAN INCISE 23X17" 6650EZ

## (undated) DEVICE — SU ETHIBOND 1 CTX CR 8/18" CX30D

## (undated) DEVICE — NDL ECLIPSE 18GA 1.5"

## (undated) DEVICE — DEFIB PRO-PADZ LVP LQD GEL ADULT 8900-2105-01

## (undated) DEVICE — SYR BULB IRRIG 50ML LATEX FREE 0035280

## (undated) DEVICE — SUCTION MANIFOLD DORNOCH ULTRA CART UL-CL500

## (undated) DEVICE — GLOVE PROTEXIS W/NEU-THERA 6.5  2D73TE65

## (undated) DEVICE — CATH ANGIO INFINITI 3DRC 6FRX100CM 534676T

## (undated) DEVICE — PACK SET-UP STD 9102

## (undated) DEVICE — BONE CEMENT KIT BOWL AND SPATULA STRK 6201-3-410

## (undated) DEVICE — IMM KIT SHOULDER STABILIZATION 7210573

## (undated) DEVICE — DRSG GAUZE 4X8"

## (undated) DEVICE — INTRO SHEATH 6FRX10CM PINNACLE RSS602

## (undated) DEVICE — MANIFOLD KIT ANGIO AUTOMATED 014613

## (undated) DEVICE — BLADE SAW SAGITTAL STRK 20.7X85X0.89MM 2108-109-000S13

## (undated) DEVICE — SU SILK 2-0 TIE 12X30" A305H

## (undated) DEVICE — TUBING SYSTEM ARTHREX PATIENT REDEUCE AR-6421

## (undated) DEVICE — INTRODUCER CATH VASC 5FRX10CM  MPIS-501-NT-U-SST

## (undated) DEVICE — INFL DVC BASIXCOMPAK PLYCRB 30 ATM 13IN 20ML IN4530

## (undated) DEVICE — SUCTION MANIFOLD NEPTUNE 2 SYS 4 PORT 0702-020-000

## (undated) DEVICE — TOTE ANGIO CORP PC15AT SAN32CC83O

## (undated) DEVICE — SUCTION IRR SYSTEM W/O TIP INTERPULSE HANDPIECE 0210-100-000

## (undated) DEVICE — SU VICRYL 0 CT-1 27" UND J260H

## (undated) DEVICE — SU FIBERWIRE 2 38"  AR-7200

## (undated) DEVICE — SOL HYDROGEN PEROXIDE 3% 4OZ BOTTLE F0010

## (undated) DEVICE — GLOVE PROTEXIS W/NEU-THERA 8.5  2D73TE85

## (undated) DEVICE — CATH BALLOON NC EMERGE 2.50X15MM H7493926715250

## (undated) DEVICE — CATH BALLOON NC EMERGE 2.50X12MM H7493926712250

## (undated) DEVICE — SU MONOCRYL 3-0 PS-2 18" UND Y497G

## (undated) DEVICE — ESU PENCIL W/HOLSTER

## (undated) DEVICE — CATH BALLOON EMERGE 2.25X12MMH7493918912220

## (undated) DEVICE — ESU CLEANER TIP 31142717

## (undated) RX ORDER — PROPOFOL 10 MG/ML
INJECTION, EMULSION INTRAVENOUS
Status: DISPENSED
Start: 2022-03-02

## (undated) RX ORDER — PROPOFOL 10 MG/ML
INJECTION, EMULSION INTRAVENOUS
Status: DISPENSED
Start: 2017-01-23

## (undated) RX ORDER — FENTANYL CITRATE 50 UG/ML
INJECTION, SOLUTION INTRAMUSCULAR; INTRAVENOUS
Status: DISPENSED
Start: 2017-01-23

## (undated) RX ORDER — HEPARIN SODIUM 1000 [USP'U]/ML
INJECTION, SOLUTION INTRAVENOUS; SUBCUTANEOUS
Status: DISPENSED
Start: 2023-01-01

## (undated) RX ORDER — GLYCOPYRROLATE 0.2 MG/ML
INJECTION, SOLUTION INTRAMUSCULAR; INTRAVENOUS
Status: DISPENSED
Start: 2022-09-07

## (undated) RX ORDER — ACETAMINOPHEN 325 MG/1
TABLET ORAL
Status: DISPENSED
Start: 2017-02-15

## (undated) RX ORDER — PROPOFOL 10 MG/ML
INJECTION, EMULSION INTRAVENOUS
Status: DISPENSED
Start: 2022-09-07

## (undated) RX ORDER — IPRATROPIUM BROMIDE AND ALBUTEROL SULFATE 2.5; .5 MG/3ML; MG/3ML
SOLUTION RESPIRATORY (INHALATION)
Status: DISPENSED
Start: 2017-02-15

## (undated) RX ORDER — CEFAZOLIN SODIUM 2 G/100ML
INJECTION, SOLUTION INTRAVENOUS
Status: DISPENSED
Start: 2017-02-15

## (undated) RX ORDER — OXYCODONE HYDROCHLORIDE 5 MG/1
TABLET ORAL
Status: DISPENSED
Start: 2023-01-01

## (undated) RX ORDER — LIDOCAINE HYDROCHLORIDE 10 MG/ML
INJECTION, SOLUTION EPIDURAL; INFILTRATION; INTRACAUDAL; PERINEURAL
Status: DISPENSED
Start: 2022-09-07

## (undated) RX ORDER — LIDOCAINE HYDROCHLORIDE 10 MG/ML
INJECTION, SOLUTION EPIDURAL; INFILTRATION; INTRACAUDAL; PERINEURAL
Status: DISPENSED
Start: 2023-01-01

## (undated) RX ORDER — GABAPENTIN 300 MG/1
CAPSULE ORAL
Status: DISPENSED
Start: 2017-02-15

## (undated) RX ORDER — HEPARIN SODIUM 200 [USP'U]/100ML
INJECTION, SOLUTION INTRAVENOUS
Status: DISPENSED
Start: 2023-01-01

## (undated) RX ORDER — NITROGLYCERIN 5 MG/ML
VIAL (ML) INTRAVENOUS
Status: DISPENSED
Start: 2023-01-01

## (undated) RX ORDER — FENTANYL CITRATE 50 UG/ML
INJECTION, SOLUTION INTRAMUSCULAR; INTRAVENOUS
Status: DISPENSED
Start: 2023-01-01

## (undated) RX ORDER — FENTANYL CITRATE 50 UG/ML
INJECTION, SOLUTION INTRAMUSCULAR; INTRAVENOUS
Status: DISPENSED
Start: 2017-02-15